# Patient Record
Sex: FEMALE | Race: WHITE | NOT HISPANIC OR LATINO
[De-identification: names, ages, dates, MRNs, and addresses within clinical notes are randomized per-mention and may not be internally consistent; named-entity substitution may affect disease eponyms.]

---

## 2017-01-12 ENCOUNTER — CHARTING TRANS (OUTPATIENT)
Dept: OTHER | Age: 57
End: 2017-01-12

## 2017-01-18 ENCOUNTER — CHARTING TRANS (OUTPATIENT)
Dept: OTHER | Age: 57
End: 2017-01-18

## 2017-01-20 ENCOUNTER — CHARTING TRANS (OUTPATIENT)
Dept: OTHER | Age: 57
End: 2017-01-20

## 2017-02-16 ENCOUNTER — CHARTING TRANS (OUTPATIENT)
Dept: OTHER | Age: 57
End: 2017-02-16

## 2017-02-17 ENCOUNTER — CHARTING TRANS (OUTPATIENT)
Dept: OTHER | Age: 57
End: 2017-02-17

## 2017-02-23 ENCOUNTER — MYAURORA ACCOUNT LINK (OUTPATIENT)
Dept: OTHER | Age: 57
End: 2017-02-23

## 2017-02-24 ENCOUNTER — PRIOR ORIGINAL RECORDS (OUTPATIENT)
Dept: OTHER | Age: 57
End: 2017-02-24

## 2017-03-13 ENCOUNTER — CHARTING TRANS (OUTPATIENT)
Dept: FAMILY MEDICINE | Age: 57
End: 2017-03-13

## 2017-03-13 ENCOUNTER — LAB SERVICES (OUTPATIENT)
Dept: OTHER | Age: 57
End: 2017-03-13

## 2017-03-13 ENCOUNTER — MYAURORA ACCOUNT LINK (OUTPATIENT)
Dept: OTHER | Age: 57
End: 2017-03-13

## 2017-03-13 ENCOUNTER — CHARTING TRANS (OUTPATIENT)
Dept: OTHER | Age: 57
End: 2017-03-13

## 2017-03-13 LAB
BILIRUBIN URINE: ABNORMAL
BLOOD URINE: NEGATIVE
CLARITY: ABNORMAL
COLOR: ABNORMAL
GLUCOSE QUALITATIVE U: NEGATIVE
KETONES, URINE: ABNORMAL
LEUKOCYTE ESTERASE URINE: NEGATIVE
NITRITE URINE: NEGATIVE
PH URINE: 6.5 (ref 5–7)
SPECIFIC GRAVITY URINE: 1.02 (ref 1–1.03)
URINE PROTEIN, QUAL (DIPSTICK): 100
UROBILINOGEN URINE: 1

## 2017-03-14 ENCOUNTER — CHARTING TRANS (OUTPATIENT)
Dept: OTHER | Age: 57
End: 2017-03-14

## 2017-03-14 ENCOUNTER — LAB SERVICES (OUTPATIENT)
Dept: OTHER | Age: 57
End: 2017-03-14

## 2017-03-14 ENCOUNTER — PRIOR ORIGINAL RECORDS (OUTPATIENT)
Dept: OTHER | Age: 57
End: 2017-03-14

## 2017-03-14 LAB
ALBUMIN SERPL BCG-MCNC: 4 G/DL (ref 3.6–5.1)
ALP SERPL-CCNC: 99 U/L (ref 45–105)
ALT SERPL W/O P-5'-P-CCNC: 24 U/L (ref 15–43)
AST SERPL-CCNC: 20 U/L (ref 14–43)
BILIRUB SERPL-MCNC: 0.7 MG/DL (ref 0–1.3)
BILIRUBIN URINE: NEGATIVE
BLOOD URINE: NEGATIVE
BUN SERPL-MCNC: 15 MG/DL (ref 7–20)
CALCIUM SERPL-MCNC: 9.5 MG/DL (ref 8.6–10.6)
CHLORIDE SERPL-SCNC: 108 MMOL/L (ref 96–107)
CLARITY: CLEAR
COLOR: YELLOW
CREATININE, SERUM: 0.8 MG/DL (ref 0.5–1.4)
GFR SERPL CREATININE-BSD FRML MDRD: >60 ML/MIN/{1.73M2}
GFR SERPL CREATININE-BSD FRML MDRD: >60 ML/MIN/{1.73M2}
GLUCOSE QUALITATIVE U: NEGATIVE
GLUCOSE SERPL-MCNC: 106 MG/DL (ref 70–200)
HCO3 SERPL-SCNC: 22 MMOL/L (ref 22–32)
KETONES, URINE: NEGATIVE
LEUKOCYTE ESTERASE URINE: NEGATIVE
NITRITE URINE: NEGATIVE
PH URINE: 7 (ref 5–7)
POTASSIUM SERPL-SCNC: 4.1 MMOL/L (ref 3.5–5.3)
PROT SERPL-MCNC: 7 G/DL (ref 6.4–8.5)
SODIUM SERPL-SCNC: 146 MMOL/L (ref 136–146)
SPECIFIC GRAVITY URINE: 1.02 (ref 1–1.03)
URINE PROTEIN, QUAL (DIPSTICK): NORMAL
UROBILINOGEN URINE: 0.2

## 2017-03-15 ENCOUNTER — CHARTING TRANS (OUTPATIENT)
Dept: OTHER | Age: 57
End: 2017-03-15

## 2017-03-15 LAB
ALBUMIN/CREAT UR: 21.7 MG/G (ref 0–30)
CREAT UR-MCNC: 79.7 MG/DL
MICROALBUMIN UR-MCNC: 17.3 MG/L

## 2017-03-16 LAB — FINAL REPORT: ABNORMAL

## 2017-03-28 ENCOUNTER — LAB SERVICES (OUTPATIENT)
Dept: OTHER | Age: 57
End: 2017-03-28

## 2017-03-28 ENCOUNTER — CHARTING TRANS (OUTPATIENT)
Dept: FAMILY MEDICINE | Age: 57
End: 2017-03-28

## 2017-03-28 ENCOUNTER — PRIOR ORIGINAL RECORDS (OUTPATIENT)
Dept: OTHER | Age: 57
End: 2017-03-28

## 2017-03-28 LAB
ALBUMIN SERPL BCG-MCNC: 3.5 G/DL (ref 3.6–5.1)
ALP SERPL-CCNC: 116 U/L (ref 45–105)
ALT SERPL W/O P-5'-P-CCNC: 31 U/L (ref 15–43)
AST SERPL-CCNC: 32 U/L (ref 14–43)
BILIRUB SERPL-MCNC: 0.9 MG/DL (ref 0–1.3)
BUN SERPL-MCNC: 13 MG/DL (ref 7–20)
CALCIUM SERPL-MCNC: 9.5 MG/DL (ref 8.6–10.6)
CHLORIDE SERPL-SCNC: 109 MMOL/L (ref 96–107)
CHOLEST SERPL-MCNC: 129 MG/DL (ref 140–200)
CREATININE, SERUM: 0.9 MG/DL (ref 0.5–1.4)
GFR SERPL CREATININE-BSD FRML MDRD: >60 ML/MIN/{1.73M2}
GFR SERPL CREATININE-BSD FRML MDRD: >60 ML/MIN/{1.73M2}
GLUCOSE SERPL-MCNC: 92 MG/DL (ref 70–200)
HCO3 SERPL-SCNC: 26 MMOL/L (ref 22–32)
HDLC SERPL-MCNC: 64 MG/DL
LDLC SERPL CALC-MCNC: 42 MG/DL (ref 30–100)
POTASSIUM SERPL-SCNC: 5 MMOL/L (ref 3.5–5.3)
PROT SERPL-MCNC: 6.6 G/DL (ref 6.4–8.5)
SODIUM SERPL-SCNC: 145 MMOL/L (ref 136–146)
TRIGL SERPL-MCNC: 115 MG/DL (ref 0–200)

## 2017-04-20 ENCOUNTER — HOSPITAL ENCOUNTER (OUTPATIENT)
Dept: LAB | Facility: HOSPITAL | Age: 57
Discharge: HOME | End: 2017-04-20
Attending: INTERNAL MEDICINE
Payer: COMMERCIAL

## 2017-04-20 ENCOUNTER — CHARTING TRANS (OUTPATIENT)
Dept: OTHER | Age: 57
End: 2017-04-20

## 2017-04-20 ENCOUNTER — PRIOR ORIGINAL RECORDS (OUTPATIENT)
Dept: OTHER | Age: 57
End: 2017-04-20

## 2017-04-20 PROCEDURE — 81001 URINALYSIS AUTO W/SCOPE: CPT | Performed by: INTERNAL MEDICINE

## 2017-04-20 PROCEDURE — 80048 BASIC METABOLIC PNL TOTAL CA: CPT | Performed by: INTERNAL MEDICINE

## 2017-04-20 PROCEDURE — 36415 COLL VENOUS BLD VENIPUNCTURE: CPT | Performed by: INTERNAL MEDICINE

## 2017-04-20 PROCEDURE — 85025 COMPLETE CBC W/AUTO DIFF WBC: CPT | Performed by: INTERNAL MEDICINE

## 2017-04-21 ENCOUNTER — PRIOR ORIGINAL RECORDS (OUTPATIENT)
Dept: OTHER | Age: 57
End: 2017-04-21

## 2017-04-21 LAB
ALBUMIN: 3.5 G/DL
ALKALINE PHOSPHATATE(ALK PHOS): 116 IU/L
BILIRUBIN TOTAL: 0.9 MG/DL
BUN: 13 MG/DL
CALCIUM: 9.5 MG/DL
CHLORIDE: 109 MEQ/L
CHOLESTEROL, TOTAL: 129 MG/DL
CREATININE, SERUM: 0.9 MG/DL
GLUCOSE: 92 MG/DL
HDL CHOLESTEROL: 64 MG/DL
LDL CHOLESTEROL: 42 MG/DL
POTASSIUM, SERUM: 4.5 MEQ/L
PROTEIN, TOTAL: 6.6 G/DL
SODIUM: 145 MEQ/L
TRIGLYCERIDES: 115 MG/DL

## 2017-04-27 LAB
BUN: 11 MG/DL
CALCIUM: 8.8 MG/DL
CHLORIDE: 103 MEQ/L
CREATININE, SERUM: 0.77 MG/DL
GLUCOSE: 88 MG/DL
HEMATOCRIT: 43.3 %
HEMOGLOBIN: 13.8 G/DL
PLATELETS: 192 K/UL
POTASSIUM, SERUM: 4.7 MEQ/L
RED BLOOD COUNT: 4.39 X 10-6/U
SODIUM: 136 MEQ/L
WHITE BLOOD COUNT: 9.4 X 10-3/U

## 2017-05-09 ENCOUNTER — APPOINTMENT (OUTPATIENT)
Dept: CT IMAGING | Facility: HOSPITAL | Age: 57
End: 2017-05-09
Attending: EMERGENCY MEDICINE
Payer: COMMERCIAL

## 2017-05-09 ENCOUNTER — PRIOR ORIGINAL RECORDS (OUTPATIENT)
Dept: OTHER | Age: 57
End: 2017-05-09

## 2017-05-09 ENCOUNTER — APPOINTMENT (OUTPATIENT)
Dept: GENERAL RADIOLOGY | Facility: HOSPITAL | Age: 57
End: 2017-05-09
Attending: EMERGENCY MEDICINE
Payer: COMMERCIAL

## 2017-05-09 ENCOUNTER — HOSPITAL ENCOUNTER (EMERGENCY)
Facility: HOSPITAL | Age: 57
Discharge: HOME | End: 2017-05-09
Attending: EMERGENCY MEDICINE
Payer: COMMERCIAL

## 2017-05-09 VITALS
HEART RATE: 77 BPM | OXYGEN SATURATION: 98 % | TEMPERATURE: 99 F | WEIGHT: 105 LBS | BODY MASS INDEX: 17.49 KG/M2 | RESPIRATION RATE: 14 BRPM | DIASTOLIC BLOOD PRESSURE: 77 MMHG | HEIGHT: 65 IN | SYSTOLIC BLOOD PRESSURE: 111 MMHG

## 2017-05-09 DIAGNOSIS — E86.0 DEHYDRATION: ICD-10-CM

## 2017-05-09 DIAGNOSIS — K52.9 GASTROENTERITIS: Primary | ICD-10-CM

## 2017-05-09 PROCEDURE — 96375 TX/PRO/DX INJ NEW DRUG ADDON: CPT

## 2017-05-09 PROCEDURE — 71020 XR CHEST PA + LAT CHEST (CPT=71020): CPT | Performed by: EMERGENCY MEDICINE

## 2017-05-09 PROCEDURE — 83690 ASSAY OF LIPASE: CPT | Performed by: EMERGENCY MEDICINE

## 2017-05-09 PROCEDURE — 99284 EMERGENCY DEPT VISIT MOD MDM: CPT

## 2017-05-09 PROCEDURE — 93010 ELECTROCARDIOGRAM REPORT: CPT

## 2017-05-09 PROCEDURE — 96374 THER/PROPH/DIAG INJ IV PUSH: CPT

## 2017-05-09 PROCEDURE — 96361 HYDRATE IV INFUSION ADD-ON: CPT

## 2017-05-09 PROCEDURE — 83880 ASSAY OF NATRIURETIC PEPTIDE: CPT | Performed by: EMERGENCY MEDICINE

## 2017-05-09 PROCEDURE — 80053 COMPREHEN METABOLIC PANEL: CPT | Performed by: EMERGENCY MEDICINE

## 2017-05-09 PROCEDURE — 93005 ELECTROCARDIOGRAM TRACING: CPT

## 2017-05-09 PROCEDURE — 85025 COMPLETE CBC W/AUTO DIFF WBC: CPT | Performed by: EMERGENCY MEDICINE

## 2017-05-09 PROCEDURE — 74177 CT ABD & PELVIS W/CONTRAST: CPT | Performed by: EMERGENCY MEDICINE

## 2017-05-09 PROCEDURE — 84484 ASSAY OF TROPONIN QUANT: CPT | Performed by: EMERGENCY MEDICINE

## 2017-05-09 RX ORDER — ONDANSETRON 2 MG/ML
4 INJECTION INTRAMUSCULAR; INTRAVENOUS ONCE
Status: COMPLETED | OUTPATIENT
Start: 2017-05-09 | End: 2017-05-09

## 2017-05-09 RX ORDER — MORPHINE SULFATE 4 MG/ML
4 INJECTION, SOLUTION INTRAMUSCULAR; INTRAVENOUS ONCE
Status: COMPLETED | OUTPATIENT
Start: 2017-05-09 | End: 2017-05-09

## 2017-05-09 NOTE — ED PROVIDER NOTES
Patient Seen in: [facility] Emergency Department    History   Patient presents with:  Nausea/Vomiting/Diarrhea (gastrointestinal): Diarrhea since last night with vomiting today.     Stated Complaint: vomiting and diarrhea    HPI    Patient is a pleas total) by mouth daily. Pravastatin Sodium 20 MG Oral Tab,  Take 1 tablet (20 mg total) by mouth nightly.    Zolpidem Tartrate ER 6.25 MG Oral Tab CR,  Take 6.25 mg by mouth nightly as needed for Sleep.   omeprazole 20 MG Oral Capsule Delayed Release,  [place] °C) (Temporal)  Resp 14  Ht 165.1 cm (5' 5\")  Wt 47.628 kg  BMI 17.47 kg/m2  SpO2 98%    Physical Exam    Vital signs are reviewed noted as documented in the nursing chart.    GENERAL: Patient is awake and alert, resting comfortably on the cart, in no appa UA HOLD   C. DIFFICILE(TOXIGENIC)PCR   STOOL CULTURE W/SHIGATOXIN    Narrative: The following orders were created for panel order STOOL CULTURE W/SHIGATOXIN.   Procedure                               Abnormality         Status                     ---- 12:34.  INDICATIONS:  vomiting and diarrhea  TECHNIQUE:  CT scanning was performed from the dome of the diaphragm to the pubic symphysis with non-ionic intravenous contrast material. Post contrast coronal MPR imaging was performed.   Dose reduction techniqu established, and blood was drawn and sent to the laboratory for further evaluation. The patient was attached to a cardiac monitor. An EKG was obtained and reviewed as noted above.  Patient was observed while the laboratory and radiology studies were obtaine

## 2017-05-09 NOTE — ED INITIAL ASSESSMENT (HPI)
Diarrhea since Thursday, vomiting starting yesterday. Patient states she feels \"depleted\". Denies fever at home.

## 2017-05-10 ENCOUNTER — CHARTING TRANS (OUTPATIENT)
Dept: OTHER | Age: 57
End: 2017-05-10

## 2017-06-05 ENCOUNTER — CHARTING TRANS (OUTPATIENT)
Dept: OTHER | Age: 57
End: 2017-06-05

## 2017-06-07 ENCOUNTER — CHARTING TRANS (OUTPATIENT)
Dept: OTHER | Age: 57
End: 2017-06-07

## 2017-06-21 ENCOUNTER — MYAURORA ACCOUNT LINK (OUTPATIENT)
Dept: OTHER | Age: 57
End: 2017-06-21

## 2017-06-21 ENCOUNTER — PRIOR ORIGINAL RECORDS (OUTPATIENT)
Dept: OTHER | Age: 57
End: 2017-06-21

## 2017-07-01 ENCOUNTER — APPOINTMENT (OUTPATIENT)
Dept: CT IMAGING | Facility: HOSPITAL | Age: 57
End: 2017-07-01
Attending: EMERGENCY MEDICINE
Payer: COMMERCIAL

## 2017-07-01 ENCOUNTER — PRIOR ORIGINAL RECORDS (OUTPATIENT)
Dept: OTHER | Age: 57
End: 2017-07-01

## 2017-07-01 ENCOUNTER — HOSPITAL ENCOUNTER (EMERGENCY)
Facility: HOSPITAL | Age: 57
Discharge: LEFT BEFORE DISCHARGE | End: 2017-07-01
Attending: EMERGENCY MEDICINE
Payer: COMMERCIAL

## 2017-07-01 VITALS
TEMPERATURE: 98 F | RESPIRATION RATE: 16 BRPM | BODY MASS INDEX: 19.16 KG/M2 | HEIGHT: 65 IN | SYSTOLIC BLOOD PRESSURE: 112 MMHG | DIASTOLIC BLOOD PRESSURE: 76 MMHG | HEART RATE: 71 BPM | WEIGHT: 115 LBS | OXYGEN SATURATION: 97 %
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
## 2017-07-01 DIAGNOSIS — R41.9 ALTERATION OF AWARENESS: Primary | ICD-10-CM

## 2017-07-01 DIAGNOSIS — T42.6X1A: ICD-10-CM

## 2017-07-01 LAB
ALBUMIN SERPL-MCNC: 3.3 G/DL (ref 3.5–4.8)
ALP LIVER SERPL-CCNC: 93 U/L (ref 41–108)
ALT SERPL-CCNC: 12 U/L (ref 14–54)
AMPHETAMINE URINE: NEGATIVE
APTT PPP: 27.5 SECONDS (ref 25–34)
AST SERPL-CCNC: 20 U/L (ref 15–41)
BARBITURATES URINE: NEGATIVE
BASOPHILS # BLD AUTO: 0.19 X10(3) UL (ref 0–0.1)
BASOPHILS NFR BLD AUTO: 2.1 %
BENZODIAZEPINES URINE: NEGATIVE
BILIRUB SERPL-MCNC: 1.9 MG/DL (ref 0.1–2)
BILIRUB UR QL STRIP.AUTO: NEGATIVE
BUN BLD-MCNC: 13 MG/DL (ref 8–20)
CALCIUM BLD-MCNC: 8.4 MG/DL (ref 8.3–10.3)
CHLORIDE: 110 MMOL/L (ref 101–111)
CLARITY UR REFRACT.AUTO: CLEAR
CO2: 23 MMOL/L (ref 22–32)
COCAINE URINE: NEGATIVE
COLOR UR AUTO: YELLOW
CREAT BLD-MCNC: 1.01 MG/DL (ref 0.55–1.02)
EOSINOPHIL # BLD AUTO: 0.28 X10(3) UL (ref 0–0.3)
EOSINOPHIL NFR BLD AUTO: 3.1 %
ERYTHROCYTE [DISTWIDTH] IN BLOOD BY AUTOMATED COUNT: 12.4 % (ref 11.5–16)
ETHYL ALCOHOL: 52 MG/DL (ref ?–3)
GLUCOSE BLD-MCNC: 84 MG/DL (ref 70–99)
GLUCOSE BLD-MCNC: 90 MG/DL (ref 65–99)
GLUCOSE UR STRIP.AUTO-MCNC: NEGATIVE MG/DL
HCT VFR BLD AUTO: 37.8 % (ref 34–50)
HGB BLD-MCNC: 12.8 G/DL (ref 12–16)
IMMATURE GRANULOCYTE COUNT: 0.1 X10(3) UL (ref 0–1)
IMMATURE GRANULOCYTE RATIO %: 1.1 %
INR BLD: 1 (ref 0.89–1.11)
KETONES UR STRIP.AUTO-MCNC: NEGATIVE MG/DL
LEUKOCYTE ESTERASE UR QL STRIP.AUTO: NEGATIVE
LYMPHOCYTES # BLD AUTO: 2.85 X10(3) UL (ref 0.9–4)
LYMPHOCYTES NFR BLD AUTO: 31.4 %
M PROTEIN MFR SERPL ELPH: 6.7 G/DL (ref 6.1–8.3)
MCH RBC QN AUTO: 30.8 PG (ref 27–33.2)
MCHC RBC AUTO-ENTMCNC: 33.9 G/DL (ref 31–37)
MCV RBC AUTO: 91.1 FL (ref 81–100)
MONOCYTES # BLD AUTO: 0.65 X10(3) UL (ref 0.1–0.6)
MONOCYTES NFR BLD AUTO: 7.2 %
NEUTROPHIL ABS PRELIM: 5.02 X10 (3) UL (ref 1.3–6.7)
NEUTROPHILS # BLD AUTO: 5.02 X10(3) UL (ref 1.3–6.7)
NEUTROPHILS NFR BLD AUTO: 55.1 %
NITRITE UR QL STRIP.AUTO: NEGATIVE
OPIATE URINE: NEGATIVE
PCP URINE: NEGATIVE
PH UR STRIP.AUTO: 6 [PH] (ref 4.5–8)
PLATELET # BLD AUTO: 176 10(3)UL (ref 150–450)
POTASSIUM SERPL-SCNC: 3.9 MMOL/L (ref 3.6–5.1)
PROT UR STRIP.AUTO-MCNC: NEGATIVE MG/DL
PSA SERPL DL<=0.01 NG/ML-MCNC: 13.2 SECONDS (ref 12–14.3)
RBC # BLD AUTO: 4.15 X10(6)UL (ref 3.8–5.1)
RBC UR QL AUTO: NEGATIVE
RED CELL DISTRIBUTION WIDTH-SD: 41.2 FL (ref 35.1–46.3)
SODIUM SERPL-SCNC: 141 MMOL/L (ref 136–144)
SP GR UR STRIP.AUTO: 1.01 (ref 1–1.03)
UROBILINOGEN UR STRIP.AUTO-MCNC: <2 MG/DL
WBC # BLD AUTO: 9.1 X10(3) UL (ref 4–13)

## 2017-07-01 PROCEDURE — 70450 CT HEAD/BRAIN W/O DYE: CPT | Performed by: EMERGENCY MEDICINE

## 2017-07-01 PROCEDURE — 82962 GLUCOSE BLOOD TEST: CPT

## 2017-07-01 PROCEDURE — 81003 URINALYSIS AUTO W/O SCOPE: CPT | Performed by: EMERGENCY MEDICINE

## 2017-07-01 PROCEDURE — 99285 EMERGENCY DEPT VISIT HI MDM: CPT

## 2017-07-01 PROCEDURE — 85610 PROTHROMBIN TIME: CPT | Performed by: EMERGENCY MEDICINE

## 2017-07-01 PROCEDURE — 96361 HYDRATE IV INFUSION ADD-ON: CPT

## 2017-07-01 PROCEDURE — 85025 COMPLETE CBC W/AUTO DIFF WBC: CPT | Performed by: EMERGENCY MEDICINE

## 2017-07-01 PROCEDURE — 96360 HYDRATION IV INFUSION INIT: CPT

## 2017-07-01 PROCEDURE — 93005 ELECTROCARDIOGRAM TRACING: CPT

## 2017-07-01 PROCEDURE — 85730 THROMBOPLASTIN TIME PARTIAL: CPT | Performed by: EMERGENCY MEDICINE

## 2017-07-01 PROCEDURE — 80307 DRUG TEST PRSMV CHEM ANLYZR: CPT | Performed by: EMERGENCY MEDICINE

## 2017-07-01 PROCEDURE — 80320 DRUG SCREEN QUANTALCOHOLS: CPT | Performed by: EMERGENCY MEDICINE

## 2017-07-01 PROCEDURE — 80053 COMPREHEN METABOLIC PANEL: CPT | Performed by: EMERGENCY MEDICINE

## 2017-07-01 PROCEDURE — 93010 ELECTROCARDIOGRAM REPORT: CPT

## 2017-07-01 RX ORDER — SODIUM CHLORIDE 9 MG/ML
INJECTION, SOLUTION INTRAVENOUS ONCE
Status: COMPLETED | OUTPATIENT
Start: 2017-07-01 | End: 2017-07-01

## 2017-07-01 RX ORDER — ACETAMINOPHEN 500 MG
1000 TABLET ORAL ONCE
Status: COMPLETED | OUTPATIENT
Start: 2017-07-01 | End: 2017-07-01

## 2017-07-01 NOTE — ED NOTES
While reviewing home medications with patients spouse, spouse stating that patient takes Ambien nightly, but approximately 15 ambien are missing from rx bottle. Patient unable to recall if she took more than prescribed dose.

## 2017-07-01 NOTE — ED PROVIDER NOTES
Patient Seen in: [facility] Emergency Department    History   Patient presents with:  Altered Mental Status (neurologic)  Dizziness (neurologic)    Stated Complaint: altered mental status,dizziness    HPI    Majority of history is obtained from [facility]  No date: OTHER SURGICAL HISTORY      Comment: heart net    Medications :   lisinopril 10 MG Oral Tab,  Take 1 tablet (10 mg total) by mouth daily. Patient taking differently: Take 20 mg by mouth daily.      Metoprolol Succinate ER 25 MG Oral otherwise stated in HPI.     Physical Exam   ED Triage Vitals [07/01/17 1720]  BP: 100/77  Pulse: 71  Resp: (!) 32  Temp: 97.7 °F (36.5 °C)  Temp src: Temporal  SpO2: 97 %  O2 Device: None (Room air)    Current:/81   Pulse 71   Temp 97.7 °F (36.5 °C) Positive (*)     All other components within normal limits    Narrative:     Results of the Urine Drug Screen should be used only for medical purposes.    ETHYL ALCOHOL - Abnormal; Notable for the following:     Ethyl Alcohol 52 (*)     All other components especially since patient is reporting significant headache. Metabolic or infectious history is also considered.       Patient hydrated with normal saline and closely monitored    CBC shows normal white count and hemoglobin with adequate platelets  metaboli

## 2017-07-01 NOTE — ED INITIAL ASSESSMENT (HPI)
Patient arrives from home with spouse, spouse states patient became confused last night. Spouse said that patient came downstairs and stated she felt like she was drunk but she was not drinking that day.  Patient also took her morning medications at night a

## 2017-07-02 LAB
ATRIAL RATE: 70 BPM
P AXIS: 39 DEGREES
P-R INTERVAL: 202 MS
Q-T INTERVAL: 414 MS
QRS DURATION: 82 MS
QTC CALCULATION (BEZET): 447 MS
R AXIS: -12 DEGREES
T AXIS: 146 DEGREES
VENTRICULAR RATE: 70 BPM

## 2017-07-02 NOTE — ED NOTES
RE-EVSANTINO ONTIVEROS MD AND TO BE DCD AMA--DETAILED DISCUSION WITH FINDINGS AND PRECAUTIONS FOR AMA. TO RETURN IF S/S RETURN OR BECOME GREATER.  IN AGREEMENT . QUESTIONS ANSWERED PER JENNIFER SALEH.  IV DCD INTACT.   AMB + GAIT WITH SPOUSE

## 2017-07-10 ENCOUNTER — CHARTING TRANS (OUTPATIENT)
Dept: OTHER | Age: 57
End: 2017-07-10

## 2017-07-10 ENCOUNTER — LAB SERVICES (OUTPATIENT)
Dept: OTHER | Age: 57
End: 2017-07-10

## 2017-07-10 ENCOUNTER — CHARTING TRANS (OUTPATIENT)
Dept: FAMILY MEDICINE | Age: 57
End: 2017-07-10

## 2017-07-13 ENCOUNTER — MYAURORA ACCOUNT LINK (OUTPATIENT)
Dept: OTHER | Age: 57
End: 2017-07-13

## 2017-07-13 ENCOUNTER — HOSPITAL ENCOUNTER (OUTPATIENT)
Dept: CV DIAGNOSTICS | Facility: HOSPITAL | Age: 57
Discharge: HOME | End: 2017-07-13
Attending: INTERNAL MEDICINE

## 2017-07-13 DIAGNOSIS — I50.1 LEFT HEART FAILURE (HCC): ICD-10-CM

## 2017-07-13 DIAGNOSIS — R06.00 DYSPNEA, UNSPECIFIED TYPE: ICD-10-CM

## 2017-07-21 ENCOUNTER — CHARTING TRANS (OUTPATIENT)
Dept: OTHER | Age: 57
End: 2017-07-21

## 2017-07-21 LAB — AP REPORT: NORMAL

## 2017-07-25 LAB — HPV I/H RISK 4 DNA CVX QL NAA+PROBE: NORMAL

## 2017-08-02 ENCOUNTER — CHARTING TRANS (OUTPATIENT)
Dept: OTHER | Age: 57
End: 2017-08-02

## 2017-08-02 ENCOUNTER — PRIOR ORIGINAL RECORDS (OUTPATIENT)
Dept: OTHER | Age: 57
End: 2017-08-02

## 2017-08-14 LAB
ALBUMIN: 3.3 G/DL
ALBUMIN: 3.5 G/DL
ALBUMIN: 4 G/DL
ALKALINE PHOSPHATATE(ALK PHOS): 116 IU/L
ALKALINE PHOSPHATATE(ALK PHOS): 93 IU/L
ALKALINE PHOSPHATATE(ALK PHOS): 99 IU/L
BILIRUBIN TOTAL: 0.7 MG/DL
BILIRUBIN TOTAL: 0.9 MG/DL
BILIRUBIN TOTAL: 1.9 MG/DL
BUN: 13 MG/DL
BUN: 13 MG/DL
BUN: 15 MG/DL
CALCIUM: 8.4 MG/DL
CALCIUM: 9.5 MG/DL
CALCIUM: 9.5 MG/DL
CHLORIDE: 108 MEQ/L
CHLORIDE: 109 MEQ/L
CHLORIDE: 110 MEQ/L
CHOLESTEROL, TOTAL: 129 MG/DL
CREATININE, SERUM: 0.8 MG/DL
CREATININE, SERUM: 0.9 MG/DL
CREATININE, SERUM: 1.01 MG/DL
GLUCOSE: 106 MG/DL
GLUCOSE: 84 MG/DL
GLUCOSE: 92 MG/DL
HDL CHOLESTEROL: 64 MG/DL
LDL CHOLESTEROL: 42 MG/DL
POTASSIUM, SERUM: 3.9 MEQ/L
POTASSIUM, SERUM: 4.1 MEQ/L
POTASSIUM, SERUM: 5 MEQ/L
PROTEIN, TOTAL: 6.6 G/DL
PROTEIN, TOTAL: 6.7 G/DL
PROTEIN, TOTAL: 7 G/DL
SGOT (AST): 20 IU/L
SGOT (AST): 20 IU/L
SGOT (AST): 32 IU/L
SGPT (ALT): 12 IU/L
SGPT (ALT): 24 IU/L
SGPT (ALT): 31 IU/L
SODIUM: 141 MEQ/L
SODIUM: 145 MEQ/L
SODIUM: 146 MEQ/L
TRIGLYCERIDES: 115 MG/DL

## 2017-08-29 ENCOUNTER — CHARTING TRANS (OUTPATIENT)
Dept: OTHER | Age: 57
End: 2017-08-29

## 2017-08-31 ENCOUNTER — CHARTING TRANS (OUTPATIENT)
Dept: OTHER | Age: 57
End: 2017-08-31

## 2017-09-06 ENCOUNTER — MYAURORA ACCOUNT LINK (OUTPATIENT)
Dept: OTHER | Age: 57
End: 2017-09-06

## 2017-09-27 ENCOUNTER — CHARTING TRANS (OUTPATIENT)
Dept: OTHER | Age: 57
End: 2017-09-27

## 2017-10-12 ENCOUNTER — IMAGING SERVICES (OUTPATIENT)
Dept: OTHER | Age: 57
End: 2017-10-12

## 2017-10-19 ENCOUNTER — CHARTING TRANS (OUTPATIENT)
Dept: OTHER | Age: 57
End: 2017-10-19

## 2017-10-25 ENCOUNTER — CHARTING TRANS (OUTPATIENT)
Dept: OTHER | Age: 57
End: 2017-10-25

## 2017-10-26 ENCOUNTER — CHARTING TRANS (OUTPATIENT)
Dept: OTHER | Age: 57
End: 2017-10-26

## 2017-10-27 ENCOUNTER — CHARTING TRANS (OUTPATIENT)
Dept: OTHER | Age: 57
End: 2017-10-27

## 2017-11-13 ENCOUNTER — CHARTING TRANS (OUTPATIENT)
Dept: OTHER | Age: 57
End: 2017-11-13

## 2017-11-13 ENCOUNTER — LAB SERVICES (OUTPATIENT)
Dept: OTHER | Age: 57
End: 2017-11-13

## 2017-11-13 LAB
BUN SERPL-MCNC: 16 MG/DL (ref 7–20)
CALCIUM SERPL-MCNC: 9.5 MG/DL (ref 8.6–10.6)
CHLORIDE SERPL-SCNC: 110 MMOL/L (ref 96–107)
CREATININE, SERUM: 1 MG/DL (ref 0.5–1.4)
GFR SERPL CREATININE-BSD FRML MDRD: 55 ML/MIN/{1.73M2}
GFR SERPL CREATININE-BSD FRML MDRD: >60 ML/MIN/{1.73M2}
GLUCOSE SERPL-MCNC: 89 MG/DL (ref 70–200)
HCO3 SERPL-SCNC: 22 MMOL/L (ref 22–32)
POTASSIUM SERPL-SCNC: 4 MMOL/L (ref 3.5–5.3)
SODIUM SERPL-SCNC: 141 MMOL/L (ref 136–146)

## 2017-11-20 ENCOUNTER — HOSPITAL ENCOUNTER (EMERGENCY)
Facility: HOSPITAL | Age: 57
Discharge: HOME | End: 2017-11-20
Attending: EMERGENCY MEDICINE
Payer: COMMERCIAL

## 2017-11-20 ENCOUNTER — APPOINTMENT (OUTPATIENT)
Dept: CT IMAGING | Facility: HOSPITAL | Age: 57
End: 2017-11-20
Attending: EMERGENCY MEDICINE
Payer: COMMERCIAL

## 2017-11-20 VITALS
RESPIRATION RATE: 18 BRPM | DIASTOLIC BLOOD PRESSURE: 74 MMHG | WEIGHT: 120 LBS | HEART RATE: 73 BPM | HEIGHT: 65 IN | TEMPERATURE: 98 F | BODY MASS INDEX: 19.99 KG/M2 | SYSTOLIC BLOOD PRESSURE: 108 MMHG | OXYGEN SATURATION: 99 %

## 2017-11-20 DIAGNOSIS — R51.9 NONINTRACTABLE HEADACHE, UNSPECIFIED CHRONICITY PATTERN, UNSPECIFIED HEADACHE TYPE: Primary | ICD-10-CM

## 2017-11-20 DIAGNOSIS — R53.83 FATIGUE, UNSPECIFIED TYPE: ICD-10-CM

## 2017-11-20 PROCEDURE — 96361 HYDRATE IV INFUSION ADD-ON: CPT

## 2017-11-20 PROCEDURE — 84484 ASSAY OF TROPONIN QUANT: CPT | Performed by: EMERGENCY MEDICINE

## 2017-11-20 PROCEDURE — 80053 COMPREHEN METABOLIC PANEL: CPT | Performed by: EMERGENCY MEDICINE

## 2017-11-20 PROCEDURE — 85610 PROTHROMBIN TIME: CPT | Performed by: EMERGENCY MEDICINE

## 2017-11-20 PROCEDURE — 96374 THER/PROPH/DIAG INJ IV PUSH: CPT

## 2017-11-20 PROCEDURE — 99285 EMERGENCY DEPT VISIT HI MDM: CPT

## 2017-11-20 PROCEDURE — 85025 COMPLETE CBC W/AUTO DIFF WBC: CPT | Performed by: EMERGENCY MEDICINE

## 2017-11-20 PROCEDURE — 93005 ELECTROCARDIOGRAM TRACING: CPT

## 2017-11-20 PROCEDURE — 85730 THROMBOPLASTIN TIME PARTIAL: CPT | Performed by: EMERGENCY MEDICINE

## 2017-11-20 PROCEDURE — 70450 CT HEAD/BRAIN W/O DYE: CPT | Performed by: EMERGENCY MEDICINE

## 2017-11-20 PROCEDURE — 93010 ELECTROCARDIOGRAM REPORT: CPT

## 2017-11-20 PROCEDURE — 96375 TX/PRO/DX INJ NEW DRUG ADDON: CPT

## 2017-11-20 RX ORDER — HYDROMORPHONE HYDROCHLORIDE 1 MG/ML
0.5 INJECTION, SOLUTION INTRAMUSCULAR; INTRAVENOUS; SUBCUTANEOUS EVERY 30 MIN PRN
Status: DISCONTINUED | OUTPATIENT
Start: 2017-11-20 | End: 2017-11-20

## 2017-11-20 RX ORDER — ONDANSETRON 2 MG/ML
4 INJECTION INTRAMUSCULAR; INTRAVENOUS ONCE
Status: COMPLETED | OUTPATIENT
Start: 2017-11-20 | End: 2017-11-20

## 2017-11-21 NOTE — ED PROVIDER NOTES
Patient Seen in: [facility] Emergency Department    History   Patient presents with:  Fatigue (constitutional, neurologic)    Stated Complaint: Pale, Headache, V/D, Dizzy     HPI    Rachael Hobson is a 52-year-old female presenting to the emergency depart except as noted above.     Physical Exam   ED Triage Vitals [11/20/17 6406]  BP: 105/76  Pulse: 86  Resp: 23  Temp: 98 °F (36.7 °C)  Temp src: Temporal  SpO2: 99 %  O2 Device: None (Room air)    Current:/76   Pulse 86   Temp 98 °F (36.7 °C) (Temporal) components within normal limits   TROPONIN I - Normal   PROTHROMBIN TIME (PT) - Normal   CBC WITH DIFFERENTIAL WITH PLATELET    Narrative: The following orders were created for panel order CBC WITH DIFFERENTIAL WITH PLATELET.   Procedure

## 2017-11-25 ENCOUNTER — CHARTING TRANS (OUTPATIENT)
Dept: OTHER | Age: 57
End: 2017-11-25

## 2017-11-28 ENCOUNTER — CHARTING TRANS (OUTPATIENT)
Dept: OTHER | Age: 57
End: 2017-11-28

## 2017-12-06 ENCOUNTER — HOSPITAL ENCOUNTER (OUTPATIENT)
Dept: LAB | Facility: HOSPITAL | Age: 57
Discharge: HOME | End: 2017-12-06
Attending: INTERNAL MEDICINE
Payer: COMMERCIAL

## 2017-12-06 ENCOUNTER — PRIOR ORIGINAL RECORDS (OUTPATIENT)
Dept: OTHER | Age: 57
End: 2017-12-06

## 2017-12-06 PROCEDURE — 80053 COMPREHEN METABOLIC PANEL: CPT | Performed by: INTERNAL MEDICINE

## 2017-12-06 PROCEDURE — 83880 ASSAY OF NATRIURETIC PEPTIDE: CPT | Performed by: INTERNAL MEDICINE

## 2017-12-06 PROCEDURE — 84443 ASSAY THYROID STIM HORMONE: CPT | Performed by: INTERNAL MEDICINE

## 2017-12-06 PROCEDURE — 83036 HEMOGLOBIN GLYCOSYLATED A1C: CPT | Performed by: INTERNAL MEDICINE

## 2017-12-06 PROCEDURE — 36415 COLL VENOUS BLD VENIPUNCTURE: CPT | Performed by: INTERNAL MEDICINE

## 2017-12-06 PROCEDURE — 85025 COMPLETE CBC W/AUTO DIFF WBC: CPT | Performed by: INTERNAL MEDICINE

## 2017-12-07 LAB
ALKALINE PHOSPHATATE(ALK PHOS): 124 IU/L
ALT (SGPT): 16 U/L
AST (SGOT): 17 U/L
BILIRUBIN TOTAL: 0.9 MG/DL
BUN: 11 MG/DL
CALCIUM: 8.3 MG/DL
CHLORIDE: 111 MEQ/L
CREATININE, SERUM: 0.83 MG/DL
GLUCOSE: 102 MG/DL
GLUCOSE: 102 MG/DL
HEMATOCRIT: 40 %
HEMOGLOBIN A1C: 5.1 %
HEMOGLOBIN: 12.6 G/DL
PLATELETS: 163 K/UL
POTASSIUM, SERUM: 4.6 MEQ/L
PROBNP: 1918 PG/ML
PROTEIN, TOTAL: 7.2 G/DL
SGOT (AST): 17 IU/L
SGPT (ALT): 16 IU/L
SODIUM: 140 MEQ/L
THYROID STIMULATING HORMONE: 2.7 MLU/L
WHITE BLOOD COUNT: 15.2 X 10-3/U

## 2017-12-08 ENCOUNTER — PRIOR ORIGINAL RECORDS (OUTPATIENT)
Dept: OTHER | Age: 57
End: 2017-12-08

## 2017-12-14 ENCOUNTER — PRIOR ORIGINAL RECORDS (OUTPATIENT)
Dept: OTHER | Age: 57
End: 2017-12-14

## 2017-12-18 ENCOUNTER — CHARTING TRANS (OUTPATIENT)
Dept: OTHER | Age: 57
End: 2017-12-18

## 2017-12-20 ENCOUNTER — PRIOR ORIGINAL RECORDS (OUTPATIENT)
Dept: OTHER | Age: 57
End: 2017-12-20

## 2017-12-21 NOTE — HISTORICAL OFFICE NOTE
Andrea Books  : 1960  ACCOUNT:  [id number]  [phone]  PCP: Dr. Cameron Delgado     TODAY'S DATE: 2017  DICTATED BY:  Tanya Weaver M.D.]    CHIEF COMPLAINT: [Followup of ICD, not pacemaker dependent.]    HPI:    [On 2017, Rodríguez Ellis cardiac catheterization June 2016, Medtronic generator change 6/13/2011, MI 2004, drug eluding stent 5/05,, permanent pacemaker and tilt table study    FAMILY HISTORY: Negative for premature CAD. Significant for AAA.   SOCIAL HISTORY: SMOKING: Former tobacc BY MOUTH DAILY                 08/18/16 *Potassium Chloride At42EVX     1 TABLET DAILY AS DIRECTED.              06/08/16 Torsemide             10MG      one tablet as needed                     12/18/15 Norco                 7.5-325M  one tablet every 8hr

## 2017-12-21 NOTE — HISTORICAL OFFICE NOTE
PRICE VASQUEZ  : 1960 18:29:40  ACCOUNT:  [id number]  HOME PHONE:  [phone]  WORK PHONE:  [phone]        Received orders from Dr Geni Begum may hold Plavix for 5 days prior to procedure  called and notified patient and to co

## 2017-12-28 ENCOUNTER — HOSPITAL ENCOUNTER (OUTPATIENT)
Dept: INTERVENTIONAL RADIOLOGY/VASCULAR | Facility: HOSPITAL | Age: 57
Discharge: HOME | End: 2017-12-28
Attending: INTERNAL MEDICINE | Admitting: INTERNAL MEDICINE
Payer: COMMERCIAL

## 2017-12-28 VITALS
RESPIRATION RATE: 18 BRPM | DIASTOLIC BLOOD PRESSURE: 48 MMHG | SYSTOLIC BLOOD PRESSURE: 79 MMHG | BODY MASS INDEX: 19.99 KG/M2 | TEMPERATURE: 97 F | HEIGHT: 65 IN | OXYGEN SATURATION: 99 % | WEIGHT: 120 LBS | HEART RATE: 69 BPM

## 2017-12-28 DIAGNOSIS — Z45.02 IMPLANTABLE CARDIOVERTER-DEFIBRILLATOR (ICD) AT END OF BATTERY LIFE: ICD-10-CM

## 2017-12-28 PROCEDURE — 99152 MOD SED SAME PHYS/QHP 5/>YRS: CPT

## 2017-12-28 PROCEDURE — 93010 ELECTROCARDIOGRAM REPORT: CPT | Performed by: INTERNAL MEDICINE

## 2017-12-28 PROCEDURE — 0JPT0PZ REMOVAL OF CARDIAC RHYTHM RELATED DEVICE FROM TRUNK SUBCUTANEOUS TISSUE AND FASCIA, OPEN APPROACH: ICD-10-PCS | Performed by: INTERNAL MEDICINE

## 2017-12-28 PROCEDURE — 0JH608Z INSERTION OF DEFIBRILLATOR GENERATOR INTO CHEST SUBCUTANEOUS TISSUE AND FASCIA, OPEN APPROACH: ICD-10-PCS | Performed by: INTERNAL MEDICINE

## 2017-12-28 PROCEDURE — 99153 MOD SED SAME PHYS/QHP EA: CPT

## 2017-12-28 PROCEDURE — 33263 RMVL & RPLCMT DFB GEN 2 LEAD: CPT

## 2017-12-28 PROCEDURE — 93005 ELECTROCARDIOGRAM TRACING: CPT

## 2017-12-28 RX ORDER — CEFAZOLIN SODIUM/WATER 2 G/20 ML
SYRINGE (ML) INTRAVENOUS
Status: COMPLETED
Start: 2017-12-28 | End: 2017-12-28

## 2017-12-28 RX ORDER — MIDAZOLAM HYDROCHLORIDE 1 MG/ML
INJECTION INTRAMUSCULAR; INTRAVENOUS
Status: COMPLETED
Start: 2017-12-28 | End: 2017-12-28

## 2017-12-28 RX ORDER — CEFAZOLIN SODIUM/WATER 2 G/20 ML
2 SYRINGE (ML) INTRAVENOUS
Status: DISCONTINUED | OUTPATIENT
Start: 2017-12-28 | End: 2017-12-28

## 2017-12-28 RX ORDER — CHLORHEXIDINE GLUCONATE 4 G/100ML
30 SOLUTION TOPICAL
Status: DISCONTINUED | OUTPATIENT
Start: 2017-12-29 | End: 2017-12-28

## 2017-12-28 RX ORDER — DIPHENHYDRAMINE HYDROCHLORIDE 50 MG/ML
INJECTION INTRAMUSCULAR; INTRAVENOUS
Status: COMPLETED
Start: 2017-12-28 | End: 2017-12-28

## 2017-12-28 RX ORDER — LIDOCAINE HYDROCHLORIDE 10 MG/ML
INJECTION, SOLUTION EPIDURAL; INFILTRATION; INTRACAUDAL; PERINEURAL
Status: COMPLETED
Start: 2017-12-28 | End: 2017-12-28

## 2017-12-28 RX ORDER — SODIUM CHLORIDE 9 MG/ML
INJECTION, SOLUTION INTRAVENOUS CONTINUOUS
Status: DISCONTINUED | OUTPATIENT
Start: 2017-12-28 | End: 2017-12-28

## 2017-12-28 RX ORDER — BACITRACIN 50000 [USP'U]/1
INJECTION, POWDER, LYOPHILIZED, FOR SOLUTION INTRAMUSCULAR
Status: COMPLETED
Start: 2017-12-28 | End: 2017-12-28

## 2017-12-28 RX ADMIN — SODIUM CHLORIDE: 9 INJECTION, SOLUTION INTRAVENOUS at 10:15:00

## 2017-12-28 NOTE — PROCEDURES
OPERATION(S) PERFORMED:   1. Dual Ch ICD implant. 2. ICD generator removal.       : Grzegorz Cheng MD  INDICATION: Device at CASSI. COMPLICATIONS: None     ESTIMATED BLOOD LOSS: Minimal.    SEDATION: IV was maintained by RN.  Patient was assessed by

## 2017-12-28 NOTE — H&P
[facility]  H&P    Rosita Sorensen Patient Status:  Outpatient in a Bed    1960 MRN [id number]   Location [address] [facility] Attending Cynthia Vega MD   Hosp Day # 0 SOFIE Cunningham Comment: Right & left hear angiogram  05/31/2016: ANGIOGRAM      Comment: Right & left heart angiogram  06/02/2016: ANGIOGRAM      Comment: Right & left heart angiogram  11/24/2004: ANGIOPLASTY (CORONARY)      Comment: stent to LAD  05/11/2005: ANGIOPLASTY 120 lb  11/20/17 : 120 lb  07/01/17 : 115 lb        General: Alert and oriented in no apparent distress. HEENT: No focal deficits. Neck: No JVD, carotids 2+ no bruits. Cardiac: Regular rate and rhythm, S1, S2 normal, no murmur, rub or gallop.   Lungs: Cl

## 2018-01-01 ENCOUNTER — EXTERNAL RECORD (OUTPATIENT)
Dept: HEALTH INFORMATION MANAGEMENT | Facility: OTHER | Age: 58
End: 2018-01-01

## 2018-01-03 ENCOUNTER — CHARTING TRANS (OUTPATIENT)
Dept: FAMILY MEDICINE | Age: 58
End: 2018-01-03

## 2018-01-11 ENCOUNTER — CHARTING TRANS (OUTPATIENT)
Dept: OTHER | Age: 58
End: 2018-01-11

## 2018-01-24 ENCOUNTER — CHARTING TRANS (OUTPATIENT)
Dept: OTHER | Age: 58
End: 2018-01-24

## 2018-01-27 ENCOUNTER — CHARTING TRANS (OUTPATIENT)
Dept: OTHER | Age: 58
End: 2018-01-27

## 2018-01-29 ENCOUNTER — PRIOR ORIGINAL RECORDS (OUTPATIENT)
Dept: OTHER | Age: 58
End: 2018-01-29

## 2018-02-05 ENCOUNTER — APPOINTMENT (OUTPATIENT)
Dept: GENERAL RADIOLOGY | Facility: HOSPITAL | Age: 58
DRG: 194 | End: 2018-02-05
Attending: EMERGENCY MEDICINE
Payer: COMMERCIAL

## 2018-02-05 ENCOUNTER — PRIOR ORIGINAL RECORDS (OUTPATIENT)
Dept: OTHER | Age: 58
End: 2018-02-05

## 2018-02-05 ENCOUNTER — HOSPITAL ENCOUNTER (INPATIENT)
Facility: HOSPITAL | Age: 58
LOS: 2 days | Discharge: HOME | DRG: 194 | End: 2018-02-07
Attending: EMERGENCY MEDICINE | Admitting: HOSPITALIST
Payer: COMMERCIAL

## 2018-02-05 DIAGNOSIS — I50.9 ACUTE ON CHRONIC CONGESTIVE HEART FAILURE, UNSPECIFIED CONGESTIVE HEART FAILURE TYPE: ICD-10-CM

## 2018-02-05 DIAGNOSIS — N17.9 AKI (ACUTE KIDNEY INJURY) (HCC): ICD-10-CM

## 2018-02-05 DIAGNOSIS — J18.9 COMMUNITY ACQUIRED PNEUMONIA, UNSPECIFIED LATERALITY: ICD-10-CM

## 2018-02-05 DIAGNOSIS — E86.0 DEHYDRATION: Primary | ICD-10-CM

## 2018-02-05 LAB
ADENOVIRUS PCR:: NEGATIVE
ALBUMIN SERPL-MCNC: 2.2 G/DL (ref 3.5–4.8)
ALP LIVER SERPL-CCNC: 457 U/L (ref 46–118)
ALT SERPL-CCNC: 21 U/L (ref 14–54)
AST SERPL-CCNC: 44 U/L (ref 15–41)
ATRIAL RATE: 70 BPM
B PERT DNA SPEC QL NAA+PROBE: NEGATIVE
BASOPHILS # BLD AUTO: 0.06 X10(3) UL (ref 0–0.1)
BASOPHILS NFR BLD AUTO: 0.6 %
BILIRUB SERPL-MCNC: 0.6 MG/DL (ref 0.1–2)
BILIRUB UR QL STRIP.AUTO: NEGATIVE
BUN BLD-MCNC: 42 MG/DL (ref 8–20)
C PNEUM DNA SPEC QL NAA+PROBE: NEGATIVE
CALCIUM BLD-MCNC: 9 MG/DL (ref 8.3–10.3)
CHLORIDE: 110 MMOL/L (ref 101–111)
CLARITY UR REFRACT.AUTO: CLEAR
CO2: 19 MMOL/L (ref 22–32)
COLOR UR AUTO: YELLOW
CORONAVIRUS 229E PCR:: NEGATIVE
CORONAVIRUS HKU1 PCR:: NEGATIVE
CORONAVIRUS NL63 PCR:: NEGATIVE
CORONAVIRUS OC43 PCR:: NEGATIVE
CREAT BLD-MCNC: 2.7 MG/DL (ref 0.55–1.02)
EOSINOPHIL # BLD AUTO: 0.01 X10(3) UL (ref 0–0.3)
EOSINOPHIL NFR BLD AUTO: 0.1 %
ERYTHROCYTE [DISTWIDTH] IN BLOOD BY AUTOMATED COUNT: 14.7 % (ref 11.5–16)
FLUAV H3 RNA SPEC QL NAA+PROBE: POSITIVE
FLUBV RNA SPEC QL NAA+PROBE: NEGATIVE
GLUCOSE BLD-MCNC: 200 MG/DL (ref 70–99)
GLUCOSE UR STRIP.AUTO-MCNC: NEGATIVE MG/DL
HCT VFR BLD AUTO: 42.9 % (ref 34–50)
HGB BLD-MCNC: 13.8 G/DL (ref 12–16)
IMMATURE GRANULOCYTE COUNT: 0.12 X10(3) UL (ref 0–1)
IMMATURE GRANULOCYTE RATIO %: 1.3 %
KETONES UR STRIP.AUTO-MCNC: NEGATIVE MG/DL
LACTIC ACID: 1.7 MMOL/L (ref 0.5–2)
LACTIC ACID: 2.1 MMOL/L (ref 0.5–2)
LEUKOCYTE ESTERASE UR QL STRIP.AUTO: NEGATIVE
LIPASE: 55 U/L (ref 73–393)
LYMPHOCYTES # BLD AUTO: 1.32 X10(3) UL (ref 0.9–4)
LYMPHOCYTES NFR BLD AUTO: 13.8 %
M PROTEIN MFR SERPL ELPH: 7.9 G/DL (ref 6.1–8.3)
MCH RBC QN AUTO: 29.4 PG (ref 27–33.2)
MCHC RBC AUTO-ENTMCNC: 32.2 G/DL (ref 31–37)
MCV RBC AUTO: 91.3 FL (ref 81–100)
METAPNEUMOVIRUS PCR:: NEGATIVE
MONOCYTES # BLD AUTO: 0.83 X10(3) UL (ref 0.1–0.6)
MONOCYTES NFR BLD AUTO: 8.7 %
MYCOPLASMA PNEUMONIA PCR:: NEGATIVE
NEUTROPHIL ABS PRELIM: 7.21 X10 (3) UL (ref 1.3–6.7)
NEUTROPHILS # BLD AUTO: 7.21 X10(3) UL (ref 1.3–6.7)
NEUTROPHILS NFR BLD AUTO: 75.5 %
NITRITE UR QL STRIP.AUTO: NEGATIVE
P AXIS: 11 DEGREES
P-R INTERVAL: 154 MS
PARAINFLUENZA 1 PCR:: NEGATIVE
PARAINFLUENZA 2 PCR:: NEGATIVE
PARAINFLUENZA 3 PCR:: NEGATIVE
PARAINFLUENZA 4 PCR:: NEGATIVE
PH UR STRIP.AUTO: 7 [PH] (ref 4.5–8)
PLATELET # BLD AUTO: 88 10(3)UL (ref 150–450)
POTASSIUM SERPL-SCNC: 3.9 MMOL/L (ref 3.6–5.1)
PRO-BETA NATRIURETIC PEPTIDE: 2753 PG/ML (ref ?–125)
PROCALCITONIN SERPL-MCNC: 0.83 NG/ML (ref ?–0.11)
PROT UR STRIP.AUTO-MCNC: 100 MG/DL
Q-T INTERVAL: 398 MS
QRS DURATION: 82 MS
QTC CALCULATION (BEZET): 429 MS
R AXIS: -19 DEGREES
RBC # BLD AUTO: 4.7 X10(6)UL (ref 3.8–5.1)
RBC UR QL AUTO: NEGATIVE
RED CELL DISTRIBUTION WIDTH-SD: 49.5 FL (ref 35.1–46.3)
RHINOVIRUS/ENTERO PCR:: NEGATIVE
RSV RNA SPEC QL NAA+PROBE: NEGATIVE
SODIUM SERPL-SCNC: 139 MMOL/L (ref 136–144)
SP GR UR STRIP.AUTO: 1.02 (ref 1–1.03)
T AXIS: 262 DEGREES
TROPONIN: <0.046 NG/ML (ref ?–0.05)
UROBILINOGEN UR STRIP.AUTO-MCNC: <2 MG/DL
VENTRICULAR RATE: 70 BPM
WBC # BLD AUTO: 9.6 X10(3) UL (ref 4–13)

## 2018-02-05 PROCEDURE — 71045 X-RAY EXAM CHEST 1 VIEW: CPT | Performed by: EMERGENCY MEDICINE

## 2018-02-05 PROCEDURE — 99223 1ST HOSP IP/OBS HIGH 75: CPT | Performed by: HOSPITALIST

## 2018-02-05 RX ORDER — METOPROLOL SUCCINATE 100 MG/1
100 TABLET, EXTENDED RELEASE ORAL NIGHTLY
Status: ON HOLD | COMMUNITY
End: 2018-10-18

## 2018-02-05 RX ORDER — HEPARIN SODIUM 5000 [USP'U]/ML
5000 INJECTION, SOLUTION INTRAVENOUS; SUBCUTANEOUS EVERY 8 HOURS SCHEDULED
Status: DISCONTINUED | OUTPATIENT
Start: 2018-02-05 | End: 2018-02-07

## 2018-02-05 RX ORDER — CLOPIDOGREL BISULFATE 75 MG/1
75 TABLET ORAL DAILY
Status: DISCONTINUED | OUTPATIENT
Start: 2018-02-05 | End: 2018-02-07

## 2018-02-05 RX ORDER — SIMVASTATIN 40 MG
40 TABLET ORAL NIGHTLY
Status: ON HOLD | COMMUNITY
End: 2018-11-16

## 2018-02-05 RX ORDER — SODIUM CHLORIDE 9 MG/ML
125 INJECTION, SOLUTION INTRAVENOUS CONTINUOUS
Status: DISCONTINUED | OUTPATIENT
Start: 2018-02-05 | End: 2018-02-05

## 2018-02-05 RX ORDER — OXYCODONE HCL 10 MG/1
10 TABLET, FILM COATED, EXTENDED RELEASE ORAL EVERY 12 HOURS
Status: ON HOLD | COMMUNITY
End: 2018-09-06

## 2018-02-05 RX ORDER — OSELTAMIVIR PHOSPHATE 75 MG/1
75 CAPSULE ORAL EVERY 24 HOURS
Status: DISCONTINUED | OUTPATIENT
Start: 2018-02-05 | End: 2018-02-06

## 2018-02-05 RX ORDER — LISINOPRIL 20 MG/1
20 TABLET ORAL NIGHTLY
Status: ON HOLD | COMMUNITY
End: 2018-10-18

## 2018-02-05 RX ORDER — OXYCODONE HCL 10 MG/1
10 TABLET, FILM COATED, EXTENDED RELEASE ORAL EVERY 12 HOURS
Status: DISCONTINUED | OUTPATIENT
Start: 2018-02-05 | End: 2018-02-07

## 2018-02-05 RX ORDER — ASPIRIN 81 MG/1
81 TABLET ORAL DAILY
Status: DISCONTINUED | OUTPATIENT
Start: 2018-02-05 | End: 2018-02-07

## 2018-02-05 RX ORDER — SODIUM CHLORIDE 9 MG/ML
INJECTION, SOLUTION INTRAVENOUS CONTINUOUS
Status: DISCONTINUED | OUTPATIENT
Start: 2018-02-05 | End: 2018-02-07

## 2018-02-05 RX ORDER — HYDROCODONE BITARTRATE AND ACETAMINOPHEN 10; 325 MG/1; MG/1
1 TABLET ORAL EVERY 8 HOURS PRN
Status: DISCONTINUED | OUTPATIENT
Start: 2018-02-05 | End: 2018-02-07

## 2018-02-05 RX ORDER — ATORVASTATIN CALCIUM 20 MG/1
20 TABLET, FILM COATED ORAL NIGHTLY
Status: DISCONTINUED | OUTPATIENT
Start: 2018-02-05 | End: 2018-02-07

## 2018-02-05 RX ORDER — MORPHINE SULFATE 2 MG/ML
2 INJECTION, SOLUTION INTRAMUSCULAR; INTRAVENOUS EVERY 2 HOUR PRN
Status: DISCONTINUED | OUTPATIENT
Start: 2018-02-05 | End: 2018-02-07

## 2018-02-05 RX ORDER — ZOLPIDEM TARTRATE 5 MG/1
5 TABLET ORAL NIGHTLY PRN
Status: DISCONTINUED | OUTPATIENT
Start: 2018-02-05 | End: 2018-02-07

## 2018-02-05 RX ORDER — PANTOPRAZOLE SODIUM 20 MG/1
20 TABLET, DELAYED RELEASE ORAL
Status: DISCONTINUED | OUTPATIENT
Start: 2018-02-06 | End: 2018-02-07

## 2018-02-05 RX ORDER — MORPHINE SULFATE 2 MG/ML
1 INJECTION, SOLUTION INTRAMUSCULAR; INTRAVENOUS EVERY 2 HOUR PRN
Status: DISCONTINUED | OUTPATIENT
Start: 2018-02-05 | End: 2018-02-07

## 2018-02-05 RX ORDER — MELATONIN
400 DAILY
Status: DISCONTINUED | OUTPATIENT
Start: 2018-02-05 | End: 2018-02-07

## 2018-02-05 RX ORDER — ONDANSETRON 4 MG/1
8 TABLET, ORALLY DISINTEGRATING ORAL EVERY 8 HOURS PRN
COMMUNITY
End: 2019-11-27

## 2018-02-05 NOTE — ED PROVIDER NOTES
Patient Seen in: [facility] Emergency Department    History   Patient presents with:  Fatigue (constitutional, neurologic)    Stated Complaint: flu like symptoms x 2 weeks, getting more fatigued/lethargic/confused    HPI    15-year-old female garima 0.00      Years: 0.00         Quit date: 11/1/2004  Smokeless tobacco: Never Used                      Alcohol use:  Yes              Comment: OCCASSIONALLY,1X/WEEK      Review of Systems    Positive for stated complaint: flu like symptoms x 2 weeks, gettin All other components within normal limits   LACTIC ACID, PLASMA - Abnormal; Notable for the following:     Lactic Acid 2.1 (*)     All other components within normal limits   LIPASE - Abnormal; Notable for the following:     Lipase 55 (*)     All other com December 20, 2017           ED Course as of Feb 05 2042  ------------------------------------------------------------       MDM       BUN and creatinine are 42 and 2.7. Patient has no known baseline renal insufficiency. This indicates dehydration.   Kareem

## 2018-02-05 NOTE — H&P
[facility]  History and Physical     [facility] Patient Status:  Emergency    1960 MRN [id number]   Location [address] [facility] Attending Maura Jackson, *   Hosp Day # 0 PCP Santiago Mckenna     Chief Com 13 years ago. She has never used smokeless tobacco. She reports that she drinks alcohol. She reports that she does not use drugs.     Family History:   Family History   Problem Relation Age of Onset   • Heart Disorder Father    • Cancer Mother    • Cancer S of Systems:   A comprehensive 14 point review of systems was completed. Pertinent positives and negatives noted in the HPI.     Physical Exam:    BP 94/60   Pulse 76   Temp 98.8 °F (37.1 °C) (Temporal)   Resp 16   Ht 165.1 cm (5' 5\")   Wt 120 lb (54.4 k discussed with patient and her     Miki Martinez MD  2/5/2018

## 2018-02-06 PROBLEM — I50.9 ACUTE ON CHRONIC CONGESTIVE HEART FAILURE (HCC): Status: ACTIVE | Noted: 2018-02-05

## 2018-02-06 PROBLEM — J18.9 COMMUNITY ACQUIRED PNEUMONIA: Status: ACTIVE | Noted: 2018-02-05

## 2018-02-06 LAB
BUN BLD-MCNC: 27 MG/DL (ref 8–20)
CALCIUM BLD-MCNC: 8.8 MG/DL (ref 8.3–10.3)
CHLORIDE: 116 MMOL/L (ref 101–111)
CO2: 16 MMOL/L (ref 22–32)
CREAT BLD-MCNC: 1.68 MG/DL (ref 0.55–1.02)
GLUCOSE BLD-MCNC: 87 MG/DL (ref 70–99)
LACTIC ACID: 1 MMOL/L (ref 0.5–2)
PLATELET # BLD AUTO: 97 10(3)UL (ref 150–450)
POTASSIUM SERPL-SCNC: 3.5 MMOL/L (ref 3.6–5.1)
POTASSIUM SERPL-SCNC: 4.2 MMOL/L (ref 3.6–5.1)
SODIUM SERPL-SCNC: 143 MMOL/L (ref 136–144)

## 2018-02-06 PROCEDURE — 99232 SBSQ HOSP IP/OBS MODERATE 35: CPT | Performed by: HOSPITALIST

## 2018-02-06 RX ORDER — ONDANSETRON 2 MG/ML
4 INJECTION INTRAMUSCULAR; INTRAVENOUS EVERY 6 HOURS PRN
Status: DISCONTINUED | OUTPATIENT
Start: 2018-02-06 | End: 2018-02-07

## 2018-02-06 RX ORDER — OSELTAMIVIR PHOSPHATE 75 MG/1
75 CAPSULE ORAL 2 TIMES DAILY
Status: DISCONTINUED | OUTPATIENT
Start: 2018-02-06 | End: 2018-02-07

## 2018-02-06 RX ORDER — POTASSIUM CHLORIDE 20 MEQ/1
40 TABLET, EXTENDED RELEASE ORAL EVERY 4 HOURS
Status: COMPLETED | OUTPATIENT
Start: 2018-02-06 | End: 2018-02-06

## 2018-02-06 NOTE — PROGRESS NOTES
[facility] Pharmacy Note:  Renal Adjustment for oseltamivir (TAMIFLU)    Angeles Callejas is a 65year old female who has been prescribed oseltamivir (TAMIFLU) 75 mg every 24 hrs.   CrCl is estimated creatinine clearance is 32.1 mL/min (based on SCr of 1.68

## 2018-02-06 NOTE — PLAN OF CARE
METABOLIC/FLUID AND ELECTROLYTES - ADULT    • Hemodynamic stability and optimal renal function maintained Progressing        RESPIRATORY - ADULT    • Achieves optimal ventilation and oxygenation Progressing          Awake, alert, oriented  Still with throa

## 2018-02-06 NOTE — PLAN OF CARE
METABOLIC/FLUID AND ELECTROLYTES - ADULT    • Hemodynamic stability and optimal renal function maintained Progressing        Patient/Family Goals    • Patient/Family Long Term Goal Progressing    • Patient/Family Short Term Goal Progressing        RESPIRAT

## 2018-02-06 NOTE — PROGRESS NOTES
[facility] Pharmacy Note:  Renal Adjustment for oseltamivir (TAMIFLU)    David Bledsoe is a 65year old female who has been prescribed oseltamivir (TAMIFLU) 75 mg every 12 hrs.   CrCl is estimated creatinine clearance is 20 mL/min (based on SCr of 2.7 mg/

## 2018-02-06 NOTE — PROGRESS NOTES
[facility]  Progress Note     [place] Patient Status:  Inpatient    1960 MRN [id number]   [facility] 8NE-A Attending Maico Perez MD   Hosp Day # 1 PCP Margret Sloan     Chief Complaint: weakness, fatigue sore thr Oral 2 times per day   • atorvastatin  20 mg Oral Nightly   • cefTRIAXone  1 g Intravenous Q24H   • azithromycin  500 mg Intravenous Q24H   • Heparin Sodium (Porcine)  5,000 Units Subcutaneous Q8H [address]   • Oseltamivir Phosphate  75 mg Oral Q24H       ASSESS

## 2018-02-06 NOTE — PAYOR COMM NOTE
--------------  ADMISSION REVIEW     Payor: [employer]  Subscriber #:  [id number]  Authorization Number: [id number]    Admit date: 2/5/18  Admit time: [facility]       Admitting Physician: Mary Allen MD  Attending Physician:  Mary Allen MD  Primary Care Phys 05/11/2005: ANGIOPLASTY (CORONARY)      Comment: stent to LAD  2004: CARDIAC DEFIBRILLATOR PLACEMENT      Comment: Medtronic ICD  06/13/2011: CARDIAC DEFIBRILLATOR PLACEMENT      Comment: dual chamber medtronic AICD generator change  No date: CATH DRUG ELU CO2 19.0 (*)     All other components within normal limits   URINALYSIS WITH CULTURE REFLEX - Abnormal; Notable for the following:     Protein Urine 100  (*)     RBC URINE 3-5 (*)     Bacteria Urine Rare (*)     Mucous Urine 1+ (*)     All other component Please view results for these tests on the individual orders.    SCAN SLIDE   LACTIC ACID, PLASMA   LACTIC ACID, PLASMA   LACTIC ACID, PLASMA   RAINBOW DRAW BLUE   RAINBOW DRAW LAVENDER   RAINBOW DRAW LIGHT GREEN   RAINBOW DRAW GOLD   BLOOD CULTURE   BLOOD Community acquired pneumonia, unspecified laterality J18.9 2/5/2018[MM. 2]               Signed by Hui Ortiz MD on 2/5/2018  8:42 PM                        H&P - H&P Note      H&P signed by Bobby Carreon MD at 2/5/2018 10:41 PM     Author:  Mary Grace Gallagher 05/11/2005: ANGIOPLASTY (CORONARY)      Comment: stent to LAD  2004: CARDIAC DEFIBRILLATOR PLACEMENT      Comment: Medtronic ICD  06/13/2011: CARDIAC DEFIBRILLATOR PLACEMENT      Comment: dual chamber medtronic AICD generator change  7/13/2015: COLONOSCOPY Clopidogrel Bisulfate (PLAVIX) 75 MG Oral Tab Take 75 mg by mouth daily. Disp:  Rfl:[OO.2]        Review of Systems:   A comprehensive 14 point review of systems was completed. Pertinent positives and negatives noted in the HPI.     Physical Exam:[OO.1] 5. ERIKA- continue gentle hydration and reeval in am        Plan of care discussed with[OO.1] patient and her     Miki Martinez MD  2/5/2018             MEDICATIONS ADMINISTERED IN LAST 1 DAY:  aspirin EC tab 81 mg     Date Action Dose Route User    2/6 2/5/2018 1623 Given 10 mL Mouth/Throat Dimple Mccord, RN      nystatin (MYCOSTATIN) suspension 500,000 Units     Date Action Dose Route User    2/6/2018 0903 Given 106423 Units Oral Jennifer Olmos    2/5/2018 2048 Given 730103 Units Oral Selam PATIENT STATED HISTORY: (As transcribed by Technologist)  The patient shares that she has had symptoms of the flu for two weeks and she is very fatigued today. FINDINGS:  The heart size is within normal limits.   There is venous congestion with inte

## 2018-02-06 NOTE — PLAN OF CARE
METABOLIC/FLUID AND ELECTROLYTES - ADULT    • Hemodynamic stability and optimal renal function maintained Progressing        RESPIRATORY - ADULT    • Achieves optimal ventilation and oxygenation Progressing          Patient received first dose of Tamiflu t

## 2018-02-06 NOTE — PAYOR COMM NOTE
--------------  ADMISSION REVIEW     Payor: LAUREN [facility]  Subscriber #:  [id number]  Authorization Number: [id number]    Admit date: 2/5/18  Admit time: [phone]       Admitting Physician: Brad Laboy MD  Attending Physician:  Brad Laboy MD  Primary Care Phys Procedure: COLONOSCOPY;  Surgeon: Miguelina Denson MD;  Location: [facility] ENDOSCOPY  No date: COLONOSCOPY  04/13/2006: EP ELECTROPHYSIOLOGY STUDY  06/30/2009: OTHER SURGICAL HISTORY      Comment: [employer] heart net      Review of Systems    Positive fo - Abnormal; Notable for the following:     Pro-Beta Natriuretic Peptide 2,753 (*)     All other components within normal limits   LACTIC ACID, PLASMA - Abnormal; Notable for the following:     Lactic Acid 2.1 (*)     All other components within normal limi (Principal)Dehydration E86.0 8/10/2015 Unknown    Acute on chronic congestive heart failure, unspecified congestive heart failure type ([facility] I50.9 2/5/2018     ERIKA (acute kidney injury) ([facility] N17.9 2/5/2018     Community acquired pneumonia, unspecified lat total) by mouth nightly. Disp: 30 tablet Rfl: 5   Zolpidem Tartrate ER 6.25 MG Oral Tab CR Take 6.25 mg by mouth nightly as needed for Sleep. Disp:  Rfl:    omeprazole 20 MG Oral Capsule Delayed Release Take 20 mg by mouth every morning.    Disp:  Rfl:    M cyanosis. Integument: No rashes or lesions. Psychiatric: Appropriate mood and affect.       Diagnostic Data:      Labs:[OO.1]  Recent Labs   Lab  02/05/18   1308   WBC  9.6   HGB  13.8   MCV  91.3   PLT  88.0*       Recent Labs   Lab  02/05/18   1308   G g Intravenous Ria Bello RN      Clopidogrel Bisulfate (PLAVIX) tab 75 mg     Date Action Dose Route User    2/6/2018 0902 Given 75 mg Oral Anatoly Nova, [facility]    2/5/2018 1619 Given 75 mg Oral Dimple Mccord, RN      folic acid (FOLVITE) tab 400 mc New Bag 125 mL/hr Intravenous Yonathan Rai RN      0.9%  NaCl infusion     Date Action Dose Route User    2/6/2018 0741 Rate/Dose Change (none) Intravenous Evelyn Manjarrez RN    2/6/2018 7855 New Bag (none) Intravenous Slade Scott RN    2/5/201

## 2018-02-06 NOTE — HOME CARE LIAISON
Referral received for home health. I met with patient and her spouse and she is not sure if she wants home health and would like to discuss with her . Brochure and my card left with patient.   Thank you for the referral.

## 2018-02-07 ENCOUNTER — PRIOR ORIGINAL RECORDS (OUTPATIENT)
Dept: OTHER | Age: 58
End: 2018-02-07

## 2018-02-07 VITALS
HEART RATE: 70 BPM | DIASTOLIC BLOOD PRESSURE: 63 MMHG | TEMPERATURE: 98 F | HEIGHT: 65 IN | BODY MASS INDEX: 20.49 KG/M2 | RESPIRATION RATE: 18 BRPM | SYSTOLIC BLOOD PRESSURE: 102 MMHG | OXYGEN SATURATION: 98 % | WEIGHT: 123 LBS

## 2018-02-07 LAB
BUN BLD-MCNC: 12 MG/DL (ref 8–20)
CALCIUM BLD-MCNC: 8.3 MG/DL (ref 8.3–10.3)
CHLORIDE: 119 MMOL/L (ref 101–111)
CO2: 18 MMOL/L (ref 22–32)
CREAT BLD-MCNC: 1.04 MG/DL (ref 0.55–1.02)
GLUCOSE BLD-MCNC: 75 MG/DL (ref 70–99)
PLATELET # BLD AUTO: 121 10(3)UL (ref 150–450)
POTASSIUM SERPL-SCNC: 4.3 MMOL/L (ref 3.6–5.1)
SODIUM SERPL-SCNC: 144 MMOL/L (ref 136–144)

## 2018-02-07 PROCEDURE — 99239 HOSP IP/OBS DSCHRG MGMT >30: CPT | Performed by: HOSPITALIST

## 2018-02-07 RX ORDER — OSELTAMIVIR PHOSPHATE 75 MG/1
75 CAPSULE ORAL 2 TIMES DAILY
Qty: 7 CAPSULE | Refills: 0 | Status: SHIPPED | OUTPATIENT
Start: 2018-02-07 | End: 2018-02-10

## 2018-02-07 RX ORDER — AMOXICILLIN AND CLAVULANATE POTASSIUM 875; 125 MG/1; MG/1
1 TABLET, FILM COATED ORAL 2 TIMES DAILY
Qty: 14 TABLET | Refills: 0 | Status: SHIPPED | OUTPATIENT
Start: 2018-02-07 | End: 2018-02-14

## 2018-02-07 NOTE — PROGRESS NOTES
Spo2% on room air at rest 99%    SpO2% ambulation on room air 98%    Pt denies any short of breath , tolerated ambulation in the halls fine.   Dr Davey Mcnulty updated

## 2018-02-07 NOTE — PROGRESS NOTES
IM quick note:    Pt seen and examined. Feels better but still weak. Desires to go home today.     /63 (BP Location: Left arm)   Pulse 70   Temp 98.1 °F (36.7 °C) (Oral)   Resp 18   Ht 5' 5\" (1.651 m)   Wt 123 lb (55.8 kg)   SpO2 98%   BMI 20.47 kg

## 2018-02-07 NOTE — PLAN OF CARE
RESPIRATORY - ADULT    • Achieves optimal ventilation and oxygenation Progressing          Per pt sore throat better  Still with non productive cough  No sob, on room air  Droplet Isolation for Flu A  On Tamiflu ,IV antibiotics  Pt express desire to go manuel

## 2018-02-08 NOTE — PAYOR COMM NOTE
--------------  DISCHARGE REVIEW    Payor: LAUREN [facility]  Subscriber #:  [id number]  Authorization Number: [id number]    Admit date: 2/5/18  Admit time:  1537  Discharge Date: 2/7/2018  2:30 PM     Admitting Physician: Lita Francis MD  Attending Physician:  Roberto Melissa Venessa Robert is a 65year old female with history of ischemic cardiomyopathy and dual-chamber ICD implanted in 2004. She f/u Dr Merlin Lugo. She p/w 2 weeks of fever chills dry cough nausea, poor po intake and sore throat. She states she has chronic thrush on da hydrocodone-acetaminophen 7.5-325 MG Tabs  Commonly known as:  NORCO      Take 1 tablet by mouth every 8 (eight) hours as needed for Pain. Refills:  0     lisinopril 20 MG Tabs  Commonly known as:  PRINIVIL,ZESTRIL      Take 20 mg by mouth daily.    Ref gallops. Abdomen: Soft, nontender, nondistended. Positive bowel sounds. No rebound or guarding. Neurologic: No focal neurological deficits. Musculoskeletal: Moves all extremities. Extremities: No edema.   ---------------------------------------------

## 2018-02-08 NOTE — DISCHARGE SUMMARY
[facility] [place] [facility]OSPITAL[facility]  DISCHARGE SUMMARY     Blaire Dias Patient Status:  Inpatient    1960 MRN [id number]   [facility] 8NE-A Attending No att. providers found   Hosp Day # 2 PCP Dariana Mathias     Date of Admission: 2018  Win Her symptoms improved. She was discharged home on Tamiflu and empiric antibiotics. All questions/concerns were addressed.     Procedures during hospitalization:   • none    Incidental or significant findings and recommendations (brief descriptions):  • As known as:  ZOFRAN-ODT      Take 8 mg by mouth every 8 (eight) hours as needed for Nausea. Refills:  0     ONE-DAILY MULTI VITAMINS Tabs      Take 1 tablet by mouth daily.    Refills:  0     OxyCODONE HCl ER 10 MG T12a  Commonly known as:  OXYCONTIN      T

## 2018-02-20 ENCOUNTER — LAB ENCOUNTER (OUTPATIENT)
Dept: LAB | Facility: HOSPITAL | Age: 58
End: 2018-02-20
Attending: INTERNAL MEDICINE
Payer: COMMERCIAL

## 2018-02-20 ENCOUNTER — PRIOR ORIGINAL RECORDS (OUTPATIENT)
Dept: OTHER | Age: 58
End: 2018-02-20

## 2018-02-20 ENCOUNTER — MYAURORA ACCOUNT LINK (OUTPATIENT)
Dept: OTHER | Age: 58
End: 2018-02-20

## 2018-02-20 ENCOUNTER — HOSPITAL ENCOUNTER (OUTPATIENT)
Dept: GENERAL RADIOLOGY | Facility: HOSPITAL | Age: 58
Discharge: HOME | End: 2018-02-20
Attending: INTERNAL MEDICINE
Payer: COMMERCIAL

## 2018-02-20 DIAGNOSIS — R06.00 DYSPNEA, PAROXYSMAL NOCTURNAL: Primary | ICD-10-CM

## 2018-02-20 DIAGNOSIS — I50.1 LEFT HEART FAILURE (HCC): ICD-10-CM

## 2018-02-20 DIAGNOSIS — R06.00 DYSPNEA: ICD-10-CM

## 2018-02-20 LAB
ALBUMIN SERPL-MCNC: 3 G/DL (ref 3.5–4.8)
ALP LIVER SERPL-CCNC: 215 U/L (ref 46–118)
ALT SERPL-CCNC: 31 U/L (ref 14–54)
AST SERPL-CCNC: 37 U/L (ref 15–41)
BASOPHILS # BLD AUTO: 0.4 X10(3) UL (ref 0–0.1)
BASOPHILS NFR BLD AUTO: 2.8 %
BETA NATRIURETIC PEPTIDE: 67 PG/ML (ref 2–99)
BILIRUB SERPL-MCNC: 0.3 MG/DL (ref 0.1–2)
BUN BLD-MCNC: 13 MG/DL (ref 8–20)
CALCIUM BLD-MCNC: 9 MG/DL (ref 8.3–10.3)
CHLORIDE: 116 MMOL/L (ref 101–111)
CO2: 18 MMOL/L (ref 22–32)
CREAT BLD-MCNC: 0.84 MG/DL (ref 0.55–1.02)
EOSINOPHIL # BLD AUTO: 0.43 X10(3) UL (ref 0–0.3)
EOSINOPHIL NFR BLD AUTO: 3 %
ERYTHROCYTE [DISTWIDTH] IN BLOOD BY AUTOMATED COUNT: 14.3 % (ref 11.5–16)
GLUCOSE BLD-MCNC: 88 MG/DL (ref 70–99)
HCT VFR BLD AUTO: 41.4 % (ref 34–50)
HGB BLD-MCNC: 13 G/DL (ref 12–16)
IMMATURE GRANULOCYTE COUNT: 0.21 X10(3) UL (ref 0–1)
IMMATURE GRANULOCYTE RATIO %: 1.5 %
LYMPHOCYTES # BLD AUTO: 5.21 X10(3) UL (ref 0.9–4)
LYMPHOCYTES NFR BLD AUTO: 36.7 %
M PROTEIN MFR SERPL ELPH: 8.2 G/DL (ref 6.1–8.3)
MCH RBC QN AUTO: 29.1 PG (ref 27–33.2)
MCHC RBC AUTO-ENTMCNC: 31.4 G/DL (ref 31–37)
MCV RBC AUTO: 92.8 FL (ref 81–100)
MONOCYTES # BLD AUTO: 1.17 X10(3) UL (ref 0.1–1)
MONOCYTES NFR BLD AUTO: 8.3 %
NEUTROPHIL ABS PRELIM: 6.76 X10 (3) UL (ref 1.3–6.7)
NEUTROPHILS # BLD AUTO: 6.76 X10(3) UL (ref 1.3–6.7)
NEUTROPHILS NFR BLD AUTO: 47.7 %
PLATELET # BLD AUTO: 407 10(3)UL (ref 150–450)
POTASSIUM SERPL-SCNC: 4.8 MMOL/L (ref 3.6–5.1)
RBC # BLD AUTO: 4.46 X10(6)UL (ref 3.8–5.1)
RED CELL DISTRIBUTION WIDTH-SD: 48.8 FL (ref 35.1–46.3)
SODIUM SERPL-SCNC: 141 MMOL/L (ref 136–144)
WBC # BLD AUTO: 14.2 X10(3) UL (ref 4–13)

## 2018-02-20 PROCEDURE — 36415 COLL VENOUS BLD VENIPUNCTURE: CPT

## 2018-02-20 PROCEDURE — 71046 X-RAY EXAM CHEST 2 VIEWS: CPT | Performed by: INTERNAL MEDICINE

## 2018-02-20 PROCEDURE — 85025 COMPLETE CBC W/AUTO DIFF WBC: CPT

## 2018-02-20 PROCEDURE — 83880 ASSAY OF NATRIURETIC PEPTIDE: CPT

## 2018-02-20 PROCEDURE — 80053 COMPREHEN METABOLIC PANEL: CPT

## 2018-02-21 ENCOUNTER — MYAURORA ACCOUNT LINK (OUTPATIENT)
Dept: OTHER | Age: 58
End: 2018-02-21

## 2018-02-21 ENCOUNTER — HOSPITAL ENCOUNTER (OUTPATIENT)
Dept: CV DIAGNOSTICS | Facility: HOSPITAL | Age: 58
Discharge: HOME | End: 2018-02-21
Attending: INTERNAL MEDICINE

## 2018-02-21 ENCOUNTER — PRIOR ORIGINAL RECORDS (OUTPATIENT)
Dept: OTHER | Age: 58
End: 2018-02-21

## 2018-02-21 DIAGNOSIS — R06.00 DYSPNEA, UNSPECIFIED TYPE: ICD-10-CM

## 2018-02-21 DIAGNOSIS — I50.1 LEFT HEART FAILURE (HCC): ICD-10-CM

## 2018-02-22 ENCOUNTER — CHARTING TRANS (OUTPATIENT)
Dept: OTHER | Age: 58
End: 2018-02-22

## 2018-02-22 LAB
ALBUMIN: 2.2 G/DL
ALKALINE PHOSPHATATE(ALK PHOS): 457 IU/L
BILIRUBIN TOTAL: 0.6 MG/DL
BUN: 12 MG/DL
BUN: 27 MG/DL
BUN: 42 MG/DL
CALCIUM: 8.3 MG/DL
CALCIUM: 8.8 MG/DL
CALCIUM: 9 MG/DL
CHLORIDE: 110 MEQ/L
CHLORIDE: 116 MEQ/L
CHLORIDE: 119 MEQ/L
CREATININE, SERUM: 1.04 MG/DL
CREATININE, SERUM: 1.68 MG/DL
CREATININE, SERUM: 2.7 MG/DL
GLUCOSE: 200 MG/DL
GLUCOSE: 75 MG/DL
GLUCOSE: 87 MG/DL
HEMATOCRIT: 42.9 %
HEMOGLOBIN: 13.8 G/DL
PLATELETS: 121 K/UL
PLATELETS: 88 K/UL
PLATELETS: 97 K/UL
POTASSIUM, SERUM: 3.5 MEQ/L
POTASSIUM, SERUM: 3.9 MEQ/L
POTASSIUM, SERUM: 4.2 MEQ/L
POTASSIUM, SERUM: 4.3 MEQ/L
PROBNP: 2753 PG/ML
PROTEIN, TOTAL: 7.9 G/DL
RED BLOOD COUNT: 4.7 X 10-6/U
SGOT (AST): 44 IU/L
SGPT (ALT): 21 IU/L
SODIUM: 139 MEQ/L
SODIUM: 143 MEQ/L
SODIUM: 144 MEQ/L
WHITE BLOOD COUNT: 9.6 X 10-3/U

## 2018-02-23 ENCOUNTER — CHARTING TRANS (OUTPATIENT)
Dept: OTHER | Age: 58
End: 2018-02-23

## 2018-02-23 ENCOUNTER — PRIOR ORIGINAL RECORDS (OUTPATIENT)
Dept: OTHER | Age: 58
End: 2018-02-23

## 2018-02-23 LAB
ALBUMIN: 3 G/DL
ALKALINE PHOSPHATATE(ALK PHOS): 215 IU/L
BILIRUBIN TOTAL: 0.3 MG/DL
BNP: 67 PMOL/L
BUN: 13 MG/DL
CALCIUM: 9 MG/DL
CHLORIDE: 116 MEQ/L
CREATININE, SERUM: 0.84 MG/DL
GLUCOSE: 88 MG/DL
HEMATOCRIT: 41.4 %
HEMOGLOBIN: 13 G/DL
PLATELETS: 407 K/UL
POTASSIUM, SERUM: 4.8 MEQ/L
PROTEIN, TOTAL: 8.2 G/DL
RED BLOOD COUNT: 4.46 X 10-6/U
SGOT (AST): 37 IU/L
SGPT (ALT): 31 IU/L
SODIUM: 141 MEQ/L
WHITE BLOOD COUNT: 14.2 X 10-3/U

## 2018-02-28 ENCOUNTER — CHARTING TRANS (OUTPATIENT)
Dept: OTHER | Age: 58
End: 2018-02-28

## 2018-03-07 ENCOUNTER — PRIOR ORIGINAL RECORDS (OUTPATIENT)
Dept: OTHER | Age: 58
End: 2018-03-07

## 2018-03-22 ENCOUNTER — CHARTING TRANS (OUTPATIENT)
Dept: OTHER | Age: 58
End: 2018-03-22

## 2018-03-30 ENCOUNTER — MYAURORA ACCOUNT LINK (OUTPATIENT)
Dept: OTHER | Age: 58
End: 2018-03-30

## 2018-04-18 ENCOUNTER — PRIOR ORIGINAL RECORDS (OUTPATIENT)
Dept: OTHER | Age: 58
End: 2018-04-18

## 2018-04-18 ENCOUNTER — CHARTING TRANS (OUTPATIENT)
Dept: OTHER | Age: 58
End: 2018-04-18

## 2018-04-19 ENCOUNTER — CHARTING TRANS (OUTPATIENT)
Dept: OTHER | Age: 58
End: 2018-04-19

## 2018-04-19 ENCOUNTER — LAB SERVICES (OUTPATIENT)
Dept: OTHER | Age: 58
End: 2018-04-19

## 2018-04-19 ENCOUNTER — MYAURORA ACCOUNT LINK (OUTPATIENT)
Dept: OTHER | Age: 58
End: 2018-04-19

## 2018-04-23 LAB — PATH REPORT: NORMAL

## 2018-06-18 ENCOUNTER — CHARTING TRANS (OUTPATIENT)
Dept: OTHER | Age: 58
End: 2018-06-18

## 2018-07-06 ENCOUNTER — MYAURORA ACCOUNT LINK (OUTPATIENT)
Dept: OTHER | Age: 58
End: 2018-07-06

## 2018-07-17 ENCOUNTER — CHARTING TRANS (OUTPATIENT)
Dept: OTHER | Age: 58
End: 2018-07-17

## 2018-07-30 ENCOUNTER — CHARTING TRANS (OUTPATIENT)
Dept: OTHER | Age: 58
End: 2018-07-30

## 2018-08-07 ENCOUNTER — CHARTING TRANS (OUTPATIENT)
Dept: OTHER | Age: 58
End: 2018-08-07

## 2018-08-09 ENCOUNTER — CHARTING TRANS (OUTPATIENT)
Dept: OTHER | Age: 58
End: 2018-08-09

## 2018-08-16 ENCOUNTER — CHARTING TRANS (OUTPATIENT)
Dept: OTHER | Age: 58
End: 2018-08-16

## 2018-08-24 ENCOUNTER — ANCILLARY ORDERS (OUTPATIENT)
Dept: OTHER | Age: 58
End: 2018-08-24

## 2018-08-24 ENCOUNTER — CHARTING TRANS (OUTPATIENT)
Dept: OTHER | Age: 58
End: 2018-08-24

## 2018-08-24 DIAGNOSIS — Z12.31 ENCOUNTER FOR SCREENING MAMMOGRAM FOR MALIGNANT NEOPLASM OF BREAST: ICD-10-CM

## 2018-09-02 ENCOUNTER — HOSPITAL ENCOUNTER (INPATIENT)
Facility: HOSPITAL | Age: 58
LOS: 2 days | Discharge: HOME | DRG: 074 | End: 2018-09-06
Attending: EMERGENCY MEDICINE | Admitting: HOSPITALIST
Payer: COMMERCIAL

## 2018-09-02 ENCOUNTER — APPOINTMENT (OUTPATIENT)
Dept: GENERAL RADIOLOGY | Facility: HOSPITAL | Age: 58
DRG: 074 | End: 2018-09-02
Attending: HOSPITALIST
Payer: COMMERCIAL

## 2018-09-02 ENCOUNTER — APPOINTMENT (OUTPATIENT)
Dept: CT IMAGING | Facility: HOSPITAL | Age: 58
DRG: 074 | End: 2018-09-02
Attending: EMERGENCY MEDICINE
Payer: COMMERCIAL

## 2018-09-02 DIAGNOSIS — R20.2 PARESTHESIAS: Primary | ICD-10-CM

## 2018-09-02 DIAGNOSIS — R53.1 WEAKNESS: ICD-10-CM

## 2018-09-02 PROBLEM — G62.9 NEUROPATHY: Status: ACTIVE | Noted: 2018-09-02

## 2018-09-02 LAB
ALBUMIN SERPL-MCNC: 2.7 G/DL (ref 3.5–4.8)
ALBUMIN/GLOB SERPL: 0.6 {RATIO} (ref 1–2)
ALP LIVER SERPL-CCNC: 164 U/L (ref 46–118)
ALT SERPL-CCNC: 39 U/L (ref 14–54)
ANION GAP SERPL CALC-SCNC: 12 MMOL/L (ref 0–18)
APTT PPP: 28.6 SECONDS (ref 26.1–34.6)
AST SERPL-CCNC: 65 U/L (ref 15–41)
BASOPHILS # BLD AUTO: 0.16 X10(3) UL (ref 0–0.1)
BASOPHILS NFR BLD AUTO: 1.6 %
BILIRUB SERPL-MCNC: 1.2 MG/DL (ref 0.1–2)
BUN BLD-MCNC: 14 MG/DL (ref 8–20)
BUN/CREAT SERPL: 16.5 (ref 10–20)
CALCIUM BLD-MCNC: 8.8 MG/DL (ref 8.3–10.3)
CHLORIDE SERPL-SCNC: 101 MMOL/L (ref 101–111)
CK SERPL-CCNC: 29 IU/L (ref 26–192)
CO2 SERPL-SCNC: 21 MMOL/L (ref 22–32)
CREAT BLD-MCNC: 0.85 MG/DL (ref 0.55–1.02)
EOSINOPHIL # BLD AUTO: 0.11 X10(3) UL (ref 0–0.3)
EOSINOPHIL NFR BLD AUTO: 1.1 %
ERYTHROCYTE [DISTWIDTH] IN BLOOD BY AUTOMATED COUNT: 13.2 % (ref 11.5–16)
GLOBULIN PLAS-MCNC: 4.7 G/DL (ref 2.5–4)
GLUCOSE BLD-MCNC: 99 MG/DL (ref 70–99)
HAV AB SERPL IA-ACNC: 896 PG/ML (ref 193–986)
HAV IGM SER QL: 1.6 MG/DL (ref 1.8–2.5)
HCT VFR BLD AUTO: 41.7 % (ref 34–50)
HGB BLD-MCNC: 13.9 G/DL (ref 12–16)
IMMATURE GRANULOCYTE COUNT: 0.12 X10(3) UL (ref 0–1)
IMMATURE GRANULOCYTE RATIO %: 1.2 %
INR BLD: 0.98 (ref 0.9–1.1)
LYMPHOCYTES # BLD AUTO: 2.32 X10(3) UL (ref 0.9–4)
LYMPHOCYTES NFR BLD AUTO: 22.5 %
M PROTEIN MFR SERPL ELPH: 7.4 G/DL (ref 6.1–8.3)
MCH RBC QN AUTO: 30.5 PG (ref 27–33.2)
MCHC RBC AUTO-ENTMCNC: 33.3 G/DL (ref 31–37)
MCV RBC AUTO: 91.6 FL (ref 81–100)
MONOCYTES # BLD AUTO: 0.73 X10(3) UL (ref 0.1–1)
MONOCYTES NFR BLD AUTO: 7.1 %
NEUTROPHIL ABS PRELIM: 6.85 X10 (3) UL (ref 1.3–6.7)
NEUTROPHILS # BLD AUTO: 6.85 X10(3) UL (ref 1.3–6.7)
NEUTROPHILS NFR BLD AUTO: 66.5 %
OSMOLALITY SERPL CALC.SUM OF ELEC: 279 MOSM/KG (ref 275–295)
PLATELET # BLD AUTO: 234 10(3)UL (ref 150–450)
POTASSIUM SERPL-SCNC: 4.2 MMOL/L (ref 3.6–5.1)
PSA SERPL DL<=0.01 NG/ML-MCNC: 13.4 SECONDS (ref 12.4–14.7)
RBC # BLD AUTO: 4.55 X10(6)UL (ref 3.8–5.1)
RED CELL DISTRIBUTION WIDTH-SD: 43.9 FL (ref 35.1–46.3)
SED RATE-ML: 22 MM/HR (ref 0–25)
SODIUM SERPL-SCNC: 134 MMOL/L (ref 136–144)
TROPONIN I SERPL-MCNC: <0.046 NG/ML (ref ?–0.05)
TSI SER-ACNC: 1.59 MIU/ML (ref 0.35–5.5)
WBC # BLD AUTO: 10.3 X10(3) UL (ref 4–13)

## 2018-09-02 PROCEDURE — 70450 CT HEAD/BRAIN W/O DYE: CPT | Performed by: EMERGENCY MEDICINE

## 2018-09-02 PROCEDURE — 99220 INITIAL OBSERVATION CARE,LEVL III: CPT | Performed by: HOSPITALIST

## 2018-09-02 PROCEDURE — 74018 RADEX ABDOMEN 1 VIEW: CPT | Performed by: HOSPITALIST

## 2018-09-02 RX ORDER — LISINOPRIL 20 MG/1
20 TABLET ORAL DAILY
Status: DISCONTINUED | OUTPATIENT
Start: 2018-09-02 | End: 2018-09-06

## 2018-09-02 RX ORDER — METOCLOPRAMIDE HYDROCHLORIDE 5 MG/ML
10 INJECTION INTRAMUSCULAR; INTRAVENOUS EVERY 8 HOURS PRN
Status: DISCONTINUED | OUTPATIENT
Start: 2018-09-02 | End: 2018-09-06

## 2018-09-02 RX ORDER — ATORVASTATIN CALCIUM 20 MG/1
20 TABLET, FILM COATED ORAL NIGHTLY
Status: DISCONTINUED | OUTPATIENT
Start: 2018-09-02 | End: 2018-09-06

## 2018-09-02 RX ORDER — METOPROLOL SUCCINATE 100 MG/1
100 TABLET, EXTENDED RELEASE ORAL
Status: DISCONTINUED | OUTPATIENT
Start: 2018-09-02 | End: 2018-09-06

## 2018-09-02 RX ORDER — METOPROLOL SUCCINATE 100 MG/1
100 TABLET, EXTENDED RELEASE ORAL
Status: DISCONTINUED | OUTPATIENT
Start: 2018-09-03 | End: 2018-09-02

## 2018-09-02 RX ORDER — OXYMETAZOLINE HCL 0.05 %
1 AEROSOL, SPRAY WITH PUMP (ML) NASAL DAILY
Status: DISCONTINUED | OUTPATIENT
Start: 2018-09-02 | End: 2018-09-02 | Stop reason: RX

## 2018-09-02 RX ORDER — ONDANSETRON 2 MG/ML
4 INJECTION INTRAMUSCULAR; INTRAVENOUS EVERY 6 HOURS PRN
Status: DISCONTINUED | OUTPATIENT
Start: 2018-09-02 | End: 2018-09-06

## 2018-09-02 RX ORDER — ACETAMINOPHEN 325 MG/1
650 TABLET ORAL EVERY 6 HOURS PRN
Status: DISCONTINUED | OUTPATIENT
Start: 2018-09-02 | End: 2018-09-06

## 2018-09-02 RX ORDER — PANTOPRAZOLE SODIUM 40 MG/1
40 TABLET, DELAYED RELEASE ORAL
Status: DISCONTINUED | OUTPATIENT
Start: 2018-09-03 | End: 2018-09-06

## 2018-09-02 RX ORDER — MORPHINE SULFATE 4 MG/ML
2 INJECTION, SOLUTION INTRAMUSCULAR; INTRAVENOUS EVERY 4 HOURS PRN
Status: DISCONTINUED | OUTPATIENT
Start: 2018-09-02 | End: 2018-09-03

## 2018-09-02 RX ORDER — MELATONIN
400 DAILY
Status: DISCONTINUED | OUTPATIENT
Start: 2018-09-02 | End: 2018-09-06

## 2018-09-02 RX ORDER — DIPHENHYDRAMINE HCL 50 MG
50 CAPSULE ORAL ONCE
Status: COMPLETED | OUTPATIENT
Start: 2018-09-02 | End: 2018-09-02

## 2018-09-02 RX ORDER — DOXEPIN HYDROCHLORIDE 50 MG/1
1 CAPSULE ORAL DAILY
Status: DISCONTINUED | OUTPATIENT
Start: 2018-09-02 | End: 2018-09-06

## 2018-09-02 RX ORDER — ASPIRIN 81 MG/1
81 TABLET ORAL DAILY
Status: DISCONTINUED | OUTPATIENT
Start: 2018-09-03 | End: 2018-09-06

## 2018-09-02 RX ORDER — ENOXAPARIN SODIUM 100 MG/ML
40 INJECTION SUBCUTANEOUS NIGHTLY
Status: DISCONTINUED | OUTPATIENT
Start: 2018-09-02 | End: 2018-09-06

## 2018-09-02 RX ORDER — ZOLPIDEM TARTRATE 5 MG/1
5 TABLET ORAL NIGHTLY PRN
Status: DISCONTINUED | OUTPATIENT
Start: 2018-09-02 | End: 2018-09-04

## 2018-09-02 RX ORDER — OXYCODONE HCL 10 MG/1
10 TABLET, FILM COATED, EXTENDED RELEASE ORAL EVERY 12 HOURS
Status: DISCONTINUED | OUTPATIENT
Start: 2018-09-02 | End: 2018-09-06

## 2018-09-02 RX ORDER — MAGNESIUM OXIDE 400 MG (241.3 MG MAGNESIUM) TABLET
400 TABLET ONCE
Status: COMPLETED | OUTPATIENT
Start: 2018-09-02 | End: 2018-09-02

## 2018-09-02 RX ORDER — FLUCONAZOLE 100 MG/1
100 TABLET ORAL DAILY
Status: ON HOLD | COMMUNITY
End: 2018-09-06

## 2018-09-02 RX ORDER — TRAZODONE HYDROCHLORIDE 50 MG/1
50 TABLET ORAL NIGHTLY PRN
Status: DISCONTINUED | OUTPATIENT
Start: 2018-09-02 | End: 2018-09-04

## 2018-09-02 RX ORDER — MORPHINE SULFATE 4 MG/ML
4 INJECTION, SOLUTION INTRAMUSCULAR; INTRAVENOUS ONCE
Status: COMPLETED | OUTPATIENT
Start: 2018-09-02 | End: 2018-09-02

## 2018-09-02 RX ORDER — ONDANSETRON 4 MG/1
8 TABLET, ORALLY DISINTEGRATING ORAL EVERY 8 HOURS PRN
Status: DISCONTINUED | OUTPATIENT
Start: 2018-09-02 | End: 2018-09-06

## 2018-09-02 NOTE — ED PROVIDER NOTES
Patient Seen in: [facility] Emergency Department    History   Patient presents with:  Numbness Weakness (neurologic)    Stated Complaint: bilateral legs and chest feel numb x2 days, n/v    HPI    Patient is a 60-year-old female with extensive medica (CORONARY)      Comment: stent to LAD  05/11/2005: ANGIOPLASTY (CORONARY)      Comment: stent to LAD  2004: [address] Medtronic ICD  No date: CATH DRUG ELUTING STENT  7/13/2015: COLONOSCOPY N/A      Comment: Procedure: C Musculoskeletal: Normal range of motion. Neurological: She is alert and oriented to person, place, and time. Strength 5 out of 5 bilateral upper and lower extremities. No drift. Skin: Skin is warm and dry.    Psychiatric: She has a normal mood and bilateral legs and chest feel numb x2 days, n/v  TECHNIQUE:  Noncontrast CT scanning is performed through the brain. Dose reduction techniques were used.  Dose information is transmitted to the ACR ([facility] [employer]) Becca Salazar [facility] Da syndrome, stroke, tumor/mass. Distribution makes cord pathology less likely. Labs, CT ordered and will discuss with neurology.   Admission disposition: 9/2/2018  4:41 PM           Update at 4:40 PM.  Patient had a little bit of pain relief with the morphi

## 2018-09-02 NOTE — ED INITIAL ASSESSMENT (HPI)
Patient presents with complaints of numbness x 2 days. She started noticing numbness in her thighs on Friday and it has spread up all the way to her shoulder now. She also reports vomiting x 1 month, which she has seen her PMD for.  She has been on three an

## 2018-09-02 NOTE — H&P
[facility]  History and Physical     Denise Navarro Patient Status:  Emergency    1960 MRN [id number]   Location [address] [facility] Attending Chinmay Hess MD   Hosp Day # 0 PCP Magali Alegria     Chief Complaint smoking about 13 years ago. She has never used smokeless tobacco. She reports that she drinks alcohol. She reports that she does not use drugs.     Family History:   Family History   Problem Relation Age of Onset   • Heart Disorder Father    • Cancer Mother Clopidogrel Bisulfate (PLAVIX) 75 MG Oral Tab Take 75 mg by mouth daily. Disp:  Rfl:        Review of Systems:   A comprehensive 14 point review of systems was completed. Pertinent positives and negatives noted in the HPI.     Physical Exam:    / recs  2. Chronic nausea/vomiting-doubt this is \"chronic thrush\" as patient says; GI consult to evaluate; resume nystatin in meantime. Check KUB in meantime. 3. Chronic systolic chf-compensated  4. Pacemaker/aicd-says she can't have mri  5.  Cad with hx p

## 2018-09-03 ENCOUNTER — APPOINTMENT (OUTPATIENT)
Dept: CT IMAGING | Facility: HOSPITAL | Age: 58
DRG: 074 | End: 2018-09-03
Attending: Other
Payer: COMMERCIAL

## 2018-09-03 LAB
ALBUMIN SERPL-MCNC: 2.3 G/DL (ref 3.5–4.8)
ALP LIVER SERPL-CCNC: 135 U/L (ref 46–118)
ALT SERPL-CCNC: 38 U/L (ref 14–54)
ANION GAP SERPL CALC-SCNC: 9 MMOL/L (ref 0–18)
AST SERPL-CCNC: 66 U/L (ref 15–41)
BILIRUB DIRECT SERPL-MCNC: 0.2 MG/DL (ref 0.1–0.5)
BILIRUB SERPL-MCNC: 0.5 MG/DL (ref 0.1–2)
BUN BLD-MCNC: 9 MG/DL (ref 8–20)
BUN/CREAT SERPL: 15 (ref 10–20)
CALCIUM BLD-MCNC: 8.3 MG/DL (ref 8.3–10.3)
CHLORIDE SERPL-SCNC: 102 MMOL/L (ref 101–111)
CO2 SERPL-SCNC: 24 MMOL/L (ref 22–32)
CREAT BLD-MCNC: 0.6 MG/DL (ref 0.55–1.02)
CRP SERPL-MCNC: <0.29 MG/DL (ref ?–1)
EST. AVERAGE GLUCOSE BLD GHB EST-MCNC: 103 MG/DL (ref 68–126)
FOLATE SERPL-MCNC: 10 NG/ML (ref 5.9–?)
GLUCOSE BLD-MCNC: 86 MG/DL (ref 70–99)
HAV IGM SER QL: 1.6 MG/DL (ref 1.8–2.5)
HBA1C MFR BLD HPLC: 5.2 % (ref ?–5.7)
M PROTEIN MFR SERPL ELPH: 6.4 G/DL (ref 6.1–8.3)
OSMOLALITY SERPL CALC.SUM OF ELEC: 278 MOSM/KG (ref 275–295)
PHOSPHATE SERPL-MCNC: 3.1 MG/DL (ref 2.5–4.9)
POTASSIUM SERPL-SCNC: 3.7 MMOL/L (ref 3.6–5.1)
RHEUMATOID FACT SERPL-ACNC: <10 IU/ML (ref ?–15)
SED RATE-ML: 24 MM/HR (ref 0–25)
SODIUM SERPL-SCNC: 135 MMOL/L (ref 136–144)

## 2018-09-03 PROCEDURE — 72133 CT LUMBAR SPINE W/O & W/DYE: CPT | Performed by: OTHER

## 2018-09-03 PROCEDURE — 99225 SUBSEQUENT OBSERVATION CARE: CPT | Performed by: HOSPITALIST

## 2018-09-03 PROCEDURE — 72130 CT CHEST SPINE W/O & W/DYE: CPT | Performed by: OTHER

## 2018-09-03 PROCEDURE — 72127 CT NECK SPINE W/O & W/DYE: CPT | Performed by: OTHER

## 2018-09-03 PROCEDURE — 99254 IP/OBS CNSLTJ NEW/EST MOD 60: CPT | Performed by: OTHER

## 2018-09-03 RX ORDER — DIPHENHYDRAMINE HCL 25 MG
25 CAPSULE ORAL EVERY 6 HOURS PRN
Status: DISCONTINUED | OUTPATIENT
Start: 2018-09-03 | End: 2018-09-06

## 2018-09-03 RX ORDER — MORPHINE SULFATE 4 MG/ML
2 INJECTION, SOLUTION INTRAMUSCULAR; INTRAVENOUS EVERY 4 HOURS PRN
Status: DISCONTINUED | OUTPATIENT
Start: 2018-09-03 | End: 2018-09-05

## 2018-09-03 RX ORDER — POTASSIUM CHLORIDE 20 MEQ/1
40 TABLET, EXTENDED RELEASE ORAL ONCE
Status: COMPLETED | OUTPATIENT
Start: 2018-09-03 | End: 2018-09-03

## 2018-09-03 RX ORDER — MAGNESIUM OXIDE 400 MG (241.3 MG MAGNESIUM) TABLET
400 TABLET ONCE
Status: COMPLETED | OUTPATIENT
Start: 2018-09-03 | End: 2018-09-03

## 2018-09-03 RX ORDER — LORAZEPAM 0.5 MG/1
0.5 TABLET ORAL NIGHTLY
Status: DISCONTINUED | OUTPATIENT
Start: 2018-09-04 | End: 2018-09-04

## 2018-09-03 RX ORDER — GABAPENTIN 100 MG/1
100 CAPSULE ORAL 3 TIMES DAILY
Status: DISCONTINUED | OUTPATIENT
Start: 2018-09-03 | End: 2018-09-04

## 2018-09-03 NOTE — PROGRESS NOTES
09/02/18 1925 09/02/18 1926 09/02/18 1928   Vital Signs   /70 114/70 100/75   BP Location Left arm Left arm Left arm   BP Method Automatic Automatic Automatic   Patient Position Lying Sitting Standing     Orthostatic negative

## 2018-09-03 NOTE — CONSULTS
[facility]                       [facility]    Rosemary Pam Da Silva Patient Status:  Observation    1960 MRN [id number]   [address] [facility] Attending Shakir Navarro MD • Pneumonia due to organism    • Renal disorder     hx of ERIKA d/t dehydration   • Systolic heart failure ([facility]    • Thrush of mouth and esophagus (HCC)    • Visual impairment     glasses     PSHx: Past Surgical History:  11/30/2006: Ant Samuels (DESYREL) tab 50 mg 50 mg Oral Nightly PRN   Enoxaparin Sodium (LOVENOX) 40 MG/0.4ML injection 40 mg 40 mg Subcutaneous Nightly   acetaminophen (TYLENOL) tab 650 mg 650 mg Oral Q6H PRN   ondansetron HCl (ZOFRAN) injection 4 mg 4 mg Intravenous Q6H PRN   Me malignancy           ENT: The patient reports no hoarseness of voice, hearing loss, sinus congestion, tinnitus           Neurologic: +h/o numbness, tingling, pain, weakness of both LE  PE: BP (!) 86/57 (BP Location: Right arm)   Pulse 71   Temp 98.7 °F (37 Recent Labs   Lab  09/02/18   1527   ALT  39   AST  65*       Imaging: =====KUB_  CONCLUSION:  No bowel obstruction. Moderate amount of stool seen throughout the colon to the level the rectum. No distended loops of bowel. No definite mass.   Brittaney Naqvi

## 2018-09-03 NOTE — PLAN OF CARE
NURSING ADMISSION NOTE      Patient admitted via Cart  Oriented to room. Safety precautions initiated. Bed in low position. Call light in reach.       A&ox4, very worried/anxious tonight about symptoms  RA, SB w/ A-pacing  Voids, up with contact SBA

## 2018-09-03 NOTE — PLAN OF CARE
26 Dr Bill Rodgers on call for Dr Rudy Wolf   Need to know if they will adjust her medication so that her bp is higher so that we can given iv pain medication  Also need to know if her AICD is MRI compatable she also has a heart mesh     1622 Dr Saul De Santiago pa

## 2018-09-03 NOTE — PHYSICAL THERAPY NOTE
PT order received for an evaluation, however pt currently off the floor for STAT CT of cx, tx and lx spine. PT will hold until imaging is complete and appropriate activity orders are in place. RN Itasker aware.

## 2018-09-03 NOTE — PROGRESS NOTES
[facility]  Progress Note     Karen Cortez Patient Status:  Observation    1960 MRN [id number]   [facility] 2NE-A Attending Rehan Klein MD   Hosp Day # 0 PCP Brionna Kate     Chief Complaint: parasthesias    S: Luna Jobs Oral Daily   • OxyCODONE HCl ER  10 mg Oral 2 times per day   • atorvastatin  20 mg Oral Nightly   • nystatin  5 mL Oral QID   • aspirin  81 mg Oral Daily   • enoxaparin  40 mg Subcutaneous Nightly   • Metoprolol Succinate ER  100 mg Oral Daily Beta Blocke

## 2018-09-03 NOTE — CONSULTS
Noel 1827   Neurology; INITIAL Consultation VISIT  9/3/2018, 1:05 PM     Julianna Da Silva Patient Status:  Observation    1960 MRN [id number]   [facility] Attending Vonda Retana MD     PCP Suzie Ryder glasses       PAST SURGICAL HISTORY: Reviewed in H/P  Past Surgical History:  11/30/2006: ANGIOGRAM      Comment: Right & left hear angiogram  05/31/2016: ANGIOGRAM      Comment: Right & left heart angiogram  06/02/2016: ANGIOGRAM      Comment: Right & lef (eight) hours as needed for Nausea. simvastatin 40 MG Oral Tab Take 40 mg by mouth nightly.    Zolpidem Tartrate ER 6.25 MG Oral Tab CR Take 6.25 mg by mouth nightly as needed for Sleep.   omeprazole 20 MG Oral Capsule Delayed Release Take 40 mg by mouth Examination:  Patient is alert oriented with normal fluent speech. Ability to comprehend is intact. Cranial nerves: Pupils are equal and reactive, extraocular movements were full to horizontal and vertical gazes. There was no ptosis.   Facial grimacing s and or the presence of spinal cord lesion such as those seen in inflammatory myelitis. Given these limitations, see recommendations below.     PLAN:  Diagnostic:  CT scan whole spine  EMG NCV in the morning to rule out GBS  Spinal Tap may be necessary    R

## 2018-09-04 ENCOUNTER — APPOINTMENT (OUTPATIENT)
Dept: ULTRASOUND IMAGING | Facility: HOSPITAL | Age: 58
DRG: 074 | End: 2018-09-04
Attending: INTERNAL MEDICINE
Payer: COMMERCIAL

## 2018-09-04 ENCOUNTER — APPOINTMENT (OUTPATIENT)
Dept: GENERAL RADIOLOGY | Facility: HOSPITAL | Age: 58
DRG: 074 | End: 2018-09-04
Attending: Other
Payer: COMMERCIAL

## 2018-09-04 LAB
ANA SCREEN: NEGATIVE
ANA SCREEN: NEGATIVE
ATRIAL RATE: 92 BPM
CLARITY CSF: CLEAR
COLOR CSF: COLORLESS
COUNT PERFORMED ON TUBE: 3
GLUCOSE CSF-MCNC: 56 MG/DL (ref 40–70)
HAV IGM SER QL: 1.7 MG/DL (ref 1.8–2.5)
INR BLD: 0.99 (ref 0.9–1.1)
P AXIS: 48 DEGREES
P-R INTERVAL: 134 MS
POTASSIUM SERPL-SCNC: 3.9 MMOL/L (ref 3.6–5.1)
PROT PATTERN CSF ELPH-IMP: 30 MG/DL (ref 15–45)
PSA SERPL DL<=0.01 NG/ML-MCNC: 13.5 SECONDS (ref 12.4–14.7)
Q-T INTERVAL: 368 MS
QRS DURATION: 78 MS
QTC CALCULATION (BEZET): 455 MS
R AXIS: -11 DEGREES
RBC CSF: 1 /MM3
T AXIS: -65 DEGREES
TOTAL VOLUME CSF: 12 ML
VENTRICULAR RATE: 92 BPM
WBC # FLD MANUAL: 1 /MM3 (ref 0–5)

## 2018-09-04 PROCEDURE — 77003 FLUOROGUIDE FOR SPINE INJECT: CPT | Performed by: OTHER

## 2018-09-04 PROCEDURE — 99232 SBSQ HOSP IP/OBS MODERATE 35: CPT | Performed by: HOSPITALIST

## 2018-09-04 PROCEDURE — 95886 MUSC TEST DONE W/N TEST COMP: CPT | Performed by: OTHER

## 2018-09-04 PROCEDURE — 62270 DX LMBR SPI PNXR: CPT | Performed by: OTHER

## 2018-09-04 PROCEDURE — 95912 NRV CNDJ TEST 11-12 STUDIES: CPT | Performed by: OTHER

## 2018-09-04 PROCEDURE — 99233 SBSQ HOSP IP/OBS HIGH 50: CPT | Performed by: OTHER

## 2018-09-04 PROCEDURE — 00JU3ZZ INSPECTION OF SPINAL CANAL, PERCUTANEOUS APPROACH: ICD-10-PCS | Performed by: RADIOLOGY

## 2018-09-04 RX ORDER — GABAPENTIN 100 MG/1
100 CAPSULE ORAL 3 TIMES DAILY
Status: DISCONTINUED | OUTPATIENT
Start: 2018-09-04 | End: 2018-09-05

## 2018-09-04 RX ORDER — OXYCODONE HCL 10 MG/1
10 TABLET, FILM COATED, EXTENDED RELEASE ORAL ONCE
Status: COMPLETED | OUTPATIENT
Start: 2018-09-04 | End: 2018-09-04

## 2018-09-04 RX ORDER — HYDROCODONE BITARTRATE AND ACETAMINOPHEN 10; 325 MG/1; MG/1
1 TABLET ORAL EVERY 8 HOURS PRN
Status: DISCONTINUED | OUTPATIENT
Start: 2018-09-04 | End: 2018-09-06

## 2018-09-04 RX ORDER — LORAZEPAM 2 MG/ML
0.5 INJECTION INTRAMUSCULAR NIGHTLY
Status: DISCONTINUED | OUTPATIENT
Start: 2018-09-04 | End: 2018-09-06

## 2018-09-04 RX ORDER — LORAZEPAM 2 MG/ML
0.5 INJECTION INTRAMUSCULAR ONCE
Status: COMPLETED | OUTPATIENT
Start: 2018-09-04 | End: 2018-09-04

## 2018-09-04 NOTE — PLAN OF CARE
PAIN - ADULT    • Verbalizes/displays adequate comfort level or patient's stated pain goal Progressing            Pt alert able to make needs known  C/0 generalized pain pain medication given as ordered.   BP continues to remain low 77/44, Dr Jacqulynn Bamberger made

## 2018-09-04 NOTE — PROCEDURES
[facility]  Procedure Note    Tri Barboza Patient Status:  Inpatient    1960 MRN [id number]   [facility] 2NE-A Attending Kailyn Louis MD   Hosp Day # 0 SOFIE Preciado     Procedure: Fluoroscopic guided lumbar punctu

## 2018-09-04 NOTE — HISTORICAL OFFICE NOTE
Bina Butt  : 1960  ACCOUNT:  [id number]  [phone]  PCP: Dr. Timmy Jaramillo     TODAY'S DATE: 2018  DICTATED BY:  [Dr. Candance Cos      CHIEF COMPLAINT: [Followup of . CAD, of native vessels, Followup of .  CHF and left vent arrhythmia, congestive heart failure, drug eluting stent, electrophysiological study (EPS), Heartnet study participant, implantable cardioverter defibrillator, left right cardiac catheterization June 2016, Medtronic generator change 6/13/2011, MI 2004, herve 11/30/17 *Omeprazole           40MG      1 CAPSULE DAILY.                          09/05/17 *Torsemide            10MG      one tablet as needed                     08/02/17 *Simvastatin          40MG      1 BY MOUTH DAILY AT BEDTIME              12/16/16

## 2018-09-04 NOTE — PROCEDURES
[place]  Nerve Conduction & EMG Report                      Polly Seals, [address]   EMG department   [address]  [place], [address] [facility]  [phone]          Patient name: Margarita Cantrell  Date of

## 2018-09-04 NOTE — PROGRESS NOTES
[facility]  Progress Note     Hanny Walton Patient Status:  Observation    1960 MRN [id number]   [facility] 2NE-A Attending Sage Le MD   Hosp Day # 0 PCP Rommel Seymour     Chief Complaint: parasthesias    S: con Imaging data reviewed in Epic.     Medications:   • gabapentin  100 mg Oral TID   • LORazepam  0.5 mg Oral Nightly   • folic acid  039 mcg Oral Daily   • Pantoprazole Sodium  40 mg Oral QAM AC   • multivitamin  1 tablet Oral Daily   • lisinopril  20 mg Oral MD

## 2018-09-04 NOTE — PROGRESS NOTES
Gastroenterology Progress Note  Patient Name: Sharad Galeas  Chief Complaint: Nausea/vomiting  S: The patient reports that her nausea/vomiting is currently controlled. She reports no abdominal pain. She is awaiting abdominal ultrasound.    O: BP appears to have a fair amount of stool in the colon, but does not feel that she is constipated, and is unwilling to accept laxative therapy.     Plan:   1) Follow-up abdominal ultrasound  2) She is currently not having any nausea/vomiting - we will give a t

## 2018-09-04 NOTE — CONSULTS
[facility]  Advanced Heart Failure Consultation    Ana Lazar OmaraHariKarrieyojana Patient Status:  Inpatient    1960 MRN [id number]   [facility] 2NE-A Attending Triny Duvall MD   Hosp Day # 0 PCP Dori Smith     Reason for Consultation who is planning LP and EMG today. As he noted, the patient cannot undergo MRI due to ICD.  I sheared with Dr. Monica Quiroz my concern about the patient's opiate's seeking behavior and indicated that I stopped prescribing any pain medication for her and strongl • Cancer Sister 36     metastatic breast CA   • Diabetes Brother      T1   • Lung Disorder Brother      sarcoidosis   • Cancer Brother      testicular   • Cancer Brother      skin cancer   • Hypertension Neg    • Obesity Neg    • Psychiatric Neg    • Lip 100 mg, 100 mg, Oral, Daily Beta Blocker    Review of Systems:  All systems were reviewed and are negative except as described above in HPI. Physical Exam:  Blood pressure 93/71, pulse 71, temperature 98.2 °F (36.8 °C), temperature source Oral, resp.  rat more phalanges of foot     Abdominal pain, right lower quadrant     Systolic congestive heart failure (HCC)     Hypokalemia     Appendicolith     Chronic systolic congestive heart failure (HCC)     CAD (coronary artery disease)     Acute renal failure synd

## 2018-09-04 NOTE — PLAN OF CARE
PAIN - ADULT    • Verbalizes/displays adequate comfort level or patient's stated pain goal Progressing            Pt is A&Ox4. C/o of pain treated with morphine. Atrial paced on tele. On room air. Family member at bedside overnight.  All needs met, will mon

## 2018-09-04 NOTE — PHYSICAL THERAPY NOTE
Physical therapy consult received and chart reviewed. Attempted to evaluate pt at this time, but RN requests therapy be held at this time as pt is hypotensive (supine BP=79/54). Also pt has EMG and Lumbar puncture scheduled for this morning.   Will plan t

## 2018-09-04 NOTE — PAYOR COMM NOTE
--------------  ADMISSION REVIEW     Payor: [facility] PPO  Subscriber #:  [id number]  Authorization Number: N/A    Admit date: 9/2 to ED      Admitting Physician: Roger Sanchez MD  Attending Physician:  Triny Duvall MD  Primary Care Physician: [place] transplant/pt had a heart attack 2004   • Coronary artery disease    • Esophageal reflux    • Heart attack ([facility] [address]    • High blood pressure    • History of blood transfusion    • Hyperlipidemia    • Ischemic cardiomyopathy    • Pancreatitis    • Systolic hea cm (5' 6\")   Wt 54.4 kg   SpO2 100%   BMI 19.37 kg/m²          Physical Exam   Constitutional: She is oriented to person, place, and time. She appears well-developed and well-nourished. HENT:   Head: Normocephalic and atraumatic.    Mouth/Throat: Orophar ------                     CBC W/ DIFFERENTIAL[[id number]]          Abnormal            Final result                 Please view results for these tests on the individual orders.      Ct Brain Or Head ([id number])    Result Date: 9/2/2018  PROCEDURE:  CT B chest.  It is constant. She denies feeling weak or having difficulty ambulating although she states she did have a couple falls when she tried to step over her dog. Her neuro exam is normal here with good, symmetric strength.   Differential includes elect Hiram Kaur MD   Hosp Day # 0 PCP Margret Sloan     Chief Complaint: numbness    History of Present Illness: [place] is a 63year old female with numbness in thighs bilaterally and anteriolly for the last 2 days.   Then also developed bu Problem Relation Age of Onset   • Heart Disorder Father    • Cancer Mother      lung CA   • Cancer Sister 36     metastatic breast CA   • Diabetes Brother    • Hypertension Neg    • Obesity Neg    • Psychiatric Neg    • Lipids Neg        Allergies:   Cod Pertinent positives and negatives noted in the HPI. Physical Exam:    /83   Pulse 110   Temp 98.3 °F (36.8 °C) (Temporal)   Resp 18   Ht 5' 6\" (1.676 m)   Wt 120 lb (54.4 kg)   SpO2 100%   BMI 19.37 kg/m²    General: No acute distress.  Alert an chf-compensated  4. Pacemaker/aicd-says she can't have mri  5. Cad with hx pci-has not taken her asa/plavix in 5 days she says; resume ASA tomorrow but continue to hold plavix until after LP obtained if it is needed  6.  \"chronic thrush\" not sure this is 9/3/2018 1807 Given [id number] Units Oral Kailey De La Vega, HAYDE      OxyCODONE HCl ER (OXYCONTIN) 12 hr tab 10 mg     Date Action Dose Route User    9/3/2018 1809 Given 10 mg Oral Kailey De La Vega RN      TraZODone HCl (DESYREL) tab 50 mg     Date Action Celestine Weaver on the lower extremities.   She however does not have any Babinski sign.     Finger to nose tests were within normal limits, heel-to-shin tests were done normally.     Gait examination not performed.           Impression:               63year-old patient w 2/2 meds  1. Chronically low, preventing her from getting further pains meds. Constantly asking for pain meds. Drug seeking? 2. Consult dr Casimiro Natarajan to see if ok to decrease ace-I/bb. 4. Chronic systolic chf-compensated  5.  Pacemaker/aicd-confirm w/ card

## 2018-09-04 NOTE — PROGRESS NOTES
Pain Service  Discussed with patient, patient's  at bedside - consulted for PCA however patient has been hypotensive with SBPs in the 70s.   Current work up per Neurology -rule out GBS vs MS - LP and EMG done today  Patient reports generalized pain a

## 2018-09-04 NOTE — PROGRESS NOTES
No[facility] 1827  Neurology  Notes  Affiliated with [address]  2018, 9:14 AM     Lilaata Speaker Patient Status:  Observation    1960 MRN [id number]   [address] [facility] Attending Amira Stewart, Take 75 mg by mouth daily.         MEDICATIONS THIS ADMISSION  • gabapentin  100 mg Oral TID   • LORazepam  0.5 mg Oral Nightly   • folic acid  784 mcg Oral Daily   • Pantoprazole Sodium  40 mg Oral QAM AC   • multivitamin  1 tablet Oral Daily   • lisinopri having pains in hands:  Etiology uncertain? P:   A. Diagnostic:     LP and EMG today  B. Therapeutic:   Symptomatic treatment of pain  C:  Referrals: PTx    D.  Instructions/counseling:   General Teodora Camarena MD  Vascular & General Neurolog

## 2018-09-05 ENCOUNTER — APPOINTMENT (OUTPATIENT)
Dept: ULTRASOUND IMAGING | Facility: HOSPITAL | Age: 58
DRG: 074 | End: 2018-09-05
Attending: INTERNAL MEDICINE
Payer: COMMERCIAL

## 2018-09-05 LAB
ARSENIC, BLOOD: <10 UG/L
CADMIUM, BLOOD: 1.2 UG/L
LEAD, BLOOD (VENOUS): 2.4 UG/DL
MERCURY, BLOOD: <3 UG/L

## 2018-09-05 PROCEDURE — 99233 SBSQ HOSP IP/OBS HIGH 50: CPT | Performed by: OTHER

## 2018-09-05 PROCEDURE — 76700 US EXAM ABDOM COMPLETE: CPT | Performed by: INTERNAL MEDICINE

## 2018-09-05 PROCEDURE — 99232 SBSQ HOSP IP/OBS MODERATE 35: CPT | Performed by: HOSPITALIST

## 2018-09-05 RX ORDER — MORPHINE SULFATE 4 MG/ML
2 INJECTION, SOLUTION INTRAMUSCULAR; INTRAVENOUS EVERY 6 HOURS PRN
Status: DISCONTINUED | OUTPATIENT
Start: 2018-09-05 | End: 2018-09-06

## 2018-09-05 RX ORDER — DULOXETIN HYDROCHLORIDE 30 MG/1
30 CAPSULE, DELAYED RELEASE ORAL DAILY
Status: DISCONTINUED | OUTPATIENT
Start: 2018-09-05 | End: 2018-09-06

## 2018-09-05 NOTE — PLAN OF CARE
A&ox4, RA, NSR/Apaced on tele, vitals stable but low (15-12'G systolic BP)  Voids, up with contact assist d/t fall risk  lovenox - MD to decide when to restart plavix  Anxious and having difficulty sleeping - IV ativan at noc per MD  Nystatin - refusing, s

## 2018-09-05 NOTE — PROGRESS NOTES
[facility]  Progress Note     Deniz Dye Patient Status:  Observation    1960 MRN [id number]   [address] [facility] Attending Golden Ferrer MD   Hosp Day # 1 PCP Santiago Mckenna     Chief Complaint: parasthesias    S: con Imaging data reviewed in Epic.     Medications:   • gabapentin  100 mg Oral TID   • LORazepam  0.5 mg Intravenous Nightly   • folic acid  616 mcg Oral Daily   • Pantoprazole Sodium  40 mg Oral QAM AC   • multivitamin  1 tablet Oral Daily   • lisinopril  20

## 2018-09-05 NOTE — OCCUPATIONAL THERAPY NOTE
OCCUPATIONAL THERAPY EVALUATION - INPATIENT     Room Number: 6380/9069-U  Evaluation Date: 9/5/2018  Type of Evaluation: Initial  Presenting Problem: parasthesias    Physician Order: IP Consult to Occupational Therapy  Reason for Therapy: ADL/IADL Dysfunct Patient is a 69-year-old female with extensive medical history, most significantly ischemic cardiomyopathy with an AICD, she is on the heart transplant list.  Today she reports pain and paresthesias that started 3 days ago in both thighs.   The pain and num Neuropathy      Past Medical History  Past Medical History:   Diagnosis Date   • Arrhythmia    • CONGESTIVE HEART FAILURE     pt is on waiting list for heart transplant/pt had a heart attack 2004   • Coronary artery disease     LAD, angioplasty and stent Prior Level of Function:  I with all ADL's, drives, recently retired . Likes dancing, art, travel.       SUBJECTIVE   \"The pain started in my thighs, it is all over now\"  \"my finger joints are stiff, like arthritis\"    Patient self-stated go -   Toileting, which includes using toilet, bedpan or urinal? : A Little  -   Putting on and taking off regular upper body clothing?: A Little  -   Taking care of personal grooming such as brushing teeth?: A Little  -   Eating meals?: None    AM-PAC Score: In this OT evaluation patient presents with the following performance deficits: decreased ADL/IADL independence, decreased activity tolerance/endurance, decreased mobility/transfers, increased overall functional decline.  These deficits impact the patient’ Patient will be independent with bilateral AROM HEP (home exercise program).

## 2018-09-05 NOTE — PROGRESS NOTES
I have discussed the case with Dr. Daryn Black. Because of scheduling conflicts, the procedure will not be able to be performed today. We will perform at 07:00 tomorrow.

## 2018-09-05 NOTE — PLAN OF CARE
Assumed care at 2300. Pt A/O x4. RA. NSR/A Paced on tele. VSS. SBP remains low (baseline). Patient complaining of pain rated at 6/10. PRN and scheduled pain medications given with some  Relief. Call light within reach. Will continue to monitor.       PAIN

## 2018-09-05 NOTE — PAYOR COMM NOTE
--------------  CONTINUED STAY REVIEW    Payor: [facility]  Subscriber #:  [id number]  Authorization Number: [id number]    Admit date: 9/4/18  Admit time: 1196    Admitting Physician: Johnny Pan MD  Attending Physician:  Leticia Avila MD  Primary Car [address]                        Electronically signed by Corin Galeano DO at 9/4/2018  5:12 PM            MEDICATIONS ADMINISTERED IN LAST 1 DAY:  aspirin EC tab 81 mg     Date Action Dose Route User    9/5/2018 0810 Given 81 mg Oral (PROTONIX) EC tab 40 mg     Date Action Dose Route User    9/5/2018 0540 Given 40 mg Oral Monae Wheeler RN      multivitamin (ADULT) tab 1 tablet     Date Action Dose Route User    9/5/2018 0810 Given 1 tablet Oral Mushtaq Loomis RN        Chief C        FINDINGS:    LIVER:  Fatty infiltration of the liver without focal mass.    BILIARY: Katerina Graft is uncomplicated cholelithiasis. Katerina Graft is a negative Smyth sign.  There is no gallbladder wall thickening. .  Common bile duct diameter is 11 mm.   PANCREA    09/04/18 2208 -- 76 18  88/59 -- -- None (Room air) --    09/04/18 2119 -- 71 18  79/60 -- -- None (Room air) --    09/04/18 2014 -- 71 --  80/55 -- -- -- --    09/04/18 2014 97.9 °F (36.6 °C) -- 18 -- -- -- None (Room air) --    09/04/18 1812

## 2018-09-05 NOTE — PROGRESS NOTES
Gastroenterology Progress Note  Patient Name: Aggie Vincent  Chief Complaint: N/V  S: The patient reports that she has had no nausea/vomiting while in-house.   She reports that she has had ongoing pain issues, which have been somewhat challenging t thickening. .  Common bile duct diameter is 11 mm. PANCREAS:  Normal.  SPLEEN:  Normal.  KIDNEYS:  Normal.  Right kidney measures 11.6 cm.  Left kidney measures 9.8 cm.   AORTA/IVC:  Normal.      Impression:     CONCLUSION:    1.  There is cholelithiasis.

## 2018-09-05 NOTE — PLAN OF CARE
Assumed care of patient around 0730. Pt a/o x 4, RA, a-paced on tele monitor, lungs clear. C/o pain/numbness/tingling in BLE and bilateral hands. Hypotension this a.m., asymptomatic. MD aware. Prn pain meds given as requested by patient.  Pt and  upd

## 2018-09-05 NOTE — PROGRESS NOTES
[facility]  Cardiology Progress Note    Hector Arguello Patient Status:  Inpatient    1960 MRN [id number]   [facility] 2NE-A Attending Fausto Crandall MD   Hosp Day # 1 PCP Roscoe MORRISON     Subjective:  Continues to complain Assessment:  · Paraesthesia of bilateral legs - per neurology. Borderline abnormal EMG. Normal LP.     · Nausea and vomiting - US abdomen with CBD dilation, plans for EUS in am  · Chronic systolic heart failure, LVEF 35%  · CAD, SAUMYA  · ICD  · Hypote

## 2018-09-05 NOTE — PHYSICAL THERAPY NOTE
PHYSICAL THERAPY EVALUATION - INPATIENT     Room Number: 0439/4484-K  Evaluation Date: 9/5/2018  Type of Evaluation: Initial  Physician Order: PT Eval and Treat (fall and increased weakness and numbness in BLE)    Presenting Problem: Paresthesias  Reason duct measuring 11 mm without definite choledocholithiasis. No intrahepatic biliary tree dilation. ERCP or MR CP would be recommended for further evaluation if clinically indicated. Normal appearing pancreas.     Therapy significant co-morbidities:  CHF ( [facility] heart net  No date: TONSILLECTOMY    HOME SITUATION  Type of Home: House   Home Layout: Multi-level  Stairs to Enter : 6  Railing: Yes  Stairs to Bedroom: 25  Railing: Yes    Lives With: Spouse  Drives: Yes  Patient Owned Equipment: None  Patient R See OT calvin--per report symmetrical WFL   RLE ASSESSMENT   RLE AROM  R Hip Flexion: 3/4 - Full ROM   R Knee Flexion: 3/4 - Full ROM   R Knee Extension: 3/4 - Full ROM   R Ankle Dorsiflexion:  3/4 - Full ROM  R Ankle Plantar Flexion: 3/4 - Full ROM   RLE P assistance (Max A per FIM 2/2 distance, but required Min A)  Distance (ft): 50  Assistive Device: Rolling walker  Pattern:  (intermittent knee buckling)  Stoop/Curb Assistance: Not tested  Comment : Above grades based upon FIM definitions per department po assist patient in returning to prior to level of function. Based on this evaluation, patient's clinical presentation is evolving and overall the evaluation complexity is considered moderate.     DISCHARGE RECOMMENDATIONS  PT Discharge Recommendations: [place]

## 2018-09-05 NOTE — PROGRESS NOTES
[address]  Neurology Progress Note    Nora Rodgers Patient Status:  Inpatient    1960 MRN [id number]   [facility] 2NE-A Attending Nehemiah Todd MD   Hosp Day # 1 PCP Halima MORRISON         Subjective:  Charles Skinner KELSEA Dang [id number]    NEUROLOGY   Attending Addendum Gagan Notes:    I have seen and evaluated the patient with my housestaff or independently on the day of the APN/PA note and agree with the overall History/Assessement and Plan:   Specific comments/addit

## 2018-09-06 VITALS
DIASTOLIC BLOOD PRESSURE: 46 MMHG | SYSTOLIC BLOOD PRESSURE: 70 MMHG | HEART RATE: 71 BPM | OXYGEN SATURATION: 95 % | RESPIRATION RATE: 18 BRPM | WEIGHT: 118.19 LBS | HEIGHT: 66 IN | BODY MASS INDEX: 18.99 KG/M2 | TEMPERATURE: 98 F

## 2018-09-06 LAB
ALBUMIN INDEX: 3.2 RATIO
ALBUMIN SERPL-MCNC: 2.3 G/DL (ref 3.5–4.8)
ALBUMIN, CSF: 9 MG/DL
ALBUMIN, SERUM/PLASMA, NEPH: 2770 MG/DL
ALBUMIN/GLOB SERPL: 0.6 {RATIO} (ref 1–2)
ALP LIVER SERPL-CCNC: 122 U/L (ref 46–118)
ALT SERPL-CCNC: 40 U/L (ref 14–54)
ANGIOTENSIN CONVERTING E, CSF: <0.4 U/L
ANION GAP SERPL CALC-SCNC: 9 MMOL/L (ref 0–18)
AST SERPL-CCNC: 61 U/L (ref 15–41)
BASOPHILS # BLD AUTO: 0.22 X10(3) UL (ref 0–0.1)
BASOPHILS NFR BLD AUTO: 2.4 %
BILIRUB SERPL-MCNC: 0.4 MG/DL (ref 0.1–2)
BUN BLD-MCNC: 12 MG/DL (ref 8–20)
BUN/CREAT SERPL: 19.4 (ref 10–20)
CALCIUM BLD-MCNC: 8.3 MG/DL (ref 8.3–10.3)
CHLORIDE SERPL-SCNC: 106 MMOL/L (ref 101–111)
CO2 SERPL-SCNC: 21 MMOL/L (ref 22–32)
CREAT BLD-MCNC: 0.62 MG/DL (ref 0.55–1.02)
CSF IGG SYNTHESIS RATE: <0 MG/D
CSF IGG/ALBUMIN RATIO: 0.21 RATIO
EOSINOPHIL # BLD AUTO: 0.3 X10(3) UL (ref 0–0.3)
EOSINOPHIL NFR BLD AUTO: 3.3 %
ERYTHROCYTE [DISTWIDTH] IN BLOOD BY AUTOMATED COUNT: 13 % (ref 11.5–16)
GLOBULIN PLAS-MCNC: 3.9 G/DL (ref 2.5–4)
GLUCOSE BLD-MCNC: 91 MG/DL (ref 70–99)
HCT VFR BLD AUTO: 36.5 % (ref 34–50)
HGB BLD-MCNC: 11.6 G/DL (ref 12–16)
IGG INDEX: 0.42 RATIO
IMMATURE GRANULOCYTE COUNT: 0.11 X10(3) UL (ref 0–1)
IMMATURE GRANULOCYTE RATIO %: 1.2 %
IMMUNOGLOBULIN G CSF: 1.9 MG/DL
IMMUNOGLOBULIN G: 1380 MG/DL
LYMPHOCYTES # BLD AUTO: 3.45 X10(3) UL (ref 0.9–4)
LYMPHOCYTES NFR BLD AUTO: 38.4 %
M PROTEIN MFR SERPL ELPH: 6.2 G/DL (ref 6.1–8.3)
MCH RBC QN AUTO: 29.4 PG (ref 27–33.2)
MCHC RBC AUTO-ENTMCNC: 31.8 G/DL (ref 31–37)
MCV RBC AUTO: 92.4 FL (ref 81–100)
MONOCYTES # BLD AUTO: 0.79 X10(3) UL (ref 0.1–1)
MONOCYTES NFR BLD AUTO: 8.8 %
NEUTROPHIL ABS PRELIM: 4.12 X10 (3) UL (ref 1.3–6.7)
NEUTROPHILS # BLD AUTO: 4.12 X10(3) UL (ref 1.3–6.7)
NEUTROPHILS NFR BLD AUTO: 45.9 %
OSMOLALITY SERPL CALC.SUM OF ELEC: 281 MOSM/KG (ref 275–295)
PLATELET # BLD AUTO: 213 10(3)UL (ref 150–450)
POTASSIUM SERPL-SCNC: 4.1 MMOL/L (ref 3.6–5.1)
RBC # BLD AUTO: 3.95 X10(6)UL (ref 3.8–5.1)
RED CELL DISTRIBUTION WIDTH-SD: 44.2 FL (ref 35.1–46.3)
SODIUM SERPL-SCNC: 136 MMOL/L (ref 136–144)
WBC # BLD AUTO: 9 X10(3) UL (ref 4–13)

## 2018-09-06 PROCEDURE — 90792 PSYCH DIAG EVAL W/MED SRVCS: CPT | Performed by: OTHER

## 2018-09-06 PROCEDURE — 0DJ08ZZ INSPECTION OF UPPER INTESTINAL TRACT, VIA NATURAL OR ARTIFICIAL OPENING ENDOSCOPIC: ICD-10-PCS | Performed by: INTERNAL MEDICINE

## 2018-09-06 PROCEDURE — 99239 HOSP IP/OBS DSCHRG MGMT >30: CPT | Performed by: HOSPITALIST

## 2018-09-06 RX ORDER — OXYCODONE HCL 10 MG/1
10 TABLET, FILM COATED, EXTENDED RELEASE ORAL EVERY 12 HOURS
Qty: 6 TABLET | Refills: 0 | Status: ON HOLD | OUTPATIENT
Start: 2018-09-06 | End: 2018-12-14

## 2018-09-06 RX ORDER — CLOPIDOGREL BISULFATE 75 MG/1
75 TABLET ORAL DAILY
Status: DISCONTINUED | OUTPATIENT
Start: 2018-09-06 | End: 2018-09-06

## 2018-09-06 RX ORDER — NALOXONE HYDROCHLORIDE 4 MG/.1ML
4 SPRAY, METERED NASAL AS NEEDED
Qty: 1 EACH | Refills: 0 | Status: SHIPPED | OUTPATIENT
Start: 2018-09-06 | End: 2018-09-07

## 2018-09-06 RX ORDER — NALOXONE HYDROCHLORIDE 4 MG/.1ML
4 SPRAY, METERED NASAL AS NEEDED
Qty: 1 EACH | Refills: 0 | Status: SHIPPED | OUTPATIENT
Start: 2018-09-06 | End: 2018-09-06

## 2018-09-06 RX ORDER — NALOXONE HYDROCHLORIDE 0.4 MG/ML
80 INJECTION, SOLUTION INTRAMUSCULAR; INTRAVENOUS; SUBCUTANEOUS AS NEEDED
Status: ACTIVE | OUTPATIENT
Start: 2018-09-06 | End: 2018-09-06

## 2018-09-06 RX ORDER — HYDROCODONE BITARTRATE AND ACETAMINOPHEN 7.5; 325 MG/1; MG/1
1 TABLET ORAL EVERY 8 HOURS PRN
Qty: 10 TABLET | Refills: 0 | Status: SHIPPED | OUTPATIENT
Start: 2018-09-06 | End: 2018-09-09

## 2018-09-06 RX ORDER — MORPHINE SULFATE 4 MG/ML
2 INJECTION, SOLUTION INTRAMUSCULAR; INTRAVENOUS EVERY 8 HOURS PRN
Status: DISCONTINUED | OUTPATIENT
Start: 2018-09-06 | End: 2018-09-06

## 2018-09-06 RX ORDER — DULOXETIN HYDROCHLORIDE 30 MG/1
30 CAPSULE, DELAYED RELEASE ORAL DAILY
Qty: 30 CAPSULE | Refills: 0 | Status: SHIPPED | OUTPATIENT
Start: 2018-09-07 | End: 2018-10-14

## 2018-09-06 RX ORDER — SODIUM CHLORIDE, SODIUM LACTATE, POTASSIUM CHLORIDE, CALCIUM CHLORIDE 600; 310; 30; 20 MG/100ML; MG/100ML; MG/100ML; MG/100ML
INJECTION, SOLUTION INTRAVENOUS CONTINUOUS
Status: DISCONTINUED | OUTPATIENT
Start: 2018-09-06 | End: 2018-09-06

## 2018-09-06 NOTE — DISCHARGE SUMMARY
[facility]ITAL[facility]  DISCHARGE SUMMARY     Blaire Dias Patient Status:  Inpatient    1960 MRN [id number]   [facility] 2NE-A Attending No att. providers found   Hosp Day # 2 PCP Dariana Mathias     Date of Admission: 2018  Win cholelithiasis. As she has no clear cause of her sx's and the sx's seem to be stable she will be dc'ed w/ plans to f/u w/ neuro. Stated she threw up all her opioids and had none left.  I recommended she see her PCP and I would only give her 3 days of pain m ondansetron 8 MG Tbdp  Commonly known as:  ZOFRAN-ODT      Take 8 mg by mouth every 8 (eight) hours as needed for Nausea. Refills:  0     ONE-DAILY MULTI VITAMINS Tabs      Take 1 tablet by mouth daily.    Refills:  0     OxyCODONE HCl ER 10 MG T12a  Co Positive bowel sounds. No rebound or guarding. Neurologic: No focal neurological deficits. Musculoskeletal: Moves all extremities. MS 4-5/5 throughout  Extremities: No edema.   ----------------------------------------------------------------------------

## 2018-09-06 NOTE — PLAN OF CARE
Impaired Functional Mobility    • Achieve highest/safest level of mobility/gait Progressing        PAIN - ADULT    • Verbalizes/displays adequate comfort level or patient's stated pain goal Progressing          Pt.  Alertx4, calm, cooperative, slightly drow

## 2018-09-06 NOTE — PAYOR COMM NOTE
--------------  CONTINUED STAY REVIEW    Payor: [facility] PPO  Subscriber #:  [id number]  Authorization Number: [id number]    Admit date: 9/4/18  Admit time: 3277    Admitting Physician: Rossi Roblero MD  Attending Physician:  Colby Thacker MD  Primary Car .0 09/06/2018   CREATSERUM 0.62 09/06/2018   BUN 12 09/06/2018    09/06/2018   K 4.1 09/06/2018    09/06/2018   CO2 21.0 09/06/2018   GLU 91 09/06/2018   CA 8.3 09/06/2018   ALB 2.3 09/06/2018   ALKPHO 122 09/06/2018   BILT 0.4 09/06/2 weeks is needed. Continue Cymbalta.   Consider possible DC today.     Dr. Mcduffie Copcolleen to follow.     RAFFI Andrew  [facility] [id number]  Pager [phone]  9/6/2018, 8:45 AM       Electronically signed by RAFFI Stock morphINE sulfate (PF) 4 MG/ML injection 2 mg     Date Action Dose Route User    9/6/2018 0530 Given 2 mg Intravenous Maribel Robbins RN    9/5/2018 2000 Given 2 mg Intravenous Maribel Robbins RN      OxyCODONE HCl ER (OXYCONTIN) 12 hr tab 10 mg     Date exam began with a linear echoendoscope. The parenchyma of the pancreatic head, body, tail and uncinate process were normal in appearance. The pancreatic duct was normal caliber measuring 1 mm in the body and 2 mm in the head of the pancreas.  The common shadi

## 2018-09-06 NOTE — PHYSICAL THERAPY NOTE
PHYSICAL THERAPY TREATMENT NOTE - INPATIENT    Room Number: 5621/4018-N     Session: 1   Number of Visits to Meet Established Goals: 5    Presenting Problem: Paresthesias     History related to current admission: Pt is 63year old female admitted on 9/2/2 would be recommended for further evaluation if clinically indicated. Normal appearing pancreas.      Therapy significant co-morbidities:  CHF (on the wait list for heart transplant), Pacemaker/AICD, CAD, HTN, s/p extraction of all her teeth (dentures), chr home    OBJECTIVE  Precautions: None    WEIGHT BEARING RESTRICTION  Weight Bearing Restriction: None                PAIN ASSESSMENT   Rating: Unable to rate  Location: B feet  Management Techniques: Repositioning; Activity promotion    BALANCE declined further mobility due to pain. Pt returned to room, requesting to toilet, performed toileting with supervision. Pt returned to bed independently. Updated pt  And pt's daughter on role of PT, POC, DC planning/recs, positioning.,activity.  Pt denies 9/5/2018

## 2018-09-06 NOTE — CONSULTS
[facility]  Report of Psychiatric Consultation    Margarita Cantrell Patient Status:  Inpatient    1960 MRN [id number]   [facility] 2NE-A Attending Apolinar Montes MD   [facility] Day # 2 PCP Lia [place]     Date of Admission:  dose be increased but rather decreased. She is right at the 50 morphine equivalents daily that greatly increases her risk for addiction/use disorder. 3) She does not plan to establish with chronic pain clinic.  Since there is no clear diagnosis, she does opioids and the fact that she ran out early, stating that she had vomited some of it up, so had to take more to replace what she threw up. Psych was consulted to calvin for anxiety/depression and opioid use disorder.      Celine Estevez shares how she has \"chronic list for heart transplant/pt had a heart attack 2004   • Coronary artery disease     LAD, angioplasty and stent   • Esophageal reflux    • Heart attack [facility])    • History of blood transfusion    • Hyperlipidemia    • Ischemic cardiomyopathy    • Pancreatit drugs. Allergies:    Codeine Sulfate         NAUSEA ONLY  Nsaids                  UNKNOWN    Comment:Cardiologist states none to be taken.     Medications:    Current Facility-Administered Medications:   •  lactated ringers infusion, , Intravenous, Margurite Christian Negative for suicidal ideas. The patient is nervous/anxious.       Mental Status Exam:     Risk Assessment  Suicidal ideation: no suicidal ideation  Homicidal ideation: None noted    Objective       09/06/18  1220   BP: (!) 70/46   Pulse: 71   Resp: 18   Te moderate. Mild to moderate central canal stenosis C5-C6.       9/2/2018 CT Brain  No acute CT abnormality noted.

## 2018-09-06 NOTE — PROGRESS NOTES
[facility]  Progress Note     Rosita Sorensen Patient Status:  Observation    1960 MRN [id number]   [facility] 2NE-A Attending Shad Sargent MD   Hosp Day # 2 PCP Jeff Cunningham     Chief Complaint: parasthesias    S: see 09/02/18   1527  09/04/18   0904   PTP  13.4  13.5   INR  0.98  0.99       Recent Labs   Lab  09/02/18   1527   TROP  <0.046   CK  29            Imaging: Imaging data reviewed in Epic.     Medications:   • DULoxetine HCl  30 mg Oral Daily   • LORazepam  0.5 diagnosis  9. Hypomagnesemia-replace per protocol  10. Hyponatremia-monitor     Quality:  · DVT Prophylaxis: lovenox  · CODE status: full  · Kumar: no     Estimated date of discharge: possibly today? OK to DC from my standpoint.    Discharge is dependent on

## 2018-09-06 NOTE — PROGRESS NOTES
IV and tele discontinued. Pt. Received discharge instructions and follow-up information. Prescriptions faxed over to pharmacy of choice and paper copies given to the patient. Questions addressed and answered. Pt.  To be transported by wheelchair with tech t

## 2018-09-06 NOTE — PLAN OF CARE
Impaired Functional Mobility    • Achieve highest/safest level of mobility/gait Progressing        PAIN - ADULT    • Verbalizes/displays adequate comfort level or patient's stated pain goal Progressing          A&ox4, family ( and dtr) at bedside th

## 2018-09-06 NOTE — OPERATIVE REPORT
[facility] Patient Status:  Inpatient    1960 MRN [id number]   [facility] ENDOSCOPY Attending Rehan Klein MD   Hosp Day # 2 PCP Thania MORRISON     PREOPERATIVE DIAGNOSIS/INDICATION: Nausea, Vomiting, Abdominal jaqueline Dilated CBD without stone or stricture. 2. Gallbladder sludge. RECOMMENDATIONS:    1. Soft diet and advance as tolerated. 2. Consider biliary dyskinesia, symptomatic cholelithiasis work up electively.      [facility]  Gastroenterology/Advanced Endo

## 2018-09-06 NOTE — PROGRESS NOTES
[facility]  Advanced Heart Failure Progress Note    Gertrude Coelho Patient Status:  Inpatient    1960 MRN [id number]   [facility] 2NE-A Attending Belkis Vaelncia MD   Hosp Day # 2 PCP Faisal Vasquez     Subjective:  Feels ter 09/06/2018   RDW 13.0 09/06/2018   .0 09/06/2018       Lab Results  Component Value Date   PT 13.3 10/16/2012   INR 0.99 09/04/2018   INR 0.98 09/02/2018   INR 1.04 11/20/2017     No results for input(s): BNPML in the last 168 hours.     BUN (mg/dL) injection 4 mg 4 mg Intravenous Q6H PRN   Metoclopramide HCl (REGLAN) injection 10 mg 10 mg Intravenous Q8H PRN   Metoprolol Succinate ER (Toprol XL) 24 hr tab 100 mg 100 mg Oral Daily Beta Blocker       Assessment and Plan:  Patient Active Problem List:

## 2018-09-06 NOTE — BH PROGRESS NOTE
Behavioral Health Note  CHIEF COMPLAINT  Nausea,vomiting  REASON FOR ADMISSION  Nausea,vomiting  SUBJECTIVE  The patient is a 62 y/o female admitted with nausea and vomitting. This writer met with the patient for follow-up for outpatient therapists.  The pily

## 2018-09-06 NOTE — H&P (VIEW-ONLY)
[facility]                       Gastroenterology Consultation-[facility]    Misael Da Silva Patient Status:  Observation    1960 MRN [id number]   [address] [place] [address] Attending Rosina Nair MD • Pneumonia due to organism    • Renal disorder     hx of ERIKA d/t dehydration   • Systolic heart failure ([address]    • Thrush of mouth and esophagus (HCC)    • Visual impairment     glasses     PSHx: Past Surgical History:  11/30/2006: Siria Matute (DESYREL) tab 50 mg 50 mg Oral Nightly PRN   Enoxaparin Sodium (LOVENOX) 40 MG/0.4ML injection 40 mg 40 mg Subcutaneous Nightly   acetaminophen (TYLENOL) tab 650 mg 650 mg Oral Q6H PRN   ondansetron HCl (ZOFRAN) injection 4 mg 4 mg Intravenous Q6H PRN   Me malignancy           ENT: The patient reports no hoarseness of voice, hearing loss, sinus congestion, tinnitus           Neurologic: +h/o numbness, tingling, pain, weakness of both LE  PE: BP (!) 86/57 (BP Location: Right arm)   Pulse 71   Temp 98.7 °F (37 Recent Labs   Lab  09/02/18   1527   ALT  39   AST  65*       Imaging: =====KUB_  CONCLUSION:  No bowel obstruction. Moderate amount of stool seen throughout the colon to the level the rectum. No distended loops of bowel. No definite mass.   Malka Stern

## 2018-09-06 NOTE — PROGRESS NOTES
PSYCH CONSULT    Date of Admission: 9/2/18  Date of Consult: 9/6/18  Reason for Consultation: Eval for opioid use disorder, depression    Impression:  AXIS 1: Anxiety and depressive disorder unspecified.      Chronic opioid use (45-52.5 oral morphine equiva will follow up with GI regarding a possible biliary cause for her recurrent N/V.    5) She will follow up with neuro regarding her acute numbness and extremity pain.      6) Psych liaison will give her referrals for individual therapists to treat her anxiet

## 2018-09-06 NOTE — PROGRESS NOTES
[facility] [facility] Progress Note    Herberth Jacinto Patient Status:  Inpatient    1960 MRN [id number]   [facility] 2NE-A Attending Colby Thacker MD   [address] Day # 2 PCP Jason Huddleston     CC: Numbness and Pain     Mart Knock 09/06/2018    09/06/2018   K 4.1 09/06/2018    09/06/2018   CO2 21.0 09/06/2018   GLU 91 09/06/2018   CA 8.3 09/06/2018   ALB 2.3 09/06/2018   ALKPHO 122 09/06/2018   BILT 0.4 09/06/2018   TP 6.2 09/06/2018   AST 61 09/06/2018   ALT 40 09/06/20 today.    Dr. Swanson Rim to follow.     RAFFI Ann  [employer] [id number]  Pager [phone]  9/6/2018, 8:45 AM

## 2018-09-07 LAB
CSF BAND OLIGOCLONAL: NEGATIVE
CSF OLIGOCLONAL BANDS NUMBER: 0 BANDS
MYELOPEROX ANTIBODIES, IGG: 0 AU/ML
SERINE PROTEASE3, IGG: 1 AU/ML

## 2018-09-07 NOTE — PAYOR COMM NOTE
--------------  DISCHARGE REVIEW    Payor: LAUREN COHN  Subscriber #:  [id number]  Authorization Number: [id number]    Admit date: 9/4/18  Admit time:  0946  Discharge Date: 9/6/2018  5:30 PM     Admitting Physician: Temo Ceja MD  Attending Physician: complicated stay. Continued to have parasthesias/neuropathy in both hands and legs. C/o significant pain. Unable to obtain MRI due to pacemaker. Had LP which was unremarkable which ruled out GBS.  Had CT spine w/ mild to moderate central canal stenosis C5-C folic acid 586 MCG Tabs  Commonly known as:  FOLVITE      Take 400 mcg by mouth daily. Refills:  0     hydrocodone-acetaminophen 7.5-325 MG Tabs  Commonly known as:  NORCO      Take 1 tablet by mouth every 8 (eight) hours as needed for Pain.    Stop yas A  [address] [address]  [phone]      Follow up with Dr. Richard KUNZ, [facility], on September 20, 2018 at [address].  [place] [address] [phone]    On 9/10/2018  Ojuimaez-Ywfufg-Yo appointment (Primary Care) on [facility]

## 2018-09-08 LAB
MAG ANTIBODY, IGM ELISA: 62 TU
SGPG ANTIBODY, IGM: 0.07 IV

## 2018-09-10 ENCOUNTER — CHARTING TRANS (OUTPATIENT)
Dept: OTHER | Age: 58
End: 2018-09-10

## 2018-10-02 ENCOUNTER — APPOINTMENT (OUTPATIENT)
Dept: CT IMAGING | Facility: HOSPITAL | Age: 58
End: 2018-10-02
Attending: EMERGENCY MEDICINE
Payer: COMMERCIAL

## 2018-10-02 ENCOUNTER — PRIOR ORIGINAL RECORDS (OUTPATIENT)
Dept: OTHER | Age: 58
End: 2018-10-02

## 2018-10-02 ENCOUNTER — APPOINTMENT (OUTPATIENT)
Dept: GENERAL RADIOLOGY | Facility: HOSPITAL | Age: 58
End: 2018-10-02
Attending: EMERGENCY MEDICINE
Payer: COMMERCIAL

## 2018-10-02 ENCOUNTER — HOSPITAL ENCOUNTER (EMERGENCY)
Facility: HOSPITAL | Age: 58
Discharge: HOME | End: 2018-10-02
Attending: EMERGENCY MEDICINE
Payer: COMMERCIAL

## 2018-10-02 VITALS
TEMPERATURE: 99 F | HEART RATE: 97 BPM | OXYGEN SATURATION: 99 % | RESPIRATION RATE: 15 BRPM | DIASTOLIC BLOOD PRESSURE: 83 MMHG | SYSTOLIC BLOOD PRESSURE: 113 MMHG

## 2018-10-02 DIAGNOSIS — E87.1 HYPONATREMIA: ICD-10-CM

## 2018-10-02 DIAGNOSIS — R11.2 NAUSEA AND VOMITING IN ADULT: Primary | ICD-10-CM

## 2018-10-02 DIAGNOSIS — R10.9 ABDOMINAL PAIN OF UNKNOWN ETIOLOGY: ICD-10-CM

## 2018-10-02 DIAGNOSIS — E87.6 HYPOKALEMIA: ICD-10-CM

## 2018-10-02 PROCEDURE — 87086 URINE CULTURE/COLONY COUNT: CPT | Performed by: EMERGENCY MEDICINE

## 2018-10-02 PROCEDURE — 81001 URINALYSIS AUTO W/SCOPE: CPT | Performed by: EMERGENCY MEDICINE

## 2018-10-02 PROCEDURE — 84484 ASSAY OF TROPONIN QUANT: CPT | Performed by: EMERGENCY MEDICINE

## 2018-10-02 PROCEDURE — 96374 THER/PROPH/DIAG INJ IV PUSH: CPT

## 2018-10-02 PROCEDURE — 96361 HYDRATE IV INFUSION ADD-ON: CPT

## 2018-10-02 PROCEDURE — 85025 COMPLETE CBC W/AUTO DIFF WBC: CPT | Performed by: EMERGENCY MEDICINE

## 2018-10-02 PROCEDURE — 93010 ELECTROCARDIOGRAM REPORT: CPT

## 2018-10-02 PROCEDURE — 80053 COMPREHEN METABOLIC PANEL: CPT | Performed by: EMERGENCY MEDICINE

## 2018-10-02 PROCEDURE — 99285 EMERGENCY DEPT VISIT HI MDM: CPT

## 2018-10-02 PROCEDURE — 93005 ELECTROCARDIOGRAM TRACING: CPT

## 2018-10-02 PROCEDURE — 74177 CT ABD & PELVIS W/CONTRAST: CPT | Performed by: EMERGENCY MEDICINE

## 2018-10-02 PROCEDURE — 83690 ASSAY OF LIPASE: CPT | Performed by: EMERGENCY MEDICINE

## 2018-10-02 PROCEDURE — 82803 BLOOD GASES ANY COMBINATION: CPT | Performed by: EMERGENCY MEDICINE

## 2018-10-02 PROCEDURE — 71045 X-RAY EXAM CHEST 1 VIEW: CPT | Performed by: EMERGENCY MEDICINE

## 2018-10-02 PROCEDURE — 96360 HYDRATION IV INFUSION INIT: CPT

## 2018-10-02 RX ORDER — DICYCLOMINE HCL 20 MG
20 TABLET ORAL 4 TIMES DAILY PRN
Qty: 30 TABLET | Refills: 0 | Status: SHIPPED | OUTPATIENT
Start: 2018-10-02 | End: 2018-10-14

## 2018-10-02 RX ORDER — ONDANSETRON 2 MG/ML
4 INJECTION INTRAMUSCULAR; INTRAVENOUS ONCE
Status: COMPLETED | OUTPATIENT
Start: 2018-10-02 | End: 2018-10-02

## 2018-10-02 RX ORDER — ONDANSETRON 4 MG/1
4 TABLET, ORALLY DISINTEGRATING ORAL EVERY 4 HOURS PRN
Qty: 10 TABLET | Refills: 0 | Status: SHIPPED | OUTPATIENT
Start: 2018-10-02 | End: 2018-10-09

## 2018-10-02 RX ORDER — POTASSIUM CHLORIDE 20 MEQ/1
40 TABLET, EXTENDED RELEASE ORAL ONCE
Status: COMPLETED | OUTPATIENT
Start: 2018-10-02 | End: 2018-10-02

## 2018-10-02 NOTE — ED INITIAL ASSESSMENT (HPI)
Pt presented c/o abdominal pain and nausea for past few weeks. Pt stated the pain worsened to point where is nausea prompted visit to ER. Pt also stated she has chest pain and SOB with a history of CHF. EKG being done.   Pt states was also recently told t

## 2018-10-02 NOTE — ED PROVIDER NOTES
Patient Seen in: [facility] Emergency Department    History   Patient presents with:  Abdomen/Flank Pain (GI/)    Stated Complaint: abd pain    HPI    60-year-old female presents with chief complaint abdominal pain.   Patient states that she woke u Comment:  2  2004: CARDIAC DEFIBRILLATOR PLACEMENT      Comment:  [employer] ICD  No date: CARDIAC PACEMAKER PLACEMENT  No date: CATH DRUG ELUTING STENT  7/13/2015: COLONOSCOPY; N/A      Comment:  Procedure: COLONOSCOPY;  Surgeon: Karlie Farias MD; is clear, uvula midline. Scalp is atraumatic. NECK: Neck is supple, there is no nuchal rigidity. No carotid bruits. No masses. Trachea midline. No cervical lymphadenopathy. HEART: Regular rate and rhythm, no murmurs.   LUNGS: Clear to auscultation Narrative: The following orders were created for panel order CBC WITH DIFFERENTIAL WITH PLATELET.   Procedure                               Abnormality         Status                     ---------                               -----------         ------ tablet Refills: 0    !! ondansetron 4 MG Oral Tablet Dispersible  Take 1 tablet (4 mg total) by mouth every 4 (four) hours as needed for Nausea. Qty: 10 tablet Refills: 0    !! - Potential duplicate medications found. Please discuss with provider.

## 2018-10-04 ENCOUNTER — PRIOR ORIGINAL RECORDS (OUTPATIENT)
Dept: OTHER | Age: 58
End: 2018-10-04

## 2018-10-05 ENCOUNTER — OFFICE VISIT (OUTPATIENT)
Dept: SURGERY | Facility: CLINIC | Age: 58
End: 2018-10-05
Payer: COMMERCIAL

## 2018-10-05 VITALS
SYSTOLIC BLOOD PRESSURE: 120 MMHG | DIASTOLIC BLOOD PRESSURE: 91 MMHG | TEMPERATURE: 97 F | HEART RATE: 112 BPM | BODY MASS INDEX: 18 KG/M2 | WEIGHT: 110 LBS

## 2018-10-05 DIAGNOSIS — K81.9 CHOLECYSTITIS: Primary | ICD-10-CM

## 2018-10-05 PROCEDURE — 99243 OFF/OP CNSLTJ NEW/EST LOW 30: CPT | Performed by: SURGERY

## 2018-10-05 NOTE — H&P
New Patient Visit Note       Active Problems      1.  Cholecystitis        Chief Complaint   Patient presents with:  Gallbladder: itractable abd PN , EGD showed dilated common bile duct and sludge in       History of Present Illness     Patient presents at hematochezia. The patient denies change in  bladder habits. There is no complaint of hematuria or dysuria. Allergies  Matteo Burton is allergic to codeine sulfate; sulfa antibiotics; and nsaids.     Past Medical / Surgical / Social / Family History    The pas ESOPHAGOGASTRODUODENOSCOPY (EGD); N/A      Comment:  Performed by Andrade Sheehan MD at [facility] ENDOSCOPY  8/11/2015: ESOPHAGOGASTRODUODENOSCOPY (EGD); N/A      Comment:  Performed by Osei Castillo MD at [facility] ENDOSCOPY  No date: FOOT SURGERY      Comment:  f Disp: 30 tablet Rfl: 0   ondansetron 4 MG Oral Tablet Dispersible Take 1 tablet (4 mg total) by mouth every 4 (four) hours as needed for Nausea.  Disp: 10 tablet Rfl: 0   DULoxetine HCl 30 MG Oral Cap DR Particles Take 1 capsule (30 mg total) by mouth daily distention, anal bleeding, blood in stool and constipation. Genitourinary: Negative for difficulty urinating, dysuria, frequency and urgency. Musculoskeletal: Negative for arthralgias and myalgias. Skin: Negative for color change and rash.    Neurolog preauricular, no posterior auricular and no occipital adenopathy present. Right cervical: No superficial cervical, no deep cervical and no posterior cervical adenopathy present.        Left cervical: No superficial cervical, no deep cervical and no p questions were answered to the patient's satisfaction, the patient provided willing and informed consent to proceed. No orders of the defined types were placed in this encounter.       Imaging & Referrals   None    Follow Up  Return in about 2 weeks

## 2018-10-05 NOTE — H&P (VIEW-ONLY)
New Patient Visit Note       Active Problems      1.  Cholecystitis        Chief Complaint   Patient presents with:  Gallbladder: itractable abd PN , EGD showed dilated common bile duct and sludge in       History of Present Illness Patient presents at the request of her primary care physician and cardiologist for evaluation of recurrent epigastric and upper abdominal pain with radiation to her chest back and shoulder.   The patient has significant comorbid medical conditions enumerate Past Medical / Surgical / Social / Family History    The past medical and past surgical history have been reviewed by me today.     Past Medical History:   Diagnosis Date   • Arrhythmia    • CONGESTIVE HEART FAILURE     pt is on waiting list for heart trans Comment:  Performed by Mena Penaloza MD at [facility] ENDOSCOPY  No date: FOOT SURGERY      Comment:  fixed nerve  No date: OTHER      Comment:  heart net  06/30/2009: OTHER SURGICAL HISTORY      Comment:  [facility] heart net  No date: TONSILLECTOMY    The fam ondansetron 4 MG Oral Tablet Dispersible Take 1 tablet (4 mg total) by mouth every 4 (four) hours as needed for Nausea. Disp: 10 tablet Rfl: 0   DULoxetine HCl 30 MG Oral Cap DR Particles Take 1 capsule (30 mg total) by mouth daily.  Disp: 30 capsule Rfl: 0 Gastrointestinal: Positive for abdominal pain, diarrhea, nausea and vomiting. Negative for abdominal distention, anal bleeding, blood in stool and constipation. Genitourinary: Negative for difficulty urinating, dysuria, frequency and urgency.    [address] Head (right side): No submental, no submandibular, no preauricular, no posterior auricular and no occipital adenopathy present.         Head (left side): No submental, no submandibular, no preauricular, no posterior auricular and no occipital adenopath · The risks, benefits, and alternatives to the procedure were explained to the patient.   The risks explained include, but are not limited to, bleeding, infection, pain wound complications, recurrence, incorrect diagnosis, injury to adjacent organs and stru

## 2018-10-06 ENCOUNTER — ANESTHESIA EVENT (OUTPATIENT)
Dept: SURGERY | Facility: HOSPITAL | Age: 58
End: 2018-10-06
Payer: COMMERCIAL

## 2018-10-08 ENCOUNTER — PRIOR ORIGINAL RECORDS (OUTPATIENT)
Dept: OTHER | Age: 58
End: 2018-10-08

## 2018-10-08 ENCOUNTER — TELEPHONE (OUTPATIENT)
Dept: SURGERY | Facility: CLINIC | Age: 58
End: 2018-10-08

## 2018-10-08 NOTE — PAT NURSING NOTE
History of cardiomyopathy/abnormal Ekg/Cmp reviewed by /anesthesia. She requests note of medical clearance pre op. Faxed request to and left message for Leslee/(cardiologist) office. Also faxed request to surgeon as ALYSE.

## 2018-10-09 LAB
ALBUMIN: 2.6 G/DL
ALKALINE PHOSPHATATE(ALK PHOS): 153 IU/L
BILIRUBIN TOTAL: 0.8 MG/DL
BUN: 11 MG/DL
CALCIUM: 8.9 MG/DL
CHLORIDE: 101 MEQ/L
CREATININE, SERUM: 0.65 MG/DL
GLOBULIN: 4.9 G/DL
GLUCOSE: 145 MG/DL
HEMATOCRIT: 37.2 %
HEMOGLOBIN: 12.7 G/DL
PLATELETS: 282 K/UL
POTASSIUM, SERUM: 3.2 MEQ/L
PROTEIN, TOTAL: 7.5 G/DL
RED BLOOD COUNT: 4.26 X 10-6/U
SGOT (AST): 47 IU/L
SGPT (ALT): 34 IU/L
SODIUM: 130 MEQ/L
WHITE BLOOD COUNT: 10.2 X 10-3/U

## 2018-10-10 ENCOUNTER — HOSPITAL ENCOUNTER (OUTPATIENT)
Facility: HOSPITAL | Age: 58
Setting detail: HOSPITAL OUTPATIENT SURGERY
Discharge: HOME | End: 2018-10-10
Attending: SURGERY | Admitting: SURGERY
Payer: COMMERCIAL

## 2018-10-10 ENCOUNTER — ANESTHESIA (OUTPATIENT)
Dept: SURGERY | Facility: HOSPITAL | Age: 58
End: 2018-10-10
Payer: COMMERCIAL

## 2018-10-10 ENCOUNTER — APPOINTMENT (OUTPATIENT)
Dept: GENERAL RADIOLOGY | Facility: HOSPITAL | Age: 58
End: 2018-10-10
Attending: SURGERY
Payer: COMMERCIAL

## 2018-10-10 VITALS
TEMPERATURE: 98 F | OXYGEN SATURATION: 100 % | WEIGHT: 105 LBS | SYSTOLIC BLOOD PRESSURE: 109 MMHG | RESPIRATION RATE: 14 BRPM | DIASTOLIC BLOOD PRESSURE: 75 MMHG | BODY MASS INDEX: 17.49 KG/M2 | HEART RATE: 113 BPM | HEIGHT: 65 IN

## 2018-10-10 DIAGNOSIS — K81.9 CHOLECYSTITIS: ICD-10-CM

## 2018-10-10 PROCEDURE — 47563 LAPARO CHOLECYSTECTOMY/GRAPH: CPT | Performed by: SURGERY

## 2018-10-10 PROCEDURE — 74300 X-RAY BILE DUCTS/PANCREAS: CPT | Performed by: SURGERY

## 2018-10-10 PROCEDURE — 0FT44ZZ RESECTION OF GALLBLADDER, PERCUTANEOUS ENDOSCOPIC APPROACH: ICD-10-PCS | Performed by: SURGERY

## 2018-10-10 RX ORDER — HYDROCODONE BITARTRATE AND ACETAMINOPHEN 10; 325 MG/1; MG/1
1 TABLET ORAL AS NEEDED
Status: COMPLETED | OUTPATIENT
Start: 2018-10-10 | End: 2018-10-10

## 2018-10-10 RX ORDER — METOCLOPRAMIDE HYDROCHLORIDE 5 MG/ML
10 INJECTION INTRAMUSCULAR; INTRAVENOUS AS NEEDED
Status: DISCONTINUED | OUTPATIENT
Start: 2018-10-10 | End: 2018-10-10

## 2018-10-10 RX ORDER — HYDROCODONE BITARTRATE AND ACETAMINOPHEN 10; 325 MG/1; MG/1
2 TABLET ORAL AS NEEDED
Status: COMPLETED | OUTPATIENT
Start: 2018-10-10 | End: 2018-10-10

## 2018-10-10 RX ORDER — SODIUM CHLORIDE, SODIUM LACTATE, POTASSIUM CHLORIDE, CALCIUM CHLORIDE 600; 310; 30; 20 MG/100ML; MG/100ML; MG/100ML; MG/100ML
INJECTION, SOLUTION INTRAVENOUS CONTINUOUS
Status: DISCONTINUED | OUTPATIENT
Start: 2018-10-10 | End: 2018-10-10

## 2018-10-10 RX ORDER — NALOXONE HYDROCHLORIDE 0.4 MG/ML
80 INJECTION, SOLUTION INTRAMUSCULAR; INTRAVENOUS; SUBCUTANEOUS AS NEEDED
Status: DISCONTINUED | OUTPATIENT
Start: 2018-10-10 | End: 2018-10-10

## 2018-10-10 RX ORDER — OXYCODONE HYDROCHLORIDE 10 MG/1
10 TABLET ORAL EVERY 6 HOURS PRN
Qty: 20 TABLET | Refills: 0 | Status: ON HOLD | OUTPATIENT
Start: 2018-10-10 | End: 2018-10-17

## 2018-10-10 RX ORDER — ONDANSETRON 2 MG/ML
INJECTION INTRAMUSCULAR; INTRAVENOUS
Status: COMPLETED
Start: 2018-10-10 | End: 2018-10-10

## 2018-10-10 RX ORDER — ACETAMINOPHEN 500 MG
1000 TABLET ORAL ONCE
Status: DISCONTINUED | OUTPATIENT
Start: 2018-10-10 | End: 2018-10-10 | Stop reason: HOSPADM

## 2018-10-10 RX ORDER — MORPHINE SULFATE 4 MG/ML
2 INJECTION, SOLUTION INTRAMUSCULAR; INTRAVENOUS EVERY 5 MIN PRN
Status: DISCONTINUED | OUTPATIENT
Start: 2018-10-10 | End: 2018-10-10

## 2018-10-10 RX ORDER — BUPIVACAINE HYDROCHLORIDE AND EPINEPHRINE 5; 5 MG/ML; UG/ML
INJECTION, SOLUTION EPIDURAL; INTRACAUDAL; PERINEURAL AS NEEDED
Status: DISCONTINUED | OUTPATIENT
Start: 2018-10-10 | End: 2018-10-10 | Stop reason: HOSPADM

## 2018-10-10 RX ORDER — ONDANSETRON 2 MG/ML
4 INJECTION INTRAMUSCULAR; INTRAVENOUS AS NEEDED
Status: DISCONTINUED | OUTPATIENT
Start: 2018-10-10 | End: 2018-10-10

## 2018-10-10 RX ORDER — DOCUSATE SODIUM 100 MG/1
100 CAPSULE, LIQUID FILLED ORAL 3 TIMES DAILY
Qty: 90 CAPSULE | Refills: 0 | Status: ON HOLD | OUTPATIENT
Start: 2018-10-10 | End: 2018-11-15

## 2018-10-10 RX ORDER — HEPARIN SODIUM 5000 [USP'U]/ML
5000 INJECTION, SOLUTION INTRAVENOUS; SUBCUTANEOUS ONCE
Status: COMPLETED | OUTPATIENT
Start: 2018-10-10 | End: 2018-10-10

## 2018-10-10 NOTE — BRIEF OP NOTE
Pre-Operative Diagnosis: Cholecystitis [K81.9]     Post-Operative Diagnosis: Cholecystitis [K81.9]      Procedure Performed:   Procedure(s):  LAPAROSCOPIC CHOLECYSTECTOMY WITH CHOLANGIOGRAM    Surgeon(s) and Role:     * Sandra Arias MD - Primary    A

## 2018-10-10 NOTE — OPERATIVE REPORT
OPERATIVE REPORT   PREOPERATIVE DIAGNOSIS: Cholecystitis and cholelithiasis. POSTOPERATIVE DIAGNOSIS: Cholecystitis and cholelithiasis. PROCEDURE PERFORMED: Laparoscopic cholecystectomy with intraoperative cholangiogram.   ASSISTANT: Lenny Jeffery.    ELVI conduct of this case, particularly in the performance of the cholangiogram, as well as the careful dissection necessary in and around the hilum of the gallbladder.    DESCRIPTION OF PROCEDURE: The patient was brought to the operating room, and, after the in artery on the specimen side, three towards the patient side, and the cystic artery was divided with the Endoshears. Next, the gallbladder was elevated from the gallbladder fossa using electrocautery.  Once the gallbladder was elevated from the gallbladder f

## 2018-10-10 NOTE — ANESTHESIA POSTPROCEDURE EVALUATION
[address] Patient Status:  Hospital Outpatient Surgery   Age/Gender 63year old female MRN [id number]   Location [facility] Attending Thong Gustafson MD   Hosp Day # 0 PCP Jeff Cunningham

## 2018-10-10 NOTE — ANESTHESIA PREPROCEDURE EVALUATION
PRE-OP EVALUATION    Patient Name: Tri Barboza    Pre-op Diagnosis: Cholecystitis [K81.9]    Procedure(s):  LAPAROSCOPIC CHOLECYSTECTOMY WITH CHOLANGIOGRAM    Surgeon(s) and Role:     * Tiffany Esparza MD - Primary    Pre-op vitals reviewed. mouth daily. Disp:  Rfl:    Coenzyme Q10 (CO Q-10) 150 MG Oral Cap Take 1 tablet by mouth daily. Disp:  Rfl:    aspirin 81 MG Oral Tab EC Take 81 mg by mouth daily. Disp:  Rfl:    folic acid (FOLVITE) 825 MCG Oral Tab Take 400 mcg by mouth daily.  Disp:  Rf OTHER      heart net   • OTHER SURGICAL HISTORY  2009    [facility] heart net   • TONSILLECTOMY       Social History    Tobacco Use      Smoking status: Former Smoker        Years: 20.00        Quit date: 2004        Years since quittin.9

## 2018-10-10 NOTE — INTERVAL H&P NOTE
Pre-op Diagnosis: Cholecystitis [K81.9]    The above referenced H&P was reviewed by Rene Gabriel MD on 10/10/2018, the patient was examined and no significant changes have occurred in the patient's condition since the H&P was performed.   I discussed w

## 2018-10-12 ENCOUNTER — CHARTING TRANS (OUTPATIENT)
Dept: OTHER | Age: 58
End: 2018-10-12

## 2018-10-13 ENCOUNTER — HOSPITAL ENCOUNTER (INPATIENT)
Facility: HOSPITAL | Age: 58
LOS: 4 days | Discharge: HOME | DRG: 314 | End: 2018-10-18
Attending: EMERGENCY MEDICINE | Admitting: INTERNAL MEDICINE
Payer: COMMERCIAL

## 2018-10-13 ENCOUNTER — APPOINTMENT (OUTPATIENT)
Dept: CT IMAGING | Facility: HOSPITAL | Age: 58
DRG: 314 | End: 2018-10-13
Attending: EMERGENCY MEDICINE
Payer: COMMERCIAL

## 2018-10-13 DIAGNOSIS — K66.8 INTRA-ABDOMINAL FREE AIR OF UNKNOWN ETIOLOGY: ICD-10-CM

## 2018-10-13 DIAGNOSIS — I50.9 ACUTE ON CHRONIC CONGESTIVE HEART FAILURE, UNSPECIFIED HEART FAILURE TYPE (HCC): ICD-10-CM

## 2018-10-13 DIAGNOSIS — Z98.890 POST-OPERATIVE STATE: ICD-10-CM

## 2018-10-13 DIAGNOSIS — I95.9 HYPOTENSION, UNSPECIFIED HYPOTENSION TYPE: Primary | ICD-10-CM

## 2018-10-13 DIAGNOSIS — R29.898 SEVERE MUSCLE DECONDITIONING: ICD-10-CM

## 2018-10-13 PROCEDURE — 74176 CT ABD & PELVIS W/O CONTRAST: CPT | Performed by: EMERGENCY MEDICINE

## 2018-10-13 RX ORDER — SODIUM CHLORIDE 9 MG/ML
1000 INJECTION, SOLUTION INTRAVENOUS ONCE
Status: COMPLETED | OUTPATIENT
Start: 2018-10-13 | End: 2018-10-13

## 2018-10-14 ENCOUNTER — APPOINTMENT (OUTPATIENT)
Dept: CT IMAGING | Facility: HOSPITAL | Age: 58
DRG: 314 | End: 2018-10-14
Attending: EMERGENCY MEDICINE
Payer: COMMERCIAL

## 2018-10-14 ENCOUNTER — APPOINTMENT (OUTPATIENT)
Dept: CV DIAGNOSTICS | Facility: HOSPITAL | Age: 58
DRG: 314 | End: 2018-10-14
Attending: INTERNAL MEDICINE
Payer: COMMERCIAL

## 2018-10-14 ENCOUNTER — APPOINTMENT (OUTPATIENT)
Dept: GENERAL RADIOLOGY | Facility: HOSPITAL | Age: 58
DRG: 314 | End: 2018-10-14
Attending: INTERNAL MEDICINE
Payer: COMMERCIAL

## 2018-10-14 PROBLEM — I50.9 ACUTE ON CHRONIC CONGESTIVE HEART FAILURE, UNSPECIFIED HEART FAILURE TYPE (HCC): Status: ACTIVE | Noted: 2018-10-14

## 2018-10-14 PROBLEM — I50.9 ACUTE ON CHRONIC CONGESTIVE HEART FAILURE (HCC): Status: RESOLVED | Noted: 2018-02-05 | Resolved: 2018-10-14

## 2018-10-14 PROBLEM — K81.9 CHOLECYSTITIS: Status: RESOLVED | Noted: 2018-10-05 | Resolved: 2018-10-14

## 2018-10-14 PROBLEM — N17.9 AKI (ACUTE KIDNEY INJURY) (HCC): Status: RESOLVED | Noted: 2018-02-05 | Resolved: 2018-10-14

## 2018-10-14 PROBLEM — N28.9 RENAL INSUFFICIENCY: Status: ACTIVE | Noted: 2018-10-14

## 2018-10-14 PROBLEM — Z98.890 POST-OPERATIVE STATE: Status: ACTIVE | Noted: 2018-10-14

## 2018-10-14 PROBLEM — Z90.49 HISTORY OF LAPAROSCOPIC CHOLECYSTECTOMY: Status: ACTIVE | Noted: 2018-10-14

## 2018-10-14 PROBLEM — K66.8 INTRA-ABDOMINAL FREE AIR OF UNKNOWN ETIOLOGY: Status: ACTIVE | Noted: 2018-10-14

## 2018-10-14 PROBLEM — I95.9 HYPOTENSION, UNSPECIFIED HYPOTENSION TYPE: Status: ACTIVE | Noted: 2018-10-14

## 2018-10-14 PROBLEM — J18.9 COMMUNITY ACQUIRED PNEUMONIA: Status: RESOLVED | Noted: 2018-02-05 | Resolved: 2018-10-14

## 2018-10-14 PROBLEM — R29.898 SEVERE MUSCLE DECONDITIONING: Status: ACTIVE | Noted: 2018-10-14

## 2018-10-14 PROCEDURE — 74176 CT ABD & PELVIS W/O CONTRAST: CPT | Performed by: EMERGENCY MEDICINE

## 2018-10-14 PROCEDURE — 93306 TTE W/DOPPLER COMPLETE: CPT | Performed by: INTERNAL MEDICINE

## 2018-10-14 PROCEDURE — 99223 1ST HOSP IP/OBS HIGH 75: CPT | Performed by: INTERNAL MEDICINE

## 2018-10-14 PROCEDURE — 71045 X-RAY EXAM CHEST 1 VIEW: CPT | Performed by: INTERNAL MEDICINE

## 2018-10-14 RX ORDER — METOCLOPRAMIDE HYDROCHLORIDE 5 MG/ML
5 INJECTION INTRAMUSCULAR; INTRAVENOUS EVERY 8 HOURS PRN
Status: DISCONTINUED | OUTPATIENT
Start: 2018-10-14 | End: 2018-10-16

## 2018-10-14 RX ORDER — CLOPIDOGREL BISULFATE 75 MG/1
75 TABLET ORAL DAILY
Status: DISCONTINUED | OUTPATIENT
Start: 2018-10-14 | End: 2018-10-18

## 2018-10-14 RX ORDER — POTASSIUM CHLORIDE 20 MEQ/1
40 TABLET, EXTENDED RELEASE ORAL EVERY 4 HOURS
Status: DISPENSED | OUTPATIENT
Start: 2018-10-14 | End: 2018-10-14

## 2018-10-14 RX ORDER — ASPIRIN 81 MG/1
81 TABLET ORAL DAILY
Status: DISCONTINUED | OUTPATIENT
Start: 2018-10-14 | End: 2018-10-18

## 2018-10-14 RX ORDER — MELATONIN
400 DAILY
Status: DISCONTINUED | OUTPATIENT
Start: 2018-10-14 | End: 2018-10-18

## 2018-10-14 RX ORDER — SODIUM CHLORIDE 9 MG/ML
INJECTION, SOLUTION INTRAVENOUS CONTINUOUS
Status: ACTIVE | OUTPATIENT
Start: 2018-10-14 | End: 2018-10-14

## 2018-10-14 RX ORDER — SODIUM CHLORIDE 9 MG/ML
INJECTION, SOLUTION INTRAVENOUS CONTINUOUS
Status: DISCONTINUED | OUTPATIENT
Start: 2018-10-14 | End: 2018-10-16

## 2018-10-14 RX ORDER — ONDANSETRON 2 MG/ML
4 INJECTION INTRAMUSCULAR; INTRAVENOUS EVERY 6 HOURS PRN
Status: DISCONTINUED | OUTPATIENT
Start: 2018-10-14 | End: 2018-10-18

## 2018-10-14 RX ORDER — ZOLPIDEM TARTRATE 5 MG/1
5 TABLET ORAL NIGHTLY PRN
Status: DISCONTINUED | OUTPATIENT
Start: 2018-10-14 | End: 2018-10-18

## 2018-10-14 RX ORDER — ACETAMINOPHEN 325 MG/1
650 TABLET ORAL EVERY 6 HOURS PRN
Status: DISCONTINUED | OUTPATIENT
Start: 2018-10-14 | End: 2018-10-18

## 2018-10-14 RX ORDER — ENOXAPARIN SODIUM 100 MG/ML
40 INJECTION SUBCUTANEOUS NIGHTLY
Status: DISCONTINUED | OUTPATIENT
Start: 2018-10-14 | End: 2018-10-14

## 2018-10-14 RX ORDER — OXYCODONE HCL 10 MG/1
10 TABLET, FILM COATED, EXTENDED RELEASE ORAL EVERY 12 HOURS
Status: DISCONTINUED | OUTPATIENT
Start: 2018-10-14 | End: 2018-10-18

## 2018-10-14 RX ORDER — POTASSIUM CHLORIDE 29.8 MG/ML
40 INJECTION INTRAVENOUS ONCE
Status: COMPLETED | OUTPATIENT
Start: 2018-10-14 | End: 2018-10-14

## 2018-10-14 RX ORDER — POTASSIUM CHLORIDE 14.9 MG/ML
20 INJECTION INTRAVENOUS ONCE
Status: COMPLETED | OUTPATIENT
Start: 2018-10-14 | End: 2018-10-14

## 2018-10-14 RX ORDER — ENOXAPARIN SODIUM 100 MG/ML
30 INJECTION SUBCUTANEOUS NIGHTLY
Status: DISCONTINUED | OUTPATIENT
Start: 2018-10-14 | End: 2018-10-18

## 2018-10-14 RX ORDER — ATORVASTATIN CALCIUM 20 MG/1
20 TABLET, FILM COATED ORAL NIGHTLY
Status: DISCONTINUED | OUTPATIENT
Start: 2018-10-14 | End: 2018-10-18

## 2018-10-14 RX ORDER — OXYMETAZOLINE HCL 0.05 %
1 AEROSOL, SPRAY WITH PUMP (ML) NASAL DAILY
Status: DISCONTINUED | OUTPATIENT
Start: 2018-10-14 | End: 2018-10-14 | Stop reason: RX

## 2018-10-14 NOTE — CONSULTS
[facility]    PATIENT'S NAME: Sarita Wallace   ATTENDING PHYSICIAN: VICKI Lee: Julio C Arguello M.D.    PATIENT ACCOUNT#:   [de-identified]    LOCATION:  [address] [place] [address]  MEDICAL RECORD #:   [id number]       DATE OF surgery. There have been no symptoms of heart failure recently. She states that she had a defibrillator discharge within the last year. Chronic pain has been an ongoing issue, and opioid use has been a concern.     PAST MEDICAL HISTORY:  Coronary disease generalized discomfort. INTEGUMENT: No skin rash. ENVIRONMENTAL ALLERGIES: None. NEUROLOGIC: No history of TIA, CVA, or syncope prior to today's episode. No history of a seizure disorder.       PHYSICAL EXAMINATION:    GENERAL:  A pleasant, chronically i infiltration of the liver. IMPRESSION:    1. Syncope, likely related to hypovolemic hypotension. The possibility of an arrhythmia cannot be excluded. 2.   Hypotension secondary to hypovolemia.   3.   Status post laparoscopic cholecystectomy 10/10/2

## 2018-10-14 NOTE — ED NOTES
Pt was able to drink approximately half of the oral contrast dilution. Antinausea medication offered, declined. EDMD notified.

## 2018-10-14 NOTE — PROGRESS NOTES
[facility] Pharmacy Note:  Renal Dose Adjustment for Metoclopramide (REGLAN)    Nora Rodgers has been prescribed metoclopramide (REGLAN) 10 mg every 8 hours as needed for N/V.     Estimated Creatinine Clearance: 31.5 mL/min (A) (based on SCr of 1.48 mg/d

## 2018-10-14 NOTE — ED INITIAL ASSESSMENT (HPI)
Pt had syncope x3 this evening after standing and restarting her cardiac medications. Pt had her gallbladder removed this past Wednesday. She was found to be hypotensive at home.

## 2018-10-14 NOTE — ED NOTES
EDMD notified of blood pressure. Her MD has notified that she should not receive dopamine. EDMD notified.

## 2018-10-14 NOTE — PROGRESS NOTES
[facility]  Cardiology Progress Note    eMna Malone Patient Status:  Inpatient    1960 MRN [id number]   [facility] 6NE-A Attending Kiko Jaime MD   Hosp Day # 0 PCP Hailey Ferrer       Subjective: Kaycee Porter Labs   Lab  10/13/18   2047   TROP  <0.046       Allergies:     Codeine Sulfate         NAUSEA ONLY  Sulfa Antibiotics       NAUSEA AND VOMITING    Comment:pancreatitis  Nsaids                  UNKNOWN    Comment:Cardiologist states none to be taken.     Me morning    Brenda Santos MD  10/14/2018  9:42 AM

## 2018-10-14 NOTE — CONSULTS
Cardiology Consult Ofelia Skinner)    Dictated   October 13, 2018    Dr. Alisa Can:  Syncope  Hypotension 2nd hypovolemia  S/P recent lap choly 10/10/18  Abnormal CT abdomen--R/O perforated viscus  Ischemic cardiomyopathy   EF 35%

## 2018-10-14 NOTE — CONSULTS
[facility]  Report of  Surgical Consultation with History and Physical Exam    Sandy Nya Patient Status:  Inpatient    1960 MRN [id number]   [facility] 6NE-A Attending Noemi Clark MD   Hosp Day # 0 PCP Stefan Soni postsurgical changes in the gallbladder fossa. Clips are present. There is no fluid between the right kidney and the liver. There is no inflammatory process ongoing anywhere in the abdomen that would be indicative of a significant injury to the bowel. NRDR ([address]) which includes the Dose Index Registry. PATIENT STATED HISTORY: (As transcribed by Technologist)  Three days post laparoscopic cholecystectomy. Three syncopal episodes tonight. Hypotension.        FINDINGS: Pranav Danielle correlation is   recommended. 2.  Mild colonic wall thickening may be related to underdistention with nonspecific colitis not excluded. Clinical correlation recommended. 3. Status post cholecystectomy.   Dilated common bile duct measuring up to 1 PLACEMENT     • CATH DRUG ELUTING STENT     • COLONOSCOPY N/A 7/13/2015    Procedure: COLONOSCOPY;  Surgeon: Antolin Billings MD;  Location: [facility] ENDOSCOPY   • COLONOSCOPY     • COLONOSCOPY N/A 7/13/2015    Performed by Antolin Billings MD at [facility] ENDOSCOPY   • [address] (PLAVIX) tab 75 mg, 75 mg, Oral, Daily  •  folic acid (FOLVITE) tab 400 mcg, 400 mcg, Oral, Daily  •  OxyCODONE HCl ER (OXYCONTIN) 12 hr tab 10 mg, 10 mg, Oral, 2 times per day  •  atorvastatin (LIPITOR) tab 20 mg, 20 mg, Oral, Nightly  •  Zolpidem Tartrat 10/14/18   0436   RBC  4.19  3.86   HGB  12.7  11.9*   HCT  37.8  35.8   MCV  90.2  92.7   MCH  30.3  30.8   MCHC  33.6  33.2   RDW  16.0  16.3*   NEPRELIM  11.68*   --    WBC  14.9*  17.4*   PLT  243.0  225.0       Recent Labs   Lab  10/13/18   2047  10/1 completely benign abdomen. She shows no signs or symptoms consistent with peritonitis. She shows no evidence of leak of contrast on the CT scan. This patient's cardiac condition precludes any urgent laparoscopy for evaluation.   We would have to insuff

## 2018-10-14 NOTE — H&P
[facility]  History and Physical     [facility] Patient Status:  Emergency    1960 MRN [id number]   Location [facility] Attending Noemi Ingram MD   Hosp Day # 0 PCP Binu Ferrer     Chief Complaint: CARDIAC DEFIBRILLATOR PLACEMENT  2004    Medtronic ICD   • CARDIAC PACEMAKER PLACEMENT     • CATH DRUG ELUTING STENT     • COLONOSCOPY N/A 7/13/2015    Procedure: COLONOSCOPY;  Surgeon: Marty Mi MD;  Location: [facility] ENDOSCOPY   • COLONOSCOPY     • COLONOS No current facility-administered medications on file prior to encounter. Current Outpatient Medications on File Prior to Encounter:  docusate sodium (COLACE) 100 MG Oral Cap Take 1 capsule (100 mg total) by mouth 3 (three) times daily.  Disp: 90 capsule wheezes. No rhonchi. Cardiovascular: S1, S2. Regular rate and rhythm. No murmurs, rubs or gallops. Equal pulses. Chest and Back: No tenderness or deformity. Abdomen: Soft, nontender, nondistended. Positive bowel sounds.  No rebound, guarding or organom viscus-> general surgery consulted.      Connie Benavides MD

## 2018-10-14 NOTE — PROGRESS NOTES
Pharmacy Note: Dietary Supplement Discontinuation Per Policy    Coenzyme T-47 has been discontinued on [facility] per policy. This supplement may be restarted upon discharge using the medication reconciliation process.     Thank you,   Ghassan Edmonds,

## 2018-10-14 NOTE — PHYSICAL THERAPY NOTE
Attempted to see pt for PT eval today. HAYDE Galvan, asked that we hold PT until tomorrow. Will attempt again as schedule permits. Thank you.

## 2018-10-14 NOTE — PLAN OF CARE
Refusing scheduled PO potassium chloride at 1000, at later time able to give dose by letting tablet dissolve in water because pt unable to take dose. Requesting potassium level be drawn prior to third dose.  Level drawn 2.8, covering via IV now that central

## 2018-10-14 NOTE — ED PROVIDER NOTES
Patient Seen in: [facility] Emergency Department    History   Patient presents with:  Syncope (cardiovascular, neurologic)  Hypotension (cardiovascular)    Stated Complaint: syncope x3, hypotension; recent kenn    HPI    Hypotension yesterday and t is currently off of it because she has too many antibodies to find an appropriate match    Past Medical History:   Diagnosis Date   • Arrhythmia    • [address] 232     pt is on waiting list for heart transplant/pt had a heart attack 2004   • Cor SURGICAL HISTORY  2009    [facility] heart net   • REMOVAL GALLBLADDER  10/2018   • TONSILLECTOMY             Social History    Tobacco Use      Smoking status: Former Smoker        Years: 20.00        Quit date: 2004        Years since quittin (*)     Calculated Osmolality 271 (*)     GFR, Non- 39 (*)     GFR, -American 45 (*)     AST 60 (*)     Alkaline Phosphatase 229 (*)     Albumin 2.6 (*)     A/G Ratio 0.6 (*)     All other components within normal limits   PROTHROMBI at 71 bpm minute EKG has some ectopy noted. Q waves in the septal leads              She with an incredibly comp gated past medical history presents to the emergency department with low blood pressure and 3 distinct episodes of syncope today.   Despite IV Disposition and Plan     Clinical Impression:  Hypotension, unspecified hypotension type  (primary encounter diagnosis)  Intra-abdominal free air of unknown etiology  Post-operative state  Acute on chronic congestive heart failure, unspecified

## 2018-10-14 NOTE — PROGRESS NOTES
Justice VASQUEZ  : 1960  ACCOUNT:  [id number]  [phone]  PCP: Dr. Renee Pike     TODAY'S DATE: 10/04/2018  DICTATED BY:  [Dr. Gia Valero      CHIEF COMPLAINT: [Followup of . CAD, of native vessels, Followup of .  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 I believe her sinus tachycardia is most likely related to the lack of absorption of any of her cardiac medications. I was able to obtain an appointment with the surgeon for tomorrow.   I will have the patient return aft daily                42/12/55 Folic Acid            213DOH    one tablet by mouth daily

## 2018-10-14 NOTE — ICD/PM
Medtronic dual lead ICD remote check, reviewed findings with device representative Tanisha. Only 2 short stored episodes of NSVT since last check July/2018.  10/11 for 3 seconds, and 10/12 for 2 seconds. Lead status in expected range.

## 2018-10-14 NOTE — PROGRESS NOTES
Central Venous Catheter Placement Note    Indication: Vascular access    Location: Right IJ vein    Catheter type: Triple lumen catheter    Pre-procedure: Consented was obtained and documented from patient. Procedural pause performed.  Vital signs, labora

## 2018-10-14 NOTE — PLAN OF CARE
Received patient from the ER around 0200. Patient is AOx4, neurologically intact. Generalized weakness. Room air, saturating 100%, lungs clear, RR 12-20. Atrial paced on the monitor, HR 70. Titrating levophed gtt to maintain SBP > 90.  Palpable 2+ pedal pul

## 2018-10-14 NOTE — CONSULTS
Critical Care Consult     Assessment / Plan:  1. Hypotension - reportedly a chronic issue. Has known chronic systolic heart failure with EF of 35%.  Acute worsening due to dehydration from diarrhea and resuming her cardiac meds which she was previously not History:   Procedure Laterality Date   • ANGIOGRAM  11/30/2006    Right & left hear angiogram   • ANGIOGRAM  05/31/2016    Right & left heart angiogram   • ANGIOGRAM  06/02/2016    Right & left heart angiogram   • ANGIOPLASTY (CORONARY)  11/24/2004    sten Disp:  Rfl:  10/13/2018 at Unknown time   Metoprolol Succinate  MG Oral Tablet 24 Hr Take 100 mg by mouth nightly.    Disp:  Rfl:  10/13/2018 at Unknown time   ondansetron 8 MG Oral Tablet Dispersible Take 8 mg by mouth every 8 (eight) hours as needed Zolpidem Tartrate ER 6.25 MG Oral Tab CR Take 6.25 mg by mouth nightly as needed for Sleep. Disp:  Rfl:    omeprazole 20 MG Oral Capsule Delayed Release Take 40 mg by mouth every morning.    Disp:  Rfl:    Coenzyme Q10 (CO Q-10) 150 MG Oral Cap Take 1 ta testicular   • Cancer Brother         skin cancer   • Hypertension Neg    • Obesity Neg    • Psychiatric Neg    • Lipids Neg          Exam:   10/14/18  0900 10/14/18  0930 10/14/18  1000 10/14/18  1015   BP: 91/66 (!) 86/61 104/71 96/68   BP Location:

## 2018-10-15 ENCOUNTER — APPOINTMENT (OUTPATIENT)
Dept: CT IMAGING | Facility: HOSPITAL | Age: 58
DRG: 314 | End: 2018-10-15
Attending: HOSPITALIST
Payer: COMMERCIAL

## 2018-10-15 PROCEDURE — 74176 CT ABD & PELVIS W/O CONTRAST: CPT | Performed by: HOSPITALIST

## 2018-10-15 PROCEDURE — 99233 SBSQ HOSP IP/OBS HIGH 50: CPT | Performed by: HOSPITALIST

## 2018-10-15 RX ORDER — PANTOPRAZOLE SODIUM 20 MG/1
20 TABLET, DELAYED RELEASE ORAL
Status: DISCONTINUED | OUTPATIENT
Start: 2018-10-15 | End: 2018-10-15

## 2018-10-15 RX ORDER — HYDROMORPHONE HYDROCHLORIDE 1 MG/ML
0.5 INJECTION, SOLUTION INTRAMUSCULAR; INTRAVENOUS; SUBCUTANEOUS EVERY 2 HOUR PRN
Status: DISCONTINUED | OUTPATIENT
Start: 2018-10-15 | End: 2018-10-17

## 2018-10-15 RX ORDER — POTASSIUM CHLORIDE 14.9 MG/ML
20 INJECTION INTRAVENOUS ONCE
Status: COMPLETED | OUTPATIENT
Start: 2018-10-15 | End: 2018-10-15

## 2018-10-15 RX ORDER — HYDROMORPHONE HYDROCHLORIDE 1 MG/ML
0.5 INJECTION, SOLUTION INTRAMUSCULAR; INTRAVENOUS; SUBCUTANEOUS EVERY 2 HOUR PRN
Status: DISCONTINUED | OUTPATIENT
Start: 2018-10-15 | End: 2018-10-15

## 2018-10-15 NOTE — OCCUPATIONAL THERAPY NOTE
OCCUPATIONAL THERAPY EVALUATION - INPATIENT     Room Number: 5138/1535-P  Evaluation Date: 10/15/2018  Type of Evaluation: Initial  Presenting Problem: synocope and collapse    Physician Order: IP Consult to Occupational Therapy  Reason for Therapy: ADL/IA 2016    Right & left heart angiogram   • ANGIOGRAM  2016    Right & left heart angiogram   • ANGIOPLASTY (CORONARY)  2004    stent to LAD   • ANGIOPLASTY (CORONARY)  2005    stent to LAD   •       2   • CARDIAC DEFIBRILLATO Cardiac  Fall Risk: High fall risk    WEIGHT BEARING RESTRICTION  Weight Bearing Restriction: None                PAIN ASSESSMENT  Rating: Unable to rate          COGNITION  Overall Cognitive Status:  Impaired    VISION  Current Vision: no changes reported compensatory strategies. Pt performed supine to edge of bed transfer with Sampson and maintained sitting at edge of bed with supervision. Pt participated in ROM and strength testing of UB.   Pt performed transfer from edge of bed level to bedside chair with margie review of history including review of medical or therapy record   Specific performance deficits impacting engagement in ADL/IADL MODERATE  3 - 5 performance deficits   Client Assessment/Performance Deficits MODERATE - Comorbidities and min to mod modificat

## 2018-10-15 NOTE — PROGRESS NOTES
Bonnie Da Silva Patient Status:  Inpatient    1960 MRN [id number]   [facility] 6NE-A Attending Sanjay Garcia MD   Hosp Day # 1 PCP [facility] TAMIKO     Critical Care Progress Note      Assessment/Plan:  1.  Hypotension - reportedly S1S2, no  murmur. Abdomen: soft, non-tender, non-distended, positive BS. Extremity: No clubbing or cyanosis no edema. Skin: No rashes or lesions.     Recent Labs   Lab  10/13/18   2047  10/14/18   0436   RBC  4.19  3.86   HGB  12.7  11.9*   HCT  37.8

## 2018-10-15 NOTE — PROGRESS NOTES
[facility]  Progress Note     Rufus Rigo Patient Status:  Inpatient    1960 MRN [id number]   [facility] 6NE-A Attending Trang Patel MD   Hosp Day # 1 PCP Geovanni Patel     Chief Complaint:  Syncope  Recent choly emanuel Clearance: 52.5 mL/min (based on SCr of 0.94 mg/dL). Recent Labs   Lab  10/13/18   2047   PTP  12.3*   INR  0.88*       Recent Labs   Lab  10/13/18   2047   TROP  <0.046            Imaging: Imaging data reviewed in Epic.     Medications:   • aspirin  81 ventricle: The cavity size was normal. Pacer C/W ICD noted in the     right ventricle. Systolic function was normal.  5. Right atrium: The atrium was normal in size. Pacer C/W ICD noted in right     atrium.   6. No significant valvular regurgitation or sten

## 2018-10-15 NOTE — PAYOR COMM NOTE
--------------  ADMISSION REVIEW     Payor: LAUREN [facility]  Subscriber #:  [id number]  Authorization Number: [id number]    Admit date: 10/14/18  Admit time: [facility]       Admitting Physician: Pankaj Bowers MD  Attending Physician:  Samuel Jj MD  Primary Care while she had been on the heart transplant list in the past she is currently off of it because she has too many antibodies to find an appropriate match    Past Medical History:   Diagnosis Date   • Arrhythmia    • CONGESTIVE HEART FAILURE     pt is on wait of events she is a very decent historian.   She has mildly icteric sclera her neck is supple her lungs are clear to auscultation she has a systolic murmur in all of her heart feels well-healed median sternotomy incision abdomen is soft not particularly tend department with low blood pressure and 3 distinct episodes of syncope today.   Despite IV fluid hydration here in the emergency department we were unable to increase her blood pressures significantly and she is a very large risk for volume overload given he failure, unspecified heart failure type ([facility] [address]  Severe muscle deconditioning      [facility]  History and Physical     Chief Complaint: syncope    History of Present Illness: Adam Live is a 63year old female with HF, CAD, HL, opiod dep cholecystectomy on 10/10    Addendum:  CT A/P reviewed- possible perforated viscus-> general surgery consulted.      SURGERY:  This patient recently underwent laparoscopic cholecystectomy, free air is noted on the CT from the emergency department     Histor anywhere in the abdomen that would be indicative of a significant injury to the bowel. There is contrast on the initial CT scan that is within the entire cecum and ascending colon as well as the transverse and portions of the descending and sigmoid colon. available. It has been reviewed with the radiology department via the emergency medicine physician.   Both readings agree with my own personal interpretation of the films.     This patient has an excessive amount of free air after laparoscopic cholecystect CT abdomen--R/O perforated viscus  Ischemic cardiomyopathy   EF 35%  CAD  Renal Insufficiency  Dyslipidemia      REC:  Pressor support/Volume resuscitation  ECHO/Doppler-reevaluate LV  ICD interrogation in the am    10/15:    SURGERY:      Had nausea earli than sepsis   1. Cont IVF  2. Cont pressor support  Weaning levo   3. CT repeat w/ contrast P    4. Blood NGTD    5. Levo gtt being weaned   5. Diarrhea   1. Rule out C.diff   2. None overnoc   6. Hypokalemia due to to GI loss  1. BMP, Mg, Phos  2.  Replace 81 mg     Date Action Dose Route User    10/15/2018 1542 Given 81 mg Oral Vicky Chavez, RN      atorvastatin (LIPITOR) tab 20 mg     Date Action Dose Route User    10/14/2018 2137 Given 20 mg Oral Lavinia Puentes, RN      Clopidogrel Bisulfate (PLAVIX 10/14/2018 1810 Rate/Dose Change 7 mcg/min Intravenous Lysbeth Found, RN    10/14/2018 1715 Rate/Dose Verify 6 mcg/min Intravenous Lysbeth Found, RN      ondansetron HCl [facility]) injection 4 mg     Date Action Dose Route User    10/15/2018 1135 Give

## 2018-10-15 NOTE — PROGRESS NOTES
[facility]  Cardiology Progress Note    Divina Kan Patient Status:  Inpatient    1960 MRN [id number]   [facility] 6NE-A Attending Kg Anderson MD   Hosp Day # 1 PCP MARIYA MORRISON     Subjective:  C/o HA and nausea this mcg/min (10/15/18 1130)       Assessment:  · Hypotension - still on Levophed  · Syncope  · Chronic systolic heart failure, LVEF 35%  · CAD, SAUMYA  · ICD  · Status post laparoscopic cholecystectomy 10/10/2018  · Abnormal CT of the abdomen, persistent free air

## 2018-10-15 NOTE — PHYSICAL THERAPY NOTE
Attempted to see patient for PT evaluation this AM.  RN reports further testing on order and to check status and initiate PT as appropriate RN in agreement.

## 2018-10-15 NOTE — PROGRESS NOTES
10/15/18 1510   Clinical Encounter Type   Visited With Patient and family together   Referral To ( provided HPOA form to patient/patient wanted her  to review the form.    to remain available at pager [phone].)   4 [facility]

## 2018-10-15 NOTE — PHYSICAL THERAPY NOTE
PHYSICAL THERAPY EVALUATION - INPATIENT     Room Number: 0304/6710-N  Evaluation Date: 10/15/2018  Type of Evaluation: Initial  Physician Order: PT Eval and Treat    Presenting Problem: syncope and collapse  Reason for Therapy: Mobility Dysfunction a & left heart angiogram   • ANGIOGRAM  2016    Right & left heart angiogram   • ANGIOPLASTY (CORONARY)  2004    stent to LAD   • ANGIOPLASTY (CORONARY)  2005    stent to LAD   •       2   • CARDIAC DEFIBRILLATOR PLACEMENT  2004 RESTRICTION  Weight Bearing Restriction: None                PAIN ASSESSMENT  Rating: Unable to rate  Location: back  Management Techniques:  Activity promotion;Repositioning    COGNITION  · Overall Cognitive Status:  WFL - within functional limits  · Pepito Mason 42.13   CMS Modifier (G-Code): CK    FUNCTIONAL ABILITY STATUS  Gait Assessment   Gait Assistance: Dependent assistance(FIM due ot distance)  Distance (ft): 5  Assistive Device: Rolling walker  Pattern: Shuffle  Stoop/Curb Assistance: Not tested  Comment : rehabilitation(ELOS 14-16 days)    PLAN  PT Treatment Plan: Bed mobility; Endurance; Energy conservation;Patient education; Family education;Gait training;Stair training;Transfer training;Balance training  Rehab Potential : Fair  Frequency (Obs): 5x/week  Num

## 2018-10-15 NOTE — CM/SW NOTE
10/15/18 1200   CM/SW Screening   Referral [phone] staff; Chart review   Patient's Mental Status Alert;Oriented     Protocol order written for dc planning due to hx of CHF. Per CNICU RN, no dc needs anticipated.   Kaye,

## 2018-10-15 NOTE — PROGRESS NOTES
[facility]  Progress Note    Ruffus Cage Patient Status:  Inpatient    1960 MRN [id number]   [facility] 6NE-A Attending Elida Jackson MD   Hosp Day # 1 PCP Zechariah MORRISON     Subjective:  Pt resting comfortably in bed   Marita coronary artery of native heart with angina pectoris (HCC)     Gastroesophageal reflux disease     Paresthesias     Weakness     Neuropathy     Opioid dependence with opioid-induced disorder (HCC)     Anxiety disorder     Depressive disorder     Hyponatrem prophylaxis    Elpidio Tinsley, [id number]  10/15/2018  1:22 PM    Addendum:    I have seen and examined the patient; I obtained and performed the above history, physical examination, assessment and plan.   I have I have edited the above to reflect my evaluation, o

## 2018-10-15 NOTE — OCCUPATIONAL THERAPY NOTE
Attempted to see patient for OT evaluation this AM.  RN reports further testing on order and to check status and initiate OT as appropriate RN in agreement.    Alejandro Toth, MOT, OTR/L

## 2018-10-16 PROCEDURE — 99232 SBSQ HOSP IP/OBS MODERATE 35: CPT | Performed by: HOSPITALIST

## 2018-10-16 PROCEDURE — 90792 PSYCH DIAG EVAL W/MED SRVCS: CPT | Performed by: OTHER

## 2018-10-16 RX ORDER — DEXTROSE MONOHYDRATE 25 G/50ML
50 INJECTION, SOLUTION INTRAVENOUS
Status: DISCONTINUED | OUTPATIENT
Start: 2018-10-16 | End: 2018-10-18

## 2018-10-16 RX ORDER — MIRTAZAPINE 15 MG/1
15 TABLET, FILM COATED ORAL NIGHTLY
Status: DISCONTINUED | OUTPATIENT
Start: 2018-10-16 | End: 2018-10-18

## 2018-10-16 RX ORDER — MAGNESIUM SULFATE HEPTAHYDRATE 40 MG/ML
2 INJECTION, SOLUTION INTRAVENOUS ONCE
Status: DISCONTINUED | OUTPATIENT
Start: 2018-10-16 | End: 2018-10-16 | Stop reason: SDUPTHER

## 2018-10-16 RX ORDER — POTASSIUM CHLORIDE 29.8 MG/ML
40 INJECTION INTRAVENOUS ONCE
Status: COMPLETED | OUTPATIENT
Start: 2018-10-16 | End: 2018-10-16

## 2018-10-16 RX ORDER — DEXTROSE AND SODIUM CHLORIDE 5; .45 G/100ML; G/100ML
INJECTION, SOLUTION INTRAVENOUS CONTINUOUS
Status: DISCONTINUED | OUTPATIENT
Start: 2018-10-16 | End: 2018-10-17

## 2018-10-16 RX ORDER — METOCLOPRAMIDE HYDROCHLORIDE 5 MG/ML
5 INJECTION INTRAMUSCULAR; INTRAVENOUS EVERY 6 HOURS
Status: DISCONTINUED | OUTPATIENT
Start: 2018-10-16 | End: 2018-10-18

## 2018-10-16 NOTE — PROGRESS NOTES
[facility]  Progress Note     Hanny Walton Patient Status:  Inpatient    1960 MRN [id number]   [facility] 6NE-A Attending Kristy Mclain MD   Hosp Day # 2 PCP Rommel Seymour     Chief Complaint: abd pain nausea    S: Briana --    --    --     < > = values in this interval not displayed. Estimated Creatinine Clearance: 226.3 mL/min (A) (based on SCr of 0.22 mg/dL (L)).     Recent Labs   Lab  10/13/18   2047   PTP  12.3*   INR  0.88*       Recent Labs   Lab  10/13/18   204

## 2018-10-16 NOTE — CONSULTS
[facility]  Report of Psychiatric Consultation    [place] Patient Status:  Inpatient    1960 MRN [id number]   [facility] 6NE-A Attending Maico Perez MD   [address] Day # 2 PCP Margret Sloan     Date of Admission: 10/13/1 be the euphoric effect of the opioids she likes, not just the pain relief.      Janet Espino  10/16/2018  1:50 PM    History of Present Illness:  62 yo female with hx CAD and CHF (EF-35%) and chronic mouth pain of unknown etiology was admitted on 10/13/18 fo No hx of suicidal attempts or inpatient psych hospitalizations.      Substance Use History: Ex-cigarette smoker     Psych Family History: None     Social and Developmental History: . Has 5 children.  She was a HS  in the past.      Past Me SURGERY      fixed nerve   • LAPAROSCOPIC CHOLECYSTECTOMY N/A 10/10/2018    Performed by Roxanna Smallwood MD at [address]   • OTHER      heart net   • OTHER SURGICAL HISTORY  06/30/2009    [employer] heart net   • REMOVAL GALLBLADDER  10/2018   • Sonja Sow 20 mg, Intravenous, Q24H  •  aspirin EC tab 81 mg, 81 mg, Oral, Daily  •  Clopidogrel Bisulfate (PLAVIX) tab 75 mg, 75 mg, Oral, Daily  •  folic acid (FOLVITE) tab 400 mcg, 400 mcg, Oral, Daily  •  OxyCODONE HCl ER (OXYCONTIN) 12 hr tab 10 mg, 10 mg, Oral, K 3.5 10/16/2018    K 3.5 10/16/2018     10/16/2018    CO2 16.0 10/16/2018    GLU 58 10/16/2018    CA 6.8 10/16/2018    MG 1.5 10/16/2018    PGLU 94 10/16/2018

## 2018-10-16 NOTE — PROGRESS NOTES
[facility]  Progress Note    Divina Kan Patient Status:  Inpatient    1960 MRN [id number]   [facility] 6NE-A Attending Kg Anderson MD   Hosp Day # 2 PCP Will Rodríguez     Subjective:  Patient feels nausea.  No emesis to unspecified hypotension type     Intra-abdominal free air of unknown etiology     Post-operative state     Acute on chronic congestive heart failure, unspecified heart failure type (HCC)     Severe muscle deconditioning     History of laparoscopic cholecys

## 2018-10-16 NOTE — HISTORICAL OFFICE NOTE
Cristy Moreno CHRISTINA  : 1960  ACCOUNT:  [id number]  [phone]  PCP: Dr. Diana Cleveland     TODAY'S DATE: 10/04/2018  DICTATED BY:  [Dr. Ninoska Sorensen      CHIEF COMPLAINT: [Followup of . CAD, of native vessels, Followup of .  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 I believe her sinus tachycardia is most likely related to the lack of absorption of any of her cardiac medications. I was able to obtain an appointment with the surgeon for tomorrow.   I will have the patient return aft daily                41/19/25 Folic Acid            708BFB    one tablet by mouth daily                    Edgar Daigle M.D., F.ELVI.CARL.CARL., F.ELVI.H.ELVI., F.E.S.C.   Medical Director, Heart Failure Research, [address] [place] Director, THE [facility]

## 2018-10-16 NOTE — OCCUPATIONAL THERAPY NOTE
OCCUPATIONAL THERAPY TREATMENT NOTE - INPATIENT     Room Number: 6265/9972-U  Session: 1   Number of Visits to Meet Established Goals: 5    Presenting Problem: synocope and collapse    History related to current admission: Pt was admitted after syncope and ANGIOPLASTY (CORONARY)  2004    stent to LAD   • ANGIOPLASTY (CORONARY)  2005    stent to LAD   •       2   • CARDIAC DEFIBRILLATOR PLACEMENT      Medtronic ICD   • CARDIAC PACEMAKER PLACEMENT     • CATH DRUG ELUTING STENT     • CO Putting on and taking off regular upper body clothing?: A Little  -   Taking care of personal grooming such as brushing teeth?: A Little  -   Eating meals?: None    AM-PAC Score:  Score: 17  Approx Degree of Impairment: 50.11%  Standardized Score (AM-PAC S Good  Frequency (Obs): 5x/week      OT Goals:   ADL Goals -ongoing  Patient will perform grooming: with supervision  Patient will perform lower body dressing:  with supervision  Patient will perform toileting: with supervision     Functional Transfer Goals

## 2018-10-16 NOTE — PLAN OF CARE
Assumed patient care this am. Patient neurologically intact, complaining of headache and requiring frequent doses of IV dilaudid for pain relief.  Dr. Daniel Tanner, Dr. Bebeto Clark, and Surgery PA informed of headaches and IV dilaudid requirements as well as am lab r

## 2018-10-16 NOTE — HISTORICAL OFFICE NOTE
Gustabo Bloommelanie CHRISTINA  : 1960  ACCOUNT:  [id number]  [phone]  PCP: Dr. Maria T Kirkland     TODAY'S DATE: 10/04/2018  DICTATED BY:  [Dr. Nadege Peters      CHIEF COMPLAINT: [Followup of . CAD, of native vessels, Followup of .  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 I believe her sinus tachycardia is most likely related to the lack of absorption of any of her cardiac medications. I was able to obtain an appointment with the surgeon for tomorrow.   I will have the patient return aft daily                86/69/35 Folic Acid            [id number]    one tablet by mouth daily

## 2018-10-16 NOTE — PHYSICAL THERAPY NOTE
PHYSICAL THERAPY TREATMENT NOTE - INPATIENT    Room Number: 9516/1050-W     Session: 1  Number of Visits to Meet Established Goals: 5    Presenting Problem: syncope and collapse   History related to current admission: admitted after syncope and collapse x left heart angiogram   • ANGIOPLASTY (CORONARY)  2004    stent to LAD   • ANGIOPLASTY (CORONARY)  2005    stent to LAD   •       2   • CARDIAC DEFIBRILLATOR PLACEMENT      Medtronic ICD   • CARDIAC PACEMAKER PLACEMENT     • CATH DR in bed (including adjusting bedclothes, sheets and blankets)?: None   -   Sitting down on and standing up from a chair with arms (e.g., wheelchair, bedside commode, etc.): A Little   -   Moving from lying on back to sitting on the side of the bed?: None LE HEP. Pt continues to present with limited endurance, strength deficits, and decreased activity tolerance. These impairments and comorbidities manifest themselves as functional limitations in independent bed mobility, transfers, and gait and stairs.  The

## 2018-10-16 NOTE — PROGRESS NOTES
[facility]  Progress Note    Herberth Jacinto Patient Status:  Inpatient    1960 MRN [id number]   [facility] 6NE-A Attending Frankie Emmanuel MD   Hosp Day # 2 PCP Britney MORRISON       Subjective:  No chest pain or shortness of br 400 mcg Oral Daily   • OxyCODONE HCl ER  10 mg Oral 2 times per day   • atorvastatin  20 mg Oral Nightly   • enoxaparin  30 mg Subcutaneous Nightly     • Dextrose-NaCl 100 mL/hr at 10/16/18 1006   • norepinephrine Stopped (10/15/18 1222)       Assessment:

## 2018-10-17 PROCEDURE — 99232 SBSQ HOSP IP/OBS MODERATE 35: CPT | Performed by: OTHER

## 2018-10-17 PROCEDURE — 99233 SBSQ HOSP IP/OBS HIGH 50: CPT | Performed by: STUDENT IN AN ORGANIZED HEALTH CARE EDUCATION/TRAINING PROGRAM

## 2018-10-17 RX ORDER — SENNA AND DOCUSATE SODIUM 50; 8.6 MG/1; MG/1
2 TABLET, FILM COATED ORAL DAILY
Status: DISCONTINUED | OUTPATIENT
Start: 2018-10-17 | End: 2018-10-17

## 2018-10-17 RX ORDER — MIRTAZAPINE 15 MG/1
15 TABLET, FILM COATED ORAL NIGHTLY
Qty: 30 TABLET | Refills: 1 | Status: ON HOLD | OUTPATIENT
Start: 2018-10-17 | End: 2018-11-15

## 2018-10-17 RX ORDER — ACETAMINOPHEN, ASPIRIN AND CAFFEINE 250; 250; 65 MG/1; MG/1; MG/1
1 TABLET, FILM COATED ORAL EVERY 4 HOURS PRN
Status: DISCONTINUED | OUTPATIENT
Start: 2018-10-17 | End: 2018-10-18

## 2018-10-17 RX ORDER — POTASSIUM CHLORIDE 20 MEQ/1
40 TABLET, EXTENDED RELEASE ORAL EVERY 4 HOURS
Status: DISCONTINUED | OUTPATIENT
Start: 2018-10-17 | End: 2018-10-17

## 2018-10-17 NOTE — PROGRESS NOTES
(-) Ortho, pt asymptomatic.  Cardiology aware, still wants to keep for one more night for AM labs and restarted ACE inhibitor in AM.

## 2018-10-17 NOTE — PROGRESS NOTES
10/17/18 1503 10/17/18 1504 10/17/18 1505   Vital Signs   Pulse 102 101 112   Heart Rate Source Monitor Monitor Monitor   Cardiac Rhythm NSR;Atrial paced NSR; Atrial paced NSR; Atrial paced   BP 97/68 110/80 112/87   BP Location Right arm Right arm Right

## 2018-10-17 NOTE — BH PROGRESS NOTE
Went to see the pt per Dr. Kathe Sinlcair and gave her a referral to f/u with the psychiatrist, Dr. Leti Genao.

## 2018-10-17 NOTE — PROGRESS NOTES
[facility]  Progress Note    Margarita Cantrell Patient Status:  Inpatient    1960 MRN [id number]   [facility] 2NE-A Attending Elba Walton MD   Hosp Day # 3 PCP Lia Stilwell     Subjective:  Patient is Awake and alert and s artery of native heart with angina pectoris (HCC)     Gastroesophageal reflux disease     Paresthesias     Weakness     Neuropathy     Opioid dependence with opioid-induced disorder (HCC)     Anxiety disorder     Depressive disorder     Hyponatremia     Hy

## 2018-10-17 NOTE — PROGRESS NOTES
[facility]  Progress Note     Ruffus Cage Patient Status:  Inpatient    1960 MRN [id number]   [facility] 2NE-A Attending Britney Skelton MD   Hosp Day # 3 PCP Bj Guallpa     Chief Complaint: Fatigue pain     S: Briana --    --    --    --    AST  60*   --    --    --    --    --    ALT  41   --    --    --    --    --    BILT  2.0   --    --    --    --    --    TP  7.0   --    --    --    --    --     < > = values in this interval not displayed.        Estimated Creatin

## 2018-10-17 NOTE — PROGRESS NOTES
[facility]  Report of Psychiatric Progress Note    Hernando Tyler Patient Status:  Inpatient    1960 MRN [id number]   [facility] 6NE-A Attending Jasmina Dunlap MD   [facility] Day # 3 PCP Pedro Kinney     Date of Admission: 10/13/ (EF-35%) and chronic mouth pain of unknown etiology was admitted on 10/13/18 for evaluation of her syncopal episodes and hypotension. She had a lap choly on 10/10/18 and the intractable nausea resolved. In the ED, she had a CT that showed pneumoperitoneum. tried to figure it out, but aren't sure of the exact cause. She has severe pain and can't eat when the ulcers present, so uses the pain meds to allow her to eat/drink.  She has worked with her dentist who has changed the dentures and dental cream and isn't Medtronic ICD   • CARDIAC PACEMAKER PLACEMENT     • CATH DRUG ELUTING STENT     • COLONOSCOPY N/A 7/13/2015    Procedure: COLONOSCOPY;  Surgeon: Erika Kirkland MD;  Location: [facility] ENDOSCOPY   • COLONOSCOPY     • COLONOSCOPY N/A 7/13/2015    Performed by Yash Mills (PAIN RELIEVER PLUS) per tab 1 tablet, 1 tablet, Oral, Q4H PRN  •  potassium chloride 40 mEq in sodium chloride 0.9% 250 mL IVPB, 40 mEq, Intravenous, Once  •  glucose (DEX4) oral liquid 15 g, 15 g, Oral, Q15 Min PRN **OR** Glucose-Vitamin C (DEX-4) 4-0.00 Positive for malaise/fatigue. Psychiatric/Behavioral: Positive for depression.      Mental Status Exam:     Risk Assessment  Suicidal ideation: no suicidal ideation    Objective       10/17/18  1338   BP: 98/79   Pulse: 106   Resp: 20   Temp:      Appeara

## 2018-10-17 NOTE — PROGRESS NOTES
[facility]  Cardiology Progress Note    Subjective:  No chest pain or shortness of breath.     Objective:  /86   Pulse 81   Temp 97.9 °F (36.6 °C) (Oral)   Resp 18   Ht 5' 5\" (1.651 m)   Wt 112 lb 10.5 oz (51.1 kg)   SpO2 98%   BMI 18.75 kg/m² shocks. No dizziness or dyspnea. Will check labs in am.   ACE was held but consider restarting in am.   Her diuretic dose at home was torsemide 10 mg prn. D/C planning to continue. Thanks. Patient seen and independently examined .  Agree with abo

## 2018-10-18 ENCOUNTER — PRIOR ORIGINAL RECORDS (OUTPATIENT)
Dept: OTHER | Age: 58
End: 2018-10-18

## 2018-10-18 VITALS
OXYGEN SATURATION: 99 % | RESPIRATION RATE: 18 BRPM | TEMPERATURE: 98 F | SYSTOLIC BLOOD PRESSURE: 104 MMHG | HEIGHT: 65 IN | BODY MASS INDEX: 17.96 KG/M2 | HEART RATE: 101 BPM | WEIGHT: 107.81 LBS | DIASTOLIC BLOOD PRESSURE: 72 MMHG

## 2018-10-18 PROCEDURE — 99239 HOSP IP/OBS DSCHRG MGMT >30: CPT | Performed by: STUDENT IN AN ORGANIZED HEALTH CARE EDUCATION/TRAINING PROGRAM

## 2018-10-18 RX ORDER — LISINOPRIL 2.5 MG/1
2.5 TABLET ORAL NIGHTLY
Qty: 30 TABLET | Refills: 2 | Status: ON HOLD | OUTPATIENT
Start: 2018-10-18 | End: 2020-12-08

## 2018-10-18 RX ORDER — ONDANSETRON 4 MG/1
4 TABLET, ORALLY DISINTEGRATING ORAL EVERY 8 HOURS PRN
Qty: 8 TABLET | Refills: 0 | Status: ON HOLD | OUTPATIENT
Start: 2018-10-18 | End: 2018-11-15

## 2018-10-18 RX ORDER — METOPROLOL SUCCINATE 25 MG/1
12.5 TABLET, EXTENDED RELEASE ORAL NIGHTLY
Qty: 30 TABLET | Refills: 2 | Status: ON HOLD | OUTPATIENT
Start: 2018-10-18 | End: 2020-12-08

## 2018-10-18 RX ORDER — METOPROLOL SUCCINATE 25 MG/1
12.5 TABLET, EXTENDED RELEASE ORAL NIGHTLY
Qty: 30 TABLET | Refills: 2 | Status: SHIPPED | OUTPATIENT
Start: 2018-10-18 | End: 2018-10-18

## 2018-10-18 RX ORDER — LISINOPRIL 2.5 MG/1
2.5 TABLET ORAL NIGHTLY
Status: DISCONTINUED | OUTPATIENT
Start: 2018-10-18 | End: 2018-10-18

## 2018-10-18 RX ORDER — PANTOPRAZOLE SODIUM 40 MG/1
40 TABLET, DELAYED RELEASE ORAL
Status: DISCONTINUED | OUTPATIENT
Start: 2018-10-18 | End: 2018-10-18

## 2018-10-18 NOTE — PROGRESS NOTES
[facility]  Progress Note    Sandy Fay Patient Status:  Inpatient    1960 MRN [id number]   [facility] 2NE-A Attending Catie Fuentes MD   Hosp Day # 4 PCP MARIYA MORRISON     Subjective:  No new complaints.  Awake and denny heart failure (HCC)     Hypokalemia     Appendicolith     CAD (coronary artery disease)     Metabolic acidosis     Arterial hypotension     Coronary artery disease involving native coronary artery of native heart with angina pectoris ([facility] [address]     Demetrice Garcia

## 2018-10-18 NOTE — PROGRESS NOTES
[facility]  Cardiology Progress Note    Subjective:  No chest pain or shortness of breath.     Objective:  BP 95/68 (BP Location: Right arm)   Pulse 112   Temp 98.4 °F (36.9 °C) (Oral)   Resp 18   Ht 165.1 cm (5' 5\")   Wt 107 lb 12.9 oz (48.9 kg)   Sp standpoint to go home.    · No diuretic for now on DC with close fup in HF clinic       Aaron Velasquez MD, [facility]  Heart Failure Transplant / Pulmonary HTN

## 2018-10-18 NOTE — PHYSICAL THERAPY NOTE
Attempted to see Pt this AM - RN Lissy aware of attempt. Per RN - Pt actively discharging at this time. Pt was evaluated already, and d/c recommendation for HHPT.   Will f/u later today if time permits, after all other patients are attempted per tentative

## 2018-10-18 NOTE — PLAN OF CARE
ALERT,ORIENTED X3  DENIES CHEST PAIN AND SOB  SEEN BY NBA GARCÍA AND JOSE RAUL ,WITH DC ORDERS  ANXIOUS TO Napoleon Corrales IN GOOD CONDITION

## 2018-10-19 NOTE — DISCHARGE SUMMARY
[facility] PSY[facility]IATR[facility] [place] HOSPITALIST  DISCHARGE SUMMARY     Mark Gabriel Patient Status:  Inpatient    1960 MRN [id number]   [facility] 2NE-A Attending No att. providers found   Hosp Day # 4 PCP Nesha Valencia     Date of Admission: 10/13/2018 Shock secondary to intravascular volume depletion- intermittently required pressors. CT of the abdomen did reveal free air and surgery was consulted- the CT scan was repeated with no worsening and the patient was managed conservatively.  The pt does have a how and when to take each. Take 1 tablet (4 mg total) by mouth every 8 (eight) hours as needed for Nausea.    Quantity:  8 tablet  Refills:  0     OxyCODONE HCl ER 10 MG T12a  Commonly known as:  OXYCONTIN  What changed:  Another medication with the sa appointment:   Attila Salazar [facility] [phone]    Schedule an appointment as soon as possible for a visit in 1 week      Britney Rosado MD  [address] [id number]

## 2018-10-22 ENCOUNTER — CHARTING TRANS (OUTPATIENT)
Dept: OTHER | Age: 58
End: 2018-10-22

## 2018-10-22 ENCOUNTER — LAB SERVICES (OUTPATIENT)
Dept: OTHER | Age: 58
End: 2018-10-22

## 2018-10-22 LAB
BILIRUBIN URINE: ABNORMAL
BLOOD URINE: NEGATIVE
CLARITY: ABNORMAL
COLOR: ABNORMAL
GLUCOSE QUALITATIVE U: NEGATIVE
KETONES, URINE: ABNORMAL
LEUKOCYTE ESTERASE URINE: ABNORMAL
NITRITE URINE: NEGATIVE
PH URINE: 5 (ref 5–7)
SPECIFIC GRAVITY URINE: 1.03 (ref 1–1.03)
URINE PROTEIN, QUAL (DIPSTICK): 100
UROBILINOGEN URINE: 4

## 2018-10-23 ENCOUNTER — CHARTING TRANS (OUTPATIENT)
Dept: OTHER | Age: 58
End: 2018-10-23

## 2018-10-30 ENCOUNTER — MYAURORA ACCOUNT LINK (OUTPATIENT)
Dept: OTHER | Age: 58
End: 2018-10-30

## 2018-10-30 ENCOUNTER — LAB SERVICES (OUTPATIENT)
Dept: OTHER | Age: 58
End: 2018-10-30

## 2018-10-30 LAB
ALBUMIN SERPL-MCNC: 3.1 G/DL (ref 3.6–5.1)
ALP SERPL-CCNC: 243 U/L (ref 45–130)
ALT SERPL W/O P-5'-P-CCNC: 21 U/L (ref 4–38)
AST SERPL-CCNC: 47 U/L (ref 14–43)
BILIRUB SERPL-MCNC: 0.6 MG/DL (ref 0–1.3)
BUN SERPL-MCNC: 6 MG/DL (ref 7–20)
CALCIUM SERPL-MCNC: 9.3 MG/DL (ref 8.6–10.6)
CHLORIDE SERPL-SCNC: 101 MMOL/L (ref 96–107)
CO2 SERPL-SCNC: 21 MMOL/L (ref 22–32)
CREAT SERPL-MCNC: 0.5 MG/DL (ref 0.5–1.4)
GFR SERPL CREATININE-BSD FRML MDRD: >60 ML/MIN/{1.73M2}
GFR SERPL CREATININE-BSD FRML MDRD: >60 ML/MIN/{1.73M2}
GLUCOSE SERPL-MCNC: 155 MG/DL (ref 70–200)
HIV1+2 AB SERPL QL IA: NEGATIVE
MAGNESIUM SERPL-MCNC: 1.4 MG/DL (ref 1.6–2.6)
POTASSIUM SERPL-SCNC: 3.7 MMOL/L (ref 3.5–5.3)
PROT SERPL-MCNC: 7 G/DL (ref 6.4–8.5)
SEDIMENTATION RATE, RBC: 40 MM/H (ref 0–20)
SODIUM SERPL-SCNC: 134 MMOL/L (ref 136–146)
TSH SERPL DL<=0.05 MIU/L-ACNC: 4.7 M[IU]/L (ref 0.3–4.82)

## 2018-11-01 ENCOUNTER — PRIOR ORIGINAL RECORDS (OUTPATIENT)
Dept: OTHER | Age: 58
End: 2018-11-01

## 2018-11-01 ENCOUNTER — CHARTING TRANS (OUTPATIENT)
Dept: OTHER | Age: 58
End: 2018-11-01

## 2018-11-01 ENCOUNTER — IMAGING SERVICES (OUTPATIENT)
Dept: OTHER | Age: 58
End: 2018-11-01

## 2018-11-01 LAB
THYROGLOB AB SERPL-ACNC: <0.9 IU/ML (ref 0–4)
THYROPEROXIDASE AB SERPL-ACNC: 28 UNITS/ML

## 2018-11-02 ENCOUNTER — PRIOR ORIGINAL RECORDS (OUTPATIENT)
Dept: OTHER | Age: 58
End: 2018-11-02

## 2018-11-06 ENCOUNTER — CHARTING TRANS (OUTPATIENT)
Dept: OTHER | Age: 58
End: 2018-11-06

## 2018-11-08 ENCOUNTER — APPOINTMENT (OUTPATIENT)
Dept: GENERAL RADIOLOGY | Facility: HOSPITAL | Age: 58
End: 2018-11-08
Attending: EMERGENCY MEDICINE
Payer: COMMERCIAL

## 2018-11-08 ENCOUNTER — HOSPITAL ENCOUNTER (EMERGENCY)
Facility: HOSPITAL | Age: 58
Discharge: HOME | End: 2018-11-08
Attending: EMERGENCY MEDICINE
Payer: COMMERCIAL

## 2018-11-08 VITALS
OXYGEN SATURATION: 97 % | BODY MASS INDEX: 16.66 KG/M2 | SYSTOLIC BLOOD PRESSURE: 94 MMHG | HEIGHT: 65 IN | RESPIRATION RATE: 14 BRPM | HEART RATE: 85 BPM | WEIGHT: 100 LBS | TEMPERATURE: 97 F | DIASTOLIC BLOOD PRESSURE: 67 MMHG

## 2018-11-08 DIAGNOSIS — R53.1 WEAKNESS: ICD-10-CM

## 2018-11-08 DIAGNOSIS — R19.7 DIARRHEA, UNSPECIFIED TYPE: Primary | ICD-10-CM

## 2018-11-08 DIAGNOSIS — E87.6 HYPOKALEMIA: ICD-10-CM

## 2018-11-08 DIAGNOSIS — E86.0 DEHYDRATION: ICD-10-CM

## 2018-11-08 PROCEDURE — 81001 URINALYSIS AUTO W/SCOPE: CPT | Performed by: EMERGENCY MEDICINE

## 2018-11-08 PROCEDURE — 87040 BLOOD CULTURE FOR BACTERIA: CPT | Performed by: EMERGENCY MEDICINE

## 2018-11-08 PROCEDURE — 96361 HYDRATE IV INFUSION ADD-ON: CPT | Performed by: EMERGENCY MEDICINE

## 2018-11-08 PROCEDURE — 87086 URINE CULTURE/COLONY COUNT: CPT | Performed by: EMERGENCY MEDICINE

## 2018-11-08 PROCEDURE — 87045 FECES CULTURE AEROBIC BACT: CPT | Performed by: EMERGENCY MEDICINE

## 2018-11-08 PROCEDURE — 80053 COMPREHEN METABOLIC PANEL: CPT | Performed by: EMERGENCY MEDICINE

## 2018-11-08 PROCEDURE — 96374 THER/PROPH/DIAG INJ IV PUSH: CPT | Performed by: EMERGENCY MEDICINE

## 2018-11-08 PROCEDURE — 87427 SHIGA-LIKE TOXIN AG IA: CPT | Performed by: EMERGENCY MEDICINE

## 2018-11-08 PROCEDURE — 99284 EMERGENCY DEPT VISIT MOD MDM: CPT | Performed by: EMERGENCY MEDICINE

## 2018-11-08 PROCEDURE — 36415 COLL VENOUS BLD VENIPUNCTURE: CPT | Performed by: EMERGENCY MEDICINE

## 2018-11-08 PROCEDURE — 83690 ASSAY OF LIPASE: CPT | Performed by: EMERGENCY MEDICINE

## 2018-11-08 PROCEDURE — 85025 COMPLETE CBC W/AUTO DIFF WBC: CPT | Performed by: EMERGENCY MEDICINE

## 2018-11-08 PROCEDURE — 87046 STOOL CULTR AEROBIC BACT EA: CPT | Performed by: EMERGENCY MEDICINE

## 2018-11-08 PROCEDURE — 87493 C DIFF AMPLIFIED PROBE: CPT | Performed by: EMERGENCY MEDICINE

## 2018-11-08 PROCEDURE — 83735 ASSAY OF MAGNESIUM: CPT | Performed by: EMERGENCY MEDICINE

## 2018-11-08 PROCEDURE — 74019 RADEX ABDOMEN 2 VIEWS: CPT | Performed by: EMERGENCY MEDICINE

## 2018-11-08 PROCEDURE — 82272 OCCULT BLD FECES 1-3 TESTS: CPT | Performed by: EMERGENCY MEDICINE

## 2018-11-08 RX ORDER — POTASSIUM CHLORIDE 20 MEQ/1
40 TABLET, EXTENDED RELEASE ORAL ONCE
Status: COMPLETED | OUTPATIENT
Start: 2018-11-08 | End: 2018-11-08

## 2018-11-08 RX ORDER — ONDANSETRON 2 MG/ML
4 INJECTION INTRAMUSCULAR; INTRAVENOUS ONCE
Status: COMPLETED | OUTPATIENT
Start: 2018-11-08 | End: 2018-11-08

## 2018-11-08 NOTE — ED INITIAL ASSESSMENT (HPI)
Patient here for nausea/vomiting and increasing fatigue/weakness over past 2 weeks. Reports gallbladder removed about 3 weeks ago with additional admission a couple weeks ago.  States 1 episode of vomiting today, has not eaten anything in 3 days, tolerating

## 2018-11-08 NOTE — ED NOTES
Patient assisted up to bathroom with . Provided with specimen containers for stool and urine specimens.

## 2018-11-09 NOTE — ED NOTES
Report to [facility] RN at this time. Patient has tolerated PO potassium at this time. Remains updated with plan of care.

## 2018-11-09 NOTE — ED PROVIDER NOTES
Patient Seen in: [facility] Emergency Department    History   Patient presents with:  Nausea/Vomiting/Diarrhea (gastrointestinal)  Fatigue (constitutional, neurologic)  Dehydration (metabolic/constitutional)    Stated Complaint: vomiting, decreased COLONOSCOPY;  Surgeon: Jose De Jesus Aguilar MD;  Location: [address] [place] [address] ENDOSCOPY   • COLONOSCOPY     • COLONOSCOPY N/A 7/13/2015    Performed by Jose De Jesus Aguilar MD at [address] [place] [address] ENDOSCOPY   • ENDOSCOPIC ULTRASOUND (EUS) N/A 9/6/2018    Performed by Lara Diaz DO at [address] [place] [address] ENDOSCOP clear to auscultation bilaterally without rales/rhonchi. Equal breath sounds bilaterally  Cardiac: No tachycardia. No murmurs. Regular rate and rhythm. Abdomen: Soft and nontender with deep palpation throughout.   No guarding or rebound extremities: No Narrative: The following orders were created for panel order STOOL CULTURE W/LAI.   Procedure                               Abnormality         Status                     ---------                               -----------         ------

## 2018-11-09 NOTE — ED NOTES
Patient has returned from xray department at this time.  at bedside. IV fluids infusing. Nausea medication administered. Patient has had 1 episode of vomiting here in ED. Will reassess nausea prior to administrating oral potassium dose.  Patient requ

## 2018-11-15 ENCOUNTER — PRIOR ORIGINAL RECORDS (OUTPATIENT)
Dept: OTHER | Age: 58
End: 2018-11-15

## 2018-11-15 ENCOUNTER — HOSPITAL ENCOUNTER (INPATIENT)
Facility: HOSPITAL | Age: 58
LOS: 4 days | Discharge: HOME HEALTH | DRG: 380 | End: 2018-11-19
Attending: EMERGENCY MEDICINE | Admitting: HOSPITALIST
Payer: COMMERCIAL

## 2018-11-15 ENCOUNTER — APPOINTMENT (OUTPATIENT)
Dept: CT IMAGING | Facility: HOSPITAL | Age: 58
DRG: 380 | End: 2018-11-15
Attending: EMERGENCY MEDICINE
Payer: COMMERCIAL

## 2018-11-15 DIAGNOSIS — E86.0 DEHYDRATION: ICD-10-CM

## 2018-11-15 DIAGNOSIS — R11.2 INTRACTABLE VOMITING WITH NAUSEA, UNSPECIFIED VOMITING TYPE: Primary | ICD-10-CM

## 2018-11-15 PROCEDURE — 74176 CT ABD & PELVIS W/O CONTRAST: CPT | Performed by: EMERGENCY MEDICINE

## 2018-11-15 PROCEDURE — 99223 1ST HOSP IP/OBS HIGH 75: CPT | Performed by: HOSPITALIST

## 2018-11-15 RX ORDER — ONDANSETRON 4 MG/1
8 TABLET, ORALLY DISINTEGRATING ORAL EVERY 8 HOURS PRN
Status: DISCONTINUED | OUTPATIENT
Start: 2018-11-15 | End: 2018-11-19

## 2018-11-15 RX ORDER — PANTOPRAZOLE SODIUM 40 MG/1
40 TABLET, DELAYED RELEASE ORAL
Status: DISCONTINUED | OUTPATIENT
Start: 2018-11-16 | End: 2018-11-16

## 2018-11-15 RX ORDER — MIRTAZAPINE 15 MG/1
15 TABLET, FILM COATED ORAL NIGHTLY
Status: DISCONTINUED | OUTPATIENT
Start: 2018-11-15 | End: 2018-11-15

## 2018-11-15 RX ORDER — LISINOPRIL 2.5 MG/1
2.5 TABLET ORAL NIGHTLY
Status: DISCONTINUED | OUTPATIENT
Start: 2018-11-15 | End: 2018-11-19

## 2018-11-15 RX ORDER — ACETAMINOPHEN 325 MG/1
650 TABLET ORAL EVERY 6 HOURS PRN
Status: DISCONTINUED | OUTPATIENT
Start: 2018-11-15 | End: 2018-11-19

## 2018-11-15 RX ORDER — METOCLOPRAMIDE HYDROCHLORIDE 5 MG/ML
10 INJECTION INTRAMUSCULAR; INTRAVENOUS EVERY 8 HOURS PRN
Status: DISCONTINUED | OUTPATIENT
Start: 2018-11-15 | End: 2018-11-16

## 2018-11-15 RX ORDER — ATORVASTATIN CALCIUM 20 MG/1
20 TABLET, FILM COATED ORAL NIGHTLY
Status: DISCONTINUED | OUTPATIENT
Start: 2018-11-15 | End: 2018-11-19

## 2018-11-15 RX ORDER — METOCLOPRAMIDE HYDROCHLORIDE 5 MG/ML
5 INJECTION INTRAMUSCULAR; INTRAVENOUS ONCE
Status: COMPLETED | OUTPATIENT
Start: 2018-11-15 | End: 2018-11-15

## 2018-11-15 RX ORDER — ASPIRIN 81 MG/1
81 TABLET ORAL DAILY
Status: DISCONTINUED | OUTPATIENT
Start: 2018-11-16 | End: 2018-11-19

## 2018-11-15 RX ORDER — SODIUM CHLORIDE 9 MG/ML
INJECTION, SOLUTION INTRAVENOUS CONTINUOUS
Status: DISCONTINUED | OUTPATIENT
Start: 2018-11-15 | End: 2018-11-17

## 2018-11-15 RX ORDER — ONDANSETRON 2 MG/ML
4 INJECTION INTRAMUSCULAR; INTRAVENOUS EVERY 6 HOURS PRN
Status: DISCONTINUED | OUTPATIENT
Start: 2018-11-15 | End: 2018-11-19

## 2018-11-15 RX ORDER — ENOXAPARIN SODIUM 100 MG/ML
40 INJECTION SUBCUTANEOUS NIGHTLY
Status: DISCONTINUED | OUTPATIENT
Start: 2018-11-15 | End: 2018-11-19

## 2018-11-15 RX ORDER — MELATONIN
400 DAILY
Status: DISCONTINUED | OUTPATIENT
Start: 2018-11-16 | End: 2018-11-19

## 2018-11-15 RX ORDER — DOCUSATE SODIUM 100 MG/1
100 CAPSULE, LIQUID FILLED ORAL 3 TIMES DAILY
Status: DISCONTINUED | OUTPATIENT
Start: 2018-11-15 | End: 2018-11-19

## 2018-11-15 RX ORDER — ONDANSETRON 2 MG/ML
4 INJECTION INTRAMUSCULAR; INTRAVENOUS ONCE
Status: COMPLETED | OUTPATIENT
Start: 2018-11-15 | End: 2018-11-15

## 2018-11-15 RX ORDER — CLOPIDOGREL BISULFATE 75 MG/1
75 TABLET ORAL DAILY
Status: DISCONTINUED | OUTPATIENT
Start: 2018-11-16 | End: 2018-11-19

## 2018-11-15 RX ORDER — ONDANSETRON 4 MG/1
4 TABLET, ORALLY DISINTEGRATING ORAL EVERY 8 HOURS PRN
Status: DISCONTINUED | OUTPATIENT
Start: 2018-11-15 | End: 2018-11-15

## 2018-11-15 RX ORDER — SODIUM CHLORIDE 9 MG/ML
125 INJECTION, SOLUTION INTRAVENOUS CONTINUOUS
Status: DISCONTINUED | OUTPATIENT
Start: 2018-11-15 | End: 2018-11-16

## 2018-11-15 RX ORDER — OXYCODONE HCL 10 MG/1
10 TABLET, FILM COATED, EXTENDED RELEASE ORAL EVERY 12 HOURS
Status: DISCONTINUED | OUTPATIENT
Start: 2018-11-15 | End: 2018-11-19

## 2018-11-15 RX ORDER — ZOLPIDEM TARTRATE 5 MG/1
5 TABLET ORAL NIGHTLY PRN
Status: DISCONTINUED | OUTPATIENT
Start: 2018-11-15 | End: 2018-11-19

## 2018-11-15 NOTE — ED PROVIDER NOTES
Patient Seen in: [facility] Emergency Department    History   Patient presents with:  Dehydration (metabolic/constitutional)    Stated Complaint: weakness x couple of months w/ decreased appetite and vomiting     HPI    Patient is a pleasant 59-year by Rekha Clark MD at [address] [place] [address] ENDOSCOPY   • ENDOSCOPIC ULTRASOUND (EUS) N/A 9/6/2018    Performed by Jordan Allen DO at [address] [place] [address] ENDOSCOPY   • ENDOSCOPIC ULTRASOUND (EUS) N/A 12/1/2015    Performed by Hannah Hou MD at [address] [place] [address] ENDOSCOPY   • EP ELECTROPHYSIOLOG motion. Neck supple. Cardiovascular: Regular rhythm and normal heart sounds. Tachycardic. Pulmonary/Chest: Effort normal and breath sounds normal.   Abdominal: Soft. Nontender/nondistended. Musculoskeletal: Normal range of motion.    Neurological: Abdomen+pelvis(cpt=74176)    Result Date: 11/15/2018  CONCLUSION:  1. No evidence of a bowel obstruction. 2. Fatty infiltration of the liver. 3. Trace amount of hyperdense debris or contrast within the posterior right aspect of the bladder.   A bladder muco

## 2018-11-15 NOTE — ED INITIAL ASSESSMENT (HPI)
62 y/o female to ED with c/o of fatigue for the last few days. Patient reports she has had a decrease in appetite due to feeling nauseous all the time. Cardiologist instructed patient to come to ED. Dyspnea with exertion.
Meal completed. Pt is sound asleep in the chair.  
53.5

## 2018-11-15 NOTE — H&P
[facility]  History and Physical     [facility]:  [facility]    1960 MRN [id number]   Location [address] [facility] Attending Lenard Severe, MD   Hosp Day # 0 PCP Aubree Scale     Chief Complaint COLONOSCOPY N/A 7/13/2015    Performed by Atul Mckinley MD at [facility] ENDOSCOPY   • ENDOSCOPIC ULTRASOUND (EUS) N/A 9/6/2018    Performed by Jasiel Vegas DO at [facility] ENDOSCOPY   • ENDOSCOPIC ULTRASOUND (EUS) N/A 12/1/2015    Performed by Precious Cedeño MD needed for Nausea. Disp: 8 tablet Rfl: 0   lisinopril 2.5 MG Oral Tab Take 1 tablet (2.5 mg total) by mouth nightly. Disp: 30 tablet Rfl: 2   Metoprolol Succinate ER 25 MG Oral Tablet 24 Hr Take 0.5 tablets (12.5 mg total) by mouth nightly.  Disp: 30 tablet S1, S2. Regular rate and rhythm. No murmurs, rubs or gallops. Equal pulses. Chest and Back: No tenderness or deformity. Abdomen: Soft, nontender, nondistended. Positive bowel sounds. No rebound, guarding or organomegaly.   Neurologic: No focal neurologi

## 2018-11-16 ENCOUNTER — CHARTING TRANS (OUTPATIENT)
Dept: OTHER | Age: 58
End: 2018-11-16

## 2018-11-16 PROCEDURE — 0DB68ZX EXCISION OF STOMACH, VIA NATURAL OR ARTIFICIAL OPENING ENDOSCOPIC, DIAGNOSTIC: ICD-10-PCS | Performed by: STUDENT IN AN ORGANIZED HEALTH CARE EDUCATION/TRAINING PROGRAM

## 2018-11-16 PROCEDURE — 0DB78ZX EXCISION OF STOMACH, PYLORUS, VIA NATURAL OR ARTIFICIAL OPENING ENDOSCOPIC, DIAGNOSTIC: ICD-10-PCS | Performed by: STUDENT IN AN ORGANIZED HEALTH CARE EDUCATION/TRAINING PROGRAM

## 2018-11-16 PROCEDURE — 0DB38ZX EXCISION OF LOWER ESOPHAGUS, VIA NATURAL OR ARTIFICIAL OPENING ENDOSCOPIC, DIAGNOSTIC: ICD-10-PCS | Performed by: STUDENT IN AN ORGANIZED HEALTH CARE EDUCATION/TRAINING PROGRAM

## 2018-11-16 PROCEDURE — 0DB18ZX EXCISION OF UPPER ESOPHAGUS, VIA NATURAL OR ARTIFICIAL OPENING ENDOSCOPIC, DIAGNOSTIC: ICD-10-PCS | Performed by: STUDENT IN AN ORGANIZED HEALTH CARE EDUCATION/TRAINING PROGRAM

## 2018-11-16 PROCEDURE — 0DB98ZX EXCISION OF DUODENUM, VIA NATURAL OR ARTIFICIAL OPENING ENDOSCOPIC, DIAGNOSTIC: ICD-10-PCS | Performed by: STUDENT IN AN ORGANIZED HEALTH CARE EDUCATION/TRAINING PROGRAM

## 2018-11-16 PROCEDURE — 99232 SBSQ HOSP IP/OBS MODERATE 35: CPT | Performed by: HOSPITALIST

## 2018-11-16 RX ORDER — NALOXONE HYDROCHLORIDE 0.4 MG/ML
80 INJECTION, SOLUTION INTRAMUSCULAR; INTRAVENOUS; SUBCUTANEOUS AS NEEDED
Status: DISCONTINUED | OUTPATIENT
Start: 2018-11-16 | End: 2018-11-16 | Stop reason: HOSPADM

## 2018-11-16 RX ORDER — ATORVASTATIN CALCIUM 40 MG/1
40 TABLET, FILM COATED ORAL NIGHTLY
Refills: 0 | Status: ON HOLD | COMMUNITY
Start: 2018-10-22 | End: 2019-01-16

## 2018-11-16 RX ORDER — OMEPRAZOLE 40 MG/1
40 CAPSULE, DELAYED RELEASE ORAL DAILY
Refills: 3 | Status: ON HOLD | COMMUNITY
Start: 2018-10-14 | End: 2018-11-17

## 2018-11-16 RX ORDER — HYDROCODONE BITARTRATE AND ACETAMINOPHEN 7.5; 325 MG/1; MG/1
1 TABLET ORAL 3 TIMES DAILY PRN
Refills: 0 | Status: ON HOLD | COMMUNITY
Start: 2018-10-18 | End: 2018-12-14

## 2018-11-16 RX ORDER — POTASSIUM CHLORIDE 20 MEQ/1
40 TABLET, EXTENDED RELEASE ORAL EVERY 4 HOURS
Status: COMPLETED | OUTPATIENT
Start: 2018-11-16 | End: 2018-11-16

## 2018-11-16 RX ORDER — SODIUM CHLORIDE, SODIUM LACTATE, POTASSIUM CHLORIDE, CALCIUM CHLORIDE 600; 310; 30; 20 MG/100ML; MG/100ML; MG/100ML; MG/100ML
INJECTION, SOLUTION INTRAVENOUS CONTINUOUS
Status: DISCONTINUED | OUTPATIENT
Start: 2018-11-16 | End: 2018-11-16

## 2018-11-16 RX ORDER — METOCLOPRAMIDE HYDROCHLORIDE 5 MG/ML
10 INJECTION INTRAMUSCULAR; INTRAVENOUS EVERY 8 HOURS
Status: DISCONTINUED | OUTPATIENT
Start: 2018-11-16 | End: 2018-11-18

## 2018-11-16 NOTE — DIETARY MALNUTRITION NOTE
[facility]    NUTRITION INITIAL ASSESSMENT    Pt meets malnutrition criteria.     CRITERIA FOR MALNUTRITION DIAGNOSIS:  Criteria for severe malnutrition diagnosis: chronic illness related to wt loss greater than 10% in 6 months and energy intake less t MD    ANTHROPOMETRICS:  Ht: 167.6 cm (5' 6\")  Wt: 43.5 kg (96 lb). This is 74 % of IBW  BMI: Body mass index is 15.49 kg/m².   IBW: 59 kg  Usual Body Wt: 55 kg    WEIGHT HISTORY:   Patient Weight for the past 72 hrs:   Weight   11/15/18 1419 43.5 kg (96 lb

## 2018-11-16 NOTE — PLAN OF CARE
New admission at 1930  A/ox4, vss on room air. Medicated with zofran po and oxy po for pain. C/o poor appetite with 15 lb weight loss in 1 month, nutrition consult ordered.     CARDIOVASCULAR - ADULT    • Absence of cardiac arrhythmias or at baseline Progre

## 2018-11-16 NOTE — PLAN OF CARE
I talked to Dr. Maryland Scheuermann. Psych consulted cancelled. I saw her in 9/2018 and 10/2018 and don't have much to add. She was on Cymbalta but said it was ineffective. She was on Remeron briefly.  She would only acknowledge depressed mood when she was in pain or h

## 2018-11-16 NOTE — CM/SW NOTE
Order for home health care received. Home care liaison to assess patient for home healthcare.  sw to continue to follow    [address] [facility]

## 2018-11-16 NOTE — OCCUPATIONAL THERAPY NOTE
OCCUPATIONAL THERAPY EVALUATION - INPATIENT     Room Number: 8599/1065-V  Evaluation Date: 11/16/2018  Type of Evaluation: Initial  Presenting Problem: vomiting and nausea    Physician Order: IP Consult to Occupational Therapy  Reason for Therapy: ADL/IADL ULTRASOUND (EUS) N/A 9/6/2018    Performed by Yolanda Walls DO at [facility] ENDOSCOPY   • ENDOSCOPIC ULTRASOUND (EUS) N/A 12/1/2015    Performed by Severo Posey, MD at [facility]  04/13/2006   • ESOPHAGOGASTRODUODENOSCOPY able to communicate all basic needs and wants    Behavioral/Emotional/Social: Pt was participated in and was motivated for therapy today.     RANGE OF MOTION AND STRENGTH ASSESSMENT  Upper extremity ROM is within functional limits     Upper extremity streng address LOB in the bathroom as pt would at times topple off toilet when trying to complete claribel care. Patient End of Session: In bed;Needs met;Call light within reach;RN aware of session/findings; All patient questions and concerns addressed;SCDs in place; training;IADL training;Continued evaluation; Compensatory technique education;Equipment eval/education; Neuromuscluar reeducation;Patient/Family training;Patient/Family education; Endurance training;UE strengthening/ROM; Functional transfer training  Rehab Pot

## 2018-11-16 NOTE — PROGRESS NOTES
[facility]  Progress Note     Harish Cabrera Patient Status:  Inpatient    1960 MRN [id number]   [facility] 0SW-A Attending Salbador Mcbride, [address] [facility] Day # 1 PCP Tarah Pearce     Chief Complaint: N/V    S: Patient seen a Daily   • Clopidogrel Bisulfate  75 mg Oral Daily   • docusate sodium  100 mg Oral TID   • folic acid  107 mcg Oral Daily   • lisinopril  2.5 mg Oral Nightly   • Metoprolol Succinate ER  12.5 mg Oral Nightly   • Pantoprazole Sodium  40 mg Oral QAM AC   • O

## 2018-11-16 NOTE — ED NOTES
Pt states \"I had a few seconds of a rapid HB' My hr was about 200, than I got a h/a, rythmn strip pulled up, 170 hr noted for about 6 sec, Dr Jake Wang made aware, no further orders rcvd

## 2018-11-16 NOTE — CONSULTS
[facility]  Report of GI Consultation    Divina Kan Patient Status:  Inpatient    1960 MRN [id number]   [facility] 0SW-A Attending Tedd Lesch, MD   Hosp Day # 0 PCP Will Quince     Date of Admission:  11/15/2018  D Thrush of mouth and esophagus ([facility] [address]    • Visual impairment     glasses       Past Surgical History  Past Surgical History:   Procedure Laterality Date   • ANGIOGRAM  11/30/2006    Right & left hear angiogram   • ANGIOGRAM  05/31/2016    Right & left heart Facility-Administered Medications:  0.9%  NaCl infusion 125 mL/hr Intravenous Continuous   [START ON 11/16/2018] aspirin EC tab 81 mg 81 mg Oral Daily   [START ON 11/16/2018] Clopidogrel Bisulfate (PLAVIX) tab 75 mg 75 mg Oral Daily   docusate sodium (COLA lymphadenopathy  PULM: Lungs clear to auscultation anteriorly  CV: Regular, no murmurs, 2+ radial pulses  ABD: Scaphoid abdomen. Non-tender.   Normoactive bowel sounds; soft/nondistended  EXT: No edema, decreased muscle mass throughout  SKIN: No rash, no j use  5. Adjustment disorder with depressed mood (as per prior psych eval, not treated because she didn't feel Cymbalta helped)    At this point suspect she has function GI disorder leading to symptoms (cyclic nausea and vomiting).   Will send some additiona

## 2018-11-16 NOTE — OPERATIVE REPORT
EGD operative report  Patient Name: West Miles  Procedure: Esophagogastroduodenoscopy with biopsy  Indication: nausea and vomiting  Attending: Amadeo Shin M.D.   Consent:  The risks, benefits, and alternatives were discussed with the patient post-bulbar duodenum, and descending duodenum were normal.    Impression:   1. Severe erosive esophagitis  2. Hiatal hernia (3 cm)  3. Mild gastritis (chronic)  Recommendations:   1. PPI twice daily IV  2. Scheduled Reglan for 24-48 hours  3.  Clear liquid

## 2018-11-17 PROCEDURE — 99232 SBSQ HOSP IP/OBS MODERATE 35: CPT | Performed by: HOSPITALIST

## 2018-11-17 RX ORDER — PANTOPRAZOLE SODIUM 40 MG/1
40 TABLET, DELAYED RELEASE ORAL
Qty: 60 TABLET | Refills: 0 | Status: SHIPPED | OUTPATIENT
Start: 2018-11-17 | End: 2018-11-19

## 2018-11-17 RX ORDER — MIRTAZAPINE 15 MG/1
15 TABLET, FILM COATED ORAL NIGHTLY
Qty: 30 TABLET | Refills: 1 | Status: SHIPPED | OUTPATIENT
Start: 2018-11-17 | End: 2018-11-18

## 2018-11-17 RX ORDER — MIRTAZAPINE 15 MG/1
15 TABLET, FILM COATED ORAL NIGHTLY
Status: DISCONTINUED | OUTPATIENT
Start: 2018-11-17 | End: 2018-11-19

## 2018-11-17 RX ORDER — PANTOPRAZOLE SODIUM 40 MG/1
40 TABLET, DELAYED RELEASE ORAL
Status: DISCONTINUED | OUTPATIENT
Start: 2018-11-17 | End: 2018-11-19

## 2018-11-17 NOTE — HOME CARE LIAISON
Met with patient to offer home health care when d/c home. Patient is agreeable to [facility] for RN/PT/OT services.   Thanks for the referral.

## 2018-11-17 NOTE — PLAN OF CARE
CARDIOVASCULAR - ADULT    • Absence of cardiac arrhythmias or at baseline Progressing        GASTROINTESTINAL - ADULT    • Minimal or absence of nausea and vomiting Progressing    • Maintains adequate nutritional intake (undernourished) Progressing

## 2018-11-17 NOTE — HOME CARE LIAISON
I attempted to see patient for home health and she declined to discuss at this time. I left my brochure and card but Lorraine Cramer is covering this weekend.   If patient agrees to discuss home health this weekend Shagufta's number is [phone]    Thank you for the

## 2018-11-17 NOTE — PROGRESS NOTES
[facility]  Progress Note     West Miles Patient Status:  Inpatient    1960 MRN [id number]   [facility] 0SW-A Attending Kg Avila, [address] [facility] Day # 2 PCP Margret Sloan     Chief Complaint: N/V    S: Patient seen a IV push  40 mg Intravenous Q12H   • Metoclopramide HCl  10 mg Intravenous Q8H   • aspirin  81 mg Oral Daily   • Clopidogrel Bisulfate  75 mg Oral Daily   • docusate sodium  100 mg Oral TID   • folic acid  377 mcg Oral Daily   • lisinopril  2.5 mg Oral Nigh

## 2018-11-17 NOTE — PROGRESS NOTES
[address]  Report of GI Consultation    Joel Gallo Patient Status:  Inpatient    1960 MRN [id number]   [facility] 2NE-A Attending Brina Nguyễn, [address] [facility] Day # 2 PCP Kristian Patterson     Date of Admission:  11/15/2018  P injection 4 mg 4 mg Intravenous Q6H PRN   ondansetron (ZOFRAN-ODT) disintegrating tab 8 mg 8 mg Oral Q8H PRN       Allergies    Codeine Sulfate         NAUSEA ONLY  Sulfa Antibiotics       NAUSEA AND VOMITING    Comment:pancreatitis  Nsaids suggested to ensure resolution. Dictated by: Alda Dunne MD on 11/15/2018 at 17:49     Approved by: Alda Dunne MD                 Impression:   1. Cyclic nausea and vomiting  2. Grade D esophagitis with ulceration  3. Hiatal hernia  4.  Ashanti Balls

## 2018-11-18 PROCEDURE — 99232 SBSQ HOSP IP/OBS MODERATE 35: CPT | Performed by: HOSPITALIST

## 2018-11-18 RX ORDER — METOCLOPRAMIDE 10 MG/1
10 TABLET ORAL
Qty: 90 TABLET | Refills: 1 | Status: ON HOLD | OUTPATIENT
Start: 2018-11-19 | End: 2018-12-14

## 2018-11-18 RX ORDER — MIRTAZAPINE 15 MG/1
15 TABLET, FILM COATED ORAL NIGHTLY
Qty: 30 TABLET | Refills: 1 | Status: SHIPPED | OUTPATIENT
Start: 2018-11-18 | End: 2019-06-04

## 2018-11-18 RX ORDER — MAGNESIUM OXIDE 400 MG (241.3 MG MAGNESIUM) TABLET
800 TABLET ONCE
Status: COMPLETED | OUTPATIENT
Start: 2018-11-18 | End: 2018-11-18

## 2018-11-18 RX ORDER — METOCLOPRAMIDE 5 MG/1
10 TABLET ORAL
Status: DISCONTINUED | OUTPATIENT
Start: 2018-11-18 | End: 2018-11-19

## 2018-11-18 NOTE — PLAN OF CARE
Complains of burning sensation in fingertips of bilateral hands. States this sensation has occurred over the last few days, prevents her from using her hands. She believes the pain medication helps resolve pain. Requested cold packs for hands.  Pain medicat

## 2018-11-18 NOTE — PROGRESS NOTES
[facility]  Report of GI Consultation    Estella Rashidacarolyn Patient Status:  Inpatient    1960 MRN [id number]   [facility] 2NE-A Attending Yanet King MD   Hosp Day # 3 PCP Zaina Wood     Date of Admission:  11/15/2018 PRN       Allergies    Codeine Sulfate         NAUSEA ONLY  Sulfa Antibiotics       NAUSEA AND VOMITING    Comment:pancreatitis  Nsaids                  UNKNOWN    Comment:Cardiologist states none to be taken.       Physical Exam:   Blood pressure 104/67, p unrevealing then. Would hold off on feeding tubes until this more clearly delineated. Further, would implement recs as per Dr Yunior Hatch previous notes, if patient is agreeable.       Recommendations:  1. PPI q12 hours oral tab  2. Continue Remeron  3.  Conve

## 2018-11-18 NOTE — DIETARY NOTE
Dietitian Short Note - [address]    Calorie count order received. Spoke with RN, envelope hanging on pt's door and started to collect meal tickets today. Will follow up on 11/19 for Day 1 results.     Margueritte Heimlich RD, LDN, CSP, [address] [place]   Pager [phone]

## 2018-11-18 NOTE — PROGRESS NOTES
[facility]  Progress Note     Roscoe Guaman Patient Status:  Inpatient    1960 MRN [id number]   [facility] 0SW-A Attending Haydee Muniz, [address] [facility] Day # 3 PCP Trinidad Watkins     Chief Complaint: N/V    S: Patient has no Labs   Lab  11/15/18   1509   TROP  <0.046            Imaging: Imaging data reviewed in Epic.     Medications:   • Metoclopramide HCl  10 mg Oral TID AC   • mirtazapine  15 mg Oral Nightly   • Pantoprazole Sodium  40 mg Oral BID AC   • aspirin  81 mg Oral D

## 2018-11-18 NOTE — PLAN OF CARE
Problem: CARDIOVASCULAR - ADULT  Goal: Absence of cardiac arrhythmias or at baseline  INTERVENTIONS:  - Continuous cardiac monitoring, monitor vital signs, obtain 12 lead EKG if indicated  - Evaluate effectiveness of antiarrhythmic and heart rate control m Administer analgesics based on type and severity of pain and evaluate response  - Implement non-pharmacological measures as appropriate and evaluate response  - Consider cultural and social influences on pain and pain management  - Manage/alleviate anxiety

## 2018-11-19 VITALS
TEMPERATURE: 98 F | WEIGHT: 95.44 LBS | HEIGHT: 66 IN | DIASTOLIC BLOOD PRESSURE: 58 MMHG | BODY MASS INDEX: 15.34 KG/M2 | SYSTOLIC BLOOD PRESSURE: 89 MMHG | RESPIRATION RATE: 16 BRPM | OXYGEN SATURATION: 99 % | HEART RATE: 99 BPM

## 2018-11-19 PROCEDURE — 99239 HOSP IP/OBS DSCHRG MGMT >30: CPT | Performed by: HOSPITALIST

## 2018-11-19 RX ORDER — PANTOPRAZOLE SODIUM 40 MG/1
40 TABLET, DELAYED RELEASE ORAL
Qty: 60 TABLET | Refills: 1 | Status: SHIPPED | OUTPATIENT
Start: 2018-11-19 | End: 2018-11-19

## 2018-11-19 RX ORDER — MAGNESIUM OXIDE 400 MG (241.3 MG MAGNESIUM) TABLET
800 TABLET ONCE
Status: COMPLETED | OUTPATIENT
Start: 2018-11-19 | End: 2018-11-19

## 2018-11-19 RX ORDER — PANTOPRAZOLE SODIUM 40 MG/1
40 TABLET, DELAYED RELEASE ORAL
Qty: 60 TABLET | Refills: 0 | Status: SHIPPED | OUTPATIENT
Start: 2018-11-19 | End: 2018-12-19

## 2018-11-19 NOTE — CM/SW NOTE
11/19/18 1400   Discharge disposition   Expected discharge disposition Home-Health   Name of Facillity/Home Care/Hospice [facility]   Discharge transportation Private car   SW notified [facility] of d/c

## 2018-11-19 NOTE — PAYOR COMM NOTE
--------------  CONTINUED STAY REVIEW    [facility]  Subscriber #:  [id number]  Authorization Number: [id number]    Admit date: 11/15/18  Admit time: 2200    Admitting Physician: Shad Sargent MD  Attending Physician:  Erin Loredo MD  Primary C (OXYCONTIN) 12 hr tab 10 mg     Date Action Dose Route User    11/19/2018 0625 Given 10 mg Oral Eliana Gomes RN    11/18/2018 1726 Given 10 mg Oral Piyush Og RN      Pantoprazole Sodium (PROTONIX) EC tab 40 mg     Date Action Dose Route User

## 2018-11-19 NOTE — DISCHARGE SUMMARY
[facility] HOSPITAL[facility]  DISCHARGE SUMMARY     Tri Barboza Patient Status:  Inpatient    1960 MRN [id number]   [facility] 2NE-A Attending Kermit Rahman MD   Hosp Day # 4 PCP Mary Preciado     Date of Admission: 11/15/2018  Date recommendations (brief descriptions):  • none    Lab/Test results pending at Discharge:   · none    Consultants:  • GI    Discharge Medication List:     Discharge Medications      START taking these medications      Instructions Prescription details   Meto for Nausea. Refills:  0     OxyCODONE HCl ER 10 MG T12a  Commonly known as:  OXYCONTIN      Take 1 tablet (10 mg total) by mouth Q12H.    Quantity:  6 tablet  Refills:  0     Zolpidem Tartrate ER 6.25 MG Tbcr  Commonly known as:  AMBIEN CR      Take 6.25

## 2018-11-19 NOTE — PLAN OF CARE
CARDIOVASCULAR - ADULT    • Absence of cardiac arrhythmias or at baseline Adequate for Discharge        GASTROINTESTINAL - ADULT    • Minimal or absence of nausea and vomiting Adequate for Discharge    • Maintains adequate nutritional intake (undernourishe

## 2018-11-19 NOTE — PROGRESS NOTES
Patient seen and examined    S- no complaints, ready to go ome, appetite has improved, is eating more tan she has in a [facility]  Chest CTAB  [facility]    Plan  Ok to dc ome  Continue PPI BID    Reyna Shrestha M.D.   [facility]

## 2018-11-20 ENCOUNTER — HOSPITAL ENCOUNTER (OUTPATIENT)
Facility: HOSPITAL | Age: 58
Setting detail: OBSERVATION
Discharge: HOME | End: 2018-11-21
Admitting: HOSPITALIST
Payer: COMMERCIAL

## 2018-11-20 ENCOUNTER — APPOINTMENT (OUTPATIENT)
Dept: GENERAL RADIOLOGY | Facility: HOSPITAL | Age: 58
End: 2018-11-20
Payer: COMMERCIAL

## 2018-11-20 ENCOUNTER — PRIOR ORIGINAL RECORDS (OUTPATIENT)
Dept: OTHER | Age: 58
End: 2018-11-20

## 2018-11-20 DIAGNOSIS — R07.9 ACUTE CHEST PAIN: Primary | ICD-10-CM

## 2018-11-20 PROCEDURE — 71045 X-RAY EXAM CHEST 1 VIEW: CPT

## 2018-11-20 RX ORDER — MORPHINE SULFATE 4 MG/ML
2 INJECTION, SOLUTION INTRAMUSCULAR; INTRAVENOUS ONCE
Status: COMPLETED | OUTPATIENT
Start: 2018-11-20 | End: 2018-11-20

## 2018-11-20 RX ORDER — MAGNESIUM HYDROXIDE/ALUMINUM HYDROXICE/SIMETHICONE 120; 1200; 1200 MG/30ML; MG/30ML; MG/30ML
30 SUSPENSION ORAL ONCE
Status: COMPLETED | OUTPATIENT
Start: 2018-11-20 | End: 2018-11-21

## 2018-11-20 RX ORDER — FAMOTIDINE 10 MG/ML
20 INJECTION, SOLUTION INTRAVENOUS ONCE
Status: COMPLETED | OUTPATIENT
Start: 2018-11-20 | End: 2018-11-20

## 2018-11-21 ENCOUNTER — CHARTING TRANS (OUTPATIENT)
Dept: OTHER | Age: 58
End: 2018-11-21

## 2018-11-21 ENCOUNTER — APPOINTMENT (OUTPATIENT)
Dept: CT IMAGING | Facility: HOSPITAL | Age: 58
End: 2018-11-21
Payer: COMMERCIAL

## 2018-11-21 VITALS
WEIGHT: 93.19 LBS | SYSTOLIC BLOOD PRESSURE: 103 MMHG | TEMPERATURE: 98 F | RESPIRATION RATE: 18 BRPM | HEIGHT: 66 IN | BODY MASS INDEX: 14.98 KG/M2 | DIASTOLIC BLOOD PRESSURE: 68 MMHG | OXYGEN SATURATION: 100 % | HEART RATE: 101 BPM

## 2018-11-21 PROBLEM — R07.9 ACUTE CHEST PAIN: Status: ACTIVE | Noted: 2018-11-21

## 2018-11-21 PROCEDURE — 71275 CT ANGIOGRAPHY CHEST: CPT

## 2018-11-21 PROCEDURE — 99220 INITIAL OBSERVATION CARE,LEVL III: CPT | Performed by: HOSPITALIST

## 2018-11-21 RX ORDER — METOCLOPRAMIDE 10 MG/1
10 TABLET ORAL
Status: DISCONTINUED | OUTPATIENT
Start: 2018-11-21 | End: 2018-11-21

## 2018-11-21 RX ORDER — PANTOPRAZOLE SODIUM 40 MG/1
40 TABLET, DELAYED RELEASE ORAL
Status: DISCONTINUED | OUTPATIENT
Start: 2018-11-21 | End: 2018-11-21

## 2018-11-21 RX ORDER — ONDANSETRON 4 MG/1
8 TABLET, ORALLY DISINTEGRATING ORAL EVERY 8 HOURS PRN
Status: DISCONTINUED | OUTPATIENT
Start: 2018-11-21 | End: 2018-11-21

## 2018-11-21 RX ORDER — ZOLPIDEM TARTRATE 5 MG/1
5 TABLET ORAL NIGHTLY PRN
Status: DISCONTINUED | OUTPATIENT
Start: 2018-11-21 | End: 2018-11-21

## 2018-11-21 RX ORDER — MELATONIN
400 DAILY
Status: DISCONTINUED | OUTPATIENT
Start: 2018-11-21 | End: 2018-11-21

## 2018-11-21 RX ORDER — OXYCODONE HCL 10 MG/1
10 TABLET, FILM COATED, EXTENDED RELEASE ORAL EVERY 12 HOURS
Status: DISCONTINUED | OUTPATIENT
Start: 2018-11-21 | End: 2018-11-21

## 2018-11-21 RX ORDER — ASPIRIN 81 MG/1
81 TABLET ORAL DAILY
Status: DISCONTINUED | OUTPATIENT
Start: 2018-11-21 | End: 2018-11-21

## 2018-11-21 RX ORDER — CLOPIDOGREL BISULFATE 75 MG/1
75 TABLET ORAL DAILY
Status: DISCONTINUED | OUTPATIENT
Start: 2018-11-21 | End: 2018-11-21

## 2018-11-21 RX ORDER — ATORVASTATIN CALCIUM 40 MG/1
40 TABLET, FILM COATED ORAL NIGHTLY
Status: DISCONTINUED | OUTPATIENT
Start: 2018-11-21 | End: 2018-11-21

## 2018-11-21 RX ORDER — MIRTAZAPINE 15 MG/1
15 TABLET, FILM COATED ORAL NIGHTLY
Status: DISCONTINUED | OUTPATIENT
Start: 2018-11-21 | End: 2018-11-21

## 2018-11-21 RX ORDER — LISINOPRIL 2.5 MG/1
2.5 TABLET ORAL NIGHTLY
Status: DISCONTINUED | OUTPATIENT
Start: 2018-11-21 | End: 2018-11-21

## 2018-11-21 RX ORDER — POTASSIUM CHLORIDE 20 MEQ/1
40 TABLET, EXTENDED RELEASE ORAL ONCE
Status: COMPLETED | OUTPATIENT
Start: 2018-11-21 | End: 2018-11-21

## 2018-11-21 RX ORDER — HYDROCODONE BITARTRATE AND ACETAMINOPHEN 10; 325 MG/1; MG/1
1 TABLET ORAL 3 TIMES DAILY PRN
Status: DISCONTINUED | OUTPATIENT
Start: 2018-11-21 | End: 2018-11-21

## 2018-11-21 RX ORDER — OXYMETAZOLINE HCL 0.05 %
1 AEROSOL, SPRAY WITH PUMP (ML) NASAL DAILY
Status: DISCONTINUED | OUTPATIENT
Start: 2018-11-21 | End: 2018-11-21 | Stop reason: RX

## 2018-11-21 NOTE — CONSULTS
[address] [facility] CONSULT      Patient: Karen Cortez Date: 2018     : 1960 Attending: Alexandrea Romero MD   63year old female      Chief Complaint/Reason for consult: History of HF/ chest pain    History of Present Illne 2016    Right & left heart angiogram   • ANGIOGRAM  2016    Right & left heart angiogram   • ANGIOPLASTY (CORONARY)  2004    stent to LAD   • ANGIOPLASTY (CORONARY)  2005    stent to LAD   •       2   • CARDIAC DEFIBRILLATO Never Used    Substance and Sexual Activity      Alcohol use: Yes        Comment: \"occasional\"      Drug use: No        Comment: pt denies      Sexual activity: Not on file    Other Topics      Concerns:        Not on file    Social History Narrative 11/20/18 2313 95 lb (43.1 kg)   11/21/18 0444 93 lb 3.2 oz (42.3 kg)        Intake/Output:  No intake/output data recorded. No intake/output data recorded.   No intake or output data in the 24 hours ending 11/21/18 1117    Physical Assessment:  Gen: appe previous dated 02/21/2018: On  eyes-yo-wssb comparison, there is improvement in left ventricle systolic  function and certain regional wall motion contractility.     Assessment/Plan:   Chest pain  -Atypical  -Last LHC 2 years ago with patent LAD stent and n

## 2018-11-21 NOTE — ED INITIAL ASSESSMENT (HPI)
Pt c/o CP onset 1700, \"goes across the back and my L arm, combination of achy and pressure. \" Pt denies MADY. States just DC'd from [facility] yesterday for Malnutrition, \"still having nausea, no vomiting. \"

## 2018-11-21 NOTE — ED PROVIDER NOTES
Patient Seen in: [facility] Emergency Department    History   Patient presents with:  Chest Pain Angina (cardiovascular)    Stated Complaint: CP since 66    HPI    81-year-old female is in this emergency department tonight with her  at the weakness    • Pancreatitis    • Personal history of antineoplastic chemotherapy    • Pneumonia due to organism    • Renal disorder     hx of ERIKA d/t dehydration   • Systolic heart failure ([facility] [address]    • Thrush of mouth and esophagus (HCC)    • Visual impairmen denies      Review of Systems    Positive for stated complaint: CP since 1700  Other systems are as noted in HPI. Constitutional and vital signs reviewed. All other systems reviewed and negative except as noted above.     Physical Exam     ED Triage V 2.4 (*)     A/G Ratio 0.5 (*)     All other components within normal limits   PRO BETA NATRIURETIC PEPTIDE - Abnormal; Notable for the following components:    Pro-Beta Natriuretic Peptide 1,660 (*)     All other components within normal limits   CBC W/ DI INDICATIONS:  persistent vomiting, leukocytosis,  TECHNIQUE:  Unenhanced multislice CT scanning was performed from the dome of the diaphragm to the pubic symphysis. Dose reduction techniques were used.  Dose information is transmitted to the ACR (American radiograph was obtained. COMPARISON:  [facility] , XR CHEST AP PORTABLE  (CPT=71045), 10/14/2018, 15:16. [facility] , XR CHEST AP PORTABLE  (CPT=71045), 10/02/2018, 11:42.   INDICATIONS:  CP since 1700  PATIENT STATED HISTORY: (As transcribed by Technologist)  Pt Didier Waters MD          I spoke with the radiologist with regard to the CT scan which looks unremarkable for PE or pneumonia, possible bronchial wall thickening, clinically questioning whether this patient may have earlier smoking history or bronchitis      C describing this pain is similar to when she had her stent years ago, I will speak with Dr. Rodolfo Gallo , for observation admission, the cardiologist plans on chemical stress test today.       Disposition and Plan     Clinical Impression:  Acute chest pain  (prim

## 2018-11-21 NOTE — DIETARY MALNUTRITION NOTE
[facility]     NUTRITION INITIAL ASSESSMENT     Pt meets severe malnutrition criteria.     CRITERIA FOR MALNUTRITION DIAGNOSIS:  Criteria for severe malnutrition diagnosis: chronic illness related to wt loss greater than 10% in 6 months and energy int medically appropriate. Discussed pt's wishes regarding this. Pt is not opposed to this idea as she is aware of her severe malnutrition, but wants to try everything possible via PO prior to considering this. Per pt would be agreeable if wt loss continues.

## 2018-11-21 NOTE — PLAN OF CARE
Pt admitted to 86 for chest pain as OBS. Troponin (-) x2. Pt received morphine, gi cocktail    PMH: CAD, HL, CHF, SAUMYA, implanted cardioverter defibrillator, recently d/c 11/19 was treated for n/v    Reports pain in upper back, chest, and neck/head.  Rates

## 2018-11-21 NOTE — PROGRESS NOTES
11/21/18 0441 11/21/18 0442 11/21/18 0444   Vital Signs   Pulse 118 116 (!) 128   Heart Rate Source Monitor Monitor Monitor   Resp 20 20 22   Respiratory Quality Normal Normal Normal   /79 104/73 (!) 71/51   BP Location Right arm Right arm Right a

## 2018-11-21 NOTE — H&P
[facility]  History and Physical     Mena Malone Patient Status:  Emergency    1960 MRN [id number]   Location 656 [facility] Attending Bharat Butler MD   Hosp Day # 0 PCP Hailey Ferrer     Chief Complaint • COLONOSCOPY     • COLONOSCOPY N/A 7/13/2015    Performed by Sarah Galvan MD at [address] [place] [address] (EUS) N/A 9/6/2018    Performed by Jonathan Caruso DO at [facility] ENDOSCOPY   • ENDOSCOPIC ULTRASOUND (EUS) N/A 12/1/2015    Performed by to Encounter:  Pantoprazole Sodium 40 MG Oral Tab EC Take 1 tablet (40 mg total) by mouth 2 (two) times daily before meals. Disp: 60 tablet Rfl: 0   mirtazapine 15 MG Oral Tab Take 1 tablet (15 mg total) by mouth nightly.  Disp: 30 tablet Rfl: 1   Metoclopr lymphadenopathy. No JVD. No carotid bruits. Respiratory: Clear to auscultation bilaterally. No wheezes. No rhonchi. Cardiovascular: S1, S2. Regular rate and rhythm. No murmurs, rubs or gallops. Equal pulses. Chest and Back: No tenderness or deformity. negative can proceed with stress test per cardiology recommendations. 2. Hyperlipidemia  3. Coronary artery disease  4. Malnutrition  5.  Opioid dependence    Quality:  · DVT Prophylaxis: Lovenox  · CODE status: Full Code  · Kumar: None    Plan of care dis

## 2018-11-21 NOTE — HISTORICAL OFFICE NOTE
Shaun VASQUEZ  : 1960  ACCOUNT:  [id number]  [phone]  PCP: Dr. Ramos Quiet     TODAY'S DATE: 10/04/2018  DICTATED BY:  [Dr. Jesse Pruett      CHIEF COMPLAINT: [Followup of . CAD, of native vessels, Followup of .  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 I believe her sinus tachycardia is most likely related to the lack of absorption of any of her cardiac medications. I was able to obtain an appointment with the surgeon for tomorrow.   I will have the patient return aft daily                27/35/20 Folic Acid            [id number]    one tablet by mouth daily

## 2018-11-21 NOTE — PLAN OF CARE
Assumed patient care 0730. Patient alert/orientated x4. No c/o SOB/chest pain. Up with assist. Poor appetite. Due medications given, needs addressed and attended to. Discharge orders received. Okay per specialities.  Discharge instructions and medications r

## 2018-11-23 ENCOUNTER — IMAGING SERVICES (OUTPATIENT)
Dept: OTHER | Age: 58
End: 2018-11-23

## 2018-11-23 ENCOUNTER — CHARTING TRANS (OUTPATIENT)
Dept: OTHER | Age: 58
End: 2018-11-23

## 2018-11-26 ENCOUNTER — PRIOR ORIGINAL RECORDS (OUTPATIENT)
Dept: OTHER | Age: 58
End: 2018-11-26

## 2018-11-28 ENCOUNTER — PRIOR ORIGINAL RECORDS (OUTPATIENT)
Dept: OTHER | Age: 58
End: 2018-11-28

## 2018-11-28 VITALS
SYSTOLIC BLOOD PRESSURE: 94 MMHG | HEIGHT: 67 IN | DIASTOLIC BLOOD PRESSURE: 68 MMHG | BODY MASS INDEX: 18.83 KG/M2 | WEIGHT: 120 LBS | TEMPERATURE: 97.8 F | RESPIRATION RATE: 16 BRPM | HEART RATE: 72 BPM

## 2018-11-28 VITALS
HEIGHT: 65 IN | BODY MASS INDEX: 19.66 KG/M2 | HEART RATE: 72 BPM | TEMPERATURE: 97.4 F | RESPIRATION RATE: 16 BRPM | DIASTOLIC BLOOD PRESSURE: 60 MMHG | SYSTOLIC BLOOD PRESSURE: 98 MMHG | WEIGHT: 118 LBS

## 2018-11-28 VITALS
TEMPERATURE: 97.9 F | WEIGHT: 131 LBS | RESPIRATION RATE: 16 BRPM | HEART RATE: 60 BPM | SYSTOLIC BLOOD PRESSURE: 102 MMHG | DIASTOLIC BLOOD PRESSURE: 70 MMHG

## 2018-11-28 VITALS
TEMPERATURE: 96.2 F | RESPIRATION RATE: 14 BRPM | DIASTOLIC BLOOD PRESSURE: 60 MMHG | HEART RATE: 74 BPM | SYSTOLIC BLOOD PRESSURE: 94 MMHG | WEIGHT: 114 LBS

## 2018-11-30 ENCOUNTER — OFFICE VISIT (OUTPATIENT)
Dept: NEUROLOGY | Facility: CLINIC | Age: 58
End: 2018-11-30
Payer: COMMERCIAL

## 2018-11-30 ENCOUNTER — APPOINTMENT (OUTPATIENT)
Dept: LAB | Age: 58
End: 2018-11-30
Attending: Other
Payer: COMMERCIAL

## 2018-11-30 VITALS
BODY MASS INDEX: 15.49 KG/M2 | WEIGHT: 93 LBS | SYSTOLIC BLOOD PRESSURE: 100 MMHG | RESPIRATION RATE: 16 BRPM | HEIGHT: 65 IN | HEART RATE: 66 BPM | DIASTOLIC BLOOD PRESSURE: 60 MMHG

## 2018-11-30 DIAGNOSIS — R29.2 HYPOREFLEXIA: Primary | ICD-10-CM

## 2018-11-30 DIAGNOSIS — R11.2 INTRACTABLE VOMITING WITH NAUSEA, UNSPECIFIED VOMITING TYPE: ICD-10-CM

## 2018-11-30 DIAGNOSIS — R53.1 WEAKNESS: ICD-10-CM

## 2018-11-30 DIAGNOSIS — R27.0 ATAXIA: ICD-10-CM

## 2018-11-30 PROCEDURE — 86334 IMMUNOFIX E-PHORESIS SERUM: CPT | Performed by: OTHER

## 2018-11-30 PROCEDURE — 83883 ASSAY NEPHELOMETRY NOT SPEC: CPT | Performed by: OTHER

## 2018-11-30 PROCEDURE — 99215 OFFICE O/P EST HI 40 MIN: CPT | Performed by: OTHER

## 2018-11-30 PROCEDURE — 84165 PROTEIN E-PHORESIS SERUM: CPT | Performed by: OTHER

## 2018-11-30 PROCEDURE — 36415 COLL VENOUS BLD VENIPUNCTURE: CPT | Performed by: OTHER

## 2018-11-30 PROCEDURE — 83516 IMMUNOASSAY NONANTIBODY: CPT | Performed by: OTHER

## 2018-11-30 NOTE — PROGRESS NOTES
[employer] Progress Note    HPI    Brigette Hammer is a 63year old F with complicated past medical history.   Patient was previously seen by my neurology associate, Dr. Froy Leach, while in hospital, but this is the patient's firs aside from when her ICD fires. She has constipation, and has lost ~30 lbs since 7/2018 - she was having intractable vomiting prior to having procedure done (ERCP) - denies any rashes or joint pains.  She denies any vision changes or pain when moving eyes f ENDOSCOPY   • ESOPHAGOGASTRODUODENOSCOPY (EGD) N/A 8/11/2015    Performed by Brenton Kessler MD at [facility] ENDOSCOPY   • FOOT SURGERY      fixed nerve   • LAPAROSCOPIC CHOLECYSTECTOMY N/A 10/10/2018    Performed by Nadege Walton MD at [facility] MAIN OR   • OTHER (15 mg total) by mouth nightly., Disp: 30 tablet, Rfl: 1  •  Metoclopramide HCl 10 MG Oral Tab, Take 1 tablet (10 mg total) by mouth 3 (three) times daily before meals. , Disp: 90 tablet, Rfl: 1  •  atorvastatin 40 MG Oral Tab, Take 40 mg by mouth nightly. , pulses intact    Neck: supple, full range of motion; no carotid bruits    Fundoscopic Exam: optic discs sharp bilaterally    Neuro:  Mental status:  Orientation: Alert and oriented to person, place, time  Speech Fluent and conversational    CN: PERRL, EOMI - 6.70 x10(3) uL   6.85 (H)   Lymphocytes Absolute      0.90 - 4.00 x10(3) uL   2.32   Monocytes Absolute      0.10 - 1.00 x10(3) uL   0.73   Eosinophils Absolute      0.00 - 0.30 x10(3) uL   0.11   Basophils Absolute      0.00 - 0.10 x10(3) uL   0.16 (H) MYELOPEROX ANTIBODIES, IGG      0 - 19 AU/mL  0    SERINE PROTEASE3, IGG      0 - 19 AU/mL  1    ANTI-NEUTROPHIL CYTO AB, IGG      <1:20  <1:20    CSF BAND OLIGOCLONAL       Negative     CSF OLIGOCLONAL BANDS NUMBER      0 - 1 Bands 0     CSF OLIG INTERP MASTOIDS:          No sign of acute inflammation. SKULL:             No evidence for fracture or osseous abnormality. OTHER:             None. Other procedures:   Reviewed     NCS/EMG (9/2018):          Summary:   1.   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  However, extensive serologic (ie vasculitis, MAG Ab, ESR, CRP, MELIZA, heavy metals),  and CSF testing was unremarkable; in addition, workup with CT cervical / thoracic/lumbar spine did not show evidence for As noted above      40 total minutes spent with patient >50% of visit was spent in counseling and coordination of care, including discussion of differential diagnosis, and review of workup to date, as well as additional diagnostic testing as noted above;

## 2018-11-30 NOTE — PATIENT INSTRUCTIONS
Please schedule EMG and have labs done  Follow up after EMG to be determined   Refill policies:    • Allow 2-3 business days for refills; controlled substances may take longer.   • Contact your pharmacy at least 5 days prior to running out of medication and done without insurance authorization, patient may be responsible for the entire amount billed. Precertification and Prior Authorizations: If your physician has recommended that you have a procedure or additional testing performed.   Golden An

## 2018-12-05 ENCOUNTER — OFF PREMISE (OUTPATIENT)
Dept: FAMILY MEDICINE | Age: 58
End: 2018-12-05

## 2018-12-05 DIAGNOSIS — E43 UNSPECIFIED SEVERE PROTEIN-CALORIE MALNUTRITION (CMD): ICD-10-CM

## 2018-12-05 DIAGNOSIS — Z79.82 ENCOUNTER FOR LONG-TERM (CURRENT) USE OF ASPIRIN: ICD-10-CM

## 2018-12-05 DIAGNOSIS — I50.22 CHRONIC SYSTOLIC HEART FAILURE (CMD): Primary | ICD-10-CM

## 2018-12-05 DIAGNOSIS — I25.5 ISCHEMIC CARDIOMYOPATHY: ICD-10-CM

## 2018-12-05 DIAGNOSIS — Z87.01 PERSONAL HISTORY OF PNEUMONIA (RECURRENT): ICD-10-CM

## 2018-12-05 DIAGNOSIS — Z95.5 PRESENCE OF CORONARY ANGIOPLASTY IMPLANT AND GRAFT: ICD-10-CM

## 2018-12-05 DIAGNOSIS — Z87.891 PERSONAL HISTORY OF NICOTINE DEPENDENCE: ICD-10-CM

## 2018-12-05 DIAGNOSIS — Z79.02 ENCOUNTER FOR LONG-TERM (CURRENT) USE OF ANTIPLATELETS/ANTITHROMBOTICS: ICD-10-CM

## 2018-12-05 DIAGNOSIS — I25.2 OLD MYOCARDIAL INFARCTION: ICD-10-CM

## 2018-12-05 DIAGNOSIS — I25.10 ATHEROSCLEROSIS OF NATIVE CORONARY ARTERY OF NATIVE HEART WITHOUT ANGINA PECTORIS: ICD-10-CM

## 2018-12-05 DIAGNOSIS — G62.9 POLYNEUROPATHY: ICD-10-CM

## 2018-12-05 DIAGNOSIS — E78.5 HYPERLIPIDEMIA, UNSPECIFIED HYPERLIPIDEMIA TYPE: ICD-10-CM

## 2018-12-05 DIAGNOSIS — F43.21 ADJUSTMENT DISORDER WITH DEPRESSED MOOD: ICD-10-CM

## 2018-12-05 DIAGNOSIS — Z91.81 HISTORY OF FALLING: ICD-10-CM

## 2018-12-07 ENCOUNTER — PRIOR ORIGINAL RECORDS (OUTPATIENT)
Dept: OTHER | Age: 58
End: 2018-12-07

## 2018-12-10 ENCOUNTER — HOSPITAL ENCOUNTER (INPATIENT)
Facility: HOSPITAL | Age: 58
LOS: 4 days | Discharge: HOME HEALTH | DRG: 641 | End: 2018-12-14
Attending: EMERGENCY MEDICINE | Admitting: HOSPITALIST
Payer: COMMERCIAL

## 2018-12-10 ENCOUNTER — APPOINTMENT (OUTPATIENT)
Dept: GENERAL RADIOLOGY | Facility: HOSPITAL | Age: 58
DRG: 641 | End: 2018-12-10
Attending: EMERGENCY MEDICINE
Payer: COMMERCIAL

## 2018-12-10 DIAGNOSIS — R11.11 INTRACTABLE VOMITING WITHOUT NAUSEA, UNSPECIFIED VOMITING TYPE: Primary | ICD-10-CM

## 2018-12-10 DIAGNOSIS — E87.2 METABOLIC ACIDOSIS: ICD-10-CM

## 2018-12-10 DIAGNOSIS — E87.1 HYPONATREMIA: ICD-10-CM

## 2018-12-10 DIAGNOSIS — R63.0 ANOREXIA: ICD-10-CM

## 2018-12-10 DIAGNOSIS — E87.6 HYPOKALEMIA: ICD-10-CM

## 2018-12-10 DIAGNOSIS — N28.9 RENAL INSUFFICIENCY: ICD-10-CM

## 2018-12-10 PROBLEM — R73.9 HYPERGLYCEMIA: Status: ACTIVE | Noted: 2018-12-10

## 2018-12-10 PROCEDURE — 99223 1ST HOSP IP/OBS HIGH 75: CPT | Performed by: HOSPITALIST

## 2018-12-10 PROCEDURE — 74018 RADEX ABDOMEN 1 VIEW: CPT | Performed by: EMERGENCY MEDICINE

## 2018-12-10 PROCEDURE — 71045 X-RAY EXAM CHEST 1 VIEW: CPT | Performed by: EMERGENCY MEDICINE

## 2018-12-10 RX ORDER — LISINOPRIL 2.5 MG/1
2.5 TABLET ORAL NIGHTLY
Status: DISCONTINUED | OUTPATIENT
Start: 2018-12-10 | End: 2018-12-12

## 2018-12-10 RX ORDER — MAGNESIUM OXIDE 400 MG (241.3 MG MAGNESIUM) TABLET
400 TABLET ONCE
Status: COMPLETED | OUTPATIENT
Start: 2018-12-10 | End: 2018-12-10

## 2018-12-10 RX ORDER — ATORVASTATIN CALCIUM 40 MG/1
40 TABLET, FILM COATED ORAL NIGHTLY
Status: DISCONTINUED | OUTPATIENT
Start: 2018-12-10 | End: 2018-12-14

## 2018-12-10 RX ORDER — SODIUM CHLORIDE 9 MG/ML
INJECTION, SOLUTION INTRAVENOUS CONTINUOUS
Status: DISCONTINUED | OUTPATIENT
Start: 2018-12-10 | End: 2018-12-14

## 2018-12-10 RX ORDER — ONDANSETRON 4 MG/1
8 TABLET, ORALLY DISINTEGRATING ORAL EVERY 8 HOURS PRN
Status: DISCONTINUED | OUTPATIENT
Start: 2018-12-10 | End: 2018-12-14

## 2018-12-10 RX ORDER — ZOLPIDEM TARTRATE 5 MG/1
5 TABLET ORAL NIGHTLY PRN
Status: DISCONTINUED | OUTPATIENT
Start: 2018-12-10 | End: 2018-12-14

## 2018-12-10 RX ORDER — ONDANSETRON 2 MG/ML
4 INJECTION INTRAMUSCULAR; INTRAVENOUS EVERY 4 HOURS PRN
Status: CANCELLED | OUTPATIENT
Start: 2018-12-10

## 2018-12-10 RX ORDER — SODIUM CHLORIDE 9 MG/ML
INJECTION, SOLUTION INTRAVENOUS CONTINUOUS
Status: DISCONTINUED | OUTPATIENT
Start: 2018-12-10 | End: 2018-12-10

## 2018-12-10 RX ORDER — SODIUM CHLORIDE 9 MG/ML
INJECTION, SOLUTION INTRAVENOUS CONTINUOUS
Status: CANCELLED | OUTPATIENT
Start: 2018-12-10 | End: 2018-12-10

## 2018-12-10 RX ORDER — CLOPIDOGREL BISULFATE 75 MG/1
75 TABLET ORAL DAILY
Status: DISCONTINUED | OUTPATIENT
Start: 2018-12-10 | End: 2018-12-10

## 2018-12-10 RX ORDER — OXYCODONE HCL 10 MG/1
10 TABLET, FILM COATED, EXTENDED RELEASE ORAL EVERY 12 HOURS
Status: DISCONTINUED | OUTPATIENT
Start: 2018-12-10 | End: 2018-12-12

## 2018-12-10 RX ORDER — SODIUM CHLORIDE 9 MG/ML
1000 INJECTION, SOLUTION INTRAVENOUS ONCE
Status: COMPLETED | OUTPATIENT
Start: 2018-12-10 | End: 2018-12-10

## 2018-12-10 RX ORDER — PANTOPRAZOLE SODIUM 40 MG/1
40 TABLET, DELAYED RELEASE ORAL
Status: DISCONTINUED | OUTPATIENT
Start: 2018-12-10 | End: 2018-12-14

## 2018-12-10 RX ORDER — ONDANSETRON 2 MG/ML
4 INJECTION INTRAMUSCULAR; INTRAVENOUS ONCE
Status: COMPLETED | OUTPATIENT
Start: 2018-12-10 | End: 2018-12-10

## 2018-12-10 RX ORDER — MIRTAZAPINE 15 MG/1
15 TABLET, FILM COATED ORAL NIGHTLY
Status: DISCONTINUED | OUTPATIENT
Start: 2018-12-10 | End: 2018-12-14

## 2018-12-10 RX ORDER — HYDROCODONE BITARTRATE AND ACETAMINOPHEN 10; 325 MG/1; MG/1
1 TABLET ORAL 3 TIMES DAILY PRN
Status: DISCONTINUED | OUTPATIENT
Start: 2018-12-10 | End: 2018-12-13

## 2018-12-10 RX ORDER — METOCLOPRAMIDE 10 MG/1
10 TABLET ORAL
Status: DISCONTINUED | OUTPATIENT
Start: 2018-12-10 | End: 2018-12-14

## 2018-12-10 NOTE — PROGRESS NOTES
[facility]  Cardiology Progress Note    Hernando Tyler Patient Status:  Inpatient    1960 MRN [id number]   [facility] 3NW-A Attending Jasmina Dunlap MD   Hosp Day # 0 PCP Pedro Kinney     Subjective:  Patient was readmission chronic.  No evidence of vascular congestion or   effusion.  No consolidation.      Impression:     CONCLUSION:  No acute process. Physical Exam:  General: Barely interactive, flat affect. Lethargic.   HEENT: cachectic  Neck: No JVD, carotids 2+ no b

## 2018-12-10 NOTE — HISTORICAL OFFICE NOTE
PRICE VASQUEZ  : 1960  ACCOUNT:  [id number]  [phone]  PCP: Dr. Javier Lopez     TODAY'S DATE: 2018  DICTATED BY:  [Dr. Altaf Cassidy      CHIEF COMPLAINT: [Followup of . CAD, of native vessels, Followup of .  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                          09/05/17 *Torsemide            10MG      one tablet as needed                     08/02/17 *Simvastatin          40MG      1 BY MOUTH DAILY AT BEDTIME              11/22/16 *Nystatin             [id number]

## 2018-12-10 NOTE — H&P
[facility]  History and Physical     Ruffus Cage Patient Status:  Emergency    1960 MRN [id number]   Location 656 [facility] Attending Tiffany Bravo MD   Hosp Day # 0 PCP Bj Guallpa     Chief Complain mouth and esophagus (HCC)    • Visual impairment     glasses        Past Surgical History:   Past Surgical History:   Procedure Laterality Date   • ANGIOGRAM  11/30/2006    Right & left hear angiogram   • ANGIOGRAM  05/31/2016    Right & left heart angiogr Diabetes Brother         T1   • Lung Disorder Brother         sarcoidosis   • Cancer Brother         testicular   • Cancer Brother         skin cancer   • Hypertension Neg    • Obesity Neg    • Psychiatric Neg    • Lipids Neg        Allergies:   Codeine Muse by mouth daily. Disp:  Rfl:    folic acid (FOLVITE) 415 MCG Oral Tab Take 400 mcg by mouth daily. Disp:  Rfl:    Clopidogrel Bisulfate (PLAVIX) 75 MG Oral Tab Take 75 mg by mouth daily.  Disp:  Rfl:        Review of Systems:   A comprehensive 14 point revie that she has not been taking for 3 days now  2. History of ischemic cardiomyopathy status post ICD  3. Depression with poor appetite and weight loss  4. Opioid dependence with opiate bowel dysfunction  5.  Gastritis    Quality:  · DVT Prophylaxis: heparin

## 2018-12-10 NOTE — PROGRESS NOTES
NURSING ADMISSION NOTE      Patient admitted via Cart  Oriented to room. Safety precautions initiated. Bed in low position. Call light in reach. PATIENT ARRIVED TO FLOOR VIA CART FROM ER IN STABLE CONDITION AT 1522.  PRESENT AT BEDSIDE.

## 2018-12-10 NOTE — CONSULTS
[facility]                       [facility]    [facility] Patient Status:  Emergency    1960 MRN [id number]   Location 656 [facility] Attending Poonam Koehler MD pt is on waiting list for heart transplant/pt had a heart attack 2004   • Coronary artery disease     LAD, angioplasty and stent   • Esophageal reflux    • Heart attack [facility])    • History of blood transfusion    • Hyperlipidemia    • Ischemic cardiomyopath TONSILLECTOMY       Medications:   [COMPLETED] 0.9%  NaCl infusion 1,000 mL Intravenous Once   [COMPLETED] ondansetron HCl (ZOFRAN) injection 4 mg 4 mg Intravenous Once     Allergies:   Codeine Sulfate         NAUSEA ONLY    Comment:Pt states \"it messes w Resp 16   Ht 5' 6\" (1.676 m)   Wt 90 lb (40.8 kg)   SpO2 99%   BMI 14.53 kg/m²   Gen: AAO x 3, able to speak in complete sentences; temporal wasting   HENT: EOMI, PERRL, oropharynx is clear with dry mucosal membranes  Eyes: Sclerae are anicteric  Neck:  S Stephen Solis MD       ____________________________________________________    EGD:11/16/2015: Dr Stefan Martinez  Indication: nausea and vomiting  Findings: severe Grade C-D reflux esophagitis in distal esophagus (28 cm-34 cm), 3 cm hiatal hernia, and mild gastritis   Imp about 250 calories daily, and if she eats more than that, she tends to vomit. She has persistent anorexia, and ongoing, but intermittent nausea. She has been taking Reglan, without much improvement.     1) Check am cortisol, prolactin, Ionized calcium

## 2018-12-11 ENCOUNTER — ANESTHESIA EVENT (OUTPATIENT)
Dept: ENDOSCOPY | Facility: HOSPITAL | Age: 58
End: 2018-12-11

## 2018-12-11 ENCOUNTER — APPOINTMENT (OUTPATIENT)
Dept: GENERAL RADIOLOGY | Facility: HOSPITAL | Age: 58
DRG: 641 | End: 2018-12-11
Attending: NURSE PRACTITIONER
Payer: COMMERCIAL

## 2018-12-11 ENCOUNTER — ANESTHESIA (OUTPATIENT)
Dept: ENDOSCOPY | Facility: HOSPITAL | Age: 58
End: 2018-12-11

## 2018-12-11 PROCEDURE — 99232 SBSQ HOSP IP/OBS MODERATE 35: CPT | Performed by: HOSPITALIST

## 2018-12-11 PROCEDURE — 99253 IP/OBS CNSLTJ NEW/EST LOW 45: CPT | Performed by: SURGERY

## 2018-12-11 PROCEDURE — 0DH63UZ INSERTION OF FEEDING DEVICE INTO STOMACH, PERCUTANEOUS APPROACH: ICD-10-PCS | Performed by: INTERNAL MEDICINE

## 2018-12-11 PROCEDURE — 90792 PSYCH DIAG EVAL W/MED SRVCS: CPT | Performed by: OTHER

## 2018-12-11 PROCEDURE — 0DHA8UZ INSERTION OF FEEDING DEVICE INTO JEJUNUM, VIA NATURAL OR ARTIFICIAL OPENING ENDOSCOPIC: ICD-10-PCS | Performed by: INTERNAL MEDICINE

## 2018-12-11 PROCEDURE — 74018 RADEX ABDOMEN 1 VIEW: CPT | Performed by: NURSE PRACTITIONER

## 2018-12-11 DEVICE — SAFETY PEG KIT 20FR: Type: IMPLANTABLE DEVICE | Status: FUNCTIONAL

## 2018-12-11 DEVICE — 3-PORT THROUGH-THE-PEG J-TUBE KIT
Type: IMPLANTABLE DEVICE | Status: FUNCTIONAL
Brand: ENDOVIVE JEJUNAL FEEDING TUBE

## 2018-12-11 RX ORDER — ARIPIPRAZOLE 15 MG/1
40 TABLET ORAL EVERY 4 HOURS
Status: COMPLETED | OUTPATIENT
Start: 2018-12-11 | End: 2018-12-11

## 2018-12-11 RX ORDER — POTASSIUM CHLORIDE 14.9 MG/ML
20 INJECTION INTRAVENOUS ONCE
Status: COMPLETED | OUTPATIENT
Start: 2018-12-11 | End: 2018-12-11

## 2018-12-11 RX ORDER — MAGNESIUM OXIDE 400 MG (241.3 MG MAGNESIUM) TABLET
800 TABLET ONCE
Status: COMPLETED | OUTPATIENT
Start: 2018-12-11 | End: 2018-12-11

## 2018-12-11 RX ORDER — CEFAZOLIN SODIUM/WATER 2 G/20 ML
SYRINGE (ML) INTRAVENOUS
Status: DISCONTINUED | OUTPATIENT
Start: 2018-12-11 | End: 2018-12-11 | Stop reason: HOSPADM

## 2018-12-11 RX ORDER — SODIUM CHLORIDE, SODIUM LACTATE, POTASSIUM CHLORIDE, CALCIUM CHLORIDE 600; 310; 30; 20 MG/100ML; MG/100ML; MG/100ML; MG/100ML
INJECTION, SOLUTION INTRAVENOUS CONTINUOUS
Status: DISCONTINUED | OUTPATIENT
Start: 2018-12-11 | End: 2018-12-12

## 2018-12-11 RX ORDER — NALOXONE HYDROCHLORIDE 0.4 MG/ML
80 INJECTION, SOLUTION INTRAMUSCULAR; INTRAVENOUS; SUBCUTANEOUS AS NEEDED
Status: DISCONTINUED | OUTPATIENT
Start: 2018-12-11 | End: 2018-12-11 | Stop reason: HOSPADM

## 2018-12-11 NOTE — CONSULTS
[facility]  Report of Psychiatric Consultation    Deniz Dye Patient Status:  Inpatient    1960 MRN [id number]   [facility] 8NE-A Attending Brennon Alex MD   Hosp Day # 1 PCP Santiago Mckenna     Date of Admission: 12/10/2 disorder psychologist, to see if her failure to thrive/inability to take in food/calories is from anorexia.      Pratima Chen  12/11/2018  9:58 AM    History of Present Illness:  64 yo female with hx CAD and CHF (EF-40%) and chronic mouth pain and HA's of un Oct 2018. It helped partially with mood and sleep, but did not improve appetite or decrease nausea. She has had helpless and hopeless feelings, but NEVER had suicidal ideation. Her weight was 111lb on 10/15/18 and is now 92 lbs today.  GI states that sh left hear angiogram   • ANGIOGRAM  05/31/2016    Right & left heart angiogram   • ANGIOGRAM  06/02/2016    Right & left heart angiogram   • ANGIOPLASTY (CORONARY)  11/24/2004    stent to LAD   • ANGIOPLASTY (CORONARY)  05/11/2005    stent to LAD   • C-SECT use drugs.     Allergies:    Codeine Sulfate         NAUSEA ONLY    Comment:Pt states \"it messes with my stomach, but I can             take it if I need it\"  Sulfa Antibiotics       NAUSEA AND VOMITING    Comment:pancreatitis  Nsaids                  UNK situation  Attention and Concentration:   fair  Memory:  Intact remote  Language: Intact naming and repetition  Fund of Knowledge: Able to recite name of [place] president    Insight: limited  Judgment: limited    Laboratory Data:  Lab Results   Component Value

## 2018-12-11 NOTE — HOME CARE LIAISON
Patient is current with [facility] for RN/PT services. Please add resumption orders prior to discharge home.   Thanks

## 2018-12-11 NOTE — DIETARY MALNUTRITION NOTE
[facility]    NUTRITION INITIAL ASSESSMENT    Pt meets severe malnutrition criteria.     CRITERIA FOR MALNUTRITION DIAGNOSIS:  Criteria for severe malnutrition diagnosis: chronic illness related to wt loss greater than 7.5% in 3 months, energy intake l lb)  10/18/18 : 48.9 kg (107 lb 12.9 oz)  10/10/18 : 47.6 kg (105 lb)  10/05/18 : 49.9 kg (110 lb)  09/03/18 : 53.6 kg (118 lb 2.7 oz)  02/07/18 : 55.8 kg (123 lb)    NUTRITION:  Diet: NPO  Oral Supplements: no    FOOD/NUTRITION RELATED HISTORY:  Appetite:

## 2018-12-11 NOTE — PROGRESS NOTES
Gastroenterology Progress Note  Patient Name: Piotr Lee  Chief Complaint: Nausea/vomiting  S: The patient reports no vomiting overnight.   She has chronic pain in the hands and feet, related to a neuropathy, and manages this pain with chronic n contributing to her overall state of poor health. She has not been taking Plavix since 12/1, per her son.     Plan:   1) Because of the suspicion that she has altered gastric motility, and that G-J tubes tend to be less reliable over time, as they migrate

## 2018-12-11 NOTE — CONSULTS
[facility]  Report of Consultation    [facility] Patient Status:  Inpatient    1960 MRN [id number]   [facility] 8NE-A Attending Eileen Lewis MD   Hosp Day # 1 PCP Lucas Cheng     Reason for Consultation:  Evaluation f (CORONARY)  2005    stent to LAD   •       2   • CARDIAC DEFIBRILLATOR PLACEMENT      Medtronic ICD   • CARDIAC PACEMAKER PLACEMENT     • CATH DRUG ELUTING STENT     • COLONOSCOPY     • COLONOSCOPY N/A 2015    Performed by Lilliana Stiles Comment:pancreatitis  Nsaids                  UNKNOWN    Comment:Cardiologist states none to be taken.     Medications:    Current Facility-Administered Medications:   •  atorvastatin (LIPITOR) tab 40 mg, 40 mg, Oral, Nightly  •  HYDROcodone-acetaminophen ( 12/11/2018    AST 32 12/11/2018    ALT 15 12/11/2018    MG 1.5 12/11/2018    PHOS 1.8 12/11/2018         Impression and Plan:  Patient Active Problem List:     Systolic congestive heart failure ([facility] [address]     Hypokalemia     Appendicolith     CAD (coronary mahin the case with Dr. Gary Romo and we discussed starting with a G-J tube placement to allow for improved nutrition in case the patient does need an operative J-tube placement. · Thank you for the consult. I spent 45 minutes face to face with the patient.  Mor

## 2018-12-11 NOTE — PROGRESS NOTES
REPORT CALLED TO HAYDE GORDON AT 3653. PATIENT TRANSFERRED TO ROOM 8606 IN STABLE CONDITION AT 1809. PATIENTS ,BRIANNE, UPDATED ON ROOM AND THAT DR. COOK IS NOT ON CALL SO HE WILL HAVE TO TALK TO HIM TOMORROW.

## 2018-12-11 NOTE — PROGRESS NOTES
Orders for consults to Dr. Rajiv Nuñez for J tube placement eval and also a new consult for Dr Joseph Samuels for opioid addiction and failure to thrive. Calls placed to notify both MD's.

## 2018-12-11 NOTE — ANESTHESIA POSTPROCEDURE EVALUATION
[address] Patient Status:  Inpatient   Age/Gender 63year old female MRN [id number]   Location 118 [facility]. Attending Elida Jackson MD   Hosp Day # 1 PCP Zechariah MORRISON       Anesthesia Post-op Note    Procedure(s)

## 2018-12-11 NOTE — ANESTHESIA PREPROCEDURE EVALUATION
PRE-OP EVALUATION    Patient Name: Hanny Walton    Pre-op Diagnosis: inpt    Procedure(s):  ESOPHAGOGASTRODUODENOSCOPY WITH PERCUTANEOUS ENDOSCOPIC GASTROSTOMY TUBE PLACEMENT      Surgeon(s) and Role:     * Miller Roberts MD - Primary    Pre-o mouth nightly. Disp:  Rfl: 0   hydrocodone-acetaminophen 7.5-325 MG Oral Tab Take 1 tablet by mouth 3 (three) times daily as needed. Disp:  Rfl: 0   lisinopril 2.5 MG Oral Tab Take 1 tablet (2.5 mg total) by mouth nightly.  Disp: 30 tablet Rfl: 2   Metoprol right atrium. 4. Pulmonary arteries: Systolic pressure could not be accurately estimated    ECG reviewed.         Unable to assess MET.    (+) hypertension   (+) hyperlipidemia  (+) CAD  (+) past MI    (+) pacemaker/AICD          (+) CHF                End tobacco: Never Used    Alcohol use: Yes      Comment: \"occasional\"      Drug use: No   Comment: pt denies     Available pre-op labs reviewed.   Lab Results   Component Value Date    WBC 8.2 12/11/2018    RBC 3.35 (L) 12/11/2018    HGB 9.7 (L) 12/11/2018

## 2018-12-11 NOTE — OPERATIVE REPORT
Percutaneous Endoscopic Gastrostomy Operative Report  Patient Name: Rosita Edu  Procedure: Esophagogastroduodenoscopy with Percutaneous Endoscopic Gastrostomy , followed by small bowel endoscopy and placement of gastrojejunal feeding tube  Shamika the subcutaneous space for local anesthetic effect. A small incision was then made, and a wire and trochar were passed into the stomach, percutaneously. The wire was grasped, and pulled out of the patient’s mouth.   A [employer] 20Fr gastrostomy tu and clipped to the jejunal mucosa. The colonoscope was then withdrawn. There was some residual bowing of jejunal tube in the stomach, and this was gently advanced towards the antrum, so as to straighten the tube.     Impression: Findings as above  Recomme

## 2018-12-11 NOTE — PHYSICAL THERAPY NOTE
PHYSICAL THERAPY EVALUATION - INPATIENT     Room Number: 1404 [facility] ENDO POOL ROOMS/ ENDO Pool  Evaluation Date: 12/11/2018  Type of Evaluation: Initial  Physician Order: PT Eval and Treat    Presenting Problem: intractable vomiting without nausea; anorexi PACEMAKER PLACEMENT     • CATH DRUG ELUTING STENT     • COLONOSCOPY     • COLONOSCOPY N/A 7/13/2015    Performed by Chantelle Garcia MD at [facility] ENDOSCOPY   • ENDOSCOPIC ULTRASOUND (EUS) N/A 9/6/2018    Performed by Haydee Daniel DO at [facility] ENDOSCOPY   • [address] functional limits     Lower extremity strength is within functional limits Right Hip flexion  3/5  Left Hip flexion  3/5  Right Knee extension  3/5  Left Knee extension  3/5  Right Dorsiflexion  3-/5  Left Dorsiflexion  3/5    BALANCE  Static Sitting: Good training  Transfer training    Patient End of Session: In bed;Needs met;Call light within reach;RN aware of session/findings; Family present    ASSESSMENT   Patient is a 63year old female admitted on 12/10/2018 for intractable vomiting/anorexia.   Pertinent 300 feet with assist device: walker - rolling at assistance level: supervision     Goal #4 Negotiate 20 steps with one rail and supervision   Goal #5    Goal #6    Goal Comments: Goals established on 12/11/2018

## 2018-12-11 NOTE — PLAN OF CARE
Alert. Drowsy. Oriented. sr per tele. ivf infusing. On oxycontin er scheduled bid for chronic pain. norco as needed. Very poor appetite. Refused to eat dinner last night. No n/v noted. Bladder scan done at 0135 showed 217. Voided 200cc.  Up w/ 1 assist and

## 2018-12-11 NOTE — PROGRESS NOTES
[facility]  Progress Note     Blaire Else Patient Status:  Inpatient    1960 MRN [id number]   [facility] ENDOSCOPY Attending Bobby Carreon MD   Hosp Day # 1 PCP Dariana Mathias     Chief Complaint: nausea vomiting depres mg Oral Nightly   • OxyCODONE HCl ER  10 mg Oral 2 times per day   • Pantoprazole Sodium  40 mg Oral BID AC       ASSESSMENT / PLAN:     1. Wt loss dehydration nausea vomiting narcs addiction-psych eval  2. Ischemic cmp and nsvt s/p aicd  3.  Cad hx of mi s

## 2018-12-12 PROCEDURE — 99232 SBSQ HOSP IP/OBS MODERATE 35: CPT | Performed by: OTHER

## 2018-12-12 PROCEDURE — 99233 SBSQ HOSP IP/OBS HIGH 50: CPT | Performed by: HOSPITALIST

## 2018-12-12 RX ORDER — SODIUM BICARBONATE 325 MG/1
650 TABLET ORAL 4 TIMES DAILY
Status: DISCONTINUED | OUTPATIENT
Start: 2018-12-12 | End: 2018-12-13

## 2018-12-12 RX ORDER — CALCIUM CARBONATE 200(500)MG
500 TABLET,CHEWABLE ORAL 2 TIMES DAILY
Status: COMPLETED | OUTPATIENT
Start: 2018-12-12 | End: 2018-12-13

## 2018-12-12 NOTE — PROGRESS NOTES
12/11/18 8497   Clinical Encounter Type   Visited With Patient  (Patient was sleeping at that time of visit.  )    to remain available at the pager #[phone] for further care and support

## 2018-12-12 NOTE — PROGRESS NOTES
[facility]  Progress Note     Gabo James Patient Status:  Inpatient    1960 MRN [id number]   [facility] 8NE-A Attending Lee Grider MD   Hosp Day # 2 PCP Kevin Webber     Chief Complaint: depression   S:  Tiarra Obrien Nightly   • lisinopril  2.5 mg Oral Nightly   • Metoclopramide HCl  10 mg Oral TID AC   • Metoprolol Succinate ER  12.5 mg Oral Nightly   • mirtazapine  15 mg Oral Nightly   • OxyCODONE HCl ER  10 mg Oral 2 times per day   • Pantoprazole Sodium  40 mg Oral

## 2018-12-12 NOTE — DIETARY NOTE
Nutrition Short Note    Consult for initiation of EN. Recommend Jevity 1.2 at 15 ml/hour . Hold at 15ml/hr x 24 hrs. If pt to continue on IVF - 50ml h20 flush q8.   If no IVF -  90 ml water flush q 4 hours    If labs are stable, can advance 10 ml/hour q

## 2018-12-12 NOTE — PROGRESS NOTES
12/12/18 1034   Clinical Encounter Type   Visited With Patient and family together   Referral To (Patient's family member has a copy of the HPOA form/ provided the 3 additional pages of directions for completion. Patient sleeping at this time.

## 2018-12-12 NOTE — PROGRESS NOTES
[facility]  Cardiology Progress Note    Subjective:  No chest pain or shortness of breath.     Objective:  /68 (BP Location: Right arm)   Pulse 86   Temp 98.7 °F (37.1 °C) (Oral)   Resp 16   Ht 5' 6\" (1.676 m)   Wt 97 lb 0 oz (44 kg)   SpO2 98% persistent nausea and vomiting.      Plan:  · Patient appears withdrawn. Appreciate Dr Libby Gilford input  · Doing well post G tube placement.  Primary to evaluate nutritional status  · Continue the electrolyte management protocol given malnutrition and dehydrati

## 2018-12-12 NOTE — PHYSICAL THERAPY NOTE
PHYSICAL THERAPY TREATMENT NOTE - INPATIENT    Room Number: 7398/2204-G     Session: 1   Number of Visits to Meet Established Goals: 3    Presenting Problem: intractable vomiting without nausea; anorexia    Problem List  Principal Problem:    Intractable ESOPHAGOGASTRODUODENOSCOPY (EGD) N/A 11/16/2018    Performed by Bhavya Arreola MD at [facility] ENDOSCOPY   • ESOPHAGOGASTRODUODENOSCOPY (EGD) N/A 12/1/2015    Performed by Tila Martinez MD at [facility] ENDOSCOPY   • ESOPHAGOGASTRODUODENOSCOPY (EGD) N/A 8/11/20 Standardized Score (AM-PAC Scale): 43.63   CMS Modifier (G-Code): CK    FUNCTIONAL ABILITY STATUS  Gait Assessment   Gait Assistance: Maximum assistance(per FIM distance - otherwise CGA)  Distance (ft): 100  Assistive Device: Rolling walker  Pattern: Buster Couch functional independence/return to baseline.  Recommend HHPT upon [facility] d/c.       DISCHARGE RECOMMENDATIONS  PT Discharge Recommendations: Home with home health PT     PLAN  PT Treatment Plan: Bed mobility;Gait training;Strengthening;Stoop trainin

## 2018-12-12 NOTE — PROGRESS NOTES
[facility]  Report of Psychiatric Progress Note    Date of Admission: 12/10/2018  Date of Service: 12/12/18  Reason for Consultation: Opioid dependence, eval anxiety/depression     Impression:  Primary Psychiatric Diagnosis:  Opioid dependence due to Dr. Thanh Schroeder has been concerned about Cristel's request for opioids when hospitalized and the fact that she is on 50mg morphine equivalents daily at home to treat mouth pain of unknown etiology. She has been on opioids for 2 yrs.  The pain was initially fr any hopelessness, anhedonia, or suicidal ideation. Interval Hx:  12/12/18- She feels tired and \"fine\". When I ask what \"fine\" means, she says, \"I'm fine\". I think she is minimizing her anxiety and depressed mood.  She is very withdrawal and talkin STENT     • COLONOSCOPY     • COLONOSCOPY N/A 7/13/2015    Performed by Kandy Leggett MD at [address] [place] [address] (EUS) N/A 9/6/2018    Performed by Tanja Arreguin DO at [facility] ENDOSCOPY   • ENDOSCOPIC ULTRASOUND (EUS) N/A 12/1/2015    Perfo bicarbonate tab 650 mg, 650 mg, Oral, QID  •  Calcium Carbonate Antacid (TUMS) chewable tab 500 mg, 500 mg, Oral, BID  •  atorvastatin (LIPITOR) tab 40 mg, 40 mg, Oral, Nightly  •  HYDROcodone-acetaminophen (NORCO)  MG per tab 1 tablet, 1 tablet, Sid Nelson  12/12/2018    K 4.0 12/12/2018     12/12/2018    CO2 16.0 12/12/2018    GLU 72 12/12/2018    CA 7.1 12/12/2018    ALB 1.4 12/12/2018    ALKPHO 128 12/12/2018    BILT 0.8 12/12/2018    TP 4.9 12/12/2018    AST 29 12/12/2018    ALT 15 12/12/2

## 2018-12-12 NOTE — PROGRESS NOTES
12/11/18 1964   Clinical Encounter Type   Visited With Patient  (Patient was sleeping at that time of visit.  )   Continue Visiting Yes    to remain available at the pager #[phone] for further care and supprt

## 2018-12-13 ENCOUNTER — TELEPHONE (OUTPATIENT)
Dept: FAMILY MEDICINE | Age: 58
End: 2018-12-13

## 2018-12-13 PROCEDURE — 99232 SBSQ HOSP IP/OBS MODERATE 35: CPT | Performed by: HOSPITALIST

## 2018-12-13 PROCEDURE — 99232 SBSQ HOSP IP/OBS MODERATE 35: CPT | Performed by: OTHER

## 2018-12-13 RX ORDER — POTASSIUM CHLORIDE 20 MEQ/1
40 TABLET, EXTENDED RELEASE ORAL EVERY 4 HOURS
Status: COMPLETED | OUTPATIENT
Start: 2018-12-13 | End: 2018-12-13

## 2018-12-13 RX ORDER — BUPRENORPHINE 2 MG/1
4 TABLET SUBLINGUAL 3 TIMES DAILY
Status: DISCONTINUED | OUTPATIENT
Start: 2018-12-13 | End: 2018-12-14

## 2018-12-13 NOTE — PROGRESS NOTES
[facility]  Progress Note     Herberth Jacinto Patient Status:  Inpatient    1960 MRN [id number]   [facility] 8NE-A Attending Marlin Gambino MD   Hosp Day # 3 PCP Jason Huddleston     Chief Complaint: depression   S:  No p 15 mmol Intravenous Once   • sodium bicarbonate  650 mg Oral QID   • Calcium Carbonate Antacid  500 mg Oral BID   • atorvastatin  40 mg Oral Nightly   • Metoclopramide HCl  10 mg Oral TID AC   • Metoprolol Succinate ER  12.5 mg Oral Nightly   • mirtazapin

## 2018-12-13 NOTE — PROGRESS NOTES
Gastroenterology Progress Note  Angeles Callejas Patient Status:  Inpatient    1960 MRN [id number]   [facility] 8NE-A Attending Toribio Franco MD   Hosp Day # 3 PCP Sanna Khalil female with chronic n/v, poor PO intake admitted with worsening malnutrition s/p EGD with G-tube insertion and jejunal extension.  TF started yesterday at slow rate to decrease likelihood of refeeding syndrome; currently without abd pain, n/v, abd distentio

## 2018-12-13 NOTE — PROGRESS NOTES
Assumed care of patient @ 9821. Pt denies nausea, complaining of shadi hand pain and feeling cold, temp 97.8F, warm packs and blanket given. Subutex started @ [id number] for opoid withdrawal, Dr. Viviana Jaeger at bedside. Pt had 6 beats of vtach.  Cards aware, potassium, ph

## 2018-12-13 NOTE — PLAN OF CARE
Patient is a 61-Problem: GASTROINTESTINAL - ADULT  Goal: Minimal or absence of nausea and vomiting  INTERVENTIONS:  - Maintain adequate hydration with IV or PO as ordered and tolerated  - Nasogastric tube to low intermittent suction as ordered  - Evaluate effectiveness of obstructive pulmonary disease) ([facility] 75[facility] Deviated nasal septum; Difficult intubation; GERD (gastroesophageal reflux disease); History of blood transfusion; Pain; Psoriasis; Rosacea; Severe protein-calorie malnutrition ([facility] 75[facility] Sjogren's disease ([facility] 75[facility] Axis 35 degrees    R Axis 36 degrees    T Axis -[id number]

## 2018-12-13 NOTE — PROGRESS NOTES
[facility]  Report of Psychiatric Progress Note    Date of Admission: 12/10/2018  Date of Service: 12/13/18  Reason for Consultation: Opioid dependence, eval anxiety/depression     Impression:  Primary Psychiatric Diagnosis:  Opioid dependence due to her failure to thrive/nausea and vomiting and inability to take food orally/weight loss. She is know to the psych service because of her chronic opioid use (she is on oxycodone long acting 10mg twice a day and Norco 7.5mg/325mg 1 tab TID prn).      Her gastric accomodation. Disrupted bowel motility is thought to be playing a role for her nausea/vomiting when she eats. Currently, she feels depressed with fatigue and low motivation.  She has anxiety about her health issues and getting the G-J tube per G angiogram   • ANGIOGRAM  2016    Right & left heart angiogram   • ANGIOGRAM  2016    Right & left heart angiogram   • ANGIOPLASTY (CORONARY)  2004    stent to LAD   • ANGIOPLASTY (CORONARY)  2005    stent to LAD   •       2 drugs.     Allergies:    Codeine Sulfate         NAUSEA ONLY    Comment:Pt states \"it messes with my stomach, but I can             take it if I need it\"  Sulfa Antibiotics       NAUSEA AND VOMITING    Comment:pancreatitis  Nsaids                  UNKNOWN soft    Mood:  \"Fine\"  Affect: Restricted, depressed    Thought process: logical  Thought content: no suicidal ideation  Perceptions: no hallucinations  Associations: Intact    Orientation: Alert, oriented person, place and oriented situation  Attention a

## 2018-12-13 NOTE — PROGRESS NOTES
[facility]  Advanced Heart Failure Progress Note    Tri Barboza Patient Status:  Inpatient    1960 MRN [id number]   [facility] 8NE-A Attending Janay Lopez MD   Hosp Day # 3 PCP Mary Preciado     Subjective:   Triny Shabazz 12/13/2018    RBC 3.36 12/13/2018    HGB 10.0 12/13/2018    HCT 30.5 12/13/2018    MCV 90.8 12/13/2018    MCH 29.8 12/13/2018    MCHC 32.8 12/13/2018    RDW 17.3 12/13/2018    .0 12/13/2018     Lab Results   Component Value Date    PT 13.3 10/16/201 native coronary artery of native heart with angina pectoris (HCC)     Gastroesophageal reflux disease     Paresthesias     Weakness     Neuropathy     Opioid dependence with opioid-induced disorder (HCC)     Anxiety disorder     Depression     Hyponatremia

## 2018-12-14 ENCOUNTER — PRIOR ORIGINAL RECORDS (OUTPATIENT)
Dept: OTHER | Age: 58
End: 2018-12-14

## 2018-12-14 VITALS
BODY MASS INDEX: 16.23 KG/M2 | TEMPERATURE: 98 F | WEIGHT: 101 LBS | HEIGHT: 66 IN | HEART RATE: 95 BPM | RESPIRATION RATE: 18 BRPM | DIASTOLIC BLOOD PRESSURE: 52 MMHG | OXYGEN SATURATION: 100 % | SYSTOLIC BLOOD PRESSURE: 83 MMHG

## 2018-12-14 PROCEDURE — 99239 HOSP IP/OBS DSCHRG MGMT >30: CPT | Performed by: HOSPITALIST

## 2018-12-14 PROCEDURE — 99232 SBSQ HOSP IP/OBS MODERATE 35: CPT | Performed by: OTHER

## 2018-12-14 RX ORDER — BUPRENORPHINE HYDROCHLORIDE 8 MG/1
8 TABLET SUBLINGUAL 2 TIMES DAILY
Status: DISCONTINUED | OUTPATIENT
Start: 2018-12-14 | End: 2018-12-14

## 2018-12-14 RX ORDER — BUPRENORPHINE HYDROCHLORIDE AND NALOXONE HYDROCHLORIDE DIHYDRATE 8; 2 MG/1; MG/1
1 TABLET SUBLINGUAL 2 TIMES DAILY
Qty: 60 TABLET | Refills: 0 | Status: ON HOLD | OUTPATIENT
Start: 2018-12-14 | End: 2019-01-16

## 2018-12-14 RX ORDER — DULOXETIN HYDROCHLORIDE 30 MG/1
CAPSULE, DELAYED RELEASE ORAL
Qty: 53 CAPSULE | Refills: 0 | Status: SHIPPED | OUTPATIENT
Start: 2018-12-14 | End: 2019-01-14 | Stop reason: CLARIF

## 2018-12-14 NOTE — PHYSICAL THERAPY NOTE
Attempted to see Pt this PM - RN aware of attempt. Pt pending d/c later today pending setup of tube feedings machine. Will f/u later today if time permits, after all other patients are attempted per tentative schedule.

## 2018-12-14 NOTE — PLAN OF CARE
PLAN OF CARE - SHIFT NOTE      Patient is ANOx4, flat affect, on RA, tele NSR, cont B/B, up with SBA. Patient has G tube that was placed on 12/11 - infusing Jevity 1.2 @ 25 cc/hr with Q8 hr flushes 50 cc. NS infusing @ 83 cc/hr.  When NS is dc'd, water flus

## 2018-12-14 NOTE — PROGRESS NOTES
[facility]  Report of Psychiatric Progress Note    Date of Admission: 12/10/2018  Date of Service: 12/14/18  Reason for Consultation: Opioid dependence, eval anxiety/depression     Impression:  Primary Psychiatric Diagnosis:  Opioid dependence due to on oxycodone long acting 10mg twice a day and Norco 7.5mg/325mg 1 tab TID prn).      Her cardiologist of 20 yrs, Dr. Tri Camacho has been concerned about Cristel's request for opioids when hospitalized and the fact that she is on 50mg morphine equivalents joaquín low motivation. She has anxiety about her health issues and getting the G-J tube per GI later today. She denies any hopelessness, anhedonia, or suicidal ideation. Interval Hx:  12/12/18- She feels tired and \"fine\".  When I ask what \"fine\" means, she Surgical History:   Procedure Laterality Date   • ANGIOGRAM  11/30/2006    Right & left hear angiogram   • ANGIOGRAM  05/31/2016    Right & left heart angiogram   • ANGIOGRAM  06/02/2016    Right & left heart angiogram   • ANGIOPLASTY (CORONARY)  11/24/200 skin cancer   • Hypertension Neg    • Obesity Neg    • Psychiatric Neg    • Lipids Neg       reports that she quit smoking about 14 years ago. She quit after 20.00 years of use.  she has never used smokeless tobacco. She reports that she drinks alcoh logical  Thought content: no suicidal ideation  Perceptions: no hallucinations  Associations: Intact    Orientation: Alert, oriented person, place and oriented situation  Attention and Concentration:   fair  Memory:  Intact remote  Language: Intact naming

## 2018-12-14 NOTE — PROGRESS NOTES
[facility]  Advanced Heart Failure Progress Note    Karen Cortez Patient Status:  Inpatient    1960 MRN [id number]   [facility] 8NE-A Attending Stu La MD   Hosp Day # 4 PCP Thania MORRISON     Subjective:  Looks an 0.97 11/20/2018    INR 0.88 (L) 10/13/2018    INR 0.99 09/04/2018     No results for input(s): BNPML in the last 168 hours.   BUN (mg/dL)   Date Value   12/14/2018 3 (L)   12/13/2018 3 (L)   12/12/2018 3 (L)   07/17/2014 13   09/27/2013 20   07/31/2013 13 Adjustment disorder with depressed mood     Intractable vomiting with nausea     Adult failure to thrive     Severe protein-calorie malnutrition (HCC)     Opioid abuse (HCC)     Intractable vomiting with nausea, unspecified vomiting type     Dehydration

## 2018-12-14 NOTE — PROGRESS NOTES
[facility]  Progress Note     Nora Rodgers Patient Status:  Inpatient    1960 MRN [id number]   [facility] 8NE-A Attending Ulises Casey MD   Hosp Day # 4 PCP Vika You     Chief Complaint: depression   S:  No p Intravenous Once   • magnesium sulfate  4 g Intravenous Once   • buprenorphine HCl  4 mg Sublingual TID   • atorvastatin  40 mg Oral Nightly   • Metoclopramide HCl  10 mg Oral TID AC   • Metoprolol Succinate ER  12.5 mg Oral Nightly   • mirtazapine  15 mg

## 2018-12-15 NOTE — PLAN OF CARE
Problem: SAFETY ADULT - FALL  Goal: Free from fall injury  INTERVENTIONS:  - Assess pt frequently for physical needs  - Identify cognitive and physical deficits and behaviors that affect risk of falls.   - [place] fall precautions as indicated by assessme

## 2018-12-15 NOTE — DISCHARGE SUMMARY
[facility]  DISCHARGE SUMMARY     Amandaroseanna Cantrell Patient Status:  Inpatient    1960 MRN [id number]   [facility] 8NE-A Attending No att. providers found   Hosp Day # 4 PCP Lia Altenburg     Date of Admission: 12/10/2018 she is complaining of generalized weakness fatigue and numbness all over her body. Patient has recently seen neurology to be evaluated by possible neuropathy as she has been having numbness in her thighs abdomen chest wall.   She is also complaining of gen (two) times daily. For chronic pain management   Quantity:  60 tablet  Refills:  0     DULoxetine HCl 30 MG Cpep  Commonly known as:  CYMBALTA      Take 1 tab every morning x 1 week, then 1 tab twice a day afterwards.    Quantity:  53 capsule  Refills:  0 OxyCODONE HCl ER 10 MG T12a  Commonly known as:  OXYCONTIN              Where to Get Your Medications      Please  your prescriptions at the location directed by your doctor or nurse    Bring a paper prescription for each of these medications  · B

## 2018-12-20 ENCOUNTER — PRIOR ORIGINAL RECORDS (OUTPATIENT)
Dept: OTHER | Age: 58
End: 2018-12-20

## 2018-12-21 ENCOUNTER — TELEPHONE (OUTPATIENT)
Dept: INTERNAL MEDICINE | Age: 58
End: 2018-12-21

## 2018-12-21 ENCOUNTER — PROCEDURE VISIT (OUTPATIENT)
Dept: NEUROLOGY | Facility: CLINIC | Age: 58
End: 2018-12-21
Payer: COMMERCIAL

## 2018-12-21 DIAGNOSIS — R29.2 HYPOREFLEXIA: Primary | ICD-10-CM

## 2018-12-21 DIAGNOSIS — R27.0 ATAXIA: ICD-10-CM

## 2018-12-21 LAB
ALBUMIN: 1.4 G/DL
ALKALINE PHOSPHATATE(ALK PHOS): 136 IU/L
BILIRUBIN TOTAL: 0.4 MG/DL
BUN: 3 MG/DL
CALCIUM: 7.4 MG/DL
CHLORIDE: 115 MEQ/L
CREATININE, SERUM: 0.33 MG/DL
GLOBULIN: 3.4 G/DL
GLUCOSE: 104 MG/DL
POTASSIUM, SERUM: 4.6 MEQ/L
PROTEIN, TOTAL: 4.8 G/DL
SGOT (AST): 32 IU/L
SGPT (ALT): 14 IU/L
SODIUM: 142 MEQ/L

## 2018-12-21 PROCEDURE — 95910 NRV CNDJ TEST 7-8 STUDIES: CPT | Performed by: OTHER

## 2018-12-21 PROCEDURE — 95886 MUSC TEST DONE W/N TEST COMP: CPT | Performed by: OTHER

## 2018-12-21 NOTE — PROCEDURES
[facility]  [address] [place], [address] [facility]  Ph: [phone]  FAX: [phone]        Full Name: Trish Canseco Gender: Female  Patient ID: [id number] YOB: 1960      Visit Date: 12/21/2018 09:42 Forearm Wrist 2.08 2.86 3.7 5.8 Forearm - Wrist 10 48   R Sural - Ankle (Calf)      Calf Ankle NR NR NR NR Calf - Ankle 14 NR       Motor NCS      Nerve / Sites Muscle Latency Amplitude Amp % Duration Area Segments Distance Lat Diff Velocity     ms mV % 1.  Right sural sensory response was absent. 2.  Right median sensory response was absent. 3.  Right ulnar sensory response was absent.   4.  Right radial sensory response was abnormal due to severely reduced amplitude, with normal peak latency, and mildl This is an abnormal study. There is electrophysiologic evidence to suggest an underlying large fiber, length dependent polyneuropathy, with absent sensory responses, indicating more severe sensory axonal involvement than motor involvement.   The presence o

## 2018-12-21 NOTE — CONSULTS
PRICE VASQUEZ  : 1960  ACCOUNT:  [id number]  [phone]  PCP: Dr. Bright Herring     TODAY'S DATE: 2018  DICTATED BY:  [Dr. Gale Florentino        CHIEF COMPLAINT: [Followup of . CAD, of native vessels, Followup of .  CHF, left ventr stent, electrophysiological study (EPS), Heartnet study participant, implantable cardioverter defibrillator, left right cardiac catheterization June 2016, Medtronic generator change 6/13/2011, MI 2004, drug eluding stent 5/05,, permanent pacemaker and tilt themselves can be a cause of her nausea and vomiting. I believe her sinus tachycardia is most likely related to the lack of absorption of any of her cardiac medications. I was able to obtain an appointment with the surgeon for tomorrow.   I will have the one tablet by mouth daily                86/70/79 Folic Acid            [id number]    one tablet by mouth daily                      Apurva Patel M.D., F.ELVI.C.C., F.ELVI.H.ELVI., F.E.S.C.   Medical Director, Heart Failure Research, [address]  Me

## 2019-01-01 ENCOUNTER — PRIOR ORIGINAL RECORDS (OUTPATIENT)
Dept: HEALTH INFORMATION MANAGEMENT | Facility: OTHER | Age: 59
End: 2019-01-01

## 2019-01-01 ENCOUNTER — EXTERNAL RECORD (OUTPATIENT)
Dept: CARDIOLOGY | Age: 59
End: 2019-01-01

## 2019-01-01 ENCOUNTER — EXTERNAL RECORD (OUTPATIENT)
Dept: HEALTH INFORMATION MANAGEMENT | Facility: OTHER | Age: 59
End: 2019-01-01

## 2019-01-08 ENCOUNTER — OFFICE VISIT (OUTPATIENT)
Dept: NEUROLOGY | Facility: CLINIC | Age: 59
End: 2019-01-08
Payer: COMMERCIAL

## 2019-01-08 VITALS
DIASTOLIC BLOOD PRESSURE: 66 MMHG | HEIGHT: 65 IN | HEART RATE: 114 BPM | BODY MASS INDEX: 16.5 KG/M2 | RESPIRATION RATE: 16 BRPM | WEIGHT: 99 LBS | SYSTOLIC BLOOD PRESSURE: 98 MMHG

## 2019-01-08 DIAGNOSIS — R27.0 ATAXIA: ICD-10-CM

## 2019-01-08 DIAGNOSIS — G62.9 NEUROPATHY: Primary | ICD-10-CM

## 2019-01-08 DIAGNOSIS — R29.2 HYPOREFLEXIA: ICD-10-CM

## 2019-01-08 DIAGNOSIS — R53.1 WEAKNESS: ICD-10-CM

## 2019-01-08 DIAGNOSIS — R11.2 INTRACTABLE VOMITING WITH NAUSEA, UNSPECIFIED VOMITING TYPE: ICD-10-CM

## 2019-01-08 PROCEDURE — 99214 OFFICE O/P EST MOD 30 MIN: CPT | Performed by: OTHER

## 2019-01-08 NOTE — PATIENT INSTRUCTIONS
Set up appointment with neurosurgery for sural nerve biopsy   Have lab work done  Follow up after testing done ~ 3 months  Refill policies:    • Allow 2-3 business days for refills; controlled substances may take longer.   • Contact your pharmacy at least 5 directly to determine coverage. If test is done without insurance authorization, patient may be responsible for the entire amount billed. Precertification and Prior Authorizations:   If your physician has recommended that you have a procedure or addit

## 2019-01-08 NOTE — PROGRESS NOTES
[facility] Progress Note    HPI  Patient presents with:  Neurologic Problem: Follow up after EMG      Estella Yates is a 63year old F with complicated past medical history.   Patient was previously seen by my neurology associat Currently, she feels she can walk but her knees buckle, denies urinary / bowel incontinence - denies headaches aside from when her ICD fires.   She has constipation, and has lost ~30 lbs since 7/2018 - she was having intractable vomiting prior to having • Shortness of breath    • Stented coronary artery    • Syncope    • Systolic heart failure (HCC)    • Thrush of mouth and esophagus (HCC)    • Uncomfortable fullness after meals    • Visual impairment     glasses   • Vomiting    • Wears glasses    • Weigh Problem Relation Age of Onset   • Other (Other) Father         Polio & multiple aortic aneursyms - burst   • Cancer Mother         lung CA   • Cancer Sister 36        metastatic breast CA   • Breast Cancer Sister    • Diabetes Brother         T1   • Lung D •  lisinopril 2.5 MG Oral Tab, Take 1 tablet (2.5 mg total) by mouth nightly., Disp: 30 tablet, Rfl: 2  •  Metoprolol Succinate ER 25 MG Oral Tablet 24 Hr, Take 0.5 tablets (12.5 mg total) by mouth nightly., Disp: 30 tablet, Rfl: 2  •  ondansetron 8 MG Ora DF and PF; otherwise, 5/5 strength throughout, tone normal;    DTR: absent bilateral biceps; present 1+ triceps, absent bilateral patellar and ankle jerks in lower extremities; toes downgoing bilaterally, no clonus  Sensory: intact to light touch throughou 35.1 - 46.3 fL   43.9   Prelim Neutrophil Abs      1.30 - 6.70 x10 (3) uL   6.85 (H)   Neutrophils Absolute      1.30 - 6.70 x10(3) uL   6.85 (H)   Lymphocytes Absolute      0.90 - 4.00 x10(3) uL   2.32   Monocytes Absolute      0.10 - 1.00 x10(3) uL 0.0 - 5.0 ug/L  1.2    LEAD, BLOOD (VENOUS)      0.0 - 4.9 ug/dL  2.4    MERCURY, BLOOD      0 - 10 ug/L  <3    MYELOPEROX ANTIBODIES, IGG      0 - 19 AU/mL  0    SERINE PROTEASE3, IGG      0 - 19 AU/mL  1    ANTI-NEUTROPHIL CYTO AB, IGG      <1:20  <1 INTRACRANIAL:  There are no abnormal extraaxial fluid collections. There is no midline shift. There are no acute intraparenchymal brain abnormalities. There is nothing specific for acute infarct. There is no hemorrhage or mass lesion.  The   craniocervi R Median - APB 30.2 5.3 24.9   R Ulnar - ADM 29.6 3.6 26.0       EMG                       EMG Summary Table       Spontaneous MUAP Recruitment   Muscle Nerve Roots IA Fib PSW Fasc H.F. Amp Dur.  PPP Pattern   R. Deltoid Axillary C5-C6 N None None None None Concentric needle EMG was performed on the selected muscles listed above in the table, with the following findings;  1.   Increased insertional activity, consisting of fibrillation potentials and positive sharp waves, was noted in the following muscles: Rig 4.  The left median motor response was normal, as was the left ulnar motor response.   5.  The right median and ulnar motor responses distally were normal.  6.  The bilateral tibial motor responses were normal, as were the bilateral peroneal motor responses NMO Ab was negative; monoclonal protein study likewise unremarkable. Repeat EMG demonstrates a predominantly sensory neuropathy with ongoing denervation or incomplete re-innervation in bilateral LE.   Findings do seem compatible with chronic inflam

## 2019-01-11 ENCOUNTER — TELEPHONE (OUTPATIENT)
Dept: CARE COORDINATION | Age: 59
End: 2019-01-11

## 2019-01-14 ENCOUNTER — HOSPITAL ENCOUNTER (OUTPATIENT)
Facility: HOSPITAL | Age: 59
Setting detail: OBSERVATION
Discharge: HOME HEALTH | End: 2019-01-16
Attending: EMERGENCY MEDICINE | Admitting: INTERNAL MEDICINE
Payer: COMMERCIAL

## 2019-01-14 ENCOUNTER — PRIOR ORIGINAL RECORDS (OUTPATIENT)
Dept: OTHER | Age: 59
End: 2019-01-14

## 2019-01-14 DIAGNOSIS — R63.30 FEEDING DIFFICULTIES: ICD-10-CM

## 2019-01-14 DIAGNOSIS — Z78.9 ENCOUNTER FOR GASTROJEJUNAL (GJ) TUBE PLACEMENT: Primary | ICD-10-CM

## 2019-01-14 LAB
ALBUMIN SERPL-MCNC: 1.5 G/DL (ref 3.1–4.5)
ALBUMIN/GLOB SERPL: 0.3 {RATIO} (ref 1–2)
ALP LIVER SERPL-CCNC: 204 U/L (ref 46–118)
ALT SERPL-CCNC: 45 U/L (ref 14–54)
ANION GAP SERPL CALC-SCNC: 8 MMOL/L (ref 0–18)
AST SERPL-CCNC: 115 U/L (ref 15–41)
BASOPHILS # BLD AUTO: 0.15 X10(3) UL (ref 0–0.1)
BASOPHILS NFR BLD AUTO: 1.3 %
BILIRUB SERPL-MCNC: 0.4 MG/DL (ref 0.1–2)
BUN BLD-MCNC: 5 MG/DL (ref 8–20)
BUN/CREAT SERPL: 10.9 (ref 10–20)
CALCIUM BLD-MCNC: 8.2 MG/DL (ref 8.3–10.3)
CHLORIDE SERPL-SCNC: 102 MMOL/L (ref 101–111)
CO2 SERPL-SCNC: 28 MMOL/L (ref 22–32)
CREAT BLD-MCNC: 0.46 MG/DL (ref 0.55–1.02)
EOSINOPHIL # BLD AUTO: 0.23 X10(3) UL (ref 0–0.3)
EOSINOPHIL NFR BLD AUTO: 2 %
ERYTHROCYTE [DISTWIDTH] IN BLOOD BY AUTOMATED COUNT: 19.1 % (ref 11.5–16)
GLOBULIN PLAS-MCNC: 4.4 G/DL (ref 2.8–4.4)
GLUCOSE BLD-MCNC: 124 MG/DL (ref 65–99)
GLUCOSE BLD-MCNC: 166 MG/DL (ref 65–99)
GLUCOSE BLD-MCNC: 235 MG/DL (ref 65–99)
GLUCOSE BLD-MCNC: 69 MG/DL (ref 70–99)
GLUCOSE BLD-MCNC: 75 MG/DL (ref 65–99)
HAV IGM SER QL: 1.4 MG/DL (ref 1.8–2.5)
HCT VFR BLD AUTO: 33.8 % (ref 34–50)
HGB BLD-MCNC: 11.2 G/DL (ref 12–16)
IMMATURE GRANULOCYTE COUNT: 0.05 X10(3) UL (ref 0–1)
IMMATURE GRANULOCYTE RATIO %: 0.4 %
LYMPHOCYTES # BLD AUTO: 3.43 X10(3) UL (ref 0.9–4)
LYMPHOCYTES NFR BLD AUTO: 29.4 %
M PROTEIN MFR SERPL ELPH: 5.9 G/DL (ref 6.4–8.2)
MCH RBC QN AUTO: 30.7 PG (ref 27–33.2)
MCHC RBC AUTO-ENTMCNC: 33.1 G/DL (ref 31–37)
MCV RBC AUTO: 92.6 FL (ref 81–100)
MONOCYTES # BLD AUTO: 0.98 X10(3) UL (ref 0.1–1)
MONOCYTES NFR BLD AUTO: 8.4 %
NEUTROPHIL ABS PRELIM: 6.82 X10 (3) UL (ref 1.3–6.7)
NEUTROPHILS # BLD AUTO: 6.82 X10(3) UL (ref 1.3–6.7)
NEUTROPHILS NFR BLD AUTO: 58.5 %
OSMOLALITY SERPL CALC.SUM OF ELEC: 282 MOSM/KG (ref 275–295)
PHOSPHATE SERPL-MCNC: 3.8 MG/DL (ref 2.5–4.9)
PLATELET # BLD AUTO: 231 10(3)UL (ref 150–450)
POTASSIUM SERPL-SCNC: 3.9 MMOL/L (ref 3.6–5.1)
RBC # BLD AUTO: 3.65 X10(6)UL (ref 3.8–5.1)
RED CELL DISTRIBUTION WIDTH-SD: 64.6 FL (ref 35.1–46.3)
SODIUM SERPL-SCNC: 138 MMOL/L (ref 136–144)
WBC # BLD AUTO: 11.7 X10(3) UL (ref 4–13)

## 2019-01-14 PROCEDURE — 99220 INITIAL OBSERVATION CARE,LEVL III: CPT | Performed by: INTERNAL MEDICINE

## 2019-01-14 RX ORDER — CLOPIDOGREL BISULFATE 75 MG/1
75 TABLET ORAL DAILY
Status: DISCONTINUED | OUTPATIENT
Start: 2019-01-15 | End: 2019-01-16

## 2019-01-14 RX ORDER — ONDANSETRON 2 MG/ML
4 INJECTION INTRAMUSCULAR; INTRAVENOUS EVERY 6 HOURS PRN
Status: DISCONTINUED | OUTPATIENT
Start: 2019-01-14 | End: 2019-01-16

## 2019-01-14 RX ORDER — LISINOPRIL 2.5 MG/1
2.5 TABLET ORAL NIGHTLY
Status: DISCONTINUED | OUTPATIENT
Start: 2019-01-14 | End: 2019-01-16

## 2019-01-14 RX ORDER — DEXTROSE AND SODIUM CHLORIDE 5; .9 G/100ML; G/100ML
INJECTION, SOLUTION INTRAVENOUS CONTINUOUS
Status: DISCONTINUED | OUTPATIENT
Start: 2019-01-14 | End: 2019-01-16

## 2019-01-14 RX ORDER — SODIUM CHLORIDE 9 MG/ML
125 INJECTION, SOLUTION INTRAVENOUS CONTINUOUS
Status: DISCONTINUED | OUTPATIENT
Start: 2019-01-14 | End: 2019-01-15

## 2019-01-14 RX ORDER — BUPRENORPHINE HYDROCHLORIDE 8 MG/1
8 TABLET SUBLINGUAL 2 TIMES DAILY PRN
Status: DISCONTINUED | OUTPATIENT
Start: 2019-01-14 | End: 2019-01-15

## 2019-01-14 RX ORDER — DEXTROSE MONOHYDRATE 25 G/50ML
25 INJECTION, SOLUTION INTRAVENOUS ONCE
Status: COMPLETED | OUTPATIENT
Start: 2019-01-14 | End: 2019-01-14

## 2019-01-14 RX ORDER — DULOXETIN HYDROCHLORIDE 30 MG/1
30 CAPSULE, DELAYED RELEASE ORAL 2 TIMES DAILY
COMMUNITY
End: 2019-02-13

## 2019-01-14 RX ORDER — ENOXAPARIN SODIUM 100 MG/ML
40 INJECTION SUBCUTANEOUS NIGHTLY
Status: DISCONTINUED | OUTPATIENT
Start: 2019-01-14 | End: 2019-01-16

## 2019-01-14 RX ORDER — DEXTROSE MONOHYDRATE 25 G/50ML
INJECTION, SOLUTION INTRAVENOUS
Status: COMPLETED
Start: 2019-01-14 | End: 2019-01-14

## 2019-01-14 RX ORDER — ASPIRIN 81 MG/1
81 TABLET ORAL DAILY
Status: DISCONTINUED | OUTPATIENT
Start: 2019-01-15 | End: 2019-01-16

## 2019-01-14 RX ORDER — DULOXETIN HYDROCHLORIDE 30 MG/1
30 CAPSULE, DELAYED RELEASE ORAL 2 TIMES DAILY
Status: DISCONTINUED | OUTPATIENT
Start: 2019-01-14 | End: 2019-01-16

## 2019-01-14 RX ORDER — ATORVASTATIN CALCIUM 40 MG/1
40 TABLET, FILM COATED ORAL NIGHTLY
Status: DISCONTINUED | OUTPATIENT
Start: 2019-01-14 | End: 2019-01-16

## 2019-01-14 RX ORDER — ZOLPIDEM TARTRATE 5 MG/1
5 TABLET ORAL NIGHTLY PRN
Status: DISCONTINUED | OUTPATIENT
Start: 2019-01-14 | End: 2019-01-16

## 2019-01-14 RX ORDER — MIRTAZAPINE 15 MG/1
15 TABLET, FILM COATED ORAL NIGHTLY
Status: DISCONTINUED | OUTPATIENT
Start: 2019-01-14 | End: 2019-01-16

## 2019-01-14 RX ORDER — MELATONIN
400 DAILY
Status: DISCONTINUED | OUTPATIENT
Start: 2019-01-15 | End: 2019-01-16

## 2019-01-14 NOTE — ED INITIAL ASSESSMENT (HPI)
Patient presents with clogged feeding tube. She states it has been clogged since this morning. Tube was placed about 5 weeks ago.

## 2019-01-14 NOTE — ED PROVIDER NOTES
Patient Seen in: [facility] Emergency Department    History   Patient presents with:  Cath Tube Problem (gastrointestinal, urinary, integumentary)    Stated Complaint: Feeding tube clotted    NOHEMI Nicholson is a 20-year-old female presenting to the [facility] 2016    Right & left heart angiogram   • ANGIOPLASTY (CORONARY)  2004    stent to LAD   • ANGIOPLASTY (CORONARY)  2005    stent to LAD   •       2   • CARDIAC DEFIBRILLATOR PLACEMENT      Medtronic ICD   • CARDIAC PACEMAKER P noted above.     Physical Exam     ED Triage Vitals [01/14/19 1509]   /78   Pulse 112   Resp 15   Temp 98.3 °F (36.8 °C)   Temp src Temporal   SpO2 96 %   O2 Device None (Room air)       Current:BP 95/69   Pulse 94   Temp 98.3 °F (36.8 °C) (Temporal)

## 2019-01-15 ENCOUNTER — ANESTHESIA EVENT (OUTPATIENT)
Dept: ENDOSCOPY | Facility: HOSPITAL | Age: 59
End: 2019-01-15
Payer: COMMERCIAL

## 2019-01-15 LAB
GLUCOSE BLD-MCNC: 101 MG/DL (ref 65–99)
GLUCOSE BLD-MCNC: 118 MG/DL (ref 65–99)

## 2019-01-15 PROCEDURE — 99225 SUBSEQUENT OBSERVATION CARE: CPT | Performed by: HOSPITALIST

## 2019-01-15 PROCEDURE — 90792 PSYCH DIAG EVAL W/MED SRVCS: CPT | Performed by: OTHER

## 2019-01-15 NOTE — CONSULTS
Gastroenterology Initial Consultation  I have personally seen and examined the patient.     Patient Name: Geovanni Graham  Referring physician: Dr. Chris Cortes  Reason for consultation: Clogged jejunal tube  CC: Clogged jejunal tube  HPI:  63 yr-old female antineoplastic chemotherapy    • Pneumonia due to organism    • Presence of other cardiac implants and grafts    • Rash    • Renal disorder     hx of ERIKA d/t dehydration   • Shortness of breath    • Stented coronary artery    • Syncope    • Systolic heart GASTROSTOMY PLACEMENT(PEG) N/A 12/11/2018    Performed by Miller Roberts MD at [facility] ENDOSCOPY   • REMOVAL GALLBLADDER  10/2018   • TONSILLECTOMY       Medications:   [COMPLETED] dextrose 50 % injection 25 mL 25 mL Intravenous Once   atorvastatin (LIPITOR) Respiratory: No shortness of breath, asthma, copd, recurrent pneumonia            Hematologic: The patient reports no easy bruising, frequent gum bleeding or nose bleeding;   The patient has no history of known chronic anemia            Dermatologic: The pa .0 01/14/2019    CREATSERUM 0.46 01/14/2019    BUN 5 01/14/2019     01/14/2019    K 3.9 01/14/2019     01/14/2019    CO2 28.0 01/14/2019    GLU 69 01/14/2019    CA 8.2 01/14/2019    ALB 1.5 01/14/2019    ALKPHO 204 01/14/2019    BILT 0.4

## 2019-01-15 NOTE — H&P
[facility]  History and Physical     Joel Gallo Patient Status:  Observation    1960 MRN [id number]   [facility] 3NW-A Attending Deidre Chun MD   Hosp Day # 0 PCP Kristian Patterson     Chief Complaint: Lanre Diaz • Wears glasses    • Weight loss         Past Surgical History:   Past Surgical History:   Procedure Laterality Date   • ANGIOGRAM  11/30/2006    Right & left hear angiogram   • ANGIOGRAM  05/31/2016    Right & left heart angiogram   • ANGIOGRAM  06/02/2 History:   Family History   Problem Relation Age of Onset   • Other (Other) Father         Polio & multiple aortic aneursyms - burst   • Cancer Mother         lung CA   • Cancer Sister 36        metastatic breast CA   • Breast Cancer Sister    • Diabetes B (PLAVIX) 75 MG Oral Tab Take 75 mg by mouth daily. Disp:  Rfl:    ondansetron 8 MG Oral Tablet Dispersible Take 8 mg by mouth every 8 (eight) hours as needed for Nausea.  Disp:  Rfl:        Review of Systems:   A comprehensive 14 point review of systems was with GJ tube placed  1. Check Mg, K, Phorphorus   3. Severe depression - resume home med  4. Chronic pain syndrome   5. CAD - resume ASA, plavix, BB in am   6.  Ischemic cardiomyopathy - compensated    Quality:  · DVT Prophylaxis: lovenox  · CODE status: fu

## 2019-01-15 NOTE — DIETARY NOTE
[facility]    NUTRITION INITIAL ASSESSMENT    Pt meets moderate malnutrition criteria.     CRITERIA FOR MALNUTRITION DIAGNOSIS:  Criteria for severe malnutrition diagnosis: chronic illness related to wt loss greater than 7.5% in 3 months and energy int (110 lb)  09/03/18 : 53.6 kg (118 lb 2.7 oz)  02/07/18 : 55.8 kg (123 lb)  12/22/17 : 54.4 kg (120 lb)  11/20/17 : 54.4 kg (120 lb)  07/01/17 : 52.2 kg (115 lb)  05/09/17 : 47.6 kg (105 lb)  08/25/16 : 49.9 kg (110 lb)  06/03/16 : 46.4 kg (102 lb 4.7 oz)

## 2019-01-15 NOTE — ANESTHESIA PREPROCEDURE EVALUATION
PRE-OP EVALUATION    Patient Name: Margarita Cantrell    Pre-op Diagnosis: Feeding difficulties [R63.3]    Procedure(s):  ESOPHAGOGASTRODUODENOSCOPY with replacement of gastric-jejunal tube     Surgeon(s) and Role:     * Landon Dumont MD - Primary MG Oral Tab Take 1 tablet (2.5 mg total) by mouth nightly. Disp: 30 tablet Rfl: 2   Metoprolol Succinate ER 25 MG Oral Tablet 24 Hr Take 0.5 tablets (12.5 mg total) by mouth nightly. Disp: 30 tablet Rfl: 2   Zolpidem Tartrate ER 6.25 MG Oral Tab CR Take 6. Pulmonary    Negative pulmonary ROS.                        Neuro/Psych      (+) depression  (+) anxiety                            Past Surgical History:   Procedure Laterality Date   • ANGIOGRAM  11/30/2006    Right & left hear angiogram   • ANGIOGRAM Never Used    Alcohol use: Yes      Comment: \"occasional\"      Drug use: No   Comment: pt denies     Available pre-op labs reviewed.   Lab Results   Component Value Date    WBC 11.7 01/14/2019    RBC 3.65 (L) 01/14/2019    HGB 11.2 (L) 01/14/2019    HCT 3

## 2019-01-15 NOTE — PROGRESS NOTES
[facility]  Progress Note     Estella Rashidacarolyn Patient Status:  Observation    1960 MRN [id number]   [facility] 3NW-A Attending Fortino Avalos MD   Hosp Day # 0 PCP Zaina Wood     Chief Complaint: G tube clogged  S: with GJ tube placed  3. Hypomagnesemia- replace   4. Severe depression - resume home med  5. Chronic pain syndrome- suboxone   6. CAD - resume ASA, plavix, BB in am   7.  Ischemic cardiomyopathy - compensated    Appreciate GI input   Replace with a jejunal

## 2019-01-16 ENCOUNTER — APPOINTMENT (OUTPATIENT)
Dept: GENERAL RADIOLOGY | Facility: HOSPITAL | Age: 59
End: 2019-01-16
Attending: INTERNAL MEDICINE
Payer: COMMERCIAL

## 2019-01-16 ENCOUNTER — ANESTHESIA (OUTPATIENT)
Dept: ENDOSCOPY | Facility: HOSPITAL | Age: 59
End: 2019-01-16
Payer: COMMERCIAL

## 2019-01-16 VITALS
RESPIRATION RATE: 18 BRPM | HEIGHT: 66 IN | BODY MASS INDEX: 16.07 KG/M2 | TEMPERATURE: 97 F | SYSTOLIC BLOOD PRESSURE: 87 MMHG | HEART RATE: 100 BPM | DIASTOLIC BLOOD PRESSURE: 60 MMHG | WEIGHT: 100 LBS | OXYGEN SATURATION: 91 %

## 2019-01-16 LAB — HAV IGM SER QL: 2.1 MG/DL (ref 1.8–2.5)

## 2019-01-16 PROCEDURE — 0DH63UZ INSERTION OF FEEDING DEVICE INTO STOMACH, PERCUTANEOUS APPROACH: ICD-10-PCS | Performed by: INTERNAL MEDICINE

## 2019-01-16 PROCEDURE — 99225 SUBSEQUENT OBSERVATION CARE: CPT | Performed by: OTHER

## 2019-01-16 PROCEDURE — 74018 RADEX ABDOMEN 1 VIEW: CPT | Performed by: INTERNAL MEDICINE

## 2019-01-16 PROCEDURE — 99217 OBSERVATION CARE DISCHARGE: CPT | Performed by: HOSPITALIST

## 2019-01-16 RX ORDER — BUPRENORPHINE AND NALOXONE 12; 3 MG/1; MG/1
1 FILM, SOLUBLE BUCCAL; SUBLINGUAL 2 TIMES DAILY
Qty: 60 EACH | Refills: 0 | Status: SHIPPED | OUTPATIENT
Start: 2019-01-16 | End: 2019-11-18

## 2019-01-16 RX ORDER — SIMVASTATIN 40 MG
40 TABLET ORAL NIGHTLY
Refills: 0 | Status: ON HOLD | COMMUNITY
Start: 2019-01-16 | End: 2020-12-08

## 2019-01-16 NOTE — CM/SW NOTE
Pt is ready for d/c today. Pt is current with [facility] and g-tube feedings with [place].  Resume HH and g-tube feedings with [place] order written. Erlin Moulton from [facility] aware of pt's d/c today.

## 2019-01-16 NOTE — PROGRESS NOTES
[facility]  Report of Psychiatric Progress Note    Elton Cosby Patient Status:  Observation    1960 MRN [id number]   [facility] 3NW-A Attending Arnoldo Quiles MD   Hosp Day # 1 PCP Reanna Nicholson     Date of Admission: 1 pain will perform the psychotherapy and a PA with pain and psych training will prescribe the suboxone. Will ask psych liaison to call her with an appointment.  (NOTE- I told her not to cancel the psych appointment at Dr. Paul Garcia office until she has an appoi decrease the side effects of opioids (ie delirium, cognitive slowing, constipation, decrease gastric motility, risk for resp failure in case of accidental OD). She agreed and was switched to Suboxone 8mg/2mg twice a day in Dec 2018.  She was referred to  of buprenorphine helps with the pain control more than the 8mg dosing. She has mild anxiety, but no depressed mood today. No hopelessness or suicidal ideation.      Psychiatry Review Systems: No voices or visions.  No elevated mood, racing thoughts, decreas • Wears glasses    • Weight loss      Past Surgical History:   Procedure Laterality Date   • ANGIOGRAM  11/30/2006    Right & left hear angiogram   • ANGIOGRAM  05/31/2016    Right & left heart angiogram   • ANGIOGRAM  06/02/2016    Right & left heart an • Lung Disorder Brother         sarcoidosis   • Cancer Brother         testicular   • Cancer Brother         skin cancer   • Hypertension Neg    • Obesity Neg    • Psychiatric Neg    • Lipids Neg       reports that she quit smoking about 14 years ago.  Sh thoughts    Mental Status Exam:     Risk Assessment  Suicidal ideation: no suicidal ideation    Objective       01/16/19  0742   BP: 94/65   Pulse: 83   Resp: 18   Temp: 97.4 °F (36.3 °C)     Appearance: Thin, in no acute distress  Behavior: normal psychom

## 2019-01-16 NOTE — PLAN OF CARE
GASTROINTESTINAL - ADULT    • Maintains or returns to baseline bowel function Progressing        MUSCULOSKELETAL - ADULT    • Return mobility to safest level of function Progressing        PAIN - ADULT    • Verbalizes/displays adequate comfort level or pat

## 2019-01-16 NOTE — DISCHARGE SUMMARY
[facility] HOSPITALIST  DISCHARGE SUMMARY     Tri Barboza Patient Status:  Observation    1960 MRN [id number]   [facility] 3NW-A Attending Yasemin Beyer MD   Hosp Day # 0 PCP Mary Preciado     Date of Admission: 2019  Date (two) times daily. Refills:  0     folic acid 924 MCG Tabs  Commonly known as:  FOLVITE      Take 400 mcg by mouth daily. Refills:  0     lisinopril 2.5 MG Tabs  Commonly known as:  PRINIVIL,ZESTRIL      Take 1 tablet (2.5 mg total) by mouth nightly. Vital signs:  Temp:  [97.4 °F (36.3 °C)-98.1 °F (36.7 °C)] 97.4 °F (36.3 °C)  Pulse:  [] 100  Resp:  [15-18] 18  BP: (87-99)/(58-65) 87/60    Physical Exam:    General: No acute distress. Respiratory: Clear to auscultation bilaterally.    Card

## 2019-01-16 NOTE — OPERATIVE REPORT
EGD operative report  Patient Name: John Nava  Procedure: Esophagogastroduodenoscopy with removal of G-J tube, and replacement with a 22 Fr [facility] G-J tube.   Date of procedure: 1/16/2019  Indication: Gastrostomy /jejunostomy malfunction incisors. Stomach: The gastric body, antrum, fundus, cardia, and angularis were normal, without masses, polyps, ulcers, erosions, diverticula, or varices.      Duodenum: The duodenal bulb, post-bulbar duodenum, and descending duodenum were normal, wi from a GI standpoint

## 2019-01-16 NOTE — PLAN OF CARE
Patient back from Endo. Ready to be discharge. States mild pain. G-tube in place, dry dressing. C/D/I.

## 2019-01-16 NOTE — ANESTHESIA POSTPROCEDURE EVALUATION
[address] Patient Status:  Observation   Age/Gender 63year old female MRN [id number]   Location 118 [facility]. Attending Mirian Royal MD   Hosp Day # 0 PCP Jessy MORRISON       Anesthesia Post-op Note    Procedu

## 2019-01-16 NOTE — PROGRESS NOTES
PSYCH CONSULT    Date of Admission: 1/14/19  Date of Consult: 1/15/19  Reason for Consultation: Chronic pain syndrome on buprenorphine     Impression:  Psychiatric Diagnoses:  Opioid dependence due to chronic pain syndrome (LE neuropathy, HA's, and mouth p

## 2019-01-16 NOTE — CONSULTS
[facility]  Report of Psychiatric Consultation    David Miss Patient Status:  Observation    1960 MRN [id number]   [facility] 3NW-A Attending Evan Motta MD   Hosp Day # 0 PCP Monserrat Bush     Date of Admission:  weight/nutrition. GI thinks recent 10lb weight gain is more fluid, than nutrition. The eventual goal is to have her eating everything orally and completely weaned off the tube feeds. She will likely need counseling/psychotherapy to do this.     Taras Montesinos decrease gastric motility, risk for resp failure in case of accidental OD). She agreed and was switched to Suboxone 8mg/2mg twice a day in Dec 2018. She was referred to Dr. Avni Villa (addictional psychiatrist) and has an appointment in the end of Jan 2019.  She unspecified. No hx of suicidal attempts or inpatient psych hospitalizations      2) Anorexia nervosa as a teenager.       Substance Use History: Ex-cigarette smoker     Psych Family History: None     Social and Developmental History: .  Has 5 childre stent to LAD   •       2   • CARDIAC DEFIBRILLATOR PLACEMENT      Medtronic ICD   • CARDIAC PACEMAKER PLACEMENT     • CATH DRUG ELUTING STENT     • COLONOSCOPY     • COLONOSCOPY N/A 2015    Performed by Leticia Venegas MD at [facility] ENDOSCOPY   • She reports that she does not use drugs.     Allergies:    Codeine Sulfate         NAUSEA ONLY    Comment:Pt states \"it messes with my stomach, but I can             take it if I need it\"  Sulfa Antibiotics       NAUSEA AND VOMITING    Comment:pancreatiti volume    Mood: \"fine\", but has some depressed and anxious mood  Affect: Calm, composed    Thought process: logical  Thought content: no delusions  Perceptions: no hallucinations  Associations: Intact    Orientation: Oriented person, place, time, situati

## 2019-01-16 NOTE — PLAN OF CARE
Patient unable to remove left and right rings, RN called Tiarra Andrew in Endo, will put tape on rings per Tiarra Andrew. Underwear and dentures removed.  put ring on bedside table.

## 2019-01-17 NOTE — CM/SW NOTE
01/17/19 0900   Discharge disposition   Expected discharge disposition Home-Health   Name of Facillity/Home Care/Hospice [facility]   Home services after discharge DME   HME provider ([facility] for tube feedings)   Discharge transportation Private car

## 2019-01-18 ENCOUNTER — TELEPHONE (OUTPATIENT)
Dept: SCHEDULING | Age: 59
End: 2019-01-18

## 2019-01-21 ENCOUNTER — TELEPHONE (OUTPATIENT)
Dept: FAMILY MEDICINE | Age: 59
End: 2019-01-21

## 2019-01-22 ENCOUNTER — TELEPHONE (OUTPATIENT)
Dept: FAMILY MEDICINE | Age: 59
End: 2019-01-22

## 2019-01-23 ENCOUNTER — HOSPITAL ENCOUNTER (OUTPATIENT)
Dept: LAB | Facility: HOSPITAL | Age: 59
Discharge: HOME | End: 2019-01-23
Attending: Other
Payer: COMMERCIAL

## 2019-01-23 ENCOUNTER — TELEPHONE (OUTPATIENT)
Dept: SURGERY | Facility: CLINIC | Age: 59
End: 2019-01-23

## 2019-01-23 ENCOUNTER — MYAURORA ACCOUNT LINK (OUTPATIENT)
Dept: OTHER | Age: 59
End: 2019-01-23

## 2019-01-23 ENCOUNTER — PRIOR ORIGINAL RECORDS (OUTPATIENT)
Dept: OTHER | Age: 59
End: 2019-01-23

## 2019-01-23 DIAGNOSIS — R27.0 ATAXIA: ICD-10-CM

## 2019-01-23 DIAGNOSIS — R11.2 INTRACTABLE VOMITING WITH NAUSEA, UNSPECIFIED VOMITING TYPE: ICD-10-CM

## 2019-01-23 DIAGNOSIS — R53.1 WEAKNESS: ICD-10-CM

## 2019-01-23 DIAGNOSIS — G62.9 NEUROPATHY: ICD-10-CM

## 2019-01-23 DIAGNOSIS — R29.2 HYPOREFLEXIA: ICD-10-CM

## 2019-01-23 LAB
ALBUMIN: 1.5 G/DL
ALKALINE PHOSPHATATE(ALK PHOS): 204 IU/L
ALT (SGPT): 45 U/L
AST (SGOT): 115 U/L
BILIRUBIN TOTAL: 0.4 MG/DL
BUN: 5 MG/DL
CALCIUM: 8.2 MG/DL
CHLORIDE: 102 MEQ/L
CREATININE, SERUM: 0.46 MG/DL
GLOBULIN: 4.4 G/DL
GLUCOSE: 69 MG/DL
HEMATOCRIT: 33.8 %
HEMOGLOBIN: 11.2 G/DL
MAGNESIUM: 1.4 MG/DL
POTASSIUM, SERUM: 3.9 MEQ/L
PROTEIN, TOTAL: 5.9 G/DL
RED BLOOD COUNT: 3.65 X 10-6/U
SGOT (AST): 115 IU/L
SGPT (ALT): 45 IU/L
SODIUM: 138 MEQ/L
WHITE BLOOD COUNT: 11.7 X 10-3/U

## 2019-01-23 PROCEDURE — 83520 IMMUNOASSAY QUANT NOS NONAB: CPT

## 2019-01-23 PROCEDURE — 36415 COLL VENOUS BLD VENIPUNCTURE: CPT

## 2019-01-24 ENCOUNTER — IMAGING SERVICES (OUTPATIENT)
Dept: OTHER | Age: 59
End: 2019-01-24

## 2019-01-24 ENCOUNTER — PRIOR ORIGINAL RECORDS (OUTPATIENT)
Dept: OTHER | Age: 59
End: 2019-01-24

## 2019-01-26 LAB
ASIALO-GM1 ANTIBODIES, IGG/IGM: 12 IV
GD1A ANTIBODIES, IGG/IGM: 13 IV
GD1B ANTIBODIES, IGG/IGM: 21 IV
GM1 ANTIBODIES, IGG/IGM: 16 IV
GM2 ANTIBODIES, IGG/IGM: 11 IV
GQ1B ANTIBODIES, IGG/IGM: 7 IV

## 2019-01-28 ENCOUNTER — TELEPHONE (OUTPATIENT)
Dept: NEUROLOGY | Facility: CLINIC | Age: 59
End: 2019-01-28

## 2019-01-28 NOTE — TELEPHONE ENCOUNTER
----- Message from Suezanne Fothergill, MD sent at 1/28/2019  3:14 PM CST -----  Results noted, all labs normal; let patient know

## 2019-02-04 ENCOUNTER — TELEPHONE (OUTPATIENT)
Dept: SURGERY | Facility: CLINIC | Age: 59
End: 2019-02-04

## 2019-02-04 ENCOUNTER — OFFICE VISIT (OUTPATIENT)
Dept: SURGERY | Facility: CLINIC | Age: 59
End: 2019-02-04
Payer: COMMERCIAL

## 2019-02-04 VITALS
SYSTOLIC BLOOD PRESSURE: 110 MMHG | HEIGHT: 66 IN | WEIGHT: 98 LBS | HEART RATE: 110 BPM | DIASTOLIC BLOOD PRESSURE: 60 MMHG | BODY MASS INDEX: 15.75 KG/M2

## 2019-02-04 DIAGNOSIS — R53.1 WEAKNESS: ICD-10-CM

## 2019-02-04 DIAGNOSIS — E43 SEVERE PROTEIN-CALORIE MALNUTRITION (HCC): ICD-10-CM

## 2019-02-04 DIAGNOSIS — G62.9 NEUROPATHY: Primary | ICD-10-CM

## 2019-02-04 DIAGNOSIS — Z01.818 PRE-OP TESTING: ICD-10-CM

## 2019-02-04 PROCEDURE — 99203 OFFICE O/P NEW LOW 30 MIN: CPT | Performed by: PHYSICIAN ASSISTANT

## 2019-02-04 RX ORDER — MIRTAZAPINE 15 MG/1
TABLET, FILM COATED ORAL
Refills: 1 | COMMUNITY
Start: 2019-01-17 | End: 2019-02-23

## 2019-02-04 RX ORDER — METOCLOPRAMIDE 10 MG/1
TABLET ORAL
Refills: 1 | COMMUNITY
Start: 2019-01-17 | End: 2019-02-23

## 2019-02-04 NOTE — PROGRESS NOTES
Pt states that she has bilateral upper and lower extremity weakness. Pt states that she has numbness and tingling in the upper and lower extremities. Pt states that she has issues with balance. Pt states that she has issues with gait.  Pt states that she ha

## 2019-02-04 NOTE — H&P
Neurosurgery Clinic Visit  2019    [place] PCP:  Margret GENAO 1960 MRN [id number]       CC:  Nerve Biopsy Consult    HPI:    Inder Nieves is a very pleasant 63year old female who presents for nerve biopsy discussion.   Last summer activity in the paraspinal muscles in the thoracic or lumbar levels.  There is no clear electrophysiologic evidence to suggest a primary demyelinating neuropathy such as CIDP.  However, given the findings on her initial presentation, with continued deficit date: 2004        Years since quittin.2      Smokeless tobacco: Never Used    Substance and Sexual Activity      Alcohol use: Yes        Comment: \"occasional\"      Drug use: No        Comment: pt denies    Other Topics      Concerns:        Caf She will need to see her dietician and be cleared from a nutrition standpoint. Will repeat CMP 1 week prior to sx. The risks, indication, options, and benefits of the operation were discussed with patient at length.   All questions were answered and the p

## 2019-02-04 NOTE — PATIENT INSTRUCTIONS
Refill policies:    • Allow 2-3 business days for refills; controlled substances may take longer.   • Contact your pharmacy at least 5 days prior to running out of medication and have them send an electronic request or submit request through the “request re been approved by your insurer. Depending on your insurance carrier, approval may take 3-10 days. It is highly recommended patients contact their insurance carrier directly to determine coverage.   If test is done without insurance authorization, patient ma Roseann Foreman.     · Stop all over the counter non-steroidal anti-inflammatories,  Vitamin E, fish/krill oil at least 7-10 days prior to surgery  · You will need to have pre-operative lab work , orders have been placed  · Nothing to eat or drink after 11:00pm the

## 2019-02-04 NOTE — TELEPHONE ENCOUNTER
You are scheduled for a Left sural nerve biopsy on 3/12/19 with Dr. Vivien Kaplan.     · Stop all over the counter non-steroidal anti-inflammatories,  Vitamin E, fish/krill oil at least 7-10 days prior to surgery  · You will need to have pre-operative lab work ,

## 2019-02-04 NOTE — TELEPHONE ENCOUNTER
Per Isidoro Singh ok for patient to complete all pre-op labs 1 week prior to surgery. LM informing patient. PA needed. Cards clearance needed. Letter faxed. Awaiting dietician name prior to sending letter.

## 2019-02-06 RX ORDER — OXYCODONE HCL 10 MG/1
TABLET, FILM COATED, EXTENDED RELEASE ORAL
COMMUNITY
End: 2019-02-11 | Stop reason: ALTCHOICE

## 2019-02-06 RX ORDER — HYDROCODONE BITARTRATE AND ACETAMINOPHEN 7.5; 325 MG/1; MG/1
TABLET ORAL
COMMUNITY
Start: 2018-11-20 | End: 2019-04-09 | Stop reason: ALTCHOICE

## 2019-02-06 RX ORDER — DICYCLOMINE HCL 20 MG
TABLET ORAL
COMMUNITY
Start: 2018-10-02 | End: 2020-05-26

## 2019-02-06 RX ORDER — LISINOPRIL 2.5 MG/1
TABLET ORAL
COMMUNITY
Start: 2018-10-19 | End: 2020-02-14 | Stop reason: SDUPTHER

## 2019-02-06 RX ORDER — MIRTAZAPINE 15 MG/1
TABLET, FILM COATED ORAL
COMMUNITY
Start: 2018-10-17

## 2019-02-06 RX ORDER — MAGNESIUM OXIDE 400 MG/1
TABLET ORAL
COMMUNITY
Start: 2018-10-31 | End: 2019-02-11 | Stop reason: ALTCHOICE

## 2019-02-06 RX ORDER — ATORVASTATIN CALCIUM 40 MG/1
TABLET, FILM COATED ORAL
COMMUNITY
Start: 2018-10-22 | End: 2019-02-11 | Stop reason: ALTCHOICE

## 2019-02-06 RX ORDER — DULOXETIN HYDROCHLORIDE 30 MG/1
CAPSULE, DELAYED RELEASE ORAL
COMMUNITY
End: 2019-02-11 | Stop reason: SDUPTHER

## 2019-02-06 RX ORDER — METOCLOPRAMIDE 5 MG/1
TABLET ORAL
COMMUNITY
Start: 2018-08-24 | End: 2019-02-11 | Stop reason: ALTCHOICE

## 2019-02-06 RX ORDER — METOPROLOL SUCCINATE 25 MG/1
TABLET, EXTENDED RELEASE ORAL
COMMUNITY
Start: 2018-10-19 | End: 2019-10-26 | Stop reason: SDUPTHER

## 2019-02-06 RX ORDER — ZOLPIDEM TARTRATE 6.25 MG/1
TABLET, FILM COATED, EXTENDED RELEASE ORAL
COMMUNITY
End: 2019-07-02 | Stop reason: SDUPTHER

## 2019-02-06 RX ORDER — SUCRALFATE ORAL 1 G/10ML
SUSPENSION ORAL
COMMUNITY
End: 2019-04-09 | Stop reason: ALTCHOICE

## 2019-02-06 RX ORDER — ONDANSETRON 8 MG/1
TABLET, ORALLY DISINTEGRATING ORAL
COMMUNITY
Start: 2018-07-30 | End: 2019-04-10 | Stop reason: SDUPTHER

## 2019-02-06 RX ORDER — TORSEMIDE 10 MG/1
TABLET ORAL
COMMUNITY
Start: 2017-09-05 | End: 2020-05-26

## 2019-02-11 ENCOUNTER — OFFICE VISIT (OUTPATIENT)
Dept: FAMILY MEDICINE | Age: 59
End: 2019-02-11

## 2019-02-11 VITALS
TEMPERATURE: 98.1 F | RESPIRATION RATE: 16 BRPM | HEART RATE: 60 BPM | WEIGHT: 101 LBS | SYSTOLIC BLOOD PRESSURE: 94 MMHG | BODY MASS INDEX: 15.82 KG/M2 | DIASTOLIC BLOOD PRESSURE: 62 MMHG

## 2019-02-11 DIAGNOSIS — R62.7 ADULT FAILURE TO THRIVE: Primary | ICD-10-CM

## 2019-02-11 DIAGNOSIS — E43 SEVERE PROTEIN-CALORIE MALNUTRITION (CMD): ICD-10-CM

## 2019-02-11 DIAGNOSIS — I10 ESSENTIAL HYPERTENSION, BENIGN: ICD-10-CM

## 2019-02-11 DIAGNOSIS — I50.22 CHRONIC SYSTOLIC CONGESTIVE HEART FAILURE (CMD): ICD-10-CM

## 2019-02-11 DIAGNOSIS — F11.29 OPIOID DEPENDENCE WITH OPIOID-INDUCED DISORDER (CMD): ICD-10-CM

## 2019-02-11 DIAGNOSIS — N18.30 STAGE 3 CHRONIC KIDNEY DISEASE DUE TO BENIGN HYPERTENSION (CMD): ICD-10-CM

## 2019-02-11 DIAGNOSIS — I12.9 STAGE 3 CHRONIC KIDNEY DISEASE DUE TO BENIGN HYPERTENSION (CMD): ICD-10-CM

## 2019-02-11 PROBLEM — I25.119 CORONARY ARTERY DISEASE INVOLVING NATIVE CORONARY ARTERY OF NATIVE HEART WITH ANGINA PECTORIS (CMD): Status: ACTIVE | Noted: 2019-02-11

## 2019-02-11 PROBLEM — E46 MALNUTRITION (CMD): Status: ACTIVE | Noted: 2019-02-11

## 2019-02-11 PROBLEM — M81.0 OSTEOPOROSIS OF LUMBAR SPINE: Status: ACTIVE | Noted: 2018-01-03

## 2019-02-11 PROBLEM — G89.29 CHRONIC IDIOPATHIC PAIN SYNDROME: Status: ACTIVE | Noted: 2019-02-11

## 2019-02-11 PROCEDURE — 99214 OFFICE O/P EST MOD 30 MIN: CPT | Performed by: FAMILY MEDICINE

## 2019-02-11 PROCEDURE — 3078F DIAST BP <80 MM HG: CPT | Performed by: FAMILY MEDICINE

## 2019-02-11 PROCEDURE — 3074F SYST BP LT 130 MM HG: CPT | Performed by: FAMILY MEDICINE

## 2019-02-11 RX ORDER — SIMVASTATIN 40 MG
40 TABLET ORAL DAILY
COMMUNITY
Start: 2019-01-16 | End: 2019-04-11 | Stop reason: SDUPTHER

## 2019-02-11 RX ORDER — BUPRENORPHINE AND NALOXONE 12; 3 MG/1; MG/1
1 FILM, SOLUBLE BUCCAL; SUBLINGUAL 2 TIMES DAILY
COMMUNITY
Start: 2019-01-16 | End: 2020-05-25 | Stop reason: DRUGHIGH

## 2019-02-11 RX ORDER — PANTOPRAZOLE SODIUM 40 MG/1
1 TABLET, DELAYED RELEASE ORAL 2 TIMES DAILY
Refills: 1 | COMMUNITY
Start: 2018-11-19 | End: 2019-02-11 | Stop reason: ALTCHOICE

## 2019-02-11 RX ORDER — CLOPIDOGREL BISULFATE 75 MG/1
75 TABLET ORAL DAILY
COMMUNITY
Start: 2010-03-15 | End: 2020-02-14 | Stop reason: SDUPTHER

## 2019-02-11 RX ORDER — DULOXETIN HYDROCHLORIDE 30 MG/1
30 CAPSULE, DELAYED RELEASE ORAL 2 TIMES DAILY
COMMUNITY
Start: 2018-09-07 | End: 2019-04-10 | Stop reason: SDUPTHER

## 2019-02-11 RX ORDER — LANOLIN ALCOHOL/MO/W.PET/CERES
400 CREAM (GRAM) TOPICAL DAILY
COMMUNITY

## 2019-02-11 ASSESSMENT — ENCOUNTER SYMPTOMS
ENDOCRINE NEGATIVE: 1
FEVER: 0
EYES NEGATIVE: 1
NUMBNESS: 1
DIAPHORESIS: 0
PSYCHIATRIC NEGATIVE: 1
ALLERGIC/IMMUNOLOGIC NEGATIVE: 1
RESPIRATORY NEGATIVE: 1
HEMATOLOGIC/LYMPHATIC NEGATIVE: 1
ACTIVITY CHANGE: 0
FATIGUE: 0
WEAKNESS: 1
CHILLS: 0
NAUSEA: 1

## 2019-02-12 ENCOUNTER — TELEPHONE (OUTPATIENT)
Dept: FAMILY MEDICINE | Age: 59
End: 2019-02-12

## 2019-02-13 DIAGNOSIS — G62.9 NEUROPATHY: Primary | ICD-10-CM

## 2019-02-13 NOTE — TELEPHONE ENCOUNTER
Medication: Duloxetine    Date of last refill:pt reported  [place]    Last office visit: 1/8/19  Due back to clinic per last office note:  3 months  Date next office visit scheduled:  not yet sceduled    Last OV note neuropathy raised and will plan for nerve biopsy as well as ganglioside Ab panel to further evaluate; pending results, may trial IVIG but will need to discuss with cardiology and other services

## 2019-02-14 RX ORDER — DULOXETIN HYDROCHLORIDE 30 MG/1
30 CAPSULE, DELAYED RELEASE ORAL 2 TIMES DAILY
Qty: 60 CAPSULE | Refills: 1 | Status: SHIPPED | OUTPATIENT
Start: 2019-02-14

## 2019-02-15 ENCOUNTER — TELEPHONE (OUTPATIENT)
Dept: FAMILY MEDICINE | Age: 59
End: 2019-02-15

## 2019-02-18 ENCOUNTER — TELEPHONE (OUTPATIENT)
Dept: FAMILY MEDICINE | Age: 59
End: 2019-02-18

## 2019-02-22 ENCOUNTER — TELEPHONE (OUTPATIENT)
Dept: FAMILY MEDICINE | Age: 59
End: 2019-02-22

## 2019-02-23 ENCOUNTER — ANESTHESIA (OUTPATIENT)
Dept: ENDOSCOPY | Facility: HOSPITAL | Age: 59
End: 2019-02-23

## 2019-02-23 ENCOUNTER — ANESTHESIA EVENT (OUTPATIENT)
Dept: ENDOSCOPY | Facility: HOSPITAL | Age: 59
End: 2019-02-23

## 2019-02-23 ENCOUNTER — APPOINTMENT (OUTPATIENT)
Dept: GENERAL RADIOLOGY | Facility: HOSPITAL | Age: 59
End: 2019-02-23
Payer: COMMERCIAL

## 2019-02-23 ENCOUNTER — HOSPITAL ENCOUNTER (EMERGENCY)
Facility: HOSPITAL | Age: 59
Discharge: HOME | End: 2019-02-23
Attending: EMERGENCY MEDICINE
Payer: COMMERCIAL

## 2019-02-23 VITALS
SYSTOLIC BLOOD PRESSURE: 108 MMHG | OXYGEN SATURATION: 99 % | TEMPERATURE: 99 F | DIASTOLIC BLOOD PRESSURE: 76 MMHG | RESPIRATION RATE: 18 BRPM | HEART RATE: 73 BPM

## 2019-02-23 DIAGNOSIS — K94.20 COMPLICATION OF GASTROSTOMY TUBE (HCC): Primary | ICD-10-CM

## 2019-02-23 PROCEDURE — 71046 X-RAY EXAM CHEST 2 VIEWS: CPT

## 2019-02-23 PROCEDURE — 74018 RADEX ABDOMEN 1 VIEW: CPT

## 2019-02-23 PROCEDURE — 99285 EMERGENCY DEPT VISIT HI MDM: CPT

## 2019-02-23 PROCEDURE — 0DC28ZZ EXTIRPATION OF MATTER FROM MIDDLE ESOPHAGUS, VIA NATURAL OR ARTIFICIAL OPENING ENDOSCOPIC: ICD-10-PCS | Performed by: STUDENT IN AN ORGANIZED HEALTH CARE EDUCATION/TRAINING PROGRAM

## 2019-02-23 RX ORDER — METOCLOPRAMIDE 10 MG/1
10 TABLET ORAL
COMMUNITY
End: 2019-11-18

## 2019-02-23 RX ORDER — MIRTAZAPINE 15 MG/1
15 TABLET, FILM COATED ORAL NIGHTLY
COMMUNITY
End: 2019-06-11

## 2019-02-23 RX ORDER — ONDANSETRON 2 MG/ML
4 INJECTION INTRAMUSCULAR; INTRAVENOUS EVERY 6 HOURS PRN
Status: DISCONTINUED | OUTPATIENT
Start: 2019-02-23 | End: 2019-02-23

## 2019-02-23 RX ORDER — ONDANSETRON 2 MG/ML
INJECTION INTRAMUSCULAR; INTRAVENOUS
Status: COMPLETED
Start: 2019-02-23 | End: 2019-02-23

## 2019-02-23 NOTE — ED PROVIDER NOTES
Patient Seen in: [facility] Emergency Department    History   Patient presents with:  Cath Tube Problem (gastrointestinal, urinary, integumentary)    Stated Complaint: concern for G/J tube displacement    HPI    15-year-old female comes to the hospi Rash    • Renal disorder     hx of ERIKA d/t dehydration   • Shortness of breath    • Stented coronary artery    • Syncope    • Systolic heart failure (HCC)    • Thrush of mouth and esophagus (HCC)    • Uncomfortable fullness after meals    • Visual impairme N/A 12/11/2018    Performed by Suri Ho MD at [facility] ENDOSCOPY   • REMOVAL GALLBLADDER  10/2018   • TONSILLECTOMY             Social History    Tobacco Use      Smoking status: Former Smoker        Years: 20.00        Quit date: 11/1/2004        Years the right mid abdomen is no longer visualized. CONCLUSION:  There is enteric catheter traversing the esophagus into the stomach. There is also a separate catheter overlying the mid abdomen. This catheter is somewhat difficult to delineate.   This may which is malpositioned. The catheter appears to extend into the upper esophagus. Adjacent to the upper portion of this catheter there is a more radiopaque density measuring 1.4 x 0.3 cm.   This may reflect the radiopaque end of the catheter which is kinke

## 2019-02-23 NOTE — ED INITIAL ASSESSMENT (HPI)
Pt here with c/o nausea, concern for g tube displacement after three days of n/d. Patient states she feels the tip of the tube in her throat.

## 2019-02-23 NOTE — ANESTHESIA PREPROCEDURE EVALUATION
PRE-OP EVALUATION    Patient Name: Matt Stubbs    Pre-op Diagnosis: J tube migration    Procedure(s):  ESOPHAGOGASTRODUODENOSCOPY    Surgeon(s) and Role:     * Morgan Munoz MD - Primary    Pre-op vitals reviewed.   Temp: 98.9 °F (37.2 °C) Complications  (-) history of anesthetic complications         GI/Hepatic/Renal  Comment: Clogged jejunal tube  Malnutrition  Adult failure to thrive    (+) GERD                           Cardiovascular  Comment: Conclusions:     1. Left ventricle:  The cav DO Deven at [facility] ENDOSCOPY   • ENDOSCOPIC ULTRASOUND (EUS) N/A 12/1/2015    Performed by Sarah Thakur MD at [facility] ENDOSCOPY   • ENTEROSCOPY N/A 12/11/2018    Performed by Ciera New MD at [facility] ENDOSCOPY   • EP ELECTROPHYSIOLOGY STUDY  04/13/2006 FB  TM distance: > 6 cm   Cardiovascular    Cardiovascular exam normal.         Dental             Pulmonary    Pulmonary exam normal.                 Other findings            ASA: 3   Plan: MAC  NPO status verified and           Plan/risks discussed with

## 2019-02-23 NOTE — ED NOTES
Pt resting comfortably in bed. Remains updated on plan of care. Denies any nausea or pain at this time, will continue to monitor.

## 2019-02-23 NOTE — H&P
[facility]  Report of GI Consultation    Karen Cortez Patient Status:  Emergency    1960 MRN [id number]   [facility] ENDOSCOPY Attending No att. providers found   Hosp Day # 0 PCP Brionna Kate     Date of Admission:   • Uncomfortable fullness after meals    • Visual impairment     glasses   • Vomiting    • Wears glasses    • Weight loss        Past Surgical History  Past Surgical History:   Procedure Laterality Date   • ANGIOGRAM  11/30/2006    Right & left hear angio Relation Age of Onset   • Other (Other) Father         Polio & multiple aortic aneursyms - burst   • Cancer Mother         lung CA   • Cancer Sister 36        metastatic breast CA   • Breast Cancer Sister    • Diabetes Brother         T1   • Lung Disorder CREATSERUM 0.46 (L) 01/14/2019    BUN 5 (L) 01/14/2019     01/14/2019    K 3.9 01/14/2019    CO2 28.0 01/14/2019    CA 8.2 (L) 01/14/2019    ALB 1.5 (L) 01/14/2019    ALKPHO 204 (H) 01/14/2019    TP 5.9 (L) 01/14/2019     (H) 01/14/2019    ALT into the esophagus  3. Extensive cardiac history including ischemic cardiomyopathy with AICD, CAD s/p PCI    Recommendations:  1. EGD with MAC to reposition J tube  2. Informed consent was obtained from the patient for the above procedure.  She was told ind

## 2019-02-23 NOTE — OPERATIVE REPORT
EGD operative report  Patient Name: Gabo James  Procedure: Esophagogastroduodenoscopy with advancement of pediatric colonoscope  Indication: migrated jejunostomy  Attending: Bird Scott M.D.   Consent:  The risks, benefits, and alternatives w The pediatric colonoscope was advanced as distal as possible. The scope was clipped to the wall and the scope was removed. There was no looping or coiling in the stomach at the end of the exam.  Impression: Findings as above.   Recommendations:   1. OK t

## 2019-02-24 NOTE — ANESTHESIA POSTPROCEDURE EVALUATION
[address] Patient Status:  Emergency   Age/Gender 63year old female MRN [id number]   Location 118 [facility]. Attending No att. providers found   Hosp Day # 0 PCP MARIYA MORRISON       Anesthesia Post-op Note    Proc

## 2019-02-25 ENCOUNTER — TELEPHONE (OUTPATIENT)
Dept: FAMILY MEDICINE | Age: 59
End: 2019-02-25

## 2019-02-27 ENCOUNTER — TELEPHONE (OUTPATIENT)
Dept: FAMILY MEDICINE | Age: 59
End: 2019-02-27

## 2019-02-27 NOTE — TELEPHONE ENCOUNTER
Spoke with patient who stated she has been seeing a dietician named Jordyn Liriano phone #: [phone]. Spoke with Jordyn Liriano who stated he is going to be seeing patient next week again and will call our office after she is re-evaluated.  He does not have the fax #

## 2019-02-28 ENCOUNTER — TELEPHONE (OUTPATIENT)
Dept: FAMILY MEDICINE | Age: 59
End: 2019-02-28

## 2019-02-28 VITALS
HEART RATE: 83 BPM | SYSTOLIC BLOOD PRESSURE: 100 MMHG | HEIGHT: 65 IN | BODY MASS INDEX: 20.33 KG/M2 | DIASTOLIC BLOOD PRESSURE: 61 MMHG | WEIGHT: 122 LBS

## 2019-02-28 VITALS
HEART RATE: 100 BPM | SYSTOLIC BLOOD PRESSURE: 72 MMHG | DIASTOLIC BLOOD PRESSURE: 48 MMHG | BODY MASS INDEX: 21.8 KG/M2 | HEIGHT: 65 IN

## 2019-02-28 VITALS
SYSTOLIC BLOOD PRESSURE: 88 MMHG | BODY MASS INDEX: 15.35 KG/M2 | HEART RATE: 113 BPM | WEIGHT: 98 LBS | DIASTOLIC BLOOD PRESSURE: 62 MMHG

## 2019-02-28 VITALS
WEIGHT: 124 LBS | SYSTOLIC BLOOD PRESSURE: 98 MMHG | BODY MASS INDEX: 20.66 KG/M2 | HEIGHT: 65 IN | DIASTOLIC BLOOD PRESSURE: 60 MMHG | HEART RATE: 60 BPM

## 2019-02-28 VITALS
DIASTOLIC BLOOD PRESSURE: 60 MMHG | HEIGHT: 65 IN | SYSTOLIC BLOOD PRESSURE: 86 MMHG | BODY MASS INDEX: 21.8 KG/M2 | HEART RATE: 85 BPM

## 2019-02-28 VITALS
SYSTOLIC BLOOD PRESSURE: 100 MMHG | WEIGHT: 118 LBS | HEIGHT: 65 IN | HEART RATE: 66 BPM | DIASTOLIC BLOOD PRESSURE: 60 MMHG | BODY MASS INDEX: 19.66 KG/M2

## 2019-02-28 VITALS
WEIGHT: 126 LBS | DIASTOLIC BLOOD PRESSURE: 52 MMHG | HEIGHT: 65 IN | BODY MASS INDEX: 20.99 KG/M2 | HEART RATE: 78 BPM | SYSTOLIC BLOOD PRESSURE: 68 MMHG

## 2019-02-28 VITALS
HEART RATE: 107 BPM | DIASTOLIC BLOOD PRESSURE: 80 MMHG | SYSTOLIC BLOOD PRESSURE: 110 MMHG | WEIGHT: 111 LBS | HEIGHT: 65 IN | BODY MASS INDEX: 18.49 KG/M2

## 2019-03-01 ENCOUNTER — TELEPHONE (OUTPATIENT)
Dept: FAMILY MEDICINE | Age: 59
End: 2019-03-01

## 2019-03-01 VITALS
WEIGHT: 117 LBS | BODY MASS INDEX: 19.49 KG/M2 | HEART RATE: 52 BPM | DIASTOLIC BLOOD PRESSURE: 52 MMHG | HEIGHT: 65 IN | SYSTOLIC BLOOD PRESSURE: 80 MMHG

## 2019-03-05 VITALS
SYSTOLIC BLOOD PRESSURE: 92 MMHG | BODY MASS INDEX: 20.3 KG/M2 | HEART RATE: 78 BPM | DIASTOLIC BLOOD PRESSURE: 70 MMHG | TEMPERATURE: 98.5 F | RESPIRATION RATE: 24 BRPM | WEIGHT: 122 LBS

## 2019-03-05 VITALS
TEMPERATURE: 96.8 F | BODY MASS INDEX: 20.47 KG/M2 | DIASTOLIC BLOOD PRESSURE: 66 MMHG | RESPIRATION RATE: 16 BRPM | SYSTOLIC BLOOD PRESSURE: 92 MMHG | HEART RATE: 72 BPM | WEIGHT: 123 LBS

## 2019-03-05 NOTE — TELEPHONE ENCOUNTER
Spoke with Juana Jolley, patients fidelia. He was able to see patient yesterday and is okay with clearing her for surgery. He will be faxing clearance note. Patient was initially seen 3 weeks ago and weighed 98 lbs, as of yesterday she weighed 105.      He w

## 2019-03-06 ENCOUNTER — LABORATORY ENCOUNTER (OUTPATIENT)
Dept: LAB | Facility: HOSPITAL | Age: 59
End: 2019-03-06
Attending: NEUROLOGICAL SURGERY
Payer: COMMERCIAL

## 2019-03-06 VITALS
HEART RATE: 88 BPM | WEIGHT: 126 LBS | RESPIRATION RATE: 16 BRPM | TEMPERATURE: 97.2 F | DIASTOLIC BLOOD PRESSURE: 50 MMHG | BODY MASS INDEX: 20.97 KG/M2 | SYSTOLIC BLOOD PRESSURE: 76 MMHG

## 2019-03-06 VITALS
HEART RATE: 88 BPM | SYSTOLIC BLOOD PRESSURE: 84 MMHG | TEMPERATURE: 97.3 F | BODY MASS INDEX: 19.77 KG/M2 | DIASTOLIC BLOOD PRESSURE: 64 MMHG | HEIGHT: 66 IN | RESPIRATION RATE: 16 BRPM | WEIGHT: 123 LBS

## 2019-03-06 VITALS
DIASTOLIC BLOOD PRESSURE: 76 MMHG | WEIGHT: 127 LBS | HEART RATE: 88 BPM | TEMPERATURE: 97.1 F | RESPIRATION RATE: 18 BRPM | BODY MASS INDEX: 21.13 KG/M2 | SYSTOLIC BLOOD PRESSURE: 98 MMHG

## 2019-03-06 DIAGNOSIS — G62.9 NEUROPATHY: ICD-10-CM

## 2019-03-06 LAB
ALBUMIN SERPL-MCNC: 2.4 G/DL (ref 3.4–5)
ALBUMIN/GLOB SERPL: 0.5 {RATIO} (ref 1–2)
ALP LIVER SERPL-CCNC: 182 U/L (ref 46–118)
ALT SERPL-CCNC: 24 U/L (ref 13–56)
ANION GAP SERPL CALC-SCNC: 7 MMOL/L (ref 0–18)
APTT PPP: 28.3 SECONDS (ref 26.1–34.6)
AST SERPL-CCNC: 41 U/L (ref 15–37)
ATRIAL RATE: 86 BPM
BASOPHILS # BLD AUTO: 0.13 X10(3) UL (ref 0–0.2)
BASOPHILS NFR BLD AUTO: 1.2 %
BILIRUB SERPL-MCNC: 0.3 MG/DL (ref 0.1–2)
BUN BLD-MCNC: 9 MG/DL (ref 7–18)
BUN/CREAT SERPL: 17 (ref 10–20)
CALCIUM BLD-MCNC: 8.6 MG/DL (ref 8.5–10.1)
CHLORIDE SERPL-SCNC: 105 MMOL/L (ref 98–107)
CO2 SERPL-SCNC: 28 MMOL/L (ref 21–32)
CREAT BLD-MCNC: 0.53 MG/DL (ref 0.55–1.02)
DEPRECATED RDW RBC AUTO: 46.5 FL (ref 35.1–46.3)
EOSINOPHIL # BLD AUTO: 0.32 X10(3) UL (ref 0–0.7)
EOSINOPHIL NFR BLD AUTO: 2.9 %
ERYTHROCYTE [DISTWIDTH] IN BLOOD BY AUTOMATED COUNT: 13.4 % (ref 11–15)
GLOBULIN PLAS-MCNC: 4.9 G/DL (ref 2.8–4.4)
GLUCOSE BLD-MCNC: 89 MG/DL (ref 70–99)
HCT VFR BLD AUTO: 39.9 % (ref 35–48)
HGB BLD-MCNC: 12.8 G/DL (ref 12–16)
IMM GRANULOCYTES # BLD AUTO: 0.05 X10(3) UL (ref 0–1)
IMM GRANULOCYTES NFR BLD: 0.4 %
INR BLD: 0.89 (ref 0.9–1.1)
LYMPHOCYTES # BLD AUTO: 2.72 X10(3) UL (ref 1–4)
LYMPHOCYTES NFR BLD AUTO: 24.4 %
M PROTEIN MFR SERPL ELPH: 7.3 G/DL (ref 6.4–8.2)
MCH RBC QN AUTO: 30.5 PG (ref 26–34)
MCHC RBC AUTO-ENTMCNC: 32.1 G/DL (ref 31–37)
MCV RBC AUTO: 95 FL (ref 80–100)
MONOCYTES # BLD AUTO: 0.88 X10(3) UL (ref 0.1–1)
MONOCYTES NFR BLD AUTO: 7.9 %
NEUTROPHILS # BLD AUTO: 7.06 X10 (3) UL (ref 1.5–7.7)
NEUTROPHILS # BLD AUTO: 7.06 X10(3) UL (ref 1.5–7.7)
NEUTROPHILS NFR BLD AUTO: 63.2 %
OSMOLALITY SERPL CALC.SUM OF ELEC: 288 MOSM/KG (ref 275–295)
P AXIS: 55 DEGREES
P-R INTERVAL: 138 MS
PLATELET # BLD AUTO: 258 10(3)UL (ref 150–450)
POTASSIUM SERPL-SCNC: 4.7 MMOL/L (ref 3.5–5.1)
PSA SERPL DL<=0.01 NG/ML-MCNC: 12.4 SECONDS (ref 12.5–14.7)
Q-T INTERVAL: 398 MS
QRS DURATION: 64 MS
QTC CALCULATION (BEZET): 476 MS
R AXIS: -13 DEGREES
RBC # BLD AUTO: 4.2 X10(6)UL (ref 3.8–5.3)
SODIUM SERPL-SCNC: 140 MMOL/L (ref 136–145)
T AXIS: -11 DEGREES
VENTRICULAR RATE: 86 BPM
WBC # BLD AUTO: 11.2 X10(3) UL (ref 4–11)

## 2019-03-06 PROCEDURE — 93010 ELECTROCARDIOGRAM REPORT: CPT | Performed by: INTERNAL MEDICINE

## 2019-03-06 PROCEDURE — 93005 ELECTROCARDIOGRAM TRACING: CPT

## 2019-03-06 PROCEDURE — 85025 COMPLETE CBC W/AUTO DIFF WBC: CPT

## 2019-03-06 PROCEDURE — 80053 COMPREHEN METABOLIC PANEL: CPT

## 2019-03-06 PROCEDURE — 85610 PROTHROMBIN TIME: CPT

## 2019-03-06 PROCEDURE — 36415 COLL VENOUS BLD VENIPUNCTURE: CPT

## 2019-03-06 PROCEDURE — 85730 THROMBOPLASTIN TIME PARTIAL: CPT

## 2019-03-07 ENCOUNTER — ANESTHESIA EVENT (OUTPATIENT)
Dept: SURGERY | Facility: HOSPITAL | Age: 59
End: 2019-03-07
Payer: COMMERCIAL

## 2019-03-07 NOTE — TELEPHONE ENCOUNTER
Below received and sent to EDW PAT. \"Notes recorded by Naif Grove MD on 3/6/2019 at 4:29 PM CST  Albumin has increased. Ok to proceed with surgery\"    Awaiting final cards clearance.

## 2019-03-08 ENCOUNTER — TELEPHONE (OUTPATIENT)
Dept: CARDIOLOGY | Age: 59
End: 2019-03-08

## 2019-03-08 NOTE — TELEPHONE ENCOUNTER
Call made to Cards office as we have not yet received cards clearance yet. Spoke with 's office who stated patient has not been seen recently, but they will send a message to clinical staff to see what can be done.      Awaiting cards clearanc

## 2019-03-11 ENCOUNTER — TELEPHONE (OUTPATIENT)
Dept: CARDIOLOGY | Age: 59
End: 2019-03-11

## 2019-03-11 NOTE — TELEPHONE ENCOUNTER
Call made to 's office and was transferred multiple times. Attempted to speak with Leslee-'s assistant. Was hung up on after being on the phone for 47:02 minutes.     Call made back to the office     Total call time: 5:25 minutes

## 2019-03-12 ENCOUNTER — ANESTHESIA (OUTPATIENT)
Dept: SURGERY | Facility: HOSPITAL | Age: 59
End: 2019-03-12
Payer: COMMERCIAL

## 2019-03-12 ENCOUNTER — HOSPITAL ENCOUNTER (OUTPATIENT)
Facility: HOSPITAL | Age: 59
Setting detail: HOSPITAL OUTPATIENT SURGERY
Discharge: HOME | End: 2019-03-12
Attending: NEUROLOGICAL SURGERY | Admitting: NEUROLOGICAL SURGERY
Payer: COMMERCIAL

## 2019-03-12 VITALS
HEIGHT: 65 IN | BODY MASS INDEX: 17.68 KG/M2 | TEMPERATURE: 98 F | RESPIRATION RATE: 18 BRPM | HEART RATE: 91 BPM | DIASTOLIC BLOOD PRESSURE: 65 MMHG | SYSTOLIC BLOOD PRESSURE: 90 MMHG | OXYGEN SATURATION: 92 % | WEIGHT: 106.13 LBS

## 2019-03-12 DIAGNOSIS — R53.1 WEAKNESS: ICD-10-CM

## 2019-03-12 DIAGNOSIS — G62.9 NEUROPATHY: Primary | ICD-10-CM

## 2019-03-12 PROCEDURE — 88313 SPECIAL STAINS GROUP 2: CPT | Performed by: NEUROLOGICAL SURGERY

## 2019-03-12 PROCEDURE — 01BG0ZX EXCISION OF TIBIAL NERVE, OPEN APPROACH, DIAGNOSTIC: ICD-10-PCS | Performed by: NEUROLOGICAL SURGERY

## 2019-03-12 PROCEDURE — 88305 TISSUE EXAM BY PATHOLOGIST: CPT | Performed by: NEUROLOGICAL SURGERY

## 2019-03-12 RX ORDER — MIDAZOLAM HYDROCHLORIDE 1 MG/ML
1 INJECTION INTRAMUSCULAR; INTRAVENOUS EVERY 5 MIN PRN
Status: DISCONTINUED | OUTPATIENT
Start: 2019-03-12 | End: 2019-03-12

## 2019-03-12 RX ORDER — HYDROCODONE BITARTRATE AND ACETAMINOPHEN 5; 325 MG/1; MG/1
2 TABLET ORAL AS NEEDED
Status: DISCONTINUED | OUTPATIENT
Start: 2019-03-12 | End: 2019-03-12

## 2019-03-12 RX ORDER — METOCLOPRAMIDE HYDROCHLORIDE 5 MG/ML
10 INJECTION INTRAMUSCULAR; INTRAVENOUS AS NEEDED
Status: DISCONTINUED | OUTPATIENT
Start: 2019-03-12 | End: 2019-03-12

## 2019-03-12 RX ORDER — SODIUM CHLORIDE, SODIUM LACTATE, POTASSIUM CHLORIDE, CALCIUM CHLORIDE 600; 310; 30; 20 MG/100ML; MG/100ML; MG/100ML; MG/100ML
INJECTION, SOLUTION INTRAVENOUS CONTINUOUS
Status: DISCONTINUED | OUTPATIENT
Start: 2019-03-12 | End: 2019-03-12

## 2019-03-12 RX ORDER — NALOXONE HYDROCHLORIDE 0.4 MG/ML
80 INJECTION, SOLUTION INTRAMUSCULAR; INTRAVENOUS; SUBCUTANEOUS AS NEEDED
Status: DISCONTINUED | OUTPATIENT
Start: 2019-03-12 | End: 2019-03-12

## 2019-03-12 RX ORDER — BACITRACIN 50000 [USP'U]/1
INJECTION, POWDER, LYOPHILIZED, FOR SOLUTION INTRAMUSCULAR AS NEEDED
Status: DISCONTINUED | OUTPATIENT
Start: 2019-03-12 | End: 2019-03-12 | Stop reason: HOSPADM

## 2019-03-12 RX ORDER — BUPIVACAINE HYDROCHLORIDE AND EPINEPHRINE 2.5; 5 MG/ML; UG/ML
INJECTION, SOLUTION EPIDURAL; INFILTRATION; INTRACAUDAL; PERINEURAL AS NEEDED
Status: DISCONTINUED | OUTPATIENT
Start: 2019-03-12 | End: 2019-03-12 | Stop reason: HOSPADM

## 2019-03-12 RX ORDER — CEFAZOLIN SODIUM/WATER 2 G/20 ML
SYRINGE (ML) INTRAVENOUS
Status: DISCONTINUED
Start: 2019-03-12 | End: 2019-03-12

## 2019-03-12 RX ORDER — LABETALOL HYDROCHLORIDE 5 MG/ML
5 INJECTION, SOLUTION INTRAVENOUS EVERY 5 MIN PRN
Status: DISCONTINUED | OUTPATIENT
Start: 2019-03-12 | End: 2019-03-12

## 2019-03-12 RX ORDER — HYDROMORPHONE HYDROCHLORIDE 1 MG/ML
0.4 INJECTION, SOLUTION INTRAMUSCULAR; INTRAVENOUS; SUBCUTANEOUS EVERY 5 MIN PRN
Status: DISCONTINUED | OUTPATIENT
Start: 2019-03-12 | End: 2019-03-12

## 2019-03-12 RX ORDER — ONDANSETRON 2 MG/ML
4 INJECTION INTRAMUSCULAR; INTRAVENOUS AS NEEDED
Status: DISCONTINUED | OUTPATIENT
Start: 2019-03-12 | End: 2019-03-12

## 2019-03-12 RX ORDER — DEXAMETHASONE SODIUM PHOSPHATE 4 MG/ML
4 VIAL (ML) INJECTION AS NEEDED
Status: DISCONTINUED | OUTPATIENT
Start: 2019-03-12 | End: 2019-03-12

## 2019-03-12 RX ORDER — HYDROCODONE BITARTRATE AND ACETAMINOPHEN 5; 325 MG/1; MG/1
1 TABLET ORAL EVERY 8 HOURS PRN
Qty: 10 TABLET | Refills: 0 | Status: SHIPPED | OUTPATIENT
Start: 2019-03-12 | End: 2019-04-05

## 2019-03-12 RX ORDER — ACETAMINOPHEN 500 MG
1000 TABLET ORAL ONCE AS NEEDED
Status: DISCONTINUED | OUTPATIENT
Start: 2019-03-12 | End: 2019-03-12

## 2019-03-12 RX ORDER — MEPERIDINE HYDROCHLORIDE 25 MG/ML
12.5 INJECTION INTRAMUSCULAR; INTRAVENOUS; SUBCUTANEOUS AS NEEDED
Status: DISCONTINUED | OUTPATIENT
Start: 2019-03-12 | End: 2019-03-12

## 2019-03-12 RX ORDER — ACETAMINOPHEN 500 MG
1000 TABLET ORAL ONCE
Status: DISCONTINUED | OUTPATIENT
Start: 2019-03-12 | End: 2019-03-12

## 2019-03-12 RX ORDER — CEFAZOLIN SODIUM/WATER 2 G/20 ML
2 SYRINGE (ML) INTRAVENOUS ONCE
Status: COMPLETED | OUTPATIENT
Start: 2019-03-12 | End: 2019-03-12

## 2019-03-12 RX ORDER — CEPHALEXIN 500 MG/1
500 CAPSULE ORAL 4 TIMES DAILY
Qty: 20 CAPSULE | Refills: 0 | Status: SHIPPED | OUTPATIENT
Start: 2019-03-12 | End: 2019-04-05 | Stop reason: ALTCHOICE

## 2019-03-12 RX ORDER — ACETAMINOPHEN 500 MG
TABLET ORAL
Status: DISCONTINUED
Start: 2019-03-12 | End: 2019-03-12

## 2019-03-12 RX ORDER — HYDROCODONE BITARTRATE AND ACETAMINOPHEN 5; 325 MG/1; MG/1
1 TABLET ORAL AS NEEDED
Status: DISCONTINUED | OUTPATIENT
Start: 2019-03-12 | End: 2019-03-12

## 2019-03-12 NOTE — H&P
Neurosurgery Clinic Visit  2019           [place] PCP:  Geovanni GENAO 1960 MRN [id number]         CC:  Nerve Biopsy Consult     HPI:     Fazal Ricardo is a very pleasant 63year old female who presents for nerve biopsy discussion. insertional activity in the paraspinal muscles in the thoracic or lumbar levels.  There is no clear electrophysiologic evidence to suggest a primary demyelinating neuropathy such as CIDP.  However, given the findings on her initial presentation, with veronika Smoking status: Former Smoker        Years: 20.00        Quit date: 2004        Years since quittin.2      Smokeless tobacco: Never Used    Substance and Sexual Activity      Alcohol use: Yes        Comment: \"occasional\"      Drug use:  No biopsy if cleared by cardiology to be off anti-platelets for 7 days and her protein is trending upwards. She will need to see her dietician and be cleared from a nutrition standpoint. Will repeat CMP 1 week prior to sx.   The risks, indication, options, a

## 2019-03-12 NOTE — ANESTHESIA PREPROCEDURE EVALUATION
PRE-OP EVALUATION    Patient Name: Adam Live    Pre-op Diagnosis: Weakness [R53.1]  Neuropathy [G62.9]    Procedure(s):  left sural nerve biopsy    Surgeon(s) and Role:     Josefa Contreras MD - Primary    Pre-op vitals reviewed.   Temp: 99.1 ° Evaluation    Patient summary reviewed.     Anesthetic Complications  (-) history of anesthetic complications         GI/Hepatic/Renal  Comment: Clogged jejunal tube  Malnutrition  Adult failure to thrive    (+) GERD                           Cardiovascular at [facility] ENDOSCOPY   • EGD     • ENDOSCOPIC ULTRASOUND (EUS) N/A 9/6/2018    Performed by Tanja Arreguin DO at [address] [place] [address] (EUS) N/A 12/1/2015    Performed by Donavan Negrete MD at [facility] ENDOSCOPY   • ENTEROSCOPY N/A 12/11/2018 Date     03/06/2019    K 4.7 03/06/2019     03/06/2019    CO2 28.0 03/06/2019    BUN 9 03/06/2019    CREATSERUM 0.53 (L) 03/06/2019    GLU 89 03/06/2019    CA 8.6 03/06/2019     Lab Results   Component Value Date    INR 0.89 (L) 03/06/2019

## 2019-03-12 NOTE — BRIEF OP NOTE
Pre-Operative Diagnosis: Weakness [R53.1]  Neuropathy [G62.9]     Post-Operative Diagnosis: Weakness [R53. 1]Neuropathy [G62.9]      Procedure Performed:   Procedure(s):  left sural nerve biopsy    Surgeon(s) and Role:     Sanket Levy MD - Primary    A

## 2019-03-12 NOTE — H&P
[facility]  Neurosurgery H&P    HISTORY OF PRESENT ILLNESS:  Denise Navarro is a(n) 63year old female with progressive, diffuse numbness and weakness with EMG showing diffuse large fiber polyneuropathy. Here for elective sural nerve biopsy. Right & left heart angiogram   • ANGIOGRAM  2016    Right & left heart angiogram   • ANGIOPLASTY (CORONARY)  2004    stent to LAD   • ANGIOPLASTY (CORONARY)  2005    stent to LAD   • APPENDECTOMY     •       2   • CARDIAC DEFI (age of onset: 36) in her sister; Diabetes in her brother; Lung Disorder in her brother; Other in her father. SOCIAL HISTORY:   reports that she quit smoking about 14 years ago. She quit after 20.00 years of use.  she has never used smokeless tobacco. Sh Clopidogrel Bisulfate (PLAVIX) 75 MG Oral Tab Take 75 mg by mouth daily. Disp:  Rfl:  Past Week at Unknown time   Metoclopramide HCl 10 MG Oral Tab Take 10 mg by mouth 3 (three) times daily before meals.  Disp:  Rfl:  More than a month at Unknown time   Z

## 2019-03-12 NOTE — ANESTHESIA POSTPROCEDURE EVALUATION
[address] Patient Status:  Hospital Outpatient Surgery   Age/Gender 63year old female MRN [id number]   Location [address] [place] [address] Attending Damon Devlin MD   Hosp Day # 0 [facility] 250 [facility]

## 2019-03-12 NOTE — OPERATIVE REPORT
[facility]    OPERATIVE REPORT    Patient:  Herberth Jacinto;  YOB: 1960     CSN:  [id number]; Medical Record Number:  [id number]    Admission Date:  3/12/2019 Operation Date:  3/12/2019    . ..........................     Operating P subcutaneous tissue was closed loosely with 2-0 vicryl sutures. The skin was closed with interrupted 3-0 nylon vertical mattress sutures. The incision was dressed with bacitracin, telfa, fluffs, a loose kerlix wrap and a loose ace bandage wrap.  Once adequ

## 2019-03-13 ENCOUNTER — TELEPHONE (OUTPATIENT)
Dept: FAMILY MEDICINE | Age: 59
End: 2019-03-13

## 2019-03-13 RX ORDER — OMEPRAZOLE 40 MG/1
40 CAPSULE, DELAYED RELEASE ORAL DAILY
Qty: 90 CAPSULE | Refills: 3 | Status: SHIPPED | OUTPATIENT
Start: 2019-03-13 | End: 2020-03-24

## 2019-03-13 RX ORDER — OMEPRAZOLE 40 MG/1
40 CAPSULE, DELAYED RELEASE ORAL DAILY
COMMUNITY
End: 2019-03-13 | Stop reason: SDUPTHER

## 2019-03-15 ENCOUNTER — TELEPHONE (OUTPATIENT)
Dept: FAMILY MEDICINE | Age: 59
End: 2019-03-15

## 2019-03-15 ENCOUNTER — TELEPHONE (OUTPATIENT)
Dept: SURGERY | Facility: CLINIC | Age: 59
End: 2019-03-15

## 2019-03-15 NOTE — TELEPHONE ENCOUNTER
Spoke with [facility] about the pt. They had some questions about dressing the incision site. I provided the instructions requested.      She also was asking that in the future let the pt's pain doctor handle any narcotic prescriptions as Dr. Shayne Lezama had ordered h

## 2019-03-20 RX ORDER — DIPHENOXYLATE HYDROCHLORIDE AND ATROPINE SULFATE 2.5; .025 MG/1; MG/1
TABLET ORAL
COMMUNITY

## 2019-03-21 ENCOUNTER — TELEPHONE (OUTPATIENT)
Dept: FAMILY MEDICINE | Age: 59
End: 2019-03-21

## 2019-03-25 ENCOUNTER — TELEPHONE (OUTPATIENT)
Dept: FAMILY MEDICINE | Age: 59
End: 2019-03-25

## 2019-03-29 ENCOUNTER — OFFICE VISIT (OUTPATIENT)
Dept: SURGERY | Facility: CLINIC | Age: 59
End: 2019-03-29
Payer: COMMERCIAL

## 2019-03-29 VITALS — HEART RATE: 110 BPM | SYSTOLIC BLOOD PRESSURE: 115 MMHG | DIASTOLIC BLOOD PRESSURE: 70 MMHG

## 2019-03-29 DIAGNOSIS — G62.9 NEUROPATHY: Primary | ICD-10-CM

## 2019-03-29 PROCEDURE — 99024 POSTOP FOLLOW-UP VISIT: CPT | Performed by: PHYSICIAN ASSISTANT

## 2019-03-29 NOTE — PROGRESS NOTES
Neurosurgery Clinic Visit  3/29/2019    Angeles Callejas PCP:  Sanna Khalil    1960 MRN [id number]       CC:  S/P L Sural Nerve biopsy 3/12/19 Dr. Vijay Leonard    HPI:    Wanda Staci is here for 2 week post op appt.   Path reveals extensive axonal los sensory axonal involvement than motor involvement.  The presence of increased insertional activity, with polyphasia in the distal lower extremities suggest ongoing denervation versus incomplete reinnervation, consistent with a subacute process.  There was • Vomiting     • Wears glasses     • Weight loss        Social History    Socioeconomic History      Marital status:       Spouse name: Not on file      Number of children: Not on file      Years of education: Not on file      Highest education le overgrown skin       A/P:     1. Neuropathy    2. Weakness    3. Severe protein-calorie malnutrition ([address]    4. S/P L Sural Nerve bx      Stitches prepped with alcohol and removed.   One stitch, the 2nd to the bottom has some retained material.  Recomm

## 2019-04-02 ENCOUNTER — TELEPHONE (OUTPATIENT)
Dept: FAMILY MEDICINE | Age: 59
End: 2019-04-02

## 2019-04-05 ENCOUNTER — OFFICE VISIT (OUTPATIENT)
Dept: NEUROLOGY | Facility: CLINIC | Age: 59
End: 2019-04-05
Payer: COMMERCIAL

## 2019-04-05 VITALS
WEIGHT: 98 LBS | HEIGHT: 66 IN | DIASTOLIC BLOOD PRESSURE: 65 MMHG | BODY MASS INDEX: 15.75 KG/M2 | RESPIRATION RATE: 16 BRPM | HEART RATE: 66 BPM | SYSTOLIC BLOOD PRESSURE: 98 MMHG

## 2019-04-05 DIAGNOSIS — R27.0 ATAXIA: ICD-10-CM

## 2019-04-05 DIAGNOSIS — R53.1 WEAKNESS: ICD-10-CM

## 2019-04-05 DIAGNOSIS — E43 SEVERE PROTEIN-CALORIE MALNUTRITION (HCC): ICD-10-CM

## 2019-04-05 DIAGNOSIS — G62.9 NEUROPATHY: Primary | ICD-10-CM

## 2019-04-05 PROCEDURE — 99214 OFFICE O/P EST MOD 30 MIN: CPT | Performed by: OTHER

## 2019-04-05 NOTE — PROGRESS NOTES
[facility] Progress Note    HPI  Patient presents with:  Neurologic Problem: Follow up      Herberth Jacinto is a 63year old F with complicated past medical history.   Patient was previously seen by my neurology associate, Dr. Luis E Verdin Currently, she feels she can walk but her knees buckle, denies urinary / bowel incontinence - denies headaches aside from when her ICD fires.   She has constipation, and has lost ~30 lbs since 7/2018 - she was having intractable vomiting prior to having 2006- due to being on transplant   • Pneumonia due to organism    • PONV (postoperative nausea and vomiting)    • Presence of other cardiac implants and grafts    • Rash    • Renal disorder     hx of ERIKA d/t dehydration   • Shortness of breath    • Stente • ESOPHAGOGASTRODUODENOSCOPY (EGD) N/A 8/11/2015    Performed by Katelin Scott MD at [facility] ENDOSCOPY   • FOOT SURGERY      fixed nerve   • LAPAROSCOPIC CHOLECYSTECTOMY N/A 10/10/2018    Performed by Humberto Perez MD at [facility] MAIN OR   • NERVE SURAL BIOPSY Sulfa Antibiotics       NAUSEA AND VOMITING    Comment:pancreatitis  Nsaids                  UNKNOWN    Comment:Cardiologist states none to be taken. Current Outpatient Medications:   •  aspirin 81 MG Oral Tab, Take 81 mg by mouth daily. , Disp: , Rfl: Gen: well developed, well nourished, no acute distress  HEENT: normocephalic  Heart; normal H2/C1, regular rate and rhythm  Lungs: clear to auscultation bilaterally  Extremities: no edema, peripheral pulses intact    Neck: supple, full range of motion; no The left sural nerve biopsy was sent to the [facility], for further processing and examination. The biopsy was reviewed by Lanny Pond MD; his diagnosis is below.   For further details, please see the scanned report, 12.0 - 16.0 g/dL   13.9   Hematocrit      34.0 - 50.0 %   41.7   Platelet Count      034.9 - 450.0 10(3)uL   234.0   MCV      81.0 - 100.0 fL   91.6   MCH      27.0 - 33.2 pg   30.5   MCHC      31.0 - 37.0 g/dL   33.3   RDW      11.5 - 16.0 %   13.2 3500 - 5200 mg/dL 2770 (L)     ALBUMIN INDEX      0.0 - 9.0 ratio 3.2     CSF IGG SYNTHESIS RATE      <=8.0 mg/d <0.0     CSF IGG/ALBUMIN RATIO      0.09 - 0.25 ratio 0.21     IGG INDEX      0.28 - 0.66 ratio 0.42     ARSENIC, BLOOD      0.0 - 13.0 ug/ VENTRICLES/SULCI:  Ventricles and sulci are normal in size. INTRACRANIAL:  There are no abnormal extraaxial fluid collections. There is no midline shift. There are no acute intraparenchymal brain abnormalities.   There is nothing specific for acu   ms ms ms   R Peroneal - EDB 58.1 6.0 52.1   R Tibial - AH 59.1 7.1 52.0   R Median - APB 30.2 5.3 24.9   R Ulnar - ADM 29.6 3.6 26.0       EMG                       EMG Summary Table       Spontaneous MUAP Recruitment   Muscle Nerve Roots IA Fib PSW Fasc 8.  Right ulnar motor response was normal; F wave response was normal     EMG (Concentric needle exam):  Concentric needle EMG was performed on the selected muscles listed above in the table, with the following findings;  1.   Increased insertional activity 3.  The left ulnar sensory response, as well as the left radial sensory response were normal.  4.  The left median motor response was normal, as was the left ulnar motor response.   5.  The right median and ulnar motor responses distally were normal.  6.  T Patient had extensive serologic workup (ie vasculitis, MAG Ab, ESR, CRP, MELIZA, heavy metals),  and CSF testing was unremarkable; in addition, workup with CT cervical / thoracic/lumbar spine did not show evidence for a compressive myelopathy.   NMO Ab was [facility] [phone]  4/5/2019

## 2019-04-09 ENCOUNTER — OFFICE VISIT (OUTPATIENT)
Dept: FAMILY MEDICINE | Age: 59
End: 2019-04-09

## 2019-04-09 VITALS
TEMPERATURE: 98.2 F | HEIGHT: 65 IN | HEART RATE: 74 BPM | BODY MASS INDEX: 19.16 KG/M2 | SYSTOLIC BLOOD PRESSURE: 92 MMHG | DIASTOLIC BLOOD PRESSURE: 68 MMHG | WEIGHT: 115 LBS | RESPIRATION RATE: 18 BRPM

## 2019-04-09 DIAGNOSIS — E78.2 MIXED HYPERLIPIDEMIA: ICD-10-CM

## 2019-04-09 DIAGNOSIS — I25.119 CORONARY ARTERY DISEASE INVOLVING NATIVE CORONARY ARTERY OF NATIVE HEART WITH ANGINA PECTORIS (CMD): ICD-10-CM

## 2019-04-09 DIAGNOSIS — I12.9 STAGE 3 CHRONIC KIDNEY DISEASE DUE TO BENIGN HYPERTENSION (CMD): ICD-10-CM

## 2019-04-09 DIAGNOSIS — R62.7 ADULT FAILURE TO THRIVE: ICD-10-CM

## 2019-04-09 DIAGNOSIS — N18.30 STAGE 3 CHRONIC KIDNEY DISEASE DUE TO BENIGN HYPERTENSION (CMD): ICD-10-CM

## 2019-04-09 DIAGNOSIS — G90.09 IDIOPATHIC PERIPHERAL AUTONOMIC NEUROPATHY: ICD-10-CM

## 2019-04-09 DIAGNOSIS — I10 ESSENTIAL HYPERTENSION, BENIGN: ICD-10-CM

## 2019-04-09 DIAGNOSIS — E43 SEVERE PROTEIN-CALORIE MALNUTRITION (CMD): Primary | ICD-10-CM

## 2019-04-09 DIAGNOSIS — Z95.810 AICD (AUTOMATIC CARDIOVERTER/DEFIBRILLATOR) PRESENT: ICD-10-CM

## 2019-04-09 DIAGNOSIS — I50.22 CHRONIC SYSTOLIC CONGESTIVE HEART FAILURE (CMD): ICD-10-CM

## 2019-04-09 PROCEDURE — 99214 OFFICE O/P EST MOD 30 MIN: CPT | Performed by: FAMILY MEDICINE

## 2019-04-09 PROCEDURE — 3078F DIAST BP <80 MM HG: CPT | Performed by: FAMILY MEDICINE

## 2019-04-09 PROCEDURE — 3074F SYST BP LT 130 MM HG: CPT | Performed by: FAMILY MEDICINE

## 2019-04-10 ENCOUNTER — OFFICE VISIT (OUTPATIENT)
Dept: CARDIOLOGY | Age: 59
End: 2019-04-10

## 2019-04-10 ENCOUNTER — ANCILLARY PROCEDURE (OUTPATIENT)
Dept: CARDIOLOGY | Age: 59
End: 2019-04-10
Attending: INTERNAL MEDICINE

## 2019-04-10 VITALS
BODY MASS INDEX: 19.49 KG/M2 | HEART RATE: 96 BPM | HEIGHT: 65 IN | DIASTOLIC BLOOD PRESSURE: 64 MMHG | WEIGHT: 117 LBS | SYSTOLIC BLOOD PRESSURE: 98 MMHG

## 2019-04-10 DIAGNOSIS — F11.29 OPIOID DEPENDENCE WITH OPIOID-INDUCED DISORDER (CMD): Primary | ICD-10-CM

## 2019-04-10 DIAGNOSIS — I12.9 STAGE 3 CHRONIC KIDNEY DISEASE DUE TO BENIGN HYPERTENSION (CMD): ICD-10-CM

## 2019-04-10 DIAGNOSIS — Z45.018 PACEMAKER REPROGRAMMING/CHECK: ICD-10-CM

## 2019-04-10 DIAGNOSIS — R62.7 ADULT FAILURE TO THRIVE: ICD-10-CM

## 2019-04-10 DIAGNOSIS — M81.0 OSTEOPOROSIS OF LUMBAR SPINE: ICD-10-CM

## 2019-04-10 DIAGNOSIS — E78.2 MIXED HYPERLIPIDEMIA: ICD-10-CM

## 2019-04-10 DIAGNOSIS — I10 ESSENTIAL HYPERTENSION, BENIGN: ICD-10-CM

## 2019-04-10 DIAGNOSIS — E43 SEVERE PROTEIN-CALORIE MALNUTRITION (CMD): ICD-10-CM

## 2019-04-10 DIAGNOSIS — G89.29 CHRONIC IDIOPATHIC PAIN SYNDROME: ICD-10-CM

## 2019-04-10 DIAGNOSIS — N18.30 STAGE 3 CHRONIC KIDNEY DISEASE DUE TO BENIGN HYPERTENSION (CMD): ICD-10-CM

## 2019-04-10 DIAGNOSIS — D12.6 TUBULAR ADENOMA OF COLON: ICD-10-CM

## 2019-04-10 DIAGNOSIS — I25.2 OLD MYOCARDIAL INFARCTION: ICD-10-CM

## 2019-04-10 DIAGNOSIS — Z95.810 AICD (AUTOMATIC CARDIOVERTER/DEFIBRILLATOR) PRESENT: ICD-10-CM

## 2019-04-10 DIAGNOSIS — I25.119 CORONARY ARTERY DISEASE INVOLVING NATIVE CORONARY ARTERY OF NATIVE HEART WITH ANGINA PECTORIS (CMD): ICD-10-CM

## 2019-04-10 DIAGNOSIS — I50.22 CHRONIC SYSTOLIC CONGESTIVE HEART FAILURE (CMD): ICD-10-CM

## 2019-04-10 PROCEDURE — 3078F DIAST BP <80 MM HG: CPT | Performed by: INTERNAL MEDICINE

## 2019-04-10 PROCEDURE — 93283 PRGRMG EVAL IMPLANTABLE DFB: CPT | Performed by: INTERNAL MEDICINE

## 2019-04-10 PROCEDURE — 93290 INTERROG DEV EVAL ICPMS IP: CPT | Performed by: INTERNAL MEDICINE

## 2019-04-10 PROCEDURE — 99215 OFFICE O/P EST HI 40 MIN: CPT | Performed by: INTERNAL MEDICINE

## 2019-04-10 PROCEDURE — 3074F SYST BP LT 130 MM HG: CPT | Performed by: INTERNAL MEDICINE

## 2019-04-10 RX ORDER — DULOXETIN HYDROCHLORIDE 30 MG/1
30 CAPSULE, DELAYED RELEASE ORAL 2 TIMES DAILY
Qty: 60 CAPSULE | Refills: 4 | Status: SHIPPED | OUTPATIENT
Start: 2019-04-10 | End: 2020-03-16 | Stop reason: SDUPTHER

## 2019-04-10 RX ORDER — ONDANSETRON 8 MG/1
8 TABLET, ORALLY DISINTEGRATING ORAL EVERY 8 HOURS PRN
Qty: 20 TABLET | Refills: 1 | Status: SHIPPED | OUTPATIENT
Start: 2019-04-10

## 2019-04-10 RX ORDER — METOCLOPRAMIDE 10 MG/1
10 TABLET ORAL DAILY
Qty: 30 TABLET | Refills: 1 | Status: SHIPPED | OUTPATIENT
Start: 2019-04-10 | End: 2020-05-26

## 2019-04-10 RX ORDER — METOCLOPRAMIDE 10 MG/1
10 TABLET ORAL DAILY
COMMUNITY
End: 2019-04-10 | Stop reason: SDUPTHER

## 2019-04-10 ASSESSMENT — ENCOUNTER SYMPTOMS: NUMBNESS: 1

## 2019-04-11 RX ORDER — SIMVASTATIN 40 MG
40 TABLET ORAL DAILY
Qty: 90 TABLET | Refills: 1 | Status: SHIPPED | OUTPATIENT
Start: 2019-04-11 | End: 2020-05-14

## 2019-04-12 ENCOUNTER — TELEPHONE (OUTPATIENT)
Dept: FAMILY MEDICINE | Age: 59
End: 2019-04-12

## 2019-04-26 ENCOUNTER — APPOINTMENT (OUTPATIENT)
Dept: CARDIOLOGY | Age: 59
End: 2019-04-26
Attending: INTERNAL MEDICINE

## 2019-04-30 ENCOUNTER — TELEPHONE (OUTPATIENT)
Dept: FAMILY MEDICINE | Age: 59
End: 2019-04-30

## 2019-05-17 ENCOUNTER — TELEPHONE (OUTPATIENT)
Dept: FAMILY MEDICINE | Age: 59
End: 2019-05-17

## 2019-05-28 ENCOUNTER — TELEPHONE (OUTPATIENT)
Dept: FAMILY MEDICINE | Age: 59
End: 2019-05-28

## 2019-06-24 ENCOUNTER — HOSPITAL ENCOUNTER (OUTPATIENT)
Dept: GENERAL RADIOLOGY | Facility: HOSPITAL | Age: 59
Discharge: HOME | End: 2019-06-24
Attending: STUDENT IN AN ORGANIZED HEALTH CARE EDUCATION/TRAINING PROGRAM
Payer: COMMERCIAL

## 2019-06-24 ENCOUNTER — LAB ENCOUNTER (OUTPATIENT)
Dept: LAB | Facility: HOSPITAL | Age: 59
End: 2019-06-24
Attending: STUDENT IN AN ORGANIZED HEALTH CARE EDUCATION/TRAINING PROGRAM
Payer: COMMERCIAL

## 2019-06-24 DIAGNOSIS — E43 SEVERE PROTEIN-CALORIE MALNUTRITION (HCC): ICD-10-CM

## 2019-06-24 DIAGNOSIS — R53.1 WEAKNESS: ICD-10-CM

## 2019-06-24 DIAGNOSIS — G62.9 NEUROPATHY: ICD-10-CM

## 2019-06-24 DIAGNOSIS — R62.7 ADULT FAILURE TO THRIVE SYNDROME: ICD-10-CM

## 2019-06-24 DIAGNOSIS — T85.528A DISLODGED JEJUNOSTOMY TUBE: ICD-10-CM

## 2019-06-24 DIAGNOSIS — Z01.818 PRE-OP TESTING: ICD-10-CM

## 2019-06-24 PROCEDURE — 74021 RADEX ABDOMEN 3+ VIEWS: CPT | Performed by: STUDENT IN AN ORGANIZED HEALTH CARE EDUCATION/TRAINING PROGRAM

## 2019-06-24 PROCEDURE — 71046 X-RAY EXAM CHEST 2 VIEWS: CPT | Performed by: STUDENT IN AN ORGANIZED HEALTH CARE EDUCATION/TRAINING PROGRAM

## 2019-06-24 PROCEDURE — 36415 COLL VENOUS BLD VENIPUNCTURE: CPT

## 2019-06-24 PROCEDURE — 80053 COMPREHEN METABOLIC PANEL: CPT

## 2019-06-24 PROCEDURE — 84134 ASSAY OF PREALBUMIN: CPT

## 2019-06-24 PROCEDURE — 85025 COMPLETE CBC W/AUTO DIFF WBC: CPT

## 2019-06-27 ENCOUNTER — TELEPHONE (OUTPATIENT)
Dept: CARDIOLOGY | Age: 59
End: 2019-06-27

## 2019-06-28 NOTE — PROGRESS NOTES
X ray shows Clip still holding the J tube in place distally. G tube removal should be done endoscopically so we can cut the suture the clip is affixed to, so as to avoid any trauma to the intestinal wall.     PM

## 2019-07-01 ENCOUNTER — TELEPHONE (OUTPATIENT)
Dept: CARDIOLOGY | Age: 59
End: 2019-07-01

## 2019-07-02 ENCOUNTER — TELEPHONE (OUTPATIENT)
Dept: CARDIOLOGY | Age: 59
End: 2019-07-02

## 2019-07-03 RX ORDER — ZOLPIDEM TARTRATE 6.25 MG/1
TABLET, FILM COATED, EXTENDED RELEASE ORAL
Qty: 90 TABLET | Refills: 0 | Status: SHIPPED | OUTPATIENT
Start: 2019-07-03 | End: 2020-01-24 | Stop reason: SDUPTHER

## 2019-07-06 ENCOUNTER — OFFICE VISIT (OUTPATIENT)
Dept: INTERNAL MEDICINE | Age: 59
End: 2019-07-06

## 2019-07-06 ENCOUNTER — LAB SERVICES (OUTPATIENT)
Dept: LAB | Age: 59
End: 2019-07-06

## 2019-07-06 DIAGNOSIS — R30.0 DYSURIA: Primary | ICD-10-CM

## 2019-07-06 DIAGNOSIS — R30.0 DYSURIA: ICD-10-CM

## 2019-07-06 DIAGNOSIS — N39.0 URINARY TRACT INFECTION WITHOUT HEMATURIA, SITE UNSPECIFIED: ICD-10-CM

## 2019-07-06 LAB
BILIRUBIN URINE: ABNORMAL
BLOOD URINE: ABNORMAL
CLARITY: ABNORMAL
COLOR: ABNORMAL
GLUCOSE QUALITATIVE U: NEGATIVE
KETONES, URINE: ABNORMAL
LEUKOCYTE ESTERASE URINE: ABNORMAL
NITRITE URINE: POSITIVE
PH URINE: 7 (ref 5–7)
RED BLOOD CELLS URINE: ABNORMAL (ref 0–3)
SPECIFIC GRAVITY URINE: 1.02 (ref 1–1.03)
SQUAMOUS EPITHELIAL CELLS: ABNORMAL
URINE PROTEIN, QUAL (DIPSTICK): 100
UROBILINOGEN URINE: ABNORMAL
WHITE BLOOD CELLS URINE: ABNORMAL (ref 0–5)

## 2019-07-06 PROCEDURE — 87088 URINE BACTERIA CULTURE: CPT | Performed by: INTERNAL MEDICINE

## 2019-07-06 PROCEDURE — 81001 URINALYSIS AUTO W/SCOPE: CPT | Performed by: INTERNAL MEDICINE

## 2019-07-06 PROCEDURE — 99214 OFFICE O/P EST MOD 30 MIN: CPT | Performed by: INTERNAL MEDICINE

## 2019-07-06 PROCEDURE — 87086 URINE CULTURE/COLONY COUNT: CPT | Performed by: INTERNAL MEDICINE

## 2019-07-06 PROCEDURE — 87186 SC STD MICRODIL/AGAR DIL: CPT | Performed by: INTERNAL MEDICINE

## 2019-07-06 PROCEDURE — 3078F DIAST BP <80 MM HG: CPT | Performed by: INTERNAL MEDICINE

## 2019-07-06 PROCEDURE — 3074F SYST BP LT 130 MM HG: CPT | Performed by: INTERNAL MEDICINE

## 2019-07-06 RX ORDER — CIPROFLOXACIN 250 MG/1
250 TABLET, FILM COATED ORAL 2 TIMES DAILY
Qty: 10 TABLET | Refills: 0 | Status: SHIPPED | OUTPATIENT
Start: 2019-07-06 | End: 2019-07-11

## 2019-07-06 ASSESSMENT — PATIENT HEALTH QUESTIONNAIRE - PHQ9
SUM OF ALL RESPONSES TO PHQ9 QUESTIONS 1 AND 2: 0
2. FEELING DOWN, DEPRESSED OR HOPELESS: NOT AT ALL
SUM OF ALL RESPONSES TO PHQ9 QUESTIONS 1 AND 2: 0
1. LITTLE INTEREST OR PLEASURE IN DOING THINGS: NOT AT ALL

## 2019-07-06 ASSESSMENT — PAIN SCALES - GENERAL: PAINLEVEL: 5-6

## 2019-07-08 LAB — FINAL REPORT: ABNORMAL

## 2019-07-18 RX ORDER — ONDANSETRON 8 MG/1
TABLET, ORALLY DISINTEGRATING ORAL
Qty: 20 TABLET | Refills: 0 | OUTPATIENT
Start: 2019-07-18

## 2019-07-23 ENCOUNTER — TELEPHONE (OUTPATIENT)
Dept: CARDIOLOGY | Age: 59
End: 2019-07-23

## 2019-07-30 ENCOUNTER — APPOINTMENT (OUTPATIENT)
Dept: FAMILY MEDICINE | Age: 59
End: 2019-07-30

## 2019-08-07 PROCEDURE — 93295 DEV INTERROG REMOTE 1/2/MLT: CPT | Performed by: INTERNAL MEDICINE

## 2019-08-07 PROCEDURE — 93296 REM INTERROG EVL PM/IDS: CPT | Performed by: INTERNAL MEDICINE

## 2019-08-16 ENCOUNTER — TELEPHONE (OUTPATIENT)
Dept: CARDIOLOGY | Age: 59
End: 2019-08-16

## 2019-08-19 ENCOUNTER — ANCILLARY PROCEDURE (OUTPATIENT)
Dept: CARDIOLOGY | Age: 59
End: 2019-08-19
Attending: INTERNAL MEDICINE

## 2019-08-19 DIAGNOSIS — Z95.810 ICD (IMPLANTABLE CARDIOVERTER-DEFIBRILLATOR) IN PLACE: ICD-10-CM

## 2019-08-29 ENCOUNTER — ANCILLARY PROCEDURE (OUTPATIENT)
Dept: CARDIOLOGY | Age: 59
End: 2019-08-29
Attending: INTERNAL MEDICINE

## 2019-08-29 ENCOUNTER — ANCILLARY ORDERS (OUTPATIENT)
Dept: CARDIOLOGY | Age: 59
End: 2019-08-29

## 2019-08-29 DIAGNOSIS — Z95.810 ICD (IMPLANTABLE CARDIOVERTER-DEFIBRILLATOR) IN PLACE: ICD-10-CM

## 2019-08-30 ENCOUNTER — HOSPITAL ENCOUNTER (OUTPATIENT)
Facility: HOSPITAL | Age: 59
Setting detail: HOSPITAL OUTPATIENT SURGERY
Discharge: HOME | End: 2019-08-30
Attending: STUDENT IN AN ORGANIZED HEALTH CARE EDUCATION/TRAINING PROGRAM | Admitting: STUDENT IN AN ORGANIZED HEALTH CARE EDUCATION/TRAINING PROGRAM
Payer: COMMERCIAL

## 2019-08-30 VITALS
OXYGEN SATURATION: 100 % | DIASTOLIC BLOOD PRESSURE: 61 MMHG | RESPIRATION RATE: 16 BRPM | SYSTOLIC BLOOD PRESSURE: 94 MMHG | HEART RATE: 76 BPM | TEMPERATURE: 98 F

## 2019-08-30 DIAGNOSIS — K94.20 COMPLICATION OF GASTROSTOMY TUBE (HCC): ICD-10-CM

## 2019-08-30 PROCEDURE — 0DP6XUZ REMOVAL OF FEEDING DEVICE FROM STOMACH, EXTERNAL APPROACH: ICD-10-PCS | Performed by: STUDENT IN AN ORGANIZED HEALTH CARE EDUCATION/TRAINING PROGRAM

## 2019-08-30 PROCEDURE — G0463 HOSPITAL OUTPT CLINIC VISIT: HCPCS | Performed by: STUDENT IN AN ORGANIZED HEALTH CARE EDUCATION/TRAINING PROGRAM

## 2019-08-30 NOTE — PROGRESS NOTES
G tube was half way out since this morning per pt, ALLEN Munoz pulled on the tubing gently and the full tube came out intact. MD spoke to pt and  at length re care of site and reinforced by staff, both verbalized understanding of instructions given.

## 2019-09-02 NOTE — H&P
Gastroenterology H/P  I have personally seen and examined the patient. Patient Name: Gertrude Coelho  CC: Problems with G-J tube  HPI: Patient is well known to my clinic. Had [address] tube placed for nausea and vomiting and weight loss.   Etiology was • Renal disorder     hx of ERIKA d/t dehydration   • Shortness of breath    • Stented coronary artery    • Syncope    • Systolic heart failure (HCC)    • Thrush of mouth and esophagus (HCC)    • Uncomfortable fullness after meals    • Visual impairment Chandni Sol MD at [facility] MAIN OR   • NERVE SURAL BIOPSY Left 3/12/2019    Performed by Destiney Garcia MD at [facility] MAIN OR   • OTHER      apparatus- around the heart- net(mesh metal sleeve)   • OTHER  03/12/2019    left sural nerve biopsy   • OTHER SURGICAL needed for Sleep.  Disp:  Rfl:      Allergies:   Codeine Sulfate         NAUSEA ONLY    Comment:Pt states \"it messes with my stomach, but I can             take it if I need it\"  Sulfa Antibiotics       NAUSEA AND VOMITING    Comment:pancreatitis  Nsaids

## 2019-09-02 NOTE — OPERATIVE REPORT
Patient: Haim Lackey   1960  Date of Service: 19  Procedure: Amezquita Coffee tube removal    Anesthesia: none    Exam/Procedure: Patient's G tube site was examined. Minor granulation tissue noted.   The tubing was pulled gently and there was no res

## 2019-09-05 ENCOUNTER — DOCUMENTATION (OUTPATIENT)
Dept: CARDIOLOGY | Age: 59
End: 2019-09-05

## 2019-09-05 ENCOUNTER — TELEPHONE (OUTPATIENT)
Dept: CARDIOLOGY | Age: 59
End: 2019-09-05

## 2019-09-10 ENCOUNTER — HOSPITAL ENCOUNTER (EMERGENCY)
Facility: HOSPITAL | Age: 59
Discharge: HOME | End: 2019-09-10
Attending: EMERGENCY MEDICINE
Payer: COMMERCIAL

## 2019-09-10 ENCOUNTER — ANESTHESIA (OUTPATIENT)
Dept: ENDOSCOPY | Facility: HOSPITAL | Age: 59
End: 2019-09-10
Payer: COMMERCIAL

## 2019-09-10 ENCOUNTER — ANESTHESIA EVENT (OUTPATIENT)
Dept: ENDOSCOPY | Facility: HOSPITAL | Age: 59
End: 2019-09-10
Payer: COMMERCIAL

## 2019-09-10 VITALS
BODY MASS INDEX: 18.66 KG/M2 | TEMPERATURE: 98 F | HEIGHT: 65 IN | RESPIRATION RATE: 16 BRPM | SYSTOLIC BLOOD PRESSURE: 108 MMHG | OXYGEN SATURATION: 99 % | DIASTOLIC BLOOD PRESSURE: 69 MMHG | HEART RATE: 85 BPM | WEIGHT: 112 LBS

## 2019-09-10 DIAGNOSIS — R10.9 ABDOMINAL PAIN OF UNKNOWN ETIOLOGY: Primary | ICD-10-CM

## 2019-09-10 LAB
ALBUMIN SERPL-MCNC: 2.2 G/DL (ref 3.4–5)
ALBUMIN/GLOB SERPL: 0.5 {RATIO} (ref 1–2)
ALP LIVER SERPL-CCNC: 232 U/L (ref 46–118)
ALT SERPL-CCNC: 56 U/L (ref 13–56)
ANION GAP SERPL CALC-SCNC: 9 MMOL/L (ref 0–18)
AST SERPL-CCNC: 104 U/L (ref 15–37)
BASOPHILS # BLD AUTO: 0.19 X10(3) UL (ref 0–0.2)
BASOPHILS NFR BLD AUTO: 1.5 %
BILIRUB SERPL-MCNC: 0.6 MG/DL (ref 0.1–2)
BUN BLD-MCNC: 5 MG/DL (ref 7–18)
BUN/CREAT SERPL: 7.1 (ref 10–20)
CALCIUM BLD-MCNC: 8.5 MG/DL (ref 8.5–10.1)
CHLORIDE SERPL-SCNC: 104 MMOL/L (ref 98–112)
CO2 SERPL-SCNC: 24 MMOL/L (ref 21–32)
CREAT BLD-MCNC: 0.7 MG/DL (ref 0.55–1.02)
DEPRECATED RDW RBC AUTO: 51.1 FL (ref 35.1–46.3)
EOSINOPHIL # BLD AUTO: 0.17 X10(3) UL (ref 0–0.7)
EOSINOPHIL NFR BLD AUTO: 1.3 %
ERYTHROCYTE [DISTWIDTH] IN BLOOD BY AUTOMATED COUNT: 14.6 % (ref 11–15)
GLOBULIN PLAS-MCNC: 4.4 G/DL (ref 2.8–4.4)
GLUCOSE BLD-MCNC: 107 MG/DL (ref 70–99)
GLUCOSE UR STRIP.AUTO-MCNC: NEGATIVE MG/DL
HCT VFR BLD AUTO: 40.7 % (ref 35–48)
HGB BLD-MCNC: 13.4 G/DL (ref 12–16)
HYALINE CASTS #/AREA URNS AUTO: PRESENT /LPF
IMM GRANULOCYTES # BLD AUTO: 0.09 X10(3) UL (ref 0–1)
IMM GRANULOCYTES NFR BLD: 0.7 %
KETONES UR STRIP.AUTO-MCNC: NEGATIVE MG/DL
LYMPHOCYTES # BLD AUTO: 1.85 X10(3) UL (ref 1–4)
LYMPHOCYTES NFR BLD AUTO: 14.3 %
M PROTEIN MFR SERPL ELPH: 6.6 G/DL (ref 6.4–8.2)
MCH RBC QN AUTO: 31.2 PG (ref 26–34)
MCHC RBC AUTO-ENTMCNC: 32.9 G/DL (ref 31–37)
MCV RBC AUTO: 94.9 FL (ref 80–100)
MONOCYTES # BLD AUTO: 0.99 X10(3) UL (ref 0.1–1)
MONOCYTES NFR BLD AUTO: 7.7 %
NEUTROPHILS # BLD AUTO: 9.61 X10 (3) UL (ref 1.5–7.7)
NEUTROPHILS # BLD AUTO: 9.61 X10(3) UL (ref 1.5–7.7)
NEUTROPHILS NFR BLD AUTO: 74.5 %
NITRITE UR QL STRIP.AUTO: NEGATIVE
OSMOLALITY SERPL CALC.SUM OF ELEC: 282 MOSM/KG (ref 275–295)
PH UR STRIP.AUTO: 6 [PH] (ref 4.5–8)
PLATELET # BLD AUTO: 196 10(3)UL (ref 150–450)
POTASSIUM SERPL-SCNC: 3.6 MMOL/L (ref 3.5–5.1)
PROT UR STRIP.AUTO-MCNC: 100 MG/DL
RBC # BLD AUTO: 4.29 X10(6)UL (ref 3.8–5.3)
RBC UR QL AUTO: NEGATIVE
SODIUM SERPL-SCNC: 137 MMOL/L (ref 136–145)
SP GR UR STRIP.AUTO: 1.03 (ref 1–1.03)
UROBILINOGEN UR STRIP.AUTO-MCNC: 4 MG/DL
WBC # BLD AUTO: 12.9 X10(3) UL (ref 4–11)

## 2019-09-10 PROCEDURE — 96361 HYDRATE IV INFUSION ADD-ON: CPT

## 2019-09-10 PROCEDURE — 85025 COMPLETE CBC W/AUTO DIFF WBC: CPT | Performed by: EMERGENCY MEDICINE

## 2019-09-10 PROCEDURE — 87086 URINE CULTURE/COLONY COUNT: CPT | Performed by: EMERGENCY MEDICINE

## 2019-09-10 PROCEDURE — 96374 THER/PROPH/DIAG INJ IV PUSH: CPT

## 2019-09-10 PROCEDURE — 81001 URINALYSIS AUTO W/SCOPE: CPT | Performed by: EMERGENCY MEDICINE

## 2019-09-10 PROCEDURE — 0D568ZZ DESTRUCTION OF STOMACH, VIA NATURAL OR ARTIFICIAL OPENING ENDOSCOPIC: ICD-10-PCS | Performed by: INTERNAL MEDICINE

## 2019-09-10 PROCEDURE — 80053 COMPREHEN METABOLIC PANEL: CPT | Performed by: EMERGENCY MEDICINE

## 2019-09-10 PROCEDURE — 3E0G3GC INTRODUCTION OF OTHER THERAPEUTIC SUBSTANCE INTO UPPER GI, PERCUTANEOUS APPROACH: ICD-10-PCS | Performed by: INTERNAL MEDICINE

## 2019-09-10 PROCEDURE — 99285 EMERGENCY DEPT VISIT HI MDM: CPT

## 2019-09-10 RX ORDER — ONDANSETRON 2 MG/ML
INJECTION INTRAMUSCULAR; INTRAVENOUS
Status: DISCONTINUED
Start: 2019-09-10 | End: 2019-09-10

## 2019-09-10 RX ORDER — NALOXONE HYDROCHLORIDE 0.4 MG/ML
80 INJECTION, SOLUTION INTRAMUSCULAR; INTRAVENOUS; SUBCUTANEOUS AS NEEDED
Status: DISCONTINUED | OUTPATIENT
Start: 2019-09-10 | End: 2019-09-10

## 2019-09-10 RX ORDER — SODIUM CHLORIDE, SODIUM LACTATE, POTASSIUM CHLORIDE, CALCIUM CHLORIDE 600; 310; 30; 20 MG/100ML; MG/100ML; MG/100ML; MG/100ML
INJECTION, SOLUTION INTRAVENOUS CONTINUOUS
Status: DISCONTINUED | OUTPATIENT
Start: 2019-09-10 | End: 2019-09-10

## 2019-09-10 RX ORDER — ONDANSETRON 2 MG/ML
4 INJECTION INTRAMUSCULAR; INTRAVENOUS ONCE
Status: COMPLETED | OUTPATIENT
Start: 2019-09-10 | End: 2019-09-10

## 2019-09-10 RX ORDER — LEVOFLOXACIN 5 MG/ML
INJECTION, SOLUTION INTRAVENOUS
Status: DISCONTINUED
Start: 2019-09-10 | End: 2019-09-10 | Stop reason: WASHOUT

## 2019-09-10 NOTE — CONSULTS
[facility]                       Gastroenterology Consultation-[facility] Gastroenterology    Deniz Hardik Patient Status:  Emergency    1960 MRN [id number]   Location 6[address] [facility] Attending Say Grider MD LAD, angioplasty and stent   • Diarrhea, unspecified    • Dizziness    • Esophageal reflux    • Fatigue    • Fever    • Frequent use of laxatives    • Frequent UTI    • Heart attack [facility])    • Heart palpitations    • Hemorrhoids    • History of blood tr ENDOSCOPY   • ENDOSCOPIC ULTRASOUND (EUS) N/A 12/1/2015    Performed by Anson Reed MD at [facility] ENDOSCOPY   • ENTEROSCOPY N/A 12/11/2018    Performed by Layne Heimlich, MD at [facility] ENDOSCOPY   • EP ELECTROPHYSIOLOGY STUDY  04/13/2006   • ERCP,DIAGNOSTIC taken. SocHx:  Quit smoking 2004; The patient drinks alcohol on social occasions; The patient has no history of IV drug use or other illicit substances. FamHx: The patient has no family history of colon cancer or other gastrointestinal malignancies;   No gastrostomy site with moderate palpation, non-distended with the presence of bowel sounds; No hepatosplenomegaly; no rebound or guarding;  No ascites is clinically apparent; no tympany to percussion; gastrostomy site without drainage at this time, elida needed     Tivis Lanes MD    Impression: 63 yr-old female with hx of MI, CAD s/p PCI (ASA/Plavix; last dose 9/4/19), ICM s/p ICD, anorexia (reports only as a teen), and recurrent nausea/vomiting c/b progressive wt loss s/p PEG tube with jejunal extension removed. This was removed recently, by Dr. Blessing Ariza, 1.5 weeks ago, as the gastric balloon had deflated. Within a few days of that, she began noticing significant drainage, which has persisted over the past week.   She reports that in the past 3-4 days, she

## 2019-09-10 NOTE — ANESTHESIA POSTPROCEDURE EVALUATION
[address] Patient Status:  Emergency   Age/Gender 63year old female MRN [id number]   Location 118 [facility]. Attending No att. providers found   Hosp Day # 0 PCP SHRUTI DESAI       Anesthesia Post-op Note    Pro

## 2019-09-10 NOTE — OPERATIVE REPORT
EGD operative report  Patient Name: Mark Gabriel  Procedure: Esophagogastroduodenoscopy with APC ablation and Padlock closure of gastric fistula  Date of procedure: 9/10/2019    Indication: Gastric fistula  Attending: Mateus Azul M.D.   Cons gastric body, antrum, fundus, cardia, and angularis were normal, without masses, polyps, ulcers, erosions, diverticula, or varices. A patent gastrostomy site was appreciated along the greater curvature of the distal body.   After wire brush excoriation of

## 2019-09-10 NOTE — ED PROVIDER NOTES
Patient Seen in: [facility] Emergency Department    History   Patient presents with:  Cath Tube Problem (gastrointestinal, urinary, integumentary)  Nausea/Vomiting/Diarrhea (gastrointestinal)    Stated Complaint: Had G-J tube removed 2 weeks ago, sina antineoplastic chemotherapy     2006- due to being on transplant   • Pneumonia due to organism    • PONV (postoperative nausea and vomiting)    • Presence of other cardiac implants and grafts    • Rash    • Renal disorder     hx of ERIKA d/t dehydration   • ENDOSCOPY   • ESOPHAGOGASTRODUODENOSCOPY (EGD) N/A 8/11/2015    Performed by Hien Valencia MD at [facility] ENDOSCOPY   • FOOT SURGERY      fixed nerve   • GASTROSTOMY TUBE REMOVAL/REPLACEMENT N/A 8/30/2019    Performed by Candice Singh MD at [facility] ENDOSCOPY atraumatic conjunctiva is clear. Sclerae anicteric. Neck is supple. Lungs are clear to auscultation bilaterally.   Heart is regular rate and rhythm without murmur gallop or rub    Abdomen is soft nondistended with tenderness to deep palpation around th -----------         ------                     CBC W/ DIFFERENTIAL[[id number]]          Abnormal            Final result                 Please view results for these tests on the individual orders.    RAINBOW DRAW BLUE   RAINBOW D

## 2019-09-10 NOTE — H&P (VIEW-ONLY)
[facility]                       [facility]    Hector Arguello Patient Status:  Emergency    1960 MRN [id number]   Location 6[address] [facility] Attending Tatiana Pedersen MD LAD, angioplasty and stent   • Diarrhea, unspecified    • Dizziness    • Esophageal reflux    • Fatigue    • Fever    • Frequent use of laxatives    • Frequent UTI    • Heart attack [facility])    • Heart palpitations    • Hemorrhoids    • History of blood tr ENDOSCOPY   • ENDOSCOPIC ULTRASOUND (EUS) N/A 12/1/2015    Performed by Andrade Sheehan MD at [facility] ENDOSCOPY   • ENTEROSCOPY N/A 12/11/2018    Performed by Miller Roberts MD at [facility] ENDOSCOPY   • EP ELECTROPHYSIOLOGY STUDY  04/13/2006   • ERCP,DIAGNOSTIC taken. SocHx:  Quit smoking 2004; The patient drinks alcohol on social occasions; The patient has no history of IV drug use or other illicit substances. FamHx: The patient has no family history of colon cancer or other gastrointestinal malignancies;   No gastrostomy site with moderate palpation, non-distended with the presence of bowel sounds; No hepatosplenomegaly; no rebound or guarding;  No ascites is clinically apparent; no tympany to percussion; gastrostomy site without drainage at this time, elida needed     Ct Isaacs MD    Impression: 63 yr-old female with hx of MI, CAD s/p PCI (ASA/Plavix; last dose 9/4/19), ICM s/p ICD, anorexia (reports only as a teen), and recurrent nausea/vomiting c/b progressive wt loss s/p PEG tube with jejunal extension removed. This was removed recently, by Dr. Regina Muller, 1.5 weeks ago, as the gastric balloon had deflated. Within a few days of that, she began noticing significant drainage, which has persisted over the past week.   She reports that in the past 3-4 days, she

## 2019-09-10 NOTE — ED INITIAL ASSESSMENT (HPI)
Pt states had a g-j tube removed 2 weeks ago, pt noted brown discharge from site with burning pain. Vomiting x 3 days.

## 2019-09-10 NOTE — H&P
Mely Osei MD   Physician   GASTROENTEROLOGY   Consults   Signed   Date of Service:  9/10/2019  1:25 PM               [facility]                       Gastroenterology [address] [place] [address] Gastroenterology           Maye Ortiz • Chest pain on exertion     • CONGESTIVE HEART FAILURE       pt is on waiting list for heart transplant/pt had a heart attack 2004   • Constipation     • Coronary artery disease       LAD, angioplasty and stent   • Diarrhea, unspecified     • Dizziness   • CARDIAC PACEMAKER PLACEMENT       • CATH DRUG ELUTING STENT       • COLONOSCOPY       • COLONOSCOPY N/A 7/13/2015     Performed by Dayana Holder MD at [facility] ENDOSCOPY   • EGD       • ENDOSCOPIC ULTRASOUND (EUS) N/A 9/6/2018     Performed by Manisha Meza, Allergies:   Codeine Sulfate         NAUSEA ONLY    Comment:Pt states \"it messes with my stomach, but I can             take it if I need it\"  Sulfa Antibiotics       NAUSEA AND VOMITING    Comment:pancreatitis  Nsaids                  UNKNOWN    Comment HENT: EOMI, PERRL, oropharynx is clear with moist mucosal membranes  Eyes: Sclerae are anicteric  Neck:  Supple without nuchal rigidity  CV: Regular rate and rhythm, with normal S1 and S2  Resp: Clear to auscultation bilaterally without wheezes; rubs, rhon Exam/Procedure: Patient's G tube site was examined.  Minor granulation tissue noted.  The tubing was pulled gently and there was no resistance.  The balloon easily passed through ostomy, and was noted to be already deflated (likely from balloon rupture or I have personally seen and examined the patient. I agree with the plan as outlined above by my NP, Juan Samayoa [facility]). This is a 62 yo woman who is well known to our service related to abnormal weight loss with recurrent nausea/vomiting.   Extensive

## 2019-09-10 NOTE — ANESTHESIA PREPROCEDURE EVALUATION
PRE-OP EVALUATION    Patient Name: David Miss    Pre-op Diagnosis: J tube migration    Procedure(s):  ESOPHAGOGASTRODUODENOSCOPY    Surgeon(s) and Role:     * Torres Munoz MD - Primary    Pre-op vitals reviewed.   Temp: 98 °F (36.7 °C)  P Endo/Other    Negative endo/other ROS. Pulmonary    Negative pulmonary ROS.                        Neuro/Psych      (+) depression  (+) anxiety                            Past Surgical History:   Procedure Lateralit Chandni Sol MD at [facility] MAIN OR   • NERVE SURAL BIOPSY Left 3/12/2019    Performed by Destiney Garcia MD at [facility] MAIN OR   • OTHER      apparatus- around the heart- net(mesh metal sleeve)   • OTHER  03/12/2019    left sural nerve biopsy   • OTHER SURGICAL

## 2019-09-17 ENCOUNTER — TELEPHONE (OUTPATIENT)
Dept: CARDIOLOGY | Age: 59
End: 2019-09-17

## 2019-09-20 ENCOUNTER — HOSPITAL ENCOUNTER (OUTPATIENT)
Dept: GENERAL RADIOLOGY | Facility: HOSPITAL | Age: 59
Discharge: HOME | End: 2019-09-20
Attending: STUDENT IN AN ORGANIZED HEALTH CARE EDUCATION/TRAINING PROGRAM
Payer: COMMERCIAL

## 2019-09-20 DIAGNOSIS — G43.A0 CYCLICAL VOMITING WITH NAUSEA, INTRACTABILITY OF VOMITING NOT SPECIFIED: ICD-10-CM

## 2019-09-20 PROCEDURE — 74245 XR UPPER GI TRACT + SMALL BOWEL (CPT=74245): CPT | Performed by: STUDENT IN AN ORGANIZED HEALTH CARE EDUCATION/TRAINING PROGRAM

## 2019-09-22 NOTE — PROGRESS NOTES
Generally normal appearing small bowel. The contrast moves slowly, but no sign of obstruction or blockage, and visual findings would not explain nausea. Continue with EGD as ordered.   PM

## 2019-09-23 ENCOUNTER — DOCUMENTATION (OUTPATIENT)
Dept: CARDIOLOGY | Age: 59
End: 2019-09-23

## 2019-09-24 ENCOUNTER — TELEPHONE (OUTPATIENT)
Dept: CARDIOLOGY | Age: 59
End: 2019-09-24

## 2019-09-26 ENCOUNTER — TELEPHONE (OUTPATIENT)
Dept: CARDIOLOGY | Age: 59
End: 2019-09-26

## 2019-09-26 ENCOUNTER — ANESTHESIA EVENT (OUTPATIENT)
Dept: ENDOSCOPY | Facility: HOSPITAL | Age: 59
End: 2019-09-26

## 2019-09-26 NOTE — PAT NURSING NOTE
Chart reviewed by anesthesiologist, Dr. Janet Sanchez for cardiomyopathy. Received an order for cardiac clearance. Faxed this request to the surgeon and Dr. Maury Victor. Received fax confirmation.  Telephoned Dr. Chasity Miranda office and spoke to [place] and [facility]

## 2019-09-27 ENCOUNTER — HOSPITAL ENCOUNTER (OUTPATIENT)
Facility: HOSPITAL | Age: 59
Setting detail: HOSPITAL OUTPATIENT SURGERY
Discharge: HOME | End: 2019-09-27
Attending: STUDENT IN AN ORGANIZED HEALTH CARE EDUCATION/TRAINING PROGRAM | Admitting: STUDENT IN AN ORGANIZED HEALTH CARE EDUCATION/TRAINING PROGRAM
Payer: COMMERCIAL

## 2019-09-27 ENCOUNTER — ANESTHESIA (OUTPATIENT)
Dept: ENDOSCOPY | Facility: HOSPITAL | Age: 59
End: 2019-09-27

## 2019-09-27 VITALS
OXYGEN SATURATION: 94 % | HEART RATE: 82 BPM | DIASTOLIC BLOOD PRESSURE: 55 MMHG | WEIGHT: 112 LBS | SYSTOLIC BLOOD PRESSURE: 92 MMHG | BODY MASS INDEX: 18.66 KG/M2 | TEMPERATURE: 99 F | RESPIRATION RATE: 18 BRPM | HEIGHT: 65 IN

## 2019-09-27 DIAGNOSIS — G43.A0 CYCLICAL VOMITING WITH NAUSEA, INTRACTABILITY OF VOMITING NOT SPECIFIED: ICD-10-CM

## 2019-09-27 PROCEDURE — 0DB98ZX EXCISION OF DUODENUM, VIA NATURAL OR ARTIFICIAL OPENING ENDOSCOPIC, DIAGNOSTIC: ICD-10-PCS | Performed by: STUDENT IN AN ORGANIZED HEALTH CARE EDUCATION/TRAINING PROGRAM

## 2019-09-27 PROCEDURE — 0DB78ZX EXCISION OF STOMACH, PYLORUS, VIA NATURAL OR ARTIFICIAL OPENING ENDOSCOPIC, DIAGNOSTIC: ICD-10-PCS | Performed by: STUDENT IN AN ORGANIZED HEALTH CARE EDUCATION/TRAINING PROGRAM

## 2019-09-27 PROCEDURE — 0DB68ZX EXCISION OF STOMACH, VIA NATURAL OR ARTIFICIAL OPENING ENDOSCOPIC, DIAGNOSTIC: ICD-10-PCS | Performed by: STUDENT IN AN ORGANIZED HEALTH CARE EDUCATION/TRAINING PROGRAM

## 2019-09-27 PROCEDURE — 88305 TISSUE EXAM BY PATHOLOGIST: CPT | Performed by: STUDENT IN AN ORGANIZED HEALTH CARE EDUCATION/TRAINING PROGRAM

## 2019-09-27 PROCEDURE — 93010 ELECTROCARDIOGRAM REPORT: CPT | Performed by: INTERNAL MEDICINE

## 2019-09-27 PROCEDURE — 93005 ELECTROCARDIOGRAM TRACING: CPT

## 2019-09-27 RX ORDER — SODIUM CHLORIDE, SODIUM LACTATE, POTASSIUM CHLORIDE, CALCIUM CHLORIDE 600; 310; 30; 20 MG/100ML; MG/100ML; MG/100ML; MG/100ML
INJECTION, SOLUTION INTRAVENOUS CONTINUOUS
Status: DISCONTINUED | OUTPATIENT
Start: 2019-09-27 | End: 2019-09-27

## 2019-09-27 NOTE — ANESTHESIA POSTPROCEDURE EVALUATION
[address] Patient Status:  Hospital Outpatient Surgery   Age/Gender 63year old female MRN [id number]   Location 118 [facility]. Attending Mettu, David Westfall MD   Hosp Day # 0 [facility]       Anesthesia Po

## 2019-09-27 NOTE — INTERVAL H&P NOTE
Pre-op Diagnosis: Cyclical vomiting with nausea, intractability of vomiting not specified [G43. A0]    The above referenced H&P was reviewed by Dave Romero MD on 9/27/2019, the patient was examined and no significant changes have occurred in the johnny

## 2019-09-27 NOTE — ANESTHESIA PREPROCEDURE EVALUATION
PRE-OP EVALUATION    Patient Name: Roscoe Guaman    Pre-op Diagnosis: Cyclical vomiting with nausea, intractability of vomiting not specified [G43. A0]    Procedure(s):  ESOPHAGOGASTRODUODENOSCOPY    Surgeon(s) and Role:     * David Munoz M 11/30/2006    Right & left hear angiogram   • ANGIOGRAM  05/31/2016    Right & left heart angiogram   • ANGIOGRAM  06/02/2016    Right & left heart angiogram   • ANGIOPLASTY (CORONARY)  11/24/2004    stent to LAD   • ANGIOPLASTY (CORONARY)  05/11/2005    s metal sleeve)   • OTHER  03/12/2019    left sural nerve biopsy   • OTHER SURGICAL HISTORY  06/30/2009    [employer] heart net   • PERCUTANEOUS ENDOSCOPIC GASTROSTOMY PLACEMENT/JEJUNOSTOMY TUBE PLACEMENT N/A 12/11/2018    Performed by Manuel Blas MD at E

## 2019-09-27 NOTE — OPERATIVE REPORT
EGD operative report  Patient Name: Rosita [facility]  Procedure: Esophagogastroduodenoscopy with biopsy   Indication: nausea and vomiting  Attending: Jasmeet Echevarria M.D.   Consent:  The risks, benefits, and alternatives were discussed with the patient sprue.  Impression: Findings as above. No explanation for symptoms. Recommendations:   1. Symptoms not explained by EGD findings or small bowel follow-through findings.   Symptoms present > 4 yrs, and prior imaging (gastric emptying scan, CT abdomen/pelvi

## 2019-10-29 RX ORDER — METOPROLOL SUCCINATE 25 MG/1
TABLET, EXTENDED RELEASE ORAL
Qty: 45 TABLET | Refills: 1 | Status: SHIPPED | OUTPATIENT
Start: 2019-10-29 | End: 2020-05-26

## 2019-11-13 PROCEDURE — 93295 DEV INTERROG REMOTE 1/2/MLT: CPT | Performed by: INTERNAL MEDICINE

## 2019-11-13 PROCEDURE — 93296 REM INTERROG EVL PM/IDS: CPT | Performed by: INTERNAL MEDICINE

## 2019-11-18 ENCOUNTER — TELEPHONE (OUTPATIENT)
Dept: CARDIOLOGY | Age: 59
End: 2019-11-18

## 2019-11-18 ENCOUNTER — APPOINTMENT (OUTPATIENT)
Dept: GENERAL RADIOLOGY | Facility: HOSPITAL | Age: 59
DRG: 309 | End: 2019-11-18
Attending: EMERGENCY MEDICINE
Payer: COMMERCIAL

## 2019-11-18 ENCOUNTER — HOSPITAL ENCOUNTER (INPATIENT)
Facility: HOSPITAL | Age: 59
LOS: 2 days | Discharge: HOME | DRG: 309 | End: 2019-11-20
Attending: EMERGENCY MEDICINE | Admitting: HOSPITALIST
Payer: COMMERCIAL

## 2019-11-18 ENCOUNTER — ANCILLARY PROCEDURE (OUTPATIENT)
Dept: CARDIOLOGY | Age: 59
End: 2019-11-18
Attending: INTERNAL MEDICINE

## 2019-11-18 DIAGNOSIS — R77.8 ELEVATED TROPONIN: ICD-10-CM

## 2019-11-18 DIAGNOSIS — Z95.810 ICD (IMPLANTABLE CARDIOVERTER-DEFIBRILLATOR) IN PLACE: ICD-10-CM

## 2019-11-18 DIAGNOSIS — R07.9 ACUTE CHEST PAIN: Primary | ICD-10-CM

## 2019-11-18 PROBLEM — R79.89 ELEVATED TROPONIN: Status: ACTIVE | Noted: 2019-11-18

## 2019-11-18 PROCEDURE — 99253 IP/OBS CNSLTJ NEW/EST LOW 45: CPT | Performed by: INTERNAL MEDICINE

## 2019-11-18 PROCEDURE — 4B02XTZ MEASUREMENT OF CARDIAC DEFIBRILLATOR, EXTERNAL APPROACH: ICD-10-PCS | Performed by: INTERNAL MEDICINE

## 2019-11-18 PROCEDURE — X1094 NO CHARGE VISIT: HCPCS | Performed by: INTERNAL MEDICINE

## 2019-11-18 PROCEDURE — 71045 X-RAY EXAM CHEST 1 VIEW: CPT | Performed by: EMERGENCY MEDICINE

## 2019-11-18 PROCEDURE — 99255 IP/OBS CONSLTJ NEW/EST HI 80: CPT | Performed by: INTERNAL MEDICINE

## 2019-11-18 PROCEDURE — 99223 1ST HOSP IP/OBS HIGH 75: CPT | Performed by: HOSPITALIST

## 2019-11-18 RX ORDER — CLOPIDOGREL BISULFATE 75 MG/1
75 TABLET ORAL DAILY
Status: DISCONTINUED | OUTPATIENT
Start: 2019-11-19 | End: 2019-11-18

## 2019-11-18 RX ORDER — BUPRENORPHINE HYDROCHLORIDE AND NALOXONE HYDROCHLORIDE DIHYDRATE 8; 2 MG/1; MG/1
1 TABLET SUBLINGUAL 3 TIMES DAILY
Refills: 0 | Status: ON HOLD | COMMUNITY
Start: 2019-11-05 | End: 2020-12-08

## 2019-11-18 RX ORDER — SOTALOL HYDROCHLORIDE 80 MG/1
80 TABLET ORAL EVERY 12 HOURS SCHEDULED
Status: DISCONTINUED | OUTPATIENT
Start: 2019-11-18 | End: 2019-11-20

## 2019-11-18 RX ORDER — DULOXETIN HYDROCHLORIDE 30 MG/1
30 CAPSULE, DELAYED RELEASE ORAL 2 TIMES DAILY
Status: DISCONTINUED | OUTPATIENT
Start: 2019-11-18 | End: 2019-11-20

## 2019-11-18 RX ORDER — ZOLPIDEM TARTRATE 5 MG/1
5 TABLET ORAL NIGHTLY PRN
Status: DISCONTINUED | OUTPATIENT
Start: 2019-11-18 | End: 2019-11-20

## 2019-11-18 RX ORDER — POTASSIUM CHLORIDE 20 MEQ/1
40 TABLET, EXTENDED RELEASE ORAL EVERY 4 HOURS
Status: COMPLETED | OUTPATIENT
Start: 2019-11-18 | End: 2019-11-18

## 2019-11-18 RX ORDER — ASPIRIN 81 MG/1
324 TABLET, CHEWABLE ORAL ONCE
Status: COMPLETED | OUTPATIENT
Start: 2019-11-18 | End: 2019-11-18

## 2019-11-18 RX ORDER — METOCLOPRAMIDE HYDROCHLORIDE 5 MG/ML
10 INJECTION INTRAMUSCULAR; INTRAVENOUS EVERY 6 HOURS PRN
Status: DISCONTINUED | OUTPATIENT
Start: 2019-11-18 | End: 2019-11-20

## 2019-11-18 RX ORDER — ONDANSETRON 2 MG/ML
4 INJECTION INTRAMUSCULAR; INTRAVENOUS EVERY 6 HOURS PRN
Status: DISCONTINUED | OUTPATIENT
Start: 2019-11-18 | End: 2019-11-19

## 2019-11-18 RX ORDER — ASPIRIN 81 MG/1
81 TABLET ORAL DAILY
Status: DISCONTINUED | OUTPATIENT
Start: 2019-11-19 | End: 2019-11-18

## 2019-11-18 RX ORDER — MIRTAZAPINE 15 MG/1
15 TABLET, FILM COATED ORAL NIGHTLY
Status: DISCONTINUED | OUTPATIENT
Start: 2019-11-18 | End: 2019-11-20

## 2019-11-18 RX ORDER — LISINOPRIL 2.5 MG/1
2.5 TABLET ORAL NIGHTLY
Status: DISCONTINUED | OUTPATIENT
Start: 2019-11-18 | End: 2019-11-20

## 2019-11-18 RX ORDER — NYSTATIN 100000 U/G
2 CREAM TOPICAL 2 TIMES DAILY
Status: DISCONTINUED | OUTPATIENT
Start: 2019-11-18 | End: 2019-11-20

## 2019-11-18 RX ORDER — MIRTAZAPINE 15 MG/1
15 TABLET, FILM COATED ORAL NIGHTLY
Status: ON HOLD | COMMUNITY
End: 2020-12-08

## 2019-11-18 RX ORDER — MELATONIN
400 DAILY
Status: DISCONTINUED | OUTPATIENT
Start: 2019-11-19 | End: 2019-11-20

## 2019-11-18 RX ORDER — NITROGLYCERIN 0.4 MG/1
0.4 TABLET SUBLINGUAL EVERY 5 MIN PRN
Status: DISCONTINUED | OUTPATIENT
Start: 2019-11-18 | End: 2019-11-20

## 2019-11-18 RX ORDER — HEPARIN SODIUM 5000 [USP'U]/ML
5000 INJECTION, SOLUTION INTRAVENOUS; SUBCUTANEOUS EVERY 8 HOURS SCHEDULED
Status: DISCONTINUED | OUTPATIENT
Start: 2019-11-18 | End: 2019-11-18

## 2019-11-18 RX ORDER — ACETAMINOPHEN 325 MG/1
650 TABLET ORAL EVERY 6 HOURS PRN
Status: DISCONTINUED | OUTPATIENT
Start: 2019-11-18 | End: 2019-11-20

## 2019-11-18 RX ORDER — ATORVASTATIN CALCIUM 20 MG/1
20 TABLET, FILM COATED ORAL NIGHTLY
Status: DISCONTINUED | OUTPATIENT
Start: 2019-11-18 | End: 2019-11-20

## 2019-11-18 RX ORDER — PANTOPRAZOLE SODIUM 40 MG/1
40 TABLET, DELAYED RELEASE ORAL
Status: DISCONTINUED | OUTPATIENT
Start: 2019-11-19 | End: 2019-11-20

## 2019-11-18 RX ORDER — AMITRIPTYLINE HYDROCHLORIDE 10 MG/1
10 TABLET, FILM COATED ORAL NIGHTLY
Status: DISCONTINUED | OUTPATIENT
Start: 2019-11-18 | End: 2019-11-20

## 2019-11-18 RX ORDER — BUPRENORPHINE HYDROCHLORIDE 8 MG/1
8 TABLET SUBLINGUAL 3 TIMES DAILY
Status: DISCONTINUED | OUTPATIENT
Start: 2019-11-18 | End: 2019-11-20

## 2019-11-18 NOTE — PROGRESS NOTES
[employer] device interrogated per request.  [employer] customer service notified and asked to have rep on call callback Dr. Larsen President w/report results.

## 2019-11-18 NOTE — H&P
[facility]  History and Physical     Elton Cosby Patient Status:  Inpatient    1960 MRN [id number]   [facility] 2NE-A Attending Yasir Florez MD   Hosp Day # 0 PCP Jenifer Augustine     Chief Complaint: AICD fired • Rash    • Renal disorder     hx of ERIKA d/t dehydration   • Shortness of breath    • Stented coronary artery    • Syncope    • Systolic heart failure (HCC)    • Thrush of mouth and esophagus (HCC)    • Uncomfortable fullness after meals    • Visual impa 12/1/2015    Performed by Randal Dudley MD at [facility] ENDOSCOPY   • ESOPHAGOGASTRODUODENOSCOPY (EGD) N/A 8/11/2015    Performed by Destiney Tolbert MD at [facility] ENDOSCOPY   • FOOT SURGERY      fixed nerve   • GASTROSTOMY TUBE REMOVAL/REPLACEMENT N/A 8/30/2019 to encounter. Buprenorphine HCl-Naloxone HCl 8-2 MG Sublingual SL Tab, Place 1 tablet under the tongue 3 (three) times daily. , Disp: , Rfl: 0  mirtazapine 15 MG Oral Tab, Take 15 mg by mouth nightly., Disp: , Rfl:   Amitriptyline HCl 10 MG Oral Tab, Take lymphadenopathy. No JVD. No carotid bruits. Respiratory: Clear to auscultation bilaterally. No wheezes. No rhonchi. Cardiovascular: S1, S2. Regular rate and rhythm. No murmurs, rubs or gallops. Equal pulses. Chest and Back: No tenderness or deformity.

## 2019-11-18 NOTE — CONSULTS
[facility]    PATIENT'S NAME: Jethro Mayes, [de-identified] M   ATTENDING PHYSICIAN: Macho Francisco D.O.   CONSULTING PHYSICIAN: Joshua Jenkins M.D.    PATIENT ACCOUNT#:   [de-identified]    LOCATION:  [facility]  MEDICAL RECORD #:   [id number]       DATE OF B distribution. History of problems with swallowing, nausea, vomiting, PEG tube.     MEDICATIONS:  Current home medications:  Amitriptyline 10 mg p.o. at bedtime; Plavix 75 mg p.o. daily; omeprazole 40 mg p.o. daily; mirtazapine 15 mg p.o. at bedtime; Mera Novel previous stent, no increased vascular markings. IMPRESSION:  A 81-year-old female with known ischemic cardiomyopathy, defibrillator, anteroapical infarct, dyskinetic apex, presenting with defibrillator going off 3 times per history.     RECOMMENDATIONS:

## 2019-11-18 NOTE — CONSULTS
Cardiology Consult Note     PRIMARY CARDIOLOGIST: RICHARD/WALTER/ LEÓN      CONSULT FOR: ICM WITH NEW DEFIB   X 3 FROM ICD. NEW ELEVATED TROP. HISTORY: 62 Y/O FEMALE WITH REMOTE ANTERIOR MI AND ICM. HAS ICD AND  LAST EF OF 40-45% NOTED ABOUT 1 YEAR AGO.

## 2019-11-18 NOTE — ED INITIAL ASSESSMENT (HPI)
Patient to ED for substernal chest pain this morning after AICD fired 3 times. Patient states was awake when this happened. Denies chest pain at this time.

## 2019-11-18 NOTE — CONSULTS
[facility]  Electrophysiology Initial Consult Note      Nora Rodgers Patient Status:  Inpatient    1960 MRN [id number]   [facility] 2NE-A Attending Nehemiah Todd MD   Hosp Day # 0  [facility] eats a little   • Osteoporosis    • Pacemaker    • Pancreatitis    • Personal history of antineoplastic chemotherapy     2006- due to being on transplant   • Pneumonia due to organism    • PONV (postoperative nausea and vomiting)    • Presence of other car • ESOPHAGOGASTRODUODENOSCOPY (EGD) N/A 1/16/2019    Performed by Jennifer Samayoa MD at [facility] ENDOSCOPY   • ESOPHAGOGASTRODUODENOSCOPY (EGD) N/A 11/16/2018    Performed by Adriane López MD at [facility] ENDOSCOPY   • ESOPHAGOGASTRODUODENOSCOPY (EGD) N/A 12/1 take it if I need it\"  Sulfa Antibiotics       NAUSEA AND VOMITING    Comment:pancreatitis  Nsaids                  UNKNOWN    Comment:Cardiologist states none to be taken.     Medications:    Current Facility-Administered Medications:   •  influenza vacci (36.8 °C), temperature source Oral, resp. rate 20, height 5' 5\" (1.651 m), weight 108 lb 0.4 oz (49 kg), SpO2 98 %.   Temp (24hrs), Av.4 °F (36.9 °C), Min:98.2 °F (36.8 °C), Max:98.5 °F (36.9 °C)    Wt Readings from Last 3 Encounters:  / : 108 l given inappropriate ICD shock for AF with RVR; will start sotalol 80 mg PO BID    3) CAD  - s/p MI; PCI 14 years ago  - continue atorvastatin, lisinopril, metoprolol  - will stop aspirin and plavix while starting anticoagulation    4) ischemic cardiomyopat Elevated troponin     AICD discharge          [facility]

## 2019-11-19 ENCOUNTER — APPOINTMENT (OUTPATIENT)
Dept: CV DIAGNOSTICS | Facility: HOSPITAL | Age: 59
DRG: 309 | End: 2019-11-19
Attending: INTERNAL MEDICINE
Payer: COMMERCIAL

## 2019-11-19 PROCEDURE — 99232 SBSQ HOSP IP/OBS MODERATE 35: CPT | Performed by: HOSPITALIST

## 2019-11-19 PROCEDURE — 93306 TTE W/DOPPLER COMPLETE: CPT | Performed by: INTERNAL MEDICINE

## 2019-11-19 PROCEDURE — 99232 SBSQ HOSP IP/OBS MODERATE 35: CPT | Performed by: INTERNAL MEDICINE

## 2019-11-19 RX ORDER — SODIUM CHLORIDE 9 MG/ML
INJECTION, SOLUTION INTRAVENOUS ONCE
Status: COMPLETED | OUTPATIENT
Start: 2019-11-19 | End: 2019-11-19

## 2019-11-19 RX ORDER — SODIUM CHLORIDE 9 MG/ML
INJECTION, SOLUTION INTRAVENOUS CONTINUOUS
Status: DISCONTINUED | OUTPATIENT
Start: 2019-11-19 | End: 2019-11-20

## 2019-11-19 RX ORDER — MAGNESIUM OXIDE 400 MG (241.3 MG MAGNESIUM) TABLET
400 TABLET ONCE
Status: COMPLETED | OUTPATIENT
Start: 2019-11-19 | End: 2019-11-19

## 2019-11-19 NOTE — PROGRESS NOTES
[facility]  Progress Note     Roscoe Guaman Patient Status:  Inpatient    1960 MRN [id number]   [facility] 2NE-A Attending Torsten Walker MD   Hosp Day # 1 PCP [facility]     Chief Complaint: AICD firing    S: Kareem acid  400 mcg Oral Daily   • lisinopril  2.5 mg Oral Nightly   • Metoprolol Succinate ER  12.5 mg Oral Nightly   • mirtazapine  15 mg Oral Nightly   • nystatin  2 g Topical BID   • Pantoprazole Sodium  40 mg Oral QAM AC   • atorvastatin  20 mg Oral Nightly

## 2019-11-19 NOTE — PROGRESS NOTES
[facility]    Electrophysiology Follow Up Note      Gabo James Patient Status:  Inpatient    1960 MRN [id number]   [facility] 2NE-A Attending Nimisha Greenwood MD   [facility] Day # 1 PCP Ruben Hou BID  •  Pantoprazole Sodium (PROTONIX) EC tab 40 mg, 40 mg, Oral, QAM AC  •  atorvastatin (LIPITOR) tab 20 mg, 20 mg, Oral, Nightly  •  Zolpidem Tartrate (AMBIEN) tab 5 mg, 5 mg, Oral, Nightly PRN  •  nitroGLYCERIN (NITROSTAT) SL tab 0.4 mg, 0.4 mg, [place] wall MI, ischemic cardiomyopathy with LVEF 45% and s/p MDT ICD who is admitted with ICD shock due to paroxysmal atrial fibrillation with RVR (morphology match low, defaulted to VT detection). Sotalol and anticoagulation was started.  ECG this am reports QTc Acute chest pain     Hyperglycemia     Intractable vomiting without nausea     Intractable vomiting without nausea, unspecified vomiting type     Anorexia     Malnutrition (HCC)     Hypocalcemia     Chronic pain syndrome     Hypomagnesemia     Encounter fo

## 2019-11-19 NOTE — PLAN OF CARE
Assumed care of patient around 0730. Ox4.  at bedside. Denies any chest pain at this time. Normal sinus with a-pace on tele. Dr. Braga Section at bedside. No cath today, ok to give eliquis, ok for regular diet.  Notified of increased QTC (511), ok to give lead EKG if indicated  - Evaluate effectiveness of antiarrhythmic and heart rate control medications as ordered  - Initiate emergency measures for life threatening arrhythmias  - Monitor electrolytes and administer replacement therapy as ordered  Outcome:

## 2019-11-19 NOTE — PLAN OF CARE
Patient here for defibrillator firing 3x. Patient started on sotalol this evening with EKG two hours after. QTC > 500. Patient currently A pacing. Eliquis on hold d/t possible cath tomorrow.    Plan for labs and 2D echo in am.      Problem: Patient/Fami

## 2019-11-19 NOTE — DIETARY MALNUTRITION NOTE
[facility]    NUTRITION INITIAL ASSESSMENT    Pt meets moderate malnutrition criteria.     CRITERIA FOR MALNUTRITION DIAGNOSIS:  Criteria for non-severe malnutrition diagnosis: chronic illness related to energy intake less than75% for greater than 1 mo Fair  Intake: 0%  Intake Meeting Needs: No, pt refused any ONS at this time  Food Allergies: No  Cultural/Ethnic/Hinduism Preferences Addresses: Yes    NUTRITION RELATED PHYSICAL FINDINGS:     1. Body Fat/Muscle Mass: moderate depletion body fat orbital r

## 2019-11-19 NOTE — PROGRESS NOTES
[facility]  Cardiology Progress Note    Subjective:  No chest pain or shortness of breath.     Objective:  BP (!) 82/49 (BP Location: Left arm)   Pulse 76   Temp 97.7 °F (36.5 °C) (Oral)   Resp 18   Ht 5' 5\" (1.651 m)   Wt 108 lb 0.4 oz (49 kg)   SpO2 morphology match was not met at these higher rates and detection criteria were met. Resulting in ICD shock x 1. This did convert the AF to sinus. Assessment:  · New PAF: [id number] score of 2, started on sotalol/apixaban 11/18.  QTc measuring 512ms this [phone]  E-mail: [email or web address]    11/19/2019  10:19 AM

## 2019-11-19 NOTE — PLAN OF CARE
New admission from ICD firing 3x at home and then subsequent chest pain. Patient denies any pain now that she is on the unit. Denies any SOB. Room air, . Normal sinus rhythm with 1x burst of 5 beats afib.  Interrogation suggested ICD fired due to afib an

## 2019-11-20 VITALS
HEART RATE: 76 BPM | RESPIRATION RATE: 18 BRPM | HEIGHT: 65 IN | SYSTOLIC BLOOD PRESSURE: 94 MMHG | TEMPERATURE: 98 F | WEIGHT: 108 LBS | DIASTOLIC BLOOD PRESSURE: 62 MMHG | OXYGEN SATURATION: 97 % | BODY MASS INDEX: 17.99 KG/M2

## 2019-11-20 PROCEDURE — 99239 HOSP IP/OBS DSCHRG MGMT >30: CPT | Performed by: HOSPITALIST

## 2019-11-20 PROCEDURE — 99232 SBSQ HOSP IP/OBS MODERATE 35: CPT | Performed by: INTERNAL MEDICINE

## 2019-11-20 RX ORDER — MAGNESIUM OXIDE 400 MG (241.3 MG MAGNESIUM) TABLET
800 TABLET ONCE
Status: COMPLETED | OUTPATIENT
Start: 2019-11-20 | End: 2019-11-20

## 2019-11-20 RX ORDER — SOTALOL HYDROCHLORIDE 80 MG/1
80 TABLET ORAL EVERY 12 HOURS SCHEDULED
Qty: 60 TABLET | Refills: 3 | Status: SHIPPED | OUTPATIENT
Start: 2019-11-20 | End: 2020-01-21

## 2019-11-20 NOTE — PLAN OF CARE
Assumed care of pt at [address]. Pt alert and oriented x4, saturating well on RA, SR occasionally Apaced on tele. No complaints of pain. All night meds given with out issue. Sotolol given. EKG to be done 2 hours after given.   Heath@[employer] ml/hr for low bp's, sbp's n

## 2019-11-20 NOTE — CM/SW NOTE
Pt being discharged to home now. Bedside RN called in the scripts, I provided the Eliquis $10/month coupon.     Sheryle Groom, RN, MSN, APN, CTL   Clinical Transitions Leader  [phone]

## 2019-11-20 NOTE — DISCHARGE SUMMARY
[facility]  DISCHARGE SUMMARY     Hanny Walton Patient Status:  Inpatient    1960 MRN [id number]   [facility] 2NE-A Attending No att. providers found   Hosp Day # 2 PCP Brennan Holloway     Date of Admission: 2019 returning to baseline. Cleared by cards for DC.      Procedures during hospitalization:   • none    Incidental or significant findings and recommendations (brief descriptions):  • none    Lab/Test results pending at Discharge:   · none    Consultants:  • ca 40 MG Cpdr      Take 40 mg by mouth daily. Refills:  3     ondansetron 4 MG Tbdp  Commonly known as:  ZOFRAN-ODT      Take 8 mg by mouth every 8 (eight) hours as needed for Nausea.    Refills:  0     simvastatin 40 MG Tabs  Commonly known as:  Bindu Rosas

## 2019-11-20 NOTE — PROGRESS NOTES
[facility]  Cardiology Progress Note    Subjective:  No chest pain or shortness of breath.     Objective:  BP 90/59 (BP Location: Left arm)   Pulse 71   Temp 98.3 °F (36.8 °C) (Oral)   Resp 18   Ht 5' 5\" (1.651 m)   Wt 108 lb 0.4 oz (49 kg)   SpO2 95% 11/18/19. · Hypotension: SBP improved this AM, 90s  · CAD: hx remote anterior MI and ischemic cardiomyopathy.   · Chronic systolic congestive heart failure: EF 30-35%, down from previous echo  · Medtronic dual chamber ICD, implanted 2004, last generator ch

## 2019-11-20 NOTE — PLAN OF CARE
Pt and VS remained stable throughout shift. Up in halls ambulating ramírez well. Denies CP/SOB sats maintained in 90's on RA  POC updated  Flu vaccine given per orders  APN cards notified will complete cardiac meds.   Pt to return to own home with support perso

## 2019-11-21 NOTE — PAYOR COMM NOTE
--------------  DISCHARGE REVIEW    Payor: LAUREN COHN  Subscriber #:  [id number]  Authorization Number: [id number]    Admit date: 11/18/19  Admit time:  5053  Discharge Date: 11/20/2019  3:11 PM     Admitting Physician: Bryan Sweeney MD  Attending [employer] in her normal state of health. Today was lying in bed when her AICD suddenly fired. Shocked her 3 times in 3 minutes. Pt denies any sx's prior or after. Denies f/c, n/v, cp, sob, abd pain, palpitations.  Does state that for the past month or so has felt occ acid 400 MCG Tabs  Commonly known as:  FOLVITE      Take 400 mcg by mouth daily. Refills:  0     lisinopril 2.5 MG Tabs      Take 1 tablet (2.5 mg total) by mouth nightly.    Quantity:  30 tablet  Refills:  2     Metoprolol Succinate ER 25 MG Tb24  Common Regular rate and rhythm. No murmurs, rubs or gallops. Abdomen: Soft, nontender, nondistended. Positive bowel sounds. No rebound or guarding. Neurologic: No focal neurological deficits. Musculoskeletal: Moves all extremities. Extremities: No edema.

## 2019-11-26 ENCOUNTER — TELEPHONE (OUTPATIENT)
Dept: CARDIOLOGY | Age: 59
End: 2019-11-26

## 2019-12-04 ENCOUNTER — HOSPITAL ENCOUNTER (INPATIENT)
Facility: HOSPITAL | Age: 59
LOS: 18 days | Discharge: NURSING, REHAB OR LONG-TERM CARE | DRG: 640 | End: 2019-12-22
Attending: EMERGENCY MEDICINE | Admitting: INTERNAL MEDICINE
Payer: COMMERCIAL

## 2019-12-04 ENCOUNTER — APPOINTMENT (OUTPATIENT)
Dept: CT IMAGING | Facility: HOSPITAL | Age: 59
DRG: 640 | End: 2019-12-04
Attending: EMERGENCY MEDICINE
Payer: COMMERCIAL

## 2019-12-04 ENCOUNTER — APPOINTMENT (OUTPATIENT)
Dept: GENERAL RADIOLOGY | Facility: HOSPITAL | Age: 59
DRG: 640 | End: 2019-12-04
Attending: EMERGENCY MEDICINE
Payer: COMMERCIAL

## 2019-12-04 DIAGNOSIS — R11.14 BILIOUS VOMITING WITH NAUSEA: ICD-10-CM

## 2019-12-04 DIAGNOSIS — R26.9 GAIT DISTURBANCE: ICD-10-CM

## 2019-12-04 DIAGNOSIS — R63.30 FEEDING DIFFICULTIES: ICD-10-CM

## 2019-12-04 DIAGNOSIS — R42 DIZZINESS: ICD-10-CM

## 2019-12-04 DIAGNOSIS — R53.1 ACUTE WEAKNESS: Primary | ICD-10-CM

## 2019-12-04 PROBLEM — D72.829 LEUKOCYTOSIS: Status: ACTIVE | Noted: 2019-12-04

## 2019-12-04 PROCEDURE — 70450 CT HEAD/BRAIN W/O DYE: CPT | Performed by: EMERGENCY MEDICINE

## 2019-12-04 PROCEDURE — 99223 1ST HOSP IP/OBS HIGH 75: CPT | Performed by: INTERNAL MEDICINE

## 2019-12-04 PROCEDURE — 71045 X-RAY EXAM CHEST 1 VIEW: CPT | Performed by: EMERGENCY MEDICINE

## 2019-12-04 RX ORDER — SODIUM CHLORIDE 9 MG/ML
INJECTION, SOLUTION INTRAVENOUS CONTINUOUS
Status: DISCONTINUED | OUTPATIENT
Start: 2019-12-04 | End: 2019-12-04

## 2019-12-04 RX ORDER — DULOXETIN HYDROCHLORIDE 30 MG/1
30 CAPSULE, DELAYED RELEASE ORAL 2 TIMES DAILY
Status: DISCONTINUED | OUTPATIENT
Start: 2019-12-04 | End: 2019-12-22

## 2019-12-04 RX ORDER — MECLIZINE HYDROCHLORIDE 25 MG/1
25 TABLET ORAL ONCE
Status: COMPLETED | OUTPATIENT
Start: 2019-12-04 | End: 2019-12-04

## 2019-12-04 RX ORDER — AMITRIPTYLINE HYDROCHLORIDE 10 MG/1
10 TABLET, FILM COATED ORAL NIGHTLY
Status: DISCONTINUED | OUTPATIENT
Start: 2019-12-04 | End: 2019-12-22

## 2019-12-04 RX ORDER — SODIUM CHLORIDE 9 MG/ML
INJECTION, SOLUTION INTRAVENOUS CONTINUOUS
Status: DISCONTINUED | OUTPATIENT
Start: 2019-12-04 | End: 2019-12-05

## 2019-12-04 RX ORDER — MIRTAZAPINE 15 MG/1
15 TABLET, FILM COATED ORAL NIGHTLY
Status: DISCONTINUED | OUTPATIENT
Start: 2019-12-04 | End: 2019-12-22

## 2019-12-04 RX ORDER — SOTALOL HYDROCHLORIDE 80 MG/1
80 TABLET ORAL EVERY 12 HOURS SCHEDULED
Status: DISCONTINUED | OUTPATIENT
Start: 2019-12-04 | End: 2019-12-22

## 2019-12-04 RX ORDER — ONDANSETRON 2 MG/ML
4 INJECTION INTRAMUSCULAR; INTRAVENOUS EVERY 6 HOURS PRN
Status: DISCONTINUED | OUTPATIENT
Start: 2019-12-04 | End: 2019-12-05

## 2019-12-04 RX ORDER — ACETAMINOPHEN 325 MG/1
650 TABLET ORAL EVERY 6 HOURS PRN
Status: DISCONTINUED | OUTPATIENT
Start: 2019-12-04 | End: 2019-12-22

## 2019-12-04 RX ORDER — METOCLOPRAMIDE HYDROCHLORIDE 5 MG/ML
10 INJECTION INTRAMUSCULAR; INTRAVENOUS EVERY 8 HOURS PRN
Status: DISCONTINUED | OUTPATIENT
Start: 2019-12-04 | End: 2019-12-08

## 2019-12-04 RX ORDER — MELATONIN
400 DAILY
Status: DISCONTINUED | OUTPATIENT
Start: 2019-12-04 | End: 2019-12-22

## 2019-12-04 RX ORDER — PANTOPRAZOLE SODIUM 20 MG/1
40 TABLET, DELAYED RELEASE ORAL
Status: DISCONTINUED | OUTPATIENT
Start: 2019-12-05 | End: 2019-12-07

## 2019-12-04 RX ORDER — ZOLPIDEM TARTRATE 5 MG/1
5 TABLET ORAL NIGHTLY PRN
Status: DISCONTINUED | OUTPATIENT
Start: 2019-12-04 | End: 2019-12-13

## 2019-12-04 RX ORDER — LISINOPRIL 2.5 MG/1
2.5 TABLET ORAL NIGHTLY
Status: DISCONTINUED | OUTPATIENT
Start: 2019-12-04 | End: 2019-12-22

## 2019-12-04 NOTE — ED PROVIDER NOTES
Patient Seen in: [facility] Emergency Department      History   Patient presents with:  Dehydration  Fatigue    Stated Complaint: dehyrdation and difficulty ambulating    HPI    50-year-old female presents with being potentially dehydrated and havin All other systems reviewed and negative except as noted above.     Physical Exam     ED Triage Vitals [12/04/19 1108]   BP 92/79   Pulse 105   Resp 16   Temp 97.9 °F (36.6 °C)   Temp src    SpO2 97 %   O2 Device None (Room air)       Current:BP 91/64 Labs Reviewed   COMP METABOLIC PANEL (14) - Abnormal; Notable for the following components:       Result Value    Glucose 115 (*)     Sodium 131 (*)     Calculated Osmolality 272 (*)      (*)     Alkaline Phosphatase 302 (*)     Albumin 2.2 (*) 12/4/2019  PROCEDURE:  CT BRAIN OR HEAD (42846)  COMPARISON:  [facility] CT, CT BRAIN OR HEAD (66542), 9/02/2018, 15:41. INDICATIONS:  dehyrdation and difficulty ambulating  TECHNIQUE:  Noncontrast CT scanning is performed through the brain.  Dose reduction lead cardiac pacemaker. Normal pulmonary vasculature. No acute process or significant interval change. CONCLUSION:  Lungs are clear without evidence of an acute process or significant interval change. Dictated by:  Anastasiia Groves DO on 12/04/2019 a

## 2019-12-04 NOTE — ED INITIAL ASSESSMENT (HPI)
Vomiting for about 3 weeks. Had G tube removed about 6 months ago.  states she has been keeping down only about 200 calories a day for about 3 weeks.

## 2019-12-05 PROCEDURE — 99232 SBSQ HOSP IP/OBS MODERATE 35: CPT | Performed by: HOSPITALIST

## 2019-12-05 NOTE — PLAN OF CARE
A&Ox4. Room air. NSR on tele. VSS. Complains of nausea, PRN zofran given. Generalized weakness. Per ER, pt is extremely unsteady. Bedrest over night, PT/OT eval. Psych consult. Pt had G tube that fell out of RLQ. Has lost weight since it was removed.  Belie Nasogastric tube to low intermittent suction as ordered  - Evaluate effectiveness of ordered antiemetic medications  - Provide nonpharmacologic comfort measures as appropriate  - Advance diet as tolerated, if ordered  - Obtain nutritional consult as needed mobility/gait  Description  Interventions:  - Assess patient's functional ability and stability  - Promote increasing activity/tolerance for mobility and gait  - Educate and engage patient/family in tolerated activity level and precautions  - Recommend pat

## 2019-12-05 NOTE — H&P
[facility]  History and Physical     Estella Rashidacarolyn Patient Status:  Inpatient    1960 MRN [id number]   [facility] 3NE-A Attending Andrey Prater MD   Hosp Day # 0 [facility]     Chief Complaint: dehydration diet     eats a little   • Osteoporosis    • Pacemaker    • Pancreatitis    • Personal history of antineoplastic chemotherapy     2006- due to being on transplant   • Pneumonia due to organism    • PONV (postoperative nausea and vomiting)    • Presence of Daysi Munoz MD at [facility] ENDOSCOPY   • ESOPHAGOGASTRODUODENOSCOPY (EGD) N/A 1/16/2019    Performed by Harika Presley MD at [facility] ENDOSCOPY   • ESOPHAGOGASTRODUODENOSCOPY (EGD) N/A 11/16/2018    Performed by Daysi Munoz MD at [place] [id number] messes with my stomach, but I can             take it if I need it\"  Sulfa Antibiotics       NAUSEA AND VOMITING    Comment:pancreatitis  Nsaids                  UNKNOWN    Comment:Cardiologist states none to be taken.     Medications:  No current facility Pertinent positives and negatives noted in the HPI. Physical Exam:    BP 94/66   Pulse 88   Temp 97.9 °F (36.6 °C)   Resp 21   Ht 157.5 cm (5' 2\")   Wt 100 lb (45.4 kg)   SpO2 99%   BMI 18.29 kg/m²   General: cachectic  Alert and oriented x 3.   HEENT full  · Kumar: none    Plan of care discussed with patient, ED physician     Hernandez Delgadillo MD  12/4/2019

## 2019-12-05 NOTE — PROGRESS NOTES
[facility]  Progress Note     Nora Rodgers Patient Status:  Inpatient    1960 MRN [id number]   [facility] 3NE-A Attending Tiana Moreno MD   Hosp Day # 1 [facility]     Chief Complaint: Weakness    S: Patient 10 mg Oral Nightly   • DULoxetine HCl  30 mg Oral BID   • folic acid  288 mcg Oral Daily   • lisinopril  2.5 mg Oral Nightly   • Metoprolol Succinate ER  12.5 mg Oral Nightly   • mirtazapine  15 mg Oral Nightly   • Pantoprazole Sodium  40 mg Oral QA AC

## 2019-12-05 NOTE — PHYSICAL THERAPY NOTE
PHYSICAL THERAPY EVALUATION - INPATIENT     Room Number: 7366/9231-K  Evaluation Date: 12/5/2019  Type of Evaluation: Initial  Physician Order: PT Eval and Treat    Presenting Problem: acute weakness, gait disturbance  Reason for Therapy: Mobility Dy Pacemaker    • Pancreatitis    • Personal history of antineoplastic chemotherapy     2006- due to being on transplant   • Pneumonia due to organism    • PONV (postoperative nausea and vomiting)    • Presence of other cardiac implants and grafts    • Rash ESOPHAGOGASTRODUODENOSCOPY (EGD) N/A 1/16/2019    Performed by Karla Vasques MD at [facility] ENDOSCOPY   • ESOPHAGOGASTRODUODENOSCOPY (EGD) N/A 11/16/2018    Performed by Loree Villalta MD at [facility] ENDOSCOPY   • ESOPHAGOGASTRODUODENOSCOPY (EGD) N/A 12/1/201  in high school, took leave from work in September of this year. SUBJECTIVE  \"i'm really tired today'    Patient self-stated goal is to improve strength and mobility, would eventually like to return to work as .     OBJECTIVE from lying on back to sitting on the side of the bed?: A Little   How much help from another person does the patient currently need. ..   -   Moving to and from a bed to a chair (including a wheelchair)?: Total   -   Need to walk in hospital room?: A Lot old female admitted on 12/4/2019 for acute weakness, gait disturbance. Pertinent comorbidities and personal factors impacting therapy include hx malnutrition, CHF, CAD, pacemaker, depression.   In this PT evaluation, the patient presents with the following mechanics; Coordination; Endurance; Energy conservation;Patient education;Gait training;Neuromuscular re-educate;Range of motion;Strengthening;Stoop training;Stair training;Transfer training;Balance training  Rehab Potential : Good  Frequency (Obs): Daily  Nu

## 2019-12-05 NOTE — BH PROGRESS NOTE
Went to see the pt per request from Dr. Charo Coronado. He wanted to make sure the pt is following up with a mental health provider. The pt said, she is seeing Sharyle Macleod NP from Dr. Agata Saavedra, psychiatrist office. She reports her last visit was about 1 1/2 weeks ago.   Sh

## 2019-12-05 NOTE — PLAN OF CARE
NURSING ADMISSION NOTE      Patient admitted via stretcher. Oriented to room. Safety precautions initiated. Bed in low position. Call light in reach. Admission navigator completed. Pt alert and oriented. Denies pain.    C/o numbness, tingling t

## 2019-12-05 NOTE — OCCUPATIONAL THERAPY NOTE
OCCUPATIONAL THERAPY EVALUATION - INPATIENT     Room Number: 6717/5081-G  Evaluation Date: 12/5/2019  Type of Evaluation: Initial  Presenting Problem: weakness    Physician Order: IP Consult to Occupational Therapy  Reason for Therapy: ADL/IADL Dysfunction • Pancreatitis    • Personal history of antineoplastic chemotherapy     2006- due to being on transplant   • Pneumonia due to organism    • PONV (postoperative nausea and vomiting)    • Presence of other cardiac implants and grafts    • Rash    • Renal d ESOPHAGOGASTRODUODENOSCOPY (EGD) N/A 1/16/2019    Performed by Landon Dumont MD at [facility] ENDOSCOPY   • ESOPHAGOGASTRODUODENOSCOPY (EGD) N/A 11/16/2018    Performed by Dot Gomez MD at [facility] ENDOSCOPY   • ESOPHAGOGASTRODUODENOSCOPY (EGD) N/A 12/1/201 stairs    Patient self-stated goal is to get stronger, to return to work next year     OBJECTIVE     Fall Risk: High fall risk    WEIGHT BEARING RESTRICTION  Weight Bearing Restriction: None                PAIN ASSESSMENT  Ratin  Location: none reporte which includes using toilet, bedpan or urinal? : A Lot  -   Putting on and taking off regular upper body clothing?: A Lot  -   Taking care of personal grooming such as brushing teeth?: A Lot  -   Eating meals?: A Little    AM-PAC Score:  Score: 13  Approx attention, motor planning, sequencing, use of adaptive techniques. These deficits impact the patient’s ability to participate in ADL, transfers, instrumental activities of daily living, rest and sleep, work, leisure and social participation.      The johnnyen program). Additional Goals:  Patient will be supervision with occulomotor HEP (home exercise program).   Pt will stand 2 min with min (A) for balance in prep for stance level ADL

## 2019-12-05 NOTE — PLAN OF CARE
Assumed care of patient at [address] [place] [address]. Patient A&Ox4. Denies pain. On RA. NSR on telemetry. Fluids d/c'd. 1800ml fluid restriction. Patient c/o blurred vision, weakness & poor coordination. Neurology consult.  PT/OT recommending acute rehab & use of sit-to-st frequently for physical needs  - Identify cognitive and physical deficits and behaviors that affect risk of falls.   - Benld fall precautions as indicated by assessment.  - Educate pt/family on patient safety including physical limitations  - Instruct p as appropriate  Outcome: Progressing     Problem: MUSCULOSKELETAL - ADULT  Goal: Return mobility to safest level of function  Description  INTERVENTIONS:  - Assess patient stability and activity tolerance for standing, transferring and ambulating w/ or w/o

## 2019-12-06 ENCOUNTER — ANESTHESIA EVENT (OUTPATIENT)
Dept: ENDOSCOPY | Facility: HOSPITAL | Age: 59
DRG: 640 | End: 2019-12-06
Payer: COMMERCIAL

## 2019-12-06 ENCOUNTER — TELEPHONE (OUTPATIENT)
Dept: CARDIOLOGY | Age: 59
End: 2019-12-06

## 2019-12-06 PROCEDURE — 99223 1ST HOSP IP/OBS HIGH 75: CPT | Performed by: OTHER

## 2019-12-06 PROCEDURE — 99232 SBSQ HOSP IP/OBS MODERATE 35: CPT | Performed by: HOSPITALIST

## 2019-12-06 PROCEDURE — 99233 SBSQ HOSP IP/OBS HIGH 50: CPT | Performed by: OTHER

## 2019-12-06 RX ORDER — BUPRENORPHINE HYDROCHLORIDE 8 MG/1
8 TABLET SUBLINGUAL 3 TIMES DAILY
Status: DISCONTINUED | OUTPATIENT
Start: 2019-12-06 | End: 2019-12-22

## 2019-12-06 RX ORDER — BUPRENORPHINE 2 MG/1
8 TABLET SUBLINGUAL EVERY 6 HOURS PRN
Status: DISCONTINUED | OUTPATIENT
Start: 2019-12-06 | End: 2019-12-06

## 2019-12-06 RX ORDER — CEFAZOLIN SODIUM/WATER 2 G/20 ML
2 SYRINGE (ML) INTRAVENOUS
Status: COMPLETED | OUTPATIENT
Start: 2019-12-07 | End: 2019-12-07

## 2019-12-06 NOTE — ANESTHESIA PREPROCEDURE EVALUATION
PRE-OP EVALUATION    Patient Name: Montez Soulier    Pre-op Diagnosis: Feeding difficulties [R63.3]  Bilious vomiting with nausea [R11.14]    Procedure(s):  PERCUTANEOUS ENDOSCOPIC GASTROSTOMY PLACEMENT/JEJUNOSTOMY TUBE PLACEMENT    Surgeon(s) and Sublingual SL Tab, Place 1 tablet under the tongue 3 (three) times daily. , Disp: , Rfl: 0  mirtazapine 15 MG Oral Tab, Take 15 mg by mouth nightly., Disp: , Rfl:   Amitriptyline HCl 10 MG Oral Tab, Take 1 tablet (10 mg total) by mouth nightly., Disp: 30 ta ANGIOGRAM  11/30/2006    Right & left hear angiogram   • ANGIOGRAM  05/31/2016    Right & left heart angiogram   • ANGIOGRAM  06/02/2016    Right & left heart angiogram   • ANGIOPLASTY (CORONARY)  11/24/2004    stent to LAD   • ANGIOPLASTY (CORONARY)  05/1 BIOPSY Left 3/12/2019    Performed by Brooks Jacobs MD at [facility] MAIN OR   • OTHER      apparatus- around the heart- net(mesh metal sleeve)   • OTHER  03/12/2019    left sural nerve biopsy   • OTHER SURGICAL HISTORY  06/30/2009    Paracor heart net   • PERCUT anesthesia, dental injury, pressure/nerve injuries from positioning, and other serious but rare complications including life-threatening cardiopulmonary events- for which she is at the increased risk given her CAD and ICM with the LVEF <40%.  All questions

## 2019-12-06 NOTE — DIETARY MALNUTRITION NOTE
[facility]    NUTRITION INITIAL ASSESSMENT    Pt meets severe malnutrition criteria.     CRITERIA FOR MALNUTRITION DIAGNOSIS:  Criteria for non-severe malnutrition diagnosis: chronic illness related to energy intake less than75% for greater than 1 kendal kg (98 lb)      NUTRITION:  Diet: Regular  Oral Supplements: Pt refused any ONS at this time.      FOOD/NUTRITION RELATED HISTORY:  Appetite: Fair  Intake: 0%  Intake Meeting Needs: No, pt refused any ONS at this time  Food Allergies: No  Cultural/Ethnic/Re

## 2019-12-06 NOTE — CONSULTS
[facility]                       Gastroenterology Consultation-[facility]    Meri Da Silva Patient Status:  Inpatient    1960 MRN [id number]   [facility] 3NE-A Attending Meri Solares MD   [facility] Day # 2 PCP palpitations    • Hemorrhoids    • History of blood transfusion     2009   • History of cardiac murmur    • Hyperlipidemia    • Indigestion    • Irregular bowel habits    • Ischemic cardiomyopathy    • Kidney failure    • Leg swelling    • Loss of appetite ENDOSCOPY   • EP ELECTROPHYSIOLOGY STUDY  04/13/2006   • ERCP,DIAGNOSTIC     • ESOPHAGOGASTRODUODENOSCOPY (EGD) N/A 9/27/2019    Performed by Justine Jones MD at [facility] ENDOSCOPY   • ESOPHAGOGASTRODUODENOSCOPY (EGD) N/A 9/10/2019    Performed by Magali Schmitt, (CYMBALTA) DR particles cap 30 mg, 30 mg, Oral, BID  folic acid (FOLVITE) tab 400 mcg, 400 mcg, Oral, Daily  lisinopril tab 2.5 mg, 2.5 mg, Oral, Nightly  metoprolol succinate (Toprol XL) partial tablet 12.5 mg, 12.5 mg, Oral, Nightly  mirtazapine (Barbara Left patient reports no history of prior solid tumor or hematologic malignancy           ENT: The patient reports no hoarseness of voice, hearing loss, sinus congestion, tinnitus           Neurologic: The patient reports no history of seizure, stroke, or freque Technologist)  Cyclical vomiting and nausea. Patient's g-tube fell out 2 weeks ago and had continued nausea and vomiting since with weight loss.        FLUOROSCOPY IMAGES OBTAINED:  27 images  FLUOROSCOPY TIME:  2 minutes 24 seconds  RADIATION DOSE (AIR KER lubricated upper endoscope was introduced into the mouth and advanced to the descending duodenum. The endoscope was then withdrawn into the gastric antrum and placed in a retroflexed position.   The endoscope was then righted, and air was suctioned from th pain, and per inpatient psych notes from 2019, patient's chronic pain was intricately linked with underlying anxiety and depression. Though she denies any having any issues now, I suspect the cyclic nausea and vomiting may in part be related to the same. lateral decubitus position, the lubricated upper endoscope was introduced into the mouth and advanced to the descending duodenum. The endoscope was then withdrawn into the gastric antrum and placed in a retroflexed position.   The endoscope was then righte diverticula, or varices.     _____________________________________________________________________  Patient: Kate Stacy GENAO 1960  Date of Service: 19  Procedure: GJ tube removal     Anesthesia: none     Exam/Procedure: Patient's G tube s practitioner's assessment and recommendations.      Briefly, this is a 64 yo F w/ho CAD s/p PCI, AF, anorexia presenting with recurrent nausea/vomiting c/b prior PEG, s/p removal. Pt states that she has felt worse since G tube removal. Pt has an OSH psych b

## 2019-12-06 NOTE — PLAN OF CARE
Cancel psych consult. Every time I've talked to her about the possibility of having a recurrence of her eating disorder (teenage years with anorexia), she is adamant that she does NOT have it. I have nothing to add regarding this.  She has a psych provider

## 2019-12-06 NOTE — CONSULTS
[facility]  Report of Consultation    Divina Kan Patient Status:  Inpatient    1960 MRN [id number]   [facility] 3NE-A Attending Connie Gonzalez MD   Hosp Day # 1 PCP Kalpana Miller     Reason for Consultation:  Lark Litten initially on anterior thighs, which then progressed as noted above.        Patient states she has numbness in feet and she also has intermittent numbness in hands for many years -         Patient states she was moving around at home after discharge    few days not been able to walk at all; in the past 2 days, she has been having dizziness, double vision and blurry vision - no slurred speech or swallowing issues; notes double vision worse on looking R; has baseline coordination issues in feet and legs an left heart angiogram   • ANGIOGRAM  2016    Right & left heart angiogram   • ANGIOPLASTY (CORONARY)  2004    stent to LAD   • ANGIOPLASTY (CORONARY)  2005    stent to LAD   • BIOPSY  2019    heel   •       2   • CARDIAC DEFIBRILL apparatus- around the heart- net(mesh metal sleeve)   • OTHER  03/12/2019    left sural nerve biopsy   • OTHER SURGICAL HISTORY  06/30/2009    Paracor heart net   • PERCUTANEOUS ENDOSCOPIC GASTROSTOMY PLACEMENT/JEJUNOSTOMY TUBE PLACEMENT N/A 12/11/2018 Tab, Take 15 mg by mouth nightly., Disp: , Rfl: , 12/3/2019 at 2100  Amitriptyline HCl 10 MG Oral Tab, Take 1 tablet (10 mg total) by mouth nightly., Disp: 30 tablet, Rfl: 2, 12/3/2019 at 2100  Omeprazole 40 MG Oral Capsule Delayed Release, Take 40 mg by m Oral, QAM AC  •  Sotalol HCl (BETAPACE) tab 80 mg, 80 mg, Oral, 2 times per day  •  Zolpidem Tartrate (AMBIEN) tab 5 mg, 5 mg, Oral, Nightly PRN    Review of Systems:  A 10-point system was reviewed. Pertinent positives and negatives are noted in HPI. Assessment:  1. Principal Problem:  2. Acute weakness  3. Active Problems:  4. Atrial fibrillation (HCC)  5.   Leukocytosis  6. Gait disturbance  7. Dizziness  8.       Rufus Sierra is a 63year old woman with complicated PMHx, inclu

## 2019-12-06 NOTE — PROGRESS NOTES
[address] [facility] Progress Note    Nora Rodgers Patient Status:  Inpatient    1960 MRN [id number]   [facility] 3NE-A Attending Tiana Moreno, [address] [facility] [address] Day # 2 PCP SHRUTI DESAI         Subjective:  Tiana Moreno Om concern then recommend MRI.       Labs:  B12 1073  UA neg for UTI  CBC with leukocytosis      Assessment:   Weakness, falls  Chronic neuropathy; prior extensive serologic workup (ie vasculitis, MAG Ab, ESR, CRP, MELIZA, heavy metals),  and CSF testing negative team.      Teodora Camarena MD  Vascular & General Neurology  [facility]  12/6/2019    Decision making:         (   ) other records reviewed -1  (   ) labs reviewed/ordered - 1  (   ) new diagnosis: - 2  (   ) Images independently reviewe

## 2019-12-06 NOTE — PROGRESS NOTES
[facility]  Progress Note     Mena Malone Patient Status:  Inpatient    1960 MRN [id number]   [facility] 3NE-A Attending Santosh Segovia MD   Hosp Day # 2 PCP Sayra Simms     Chief Complaint: Weakness    S: Patient 10 mg Oral Nightly   • DULoxetine HCl  30 mg Oral BID   • folic acid  201 mcg Oral Daily   • lisinopril  2.5 mg Oral Nightly   • Metoprolol Succinate ER  12.5 mg Oral Nightly   • mirtazapine  15 mg Oral Nightly   • Pantoprazole Sodium  40 mg Oral QA AC

## 2019-12-06 NOTE — CONSULTS
[facility]    Hanny Walton Patient Status:  Inpatient    1960 MRN [id number]   [facility] 3NE-A Attending Marilynn Blanton MD   [address] Day # 2 [facility]     Patient Identification  Hanny Walton is a 63 yea Functional Status: Patient was not independent with mobility/ambulation, transfers, ADL's, IADL's.   Support System and Family Circumstances (i.e., potential caregivers): spouse / significant other  Home Environment / Accessibility: 2-story house  Current F • ANGIOGRAM  05/31/2016    Right & left heart angiogram   • ANGIOGRAM  06/02/2016    Right & left heart angiogram   • ANGIOPLASTY (CORONARY)  11/24/2004    stent to LAD   • ANGIOPLASTY (CORONARY)  05/11/2005    stent to LAD   • BIOPSY  2019    heel   • C at [facility] MAIN OR   • OTHER      apparatus- around the heart- net(mesh metal sleeve)   • OTHER  03/12/2019    left sural nerve biopsy   • OTHER SURGICAL HISTORY  06/30/2009    Paracor heart net   • PERCUTANEOUS ENDOSCOPIC GASTROSTOMY PLACEMENT/JEJUNOSTOMY TUBE sarcoidosis   • Cancer Brother         testicular   • Cancer Brother         skin cancer   • Hypertension Neg    • Obesity Neg    • Psychiatric Neg    • Lipids Neg        Allergies:    Codeine Sulfate         NAUSEA ONLY    Comment:Pt states \"it messes Strength  is 4. ROM WNL. Left Lower Extremity: Strength  is 4. ROM WNL. Neuro: CNII-XII are grossly intact.  Sensation to dull touch intact in all extremities and reflexes are 2+, bilateral and symmetric for biceps, brachioradialis, triceps,

## 2019-12-06 NOTE — PLAN OF CARE
A/O x 4. States her  had brought her something to eat- menu noted to not be in room by PCT.  No dinner noted by RN or PCT  Room air  Tele NSR  Room air  Constipation   Bedpan to urinate   PT/OT rec Acute rehab- pt refusing to stand at this time bec assessment.  - Educate pt/family on patient safety including physical limitations  - Instruct pt to call for assistance with activity based on assessment  - Modify environment to reduce risk of injury  - Provide assistive devices as appropriate  - Consider Assess patient stability and activity tolerance for standing, transferring and ambulating w/ or w/o assistive devices  - Assist with transfers and ambulation using safe patient handling equipment as needed  - Ensure adequate protection for wounds/incisions

## 2019-12-07 ENCOUNTER — APPOINTMENT (OUTPATIENT)
Dept: GENERAL RADIOLOGY | Facility: HOSPITAL | Age: 59
DRG: 640 | End: 2019-12-07
Attending: INTERNAL MEDICINE
Payer: COMMERCIAL

## 2019-12-07 ENCOUNTER — ANESTHESIA (OUTPATIENT)
Dept: ENDOSCOPY | Facility: HOSPITAL | Age: 59
DRG: 640 | End: 2019-12-07
Payer: COMMERCIAL

## 2019-12-07 PROCEDURE — 74018 RADEX ABDOMEN 1 VIEW: CPT | Performed by: INTERNAL MEDICINE

## 2019-12-07 PROCEDURE — 99232 SBSQ HOSP IP/OBS MODERATE 35: CPT | Performed by: INTERNAL MEDICINE

## 2019-12-07 PROCEDURE — 0DHA3UZ INSERTION OF FEEDING DEVICE INTO JEJUNUM, PERCUTANEOUS APPROACH: ICD-10-PCS | Performed by: INTERNAL MEDICINE

## 2019-12-07 DEVICE — SAFETY PEG KIT 20FR: Type: IMPLANTABLE DEVICE | Site: ABDOMEN | Status: FUNCTIONAL

## 2019-12-07 RX ORDER — SODIUM CHLORIDE 9 MG/ML
INJECTION, SOLUTION INTRAVENOUS CONTINUOUS
Status: DISCONTINUED | OUTPATIENT
Start: 2019-12-07 | End: 2019-12-08

## 2019-12-07 RX ORDER — LORAZEPAM 2 MG/ML
0.5 INJECTION INTRAMUSCULAR EVERY 6 HOURS PRN
Status: DISCONTINUED | OUTPATIENT
Start: 2019-12-07 | End: 2019-12-10

## 2019-12-07 RX ORDER — LORAZEPAM 2 MG/ML
1 INJECTION INTRAMUSCULAR EVERY 6 HOURS PRN
Status: DISCONTINUED | OUTPATIENT
Start: 2019-12-07 | End: 2019-12-08

## 2019-12-07 RX ORDER — ESMOLOL HYDROCHLORIDE 10 MG/ML
INJECTION INTRAVENOUS AS NEEDED
Status: DISCONTINUED | OUTPATIENT
Start: 2019-12-07 | End: 2019-12-07 | Stop reason: SURG

## 2019-12-07 RX ORDER — ENOXAPARIN SODIUM 100 MG/ML
30 INJECTION SUBCUTANEOUS DAILY
Status: DISCONTINUED | OUTPATIENT
Start: 2019-12-07 | End: 2019-12-08

## 2019-12-07 RX ORDER — SODIUM CHLORIDE, SODIUM LACTATE, POTASSIUM CHLORIDE, CALCIUM CHLORIDE 600; 310; 30; 20 MG/100ML; MG/100ML; MG/100ML; MG/100ML
INJECTION, SOLUTION INTRAVENOUS CONTINUOUS
Status: DISCONTINUED | OUTPATIENT
Start: 2019-12-07 | End: 2019-12-13

## 2019-12-07 RX ORDER — PHENYLEPHRINE HCL 10 MG/ML
VIAL (ML) INJECTION AS NEEDED
Status: DISCONTINUED | OUTPATIENT
Start: 2019-12-07 | End: 2019-12-07 | Stop reason: SURG

## 2019-12-07 RX ORDER — SODIUM CHLORIDE 9 MG/ML
INJECTION, SOLUTION INTRAVENOUS CONTINUOUS PRN
Status: DISCONTINUED | OUTPATIENT
Start: 2019-12-07 | End: 2019-12-07 | Stop reason: SURG

## 2019-12-07 RX ORDER — LIDOCAINE HYDROCHLORIDE 10 MG/ML
INJECTION, SOLUTION EPIDURAL; INFILTRATION; INTRACAUDAL; PERINEURAL AS NEEDED
Status: DISCONTINUED | OUTPATIENT
Start: 2019-12-07 | End: 2019-12-07 | Stop reason: SURG

## 2019-12-07 RX ORDER — NALOXONE HYDROCHLORIDE 0.4 MG/ML
80 INJECTION, SOLUTION INTRAMUSCULAR; INTRAVENOUS; SUBCUTANEOUS AS NEEDED
Status: DISCONTINUED | OUTPATIENT
Start: 2019-12-07 | End: 2019-12-07 | Stop reason: HOSPADM

## 2019-12-07 RX ORDER — MIDAZOLAM HYDROCHLORIDE 1 MG/ML
INJECTION INTRAMUSCULAR; INTRAVENOUS AS NEEDED
Status: DISCONTINUED | OUTPATIENT
Start: 2019-12-07 | End: 2019-12-07 | Stop reason: SURG

## 2019-12-07 RX ADMIN — PHENYLEPHRINE HCL 100 MCG: 10 MG/ML VIAL (ML) INJECTION at 10:35:00

## 2019-12-07 RX ADMIN — ESMOLOL HYDROCHLORIDE 30 MG: 10 INJECTION INTRAVENOUS at 10:59:00

## 2019-12-07 RX ADMIN — LIDOCAINE HYDROCHLORIDE 40 MG: 10 INJECTION, SOLUTION EPIDURAL; INFILTRATION; INTRACAUDAL; PERINEURAL at 10:33:00

## 2019-12-07 RX ADMIN — SODIUM CHLORIDE: 9 INJECTION, SOLUTION INTRAVENOUS at 10:26:00

## 2019-12-07 RX ADMIN — MIDAZOLAM HYDROCHLORIDE 2 MG: 1 INJECTION INTRAMUSCULAR; INTRAVENOUS at 10:33:00

## 2019-12-07 RX ADMIN — SODIUM CHLORIDE: 9 INJECTION, SOLUTION INTRAVENOUS at 11:26:00

## 2019-12-07 NOTE — DIETARY NOTE
Clinical Nutrition EN Recommendations  Noted pt NPO for G-J tube placement. Once appropriate to initiate, recommend Jevity 1.5 at 15 ml/hour. Hold at 15 ml/hr x 12 hrs.     120 ml water flush q 4 hours    If labs are stable, can advance 10 ml/hour q 8 ho

## 2019-12-07 NOTE — ANESTHESIA POSTPROCEDURE EVALUATION
[address] Patient Status:  Inpatient   Age/Gender 63year old female MRN [id number]   Location [facility]. Attending Shivam Angulo, 160[address] [facility] Day # 3  Jessica Aguayo       Anesthesia Post-op Note    Procedure

## 2019-12-07 NOTE — OPERATIVE REPORT
Pre-Operative Diagnosis: malnutrition, vomiting   Post-Operative Diagnosis:   1. Eosive gastritis   2.  Successful placement of gastrostomy tube with jejunal extension  Procedure Performed:   Esophagogastroduodenoscopy with placement of gastrostomy and jeju abdomen was exposed, and a site about 2-3 finger breadths below the costal margin and just left of midline was identified. Good transillumination was achieved, and good 1:1 ratio of external compression to internal indentation was appreciated.  The area was Dietitian for recommendations - initiation of tube feeding should be cautious, so as to avoid or minimize re-feeding syndrome risk                                                                      2) Check cbc, cmp, Mg, Phos in am

## 2019-12-07 NOTE — PROGRESS NOTES
[facility]  Progress Note     Sandy Nya Patient Status:  Inpatient    1960 MRN [id number]   [facility] 3NE-A Attending Clark Simms MD   Hosp Day # 3 [facility]     Chief Complaint: Weakness    S: Complain 12/07/19  0710   PTP 13.1   INR 0.95       Recent Labs   Lab 12/04/19  1109   TROP <0.045            Imaging: Imaging data reviewed in Epic.     Medications:   • pantoprazole (PROTONIX) IV push  40 mg Intravenous Q12H   • buprenorphine HCl  8 mg Sublingual

## 2019-12-07 NOTE — PROGRESS NOTES
Spoke with patient and her  Marguerite Corrales, who is POA.  The potentially life-threatening complications of infection, sedation, bleeding, perforation and death were reviewed along with the possible need for surgical management, transfusions, or repeat endosco

## 2019-12-07 NOTE — PLAN OF CARE
Assumed pt care at 1. A&Ox4. VSS. Room air. NSR/ST on tele. NPO at midnight for G-J tube placement. Pt reports numbness/tingling in all extremities. Rt AC PIV SL. Denies pain. Pt using bedpan to void. Last BM 12/5. Daily weights. I/O's.   Restin assessment.  - Educate pt/family on patient safety including physical limitations  - Instruct pt to call for assistance with activity based on assessment  - Modify environment to reduce risk of injury  - Provide assistive devices as appropriate  - Consider Assess patient stability and activity tolerance for standing, transferring and ambulating w/ or w/o assistive devices  - Assist with transfers and ambulation using safe patient handling equipment as needed  - Ensure adequate protection for wounds/incisions

## 2019-12-08 ENCOUNTER — APPOINTMENT (OUTPATIENT)
Dept: CT IMAGING | Facility: HOSPITAL | Age: 59
DRG: 640 | End: 2019-12-08
Attending: INTERNAL MEDICINE
Payer: COMMERCIAL

## 2019-12-08 ENCOUNTER — APPOINTMENT (OUTPATIENT)
Dept: GENERAL RADIOLOGY | Facility: HOSPITAL | Age: 59
DRG: 640 | End: 2019-12-08
Attending: NURSE PRACTITIONER
Payer: COMMERCIAL

## 2019-12-08 ENCOUNTER — ANESTHESIA EVENT (OUTPATIENT)
Dept: ENDOSCOPY | Facility: HOSPITAL | Age: 59
DRG: 640 | End: 2019-12-08
Payer: COMMERCIAL

## 2019-12-08 ENCOUNTER — ANESTHESIA (OUTPATIENT)
Dept: ENDOSCOPY | Facility: HOSPITAL | Age: 59
DRG: 640 | End: 2019-12-08
Payer: COMMERCIAL

## 2019-12-08 PROCEDURE — 99291 CRITICAL CARE FIRST HOUR: CPT | Performed by: NURSE PRACTITIONER

## 2019-12-08 PROCEDURE — 74176 CT ABD & PELVIS W/O CONTRAST: CPT | Performed by: INTERNAL MEDICINE

## 2019-12-08 PROCEDURE — 71045 X-RAY EXAM CHEST 1 VIEW: CPT | Performed by: NURSE PRACTITIONER

## 2019-12-08 PROCEDURE — 99253 IP/OBS CNSLTJ NEW/EST LOW 45: CPT | Performed by: INTERNAL MEDICINE

## 2019-12-08 PROCEDURE — 30233K1 TRANSFUSION OF NONAUTOLOGOUS FROZEN PLASMA INTO PERIPHERAL VEIN, PERCUTANEOUS APPROACH: ICD-10-PCS | Performed by: INTERNAL MEDICINE

## 2019-12-08 PROCEDURE — 30233N1 TRANSFUSION OF NONAUTOLOGOUS RED BLOOD CELLS INTO PERIPHERAL VEIN, PERCUTANEOUS APPROACH: ICD-10-PCS | Performed by: INTERNAL MEDICINE

## 2019-12-08 PROCEDURE — 99254 IP/OBS CNSLTJ NEW/EST MOD 60: CPT | Performed by: SURGERY

## 2019-12-08 PROCEDURE — 02HV33Z INSERTION OF INFUSION DEVICE INTO SUPERIOR VENA CAVA, PERCUTANEOUS APPROACH: ICD-10-PCS | Performed by: INTERNAL MEDICINE

## 2019-12-08 PROCEDURE — 0W3P8ZZ CONTROL BLEEDING IN GASTROINTESTINAL TRACT, VIA NATURAL OR ARTIFICIAL OPENING ENDOSCOPIC: ICD-10-PCS | Performed by: INTERNAL MEDICINE

## 2019-12-08 PROCEDURE — 99233 SBSQ HOSP IP/OBS HIGH 50: CPT | Performed by: INTERNAL MEDICINE

## 2019-12-08 RX ORDER — METOCLOPRAMIDE HYDROCHLORIDE 5 MG/ML
10 INJECTION INTRAMUSCULAR; INTRAVENOUS ONCE
Status: COMPLETED | OUTPATIENT
Start: 2019-12-08 | End: 2019-12-08

## 2019-12-08 RX ORDER — SODIUM CHLORIDE 9 MG/ML
INJECTION, SOLUTION INTRAVENOUS ONCE
Status: COMPLETED | OUTPATIENT
Start: 2019-12-08 | End: 2019-12-08

## 2019-12-08 RX ORDER — SODIUM CHLORIDE, SODIUM LACTATE, POTASSIUM CHLORIDE, CALCIUM CHLORIDE 600; 310; 30; 20 MG/100ML; MG/100ML; MG/100ML; MG/100ML
INJECTION, SOLUTION INTRAVENOUS CONTINUOUS PRN
Status: DISCONTINUED | OUTPATIENT
Start: 2019-12-08 | End: 2019-12-08 | Stop reason: SURG

## 2019-12-08 RX ORDER — METOCLOPRAMIDE HYDROCHLORIDE 5 MG/ML
INJECTION INTRAMUSCULAR; INTRAVENOUS
Status: DISCONTINUED
Start: 2019-12-08 | End: 2019-12-08

## 2019-12-08 RX ORDER — SODIUM CHLORIDE 9 MG/ML
INJECTION, SOLUTION INTRAVENOUS CONTINUOUS
Status: DISCONTINUED | OUTPATIENT
Start: 2019-12-08 | End: 2019-12-08

## 2019-12-08 RX ORDER — LIDOCAINE HYDROCHLORIDE 10 MG/ML
INJECTION, SOLUTION EPIDURAL; INFILTRATION; INTRACAUDAL; PERINEURAL AS NEEDED
Status: DISCONTINUED | OUTPATIENT
Start: 2019-12-08 | End: 2019-12-08 | Stop reason: SURG

## 2019-12-08 RX ORDER — SODIUM CHLORIDE 9 MG/ML
INJECTION, SOLUTION INTRAVENOUS ONCE
Status: DISCONTINUED | OUTPATIENT
Start: 2019-12-08 | End: 2019-12-22

## 2019-12-08 RX ADMIN — SODIUM CHLORIDE, SODIUM LACTATE, POTASSIUM CHLORIDE, CALCIUM CHLORIDE: 600; 310; 30; 20 INJECTION, SOLUTION INTRAVENOUS at 09:16:00

## 2019-12-08 RX ADMIN — SODIUM CHLORIDE, SODIUM LACTATE, POTASSIUM CHLORIDE, CALCIUM CHLORIDE: 600; 310; 30; 20 INJECTION, SOLUTION INTRAVENOUS at 08:12:00

## 2019-12-08 RX ADMIN — LIDOCAINE HYDROCHLORIDE 40 MG: 10 INJECTION, SOLUTION EPIDURAL; INFILTRATION; INTRACAUDAL; PERINEURAL at 08:24:00

## 2019-12-08 NOTE — PROGRESS NOTES
[facility] Pharmacy Note:  Renal Adjustment for piperacillin/tazobactam (Satish Sierra)    Joel Gallo is a 63year old female who has been prescribed piperacillin/tazobactam (ZOSYN) 4.5 g every 6 hrs.   CrCl is estimated creatinine clearance is 53.8 mL/min (ba

## 2019-12-08 NOTE — PLAN OF CARE
Upon assessment pt resting in bed with levophed and vasopressin infusing. Multiple blood products administered, no adverse reactions. EGD preformed at bedside, this RN present in room during course of procedure.  Pt extubated post procedure and placed on na feeding, grooming, and bathing  - Educate and encourage patient/family in tolerated functional activity level and precautions during self-care     Outcome: Not Progressing

## 2019-12-08 NOTE — CONSULTS
[facility] Pulmonary, Critical Care and Sleep    Gertrude Coelho Patient Status:  Inpatient    1960 MRN [id number]   Location 463/463-A PCP Terese Muñoz     Date of Admission: 2019  History of Present Illness: Pt is a 63year old female consul appetite    • Mouth sores    • Muscle weakness     due to neuropathy in P.T now   • Nausea    • Neuropathy    • On tube feeding diet     eats a little   • Osteoporosis    • Pacemaker    • Pancreatitis    • Personal history of antineoplastic chemotherapy Garett Dye MD at [facility] ENDOSCOPY   • ESOPHAGOGASTRODUODENOSCOPY (EGD) N/A 2/23/2019    Performed by Justine Jones MD at [facility] ENDOSCOPY   • ESOPHAGOGASTRODUODENOSCOPY (EGD) N/A 1/16/2019    Performed by Ever Busch MD at [facility] ENDOSCOPY   • [address] [place] [address] Take 1 tablet (5 mg total) by mouth 2 (two) times daily. , Disp: 60 tablet, Rfl: 3  Buprenorphine HCl-Naloxone HCl 8-2 MG Sublingual SL Tab, Place 1 tablet under the tongue 3 (three) times daily. , Disp: , Rfl: 0  mirtazapine 15 MG Oral Tab, Take 15 mg by mo 20. 00 Years   Quit date: 11/1/2004   Smokeless tobacco: Never Used       Alcohol use Yes   Comment: \"occasional\"       Drug use:    Types: Cannabis   Comment: once per week     Work:No exposures.     TB: none  Asbestos: none    Family History   Problem Re -American 95 >=60   MAGNESIUM    Collection Time: 12/07/19  7:10 AM   Result Value Ref Range    Magnesium 1.9 1.6 - 2.6 mg/dL   PROTHROMBIN TIME (PT)    Collection Time: 12/07/19  7:10 AM   Result Value Ref Range    PT 13.1 12.5 - 14.7 seconds    IN 4.9 2.5 - 4.9 mg/dL   MAGNESIUM    Collection Time: 12/08/19  1:35 AM   Result Value Ref Range    Magnesium 1.7 1.6 - 2.6 mg/dL   COMP METABOLIC PANEL (14)    Collection Time: 12/08/19  1:35 AM   Result Value Ref Range    Glucose 169 (H) 70 - 99 mg/dL    S (H) 0    Total Cells Counted 100     RBC Morphology Normal Normal, Slide reviewed, see previous RBC morphology.     Platelet Morphology Normal Normal   LACTIC ACID 3 HR POST POSITIVE    Collection Time: 12/08/19  3:41 AM   Result Value Ref Range    Lactic A None Seen Small /LPF    Transitional Cells None Seen Small /LPF    Hyaline Casts Present (A) None Seen /LPF    Granular Casts Present (A) None Seen /LPF    Mucous Urine 2+ (A) None Seen    Yeast Urine None Seen None Seen    Non-Squamous Epithelial Moderate

## 2019-12-08 NOTE — PROGRESS NOTES
[facility]  Progress Note     Divina Kan Patient Status:  Inpatient    1960 MRN [id number]   [facility] 3NE-A Attending Connie Gonzalez MD   Hosp Day # 4 PCP [facility]     Chief Complaint: Weakness    S: [place] 8.7 8.3* 9.2 7.8*  7.8*   ALB 2.2*  --   --  1.5*   * 134* 134* 139  139   K 4.1 4.5 5.0 4.9  4.9   CL 99 105 100 110  110   CO2 23.0 23.0 22.0 14.0*  14.0*   ALKPHO 302*  --   --  180*   *  --   --  89*   ALT 55  --   --  32   BILT 1.2  -- Sotalol, may ask for Cardiology input    Plan of care: as above.  Patient critically ill; discussed in detail with patient's daughter    Quality:  · DVT Prophylaxis: Lovenox  · CODE status: Full  · Kumar: None  · Central line: None    Will the patient be re

## 2019-12-08 NOTE — PROCEDURES
Procedure note: R Subclavian central line placement  Indication: Hypotension  : Geovanna  Anesthesia: Local   US exam showed no clear venous lumen on RIJ site. Pt has L sided AICD with subclavian wires. Informed consent obtained and time out performed.

## 2019-12-08 NOTE — ANESTHESIA POSTPROCEDURE EVALUATION
[address] Patient Status:  Inpatient   Age/Gender 63year old female MRN [id number]   [facility] 4SW-A Attending Ross Le, 160[address] [facility] Day # 4 PCP [address] [facility]       Anesthesia Post-op Note    Procedure(s):

## 2019-12-08 NOTE — PROGRESS NOTES
ICU  Critical Care APRN Progress Note    NAME: Pamella Huff - ROOM: [address] [place] [address] - MRN: [id number] - Age: 63year old - :1960    History Of Present Illness:  Pamella Huff is a 63year old female with PMHx significant for HFrEF with A • Ischemic cardiomyopathy    • Kidney failure    • Leg swelling    • Loss of appetite    • Mouth sores    • Muscle weakness     due to neuropathy in P.T now   • Nausea    • Neuropathy    • On tube feeding diet     eats a little   • Osteoporosis    • Pac Physical Exam:    General Appearance: lethargic, confused, pale, mottled fingers, poor cap refill  Neck: No JVD, neck supple, no adenopathy, trachea midline, no carotid bruits  Lungs: Clear to auscultation bilaterally, respirations unlabored  Heart: Re 12: 12     Approved by: Richard Muller DO on 12/04/2019 at 12:14          Xr Chest Ap Portable  (cpt=71045)    Result Date: 11/18/2019  CONCLUSION:  No active cardiopulmonary process identified.     Dictated by: Davy Mortensen MD on 11/18/2019 at 9:11     Ap to evaluate  -Hold TF but ok to give contrast through tube    #CHF, CAD, PPM  -EKG unchanged  -Troponin negative  -EF 30-35% in November  -Cardiology consult    #Afib on Eliquis, PPM  -Eliquis on hold  -Lovenox given last night at 4pm, now d/c due to anemi

## 2019-12-08 NOTE — PLAN OF CARE
Received pt from floor with persistent hypotension/tachycardia. SBP upon arrival in the high 50's/60's. Levo started right away and titrated as needed. Pt arrived pale, cold to touch, confused, breathing in the 30's. Afebrile.  BC x1 set were done periphera

## 2019-12-08 NOTE — PLAN OF CARE
Assumed care of patient at [address] [place] [address]. Patient A&Ox4, very drowsy throughout the day. On RA, , saturating well. On telemetry, ST. Increasing to 130's post operative. GJ Tube placement today.  To start tube feeds tonight, however tube will need to be assess appropriate  Outcome: Progressing     Problem: SAFETY ADULT - FALL  Goal: Free from fall injury  Description  INTERVENTIONS:  - Assess pt frequently for physical needs  - Identify cognitive and physical deficits and behaviors that affect risk of falls.   - including rhythm and repeat lab results as appropriate  - Fluid restriction as ordered  - Instruct patient on fluid and nutrition restrictions as appropriate  Outcome: Progressing     Problem: MUSCULOSKELETAL - ADULT  Goal: Return mobility to safest level

## 2019-12-08 NOTE — PROGRESS NOTES
Assumed patient care at 1900  Patient A&O x 4  Drowsy  Complaints of pain to abdomen. GI paged and notified. Per GI pain is expected. Tube hooked up to gravity drainage. XR confirmed placement of G-J tube.    Increased HR and tube feeds unable to be started

## 2019-12-08 NOTE — CONSULTS
[facility]    Report of Consultation    Sharad Wells Patient Status:  Inpatient    1960 MRN [id number]   [facility] 4SW-A Attending Maude Navarro, DO   Hosp Day # 4 PCP Lyla Browning     Date of Admission:  2019 appetite    • Mouth sores    • Muscle weakness     due to neuropathy in P.T now   • Nausea    • Neuropathy    • On tube feeding diet     eats a little   • Osteoporosis    • Pacemaker    • Pancreatitis    • Personal history of antineoplastic chemotherapy Alona Kellogg MD at [facility] ENDOSCOPY   • ESOPHAGOGASTRODUODENOSCOPY (EGD) N/A 2/23/2019    Performed by Juan Carlos Shah MD at [address] [place] [address] ENDOSCOPY   • ESOPHAGOGASTRODUODENOSCOPY (EGD) N/A 1/16/2019    Performed by Elise Muller MD at [address] [place] [address] ENDOSCOPY   • [address] [place] [address] Cannabis.     Allergies:    Codeine Sulfate         NAUSEA ONLY    Comment:Pt states \"it messes with my stomach, but I can             take it if I need it\"  Sulfa Antibiotics       NAUSEA AND VOMITING    Comment:pancreatitis  Nsaids                  Venora Balo Exam:  Blood pressure 100/66, pulse 112, temperature 100 °F (37.8 °C), temperature source Temporal, resp. rate 23, height 157.5 cm (5' 2\"), weight 100 lb 9.6 oz (45.6 kg), SpO2 97 %.   Temp (24hrs), Av.4 °F (36.9 °C), Min:97.5 °F (36.4 °C), Max:100 °F state     Acute on chronic congestive heart failure, unspecified heart failure type (HCC)     Severe muscle deconditioning     History of laparoscopic cholecystectomy     Renal insufficiency     Chronic idiopathic pain syndrome     Adjustment disorder with

## 2019-12-08 NOTE — PROGRESS NOTES
Progress Note    Pt transferred to ICU for hypotension and tachycardia. Labs show a decline in Hgb. She is already on PPI BID. She has an elevated WBC count. Recommend empiric Zosyn and CT A/P with gastrograffin via G tube.  Per nursing, she has been somewh

## 2019-12-08 NOTE — PROGRESS NOTES
CT with blood in stomach. Pt has not had melena or hematochezia, but could be because of delayed motility. She may also have a secondary cause of hypotension, and has been started on Abx. I have tightened her bumper.  She will be getting her second unit of

## 2019-12-08 NOTE — ANESTHESIA PREPROCEDURE EVALUATION
PRE-OP EVALUATION    Patient Name: Aggie Vincent    Pre-op Diagnosis: GI bleed [K92.2]    Procedure(s):  ESOPHAGOGASTRODUODENOSCOPY (EGD)    Surgeon(s) and Role:     * Lizzie Hightower, DO - Primary    Pre-op vitals reviewed.   Temp: 98 °F (36.7 °C Oral, Q6H PRN  Metoclopramide HCl (REGLAN) injection 10 mg, 10 mg, Intravenous, Q8H PRN  Amitriptyline HCl (ELAVIL) tab 10 mg, 10 mg, Oral, Nightly  DULoxetine HCl (CYMBALTA) DR particles cap 30 mg, 30 mg, Oral, BID  folic acid (FOLVITE) tab 400 mcg, 400 m acid (FOLVITE) 400 MCG Oral Tab, Take 400 mcg by mouth daily. , Disp: , Rfl:         Allergies: Codeine Sulfate; Sulfa Antibiotics; Nsaids      Anesthesia Evaluation    Patient summary reviewed.     Anesthetic Complications  (+) history of anesthetic complic ESOPHAGOGASTRODUODENOSCOPY (EGD) N/A 2/23/2019    Performed by Esau Bass MD at [facility] ENDOSCOPY   • ESOPHAGOGASTRODUODENOSCOPY (EGD) N/A 1/16/2019    Performed by Layne Heimlich, MD at [facility] ENDOSCOPY   • ESOPHAGOGASTRODUODENOSCOPY (EGD) N/A 11/16/201 12/08/2019     12/08/2019    CO2 14.0 (L) 12/08/2019    CO2 14.0 (L) 12/08/2019    BUN 18 12/08/2019    BUN 18 12/08/2019    CREATSERUM 0.82 12/08/2019    CREATSERUM 0.82 12/08/2019     (H) 12/08/2019     (H) 12/08/2019    CA 7.8 (L) 12

## 2019-12-08 NOTE — OPERATIVE REPORT
Operative Report-Esophagogastroduodenoscopy with control of bleeding  Hanny Walton 8/5/1960   [facility] [id number] MRN [id number]   Admission Date 12/4/2019 Operation Date 12/8/2019   Attending Physician Alfredo Mejia DO Operating Physician Marivel Murray JEJUNUM: Normal. The Jejunal extension tube was seen up to the distal tip. There was no signs of active bleeding.    THERAPEUTICS: See above  RECOMMENDATIONS:   - Post EGD precautions, watch for bleeding, infection, perforation, adverse drug reactions   - M

## 2019-12-08 NOTE — ANESTHESIA PROCEDURE NOTES
Airway  Date/Time: 12/8/2019 8:25 AM  Urgency: elective    Airway not difficult    General Information and Staff    Patient location during procedure: ICU  Anesthesiologist: Morgan Cuevas MD  Performed: anesthesiologist     Indications and Patien

## 2019-12-09 ENCOUNTER — TELEPHONE (OUTPATIENT)
Dept: FAMILY MEDICINE | Age: 59
End: 2019-12-09

## 2019-12-09 PROCEDURE — 3E0H76Z INTRODUCTION OF NUTRITIONAL SUBSTANCE INTO LOWER GI, VIA NATURAL OR ARTIFICIAL OPENING: ICD-10-PCS | Performed by: INTERNAL MEDICINE

## 2019-12-09 PROCEDURE — 99232 SBSQ HOSP IP/OBS MODERATE 35: CPT | Performed by: INTERNAL MEDICINE

## 2019-12-09 PROCEDURE — 99232 SBSQ HOSP IP/OBS MODERATE 35: CPT | Performed by: SURGERY

## 2019-12-09 RX ORDER — APIXABAN 5 MG/1
TABLET, FILM COATED ORAL
Refills: 0 | COMMUNITY
Start: 2019-11-20 | End: 2020-02-14 | Stop reason: SDUPTHER

## 2019-12-09 RX ORDER — OXYMETAZOLINE HCL 0.05 %
AEROSOL, SPRAY WITH PUMP (ML) NASAL
COMMUNITY
Start: 2010-03-15

## 2019-12-09 RX ORDER — AMITRIPTYLINE HYDROCHLORIDE 10 MG/1
TABLET, FILM COATED ORAL
Refills: 2 | COMMUNITY
Start: 2019-12-02 | End: 2020-05-26

## 2019-12-09 RX ORDER — POTASSIUM CHLORIDE 29.8 MG/ML
40 INJECTION INTRAVENOUS ONCE
Status: DISCONTINUED | OUTPATIENT
Start: 2019-12-09 | End: 2019-12-09

## 2019-12-09 RX ORDER — SOTALOL HYDROCHLORIDE 80 MG/1
TABLET ORAL
Refills: 0 | COMMUNITY
Start: 2019-11-20 | End: 2020-05-26

## 2019-12-09 NOTE — PROGRESS NOTES
[facility]  Advanced Heart Failure Progress Note    Nora Rodgers Patient Status:  Inpatient    1960 MRN [id number]   [facility] 4SW-A Attending Anival Mcbride, DO   Hosp Day # 5 PCP SHRUTI DESAI     Subjective:  I have k Normal excursions and effort. Abdomen: Soft, diffusely tender, audible bowel sounds. Extremities: Without clubbing, cyanosis or edema. Peripheral pulses are 2+. Neurologic: Alert and oriented, normal affect. Skin: Warm and dry.      Laboratory/Data: particles cap 30 mg, 30 mg, Oral, BID  folic acid (FOLVITE) tab 400 mcg, 400 mcg, Oral, Daily  lisinopril tab 2.5 mg, 2.5 mg, Oral, Nightly  metoprolol succinate (Toprol XL) partial tablet 12.5 mg, 12.5 mg, Oral, Nightly  mirtazapine (REMERON) tab 15 mg, 1 Malfunction of gastrostomy tube ([address]     Complication of gastrostomy tube ([address]     Abdominal pain of unknown etiology     Elevated troponin     AICD discharge     Atrial fibrillation (HCC)     Leukocytosis     Acute weakness     Gait disturbance     Dizz

## 2019-12-09 NOTE — PROGRESS NOTES
Critical Care Progress Note        NAME: Elton Cosby - ROOM: 018/200-T - MRN: [id number] - Age: 63year old - : 1960  Date of Admission: 2019 10:46 AM  Admission Diagnosis: Dizziness [R42]  Gait disturbance [R26.9]  Acute weakness [R5 12/09/19 0617     PRN Medication:norepinephrine, vasopressin (PITRESSIN) infusion for shock, LORazepam **OR** [DISCONTINUED] LORazepam, acetaminophen, Zolpidem Tartrate     Lungs: clear to auscultation bilaterally  Heart: S1, S2 normal, no murmur, click, r [place], [place],[address]   07/31/2013 02:38 PM 65 2 - 99 pg/mL Final     Comment:     [address] [place], [place],[address]     TSH   Date/Time Value Ref Range Status   11/19/2019 06:04 AM 3.710 0.358 - 3.740 mIU/mL Final acidosis  -resolved  6. CV: h/o afib and off anticoagulation, Systolic HF EF 30 %. Cards following  7. Proph  SCD, off eliquis currently. 8. FEN  -diet per GI  9.  Dispo  Full code ICU monitoring until seen by GI, can transfer out from pulm perspective

## 2019-12-09 NOTE — PROGRESS NOTES
[facility]  Progress Note     Roscoe Guaman Patient Status:  Inpatient    1960 MRN [id number]   [facility] 3NE-A Attending Edward Mcclain MD   Hosp Day # 5 [facility]     Chief Complaint: Weakness    S: patient r 7. 8*  7.8* 7.3*   ALB 2.2*  --   --  1.5*  --    *   < > 134* 139  139 137   K 4.1   < > 5.0 4.9  4.9 3.3*   CL 99   < > 100 110  110 108   CO2 23.0   < > 22.0 14.0*  14.0* 23.0   ALKPHO 302*  --   --  180*  --    *  --   --  89*  --    ALT 55 1. Monitor tele, Eliquis is on hold  12. Chronic Neuropathy  1. subutex TID - home dose  13. Prolonged Qtc  1. Cards evaluated, EKG yesterday improved  14.  Hypophosphatemia, hypokalemia, hypomagnesia - replace    Quality:  · DVT Prophylaxis: scds  · CODE

## 2019-12-09 NOTE — CM/SW NOTE
12/09/19 1500   CM/SW Referral Data   Referral Source Physician   Reason for Referral Discharge planning   Informant Patient;Spouse   Social History   Recreational Drug/Alcohol Use no   Major Changes Last 6 Months no   Domestic/Partner Violence no   Denia Right

## 2019-12-09 NOTE — PLAN OF CARE
Took over patient care in am. Vital signs monitored. Kept NPO. Repeat labs at 2pm. GJ tube intact. Turned per protocol. Sacral has stage 1 but blanchable. Nares + for MRsa , APN made aware. Family updated and at bedside.  Continue to monitor patient progres

## 2019-12-09 NOTE — OCCUPATIONAL THERAPY NOTE
Patient transferred to ICU 12/8/19 d/t hypotension and tachycardia. Will require new PT/OT orders to eval and treat when medically appropriate.

## 2019-12-09 NOTE — PROGRESS NOTES
[facility] Gastroenterology Progress Note    S:  Pt feeling well today with no BM in the past 24 hours. Off pressors at this time. O: BP 97/73 (BP Location: Left arm)   Pulse 104   Temp 99 °F (37.2 °C) (Temporal)   Resp 18   Ht 62\"   Wt 108 lb 7.

## 2019-12-09 NOTE — PROGRESS NOTES
[facility]  Progress Note    Matt Stubbs Patient Status:  Inpatient    1960 MRN [id number]   [facility] 4SW-A Attending Mandie Duron, DO   Hosp Day # 5 PCP SHRUTI DESAI     Subjective:  Patient currently denies any ab systolic CHF (congestive heart failure) (HCC)     CAD (coronary artery disease)     Metabolic acidosis     Arterial hypotension     Coronary artery disease involving native coronary artery of native heart with angina pectoris ([address]     Gastroesophageal ref examinations. Discussed at length with patient and family the rationale regarding current treatment and management. I spent  34  minutes face to face with the patient.  More than 50% of that time was spent counseling the patient and/or on coordination of

## 2019-12-09 NOTE — PAYOR COMM NOTE
REF: [id number]  APPROVED 12/4/19-12/7/19 PER [facility] CLINICAL UPDATE FOR 12/7/19-12/8/19 12/7/19  Chief Complaint: Weakness     S: Complains of nausea, some discomfort at PEG     Vital signs:  Temp:  [98 °F (36.7 °C)-98.4 °F (36.9 °C)] 98 °F Nightly   • mirtazapine  15 mg Oral Nightly   • Sotalol HCl  80 mg Oral 2 times per day      ASSESSMENT / PLAN:      1. Weakness/FTT:  PT eval noted, appreciate neuro follow up. PMR consult.   2. Suspected Eating Disorder:  Calorie count, s/p G-J tube POD 169*   BUN 11 8 11 18  18   CREATSERUM 0.61 0.53* 0.79 0.82  0.82   GFRAA 116 121 95 91  91   GFRNAA 100 105 83 79  79   CA 8.7 8.3* 9.2 7.8*  7.8*   ALB 2.2*  --   --  1.5*   * 134* 134* 139  139   K 4.1 4.5 5.0 4.9  4.9   CL 99 105 100 110  110   C meds, likely 2/2 Sotalol, may ask for Cardiology input     Plan of care: as above.  Patient critically ill; discussed in detail with patient's daughter     Quality:  · DVT Prophylaxis: Lovenox  · CODE status: Full    MEDICATIONS ADMINISTERED IN LAST 1 DAY:

## 2019-12-09 NOTE — HOME CARE LIAISON
Attempted to see patient to offer home health choice at discharge. Per floor RN, room occupied at this time. Will follow up later.

## 2019-12-09 NOTE — DIETARY NOTE
Clinical Nutrition    Pt w/ GJ tube intact. Repeat Phos level @ 1400. If ok w/ GI* and surgery to start TFs, would recommend trickle feeds of Jevity 1.5 @ 15 ml/hr today if Phos level >2.      Geena Weaver, MS, RD, LDN  Critical Care Dietitian  Pager # [phone]

## 2019-12-09 NOTE — CONSULTS
[place] is a 63year old female  Patient presents with:  Dehydration  Fatigue      REFERRED BY    Patient presents for evaluation at the request of Dr. Eric Burch. Patient was admitted with decreasing p.o. intake.   And subsequently had a G J-t Shortness of breath    • Stented coronary artery    • Syncope    • Systolic heart failure (HCC)    • Thrush of mouth and esophagus (HCC)    • Uncomfortable fullness after meals    • Visual impairment     glasses for reading   • Vomiting    • Wears glasses (EGD) N/A 8/11/2015    Performed by Miriam Hammer MD at [facility] ENDOSCOPY   • FOOT SURGERY      fixed nerve   • GASTROSTOMY TUBE REMOVAL/REPLACEMENT N/A 8/30/2019    Performed by Jaciel Gillis MD at [address] [place] [address] 10/1 2  Metoprolol Succinate ER 25 MG Oral Tablet 24 Hr, Take 0.5 tablets (12.5 mg total) by mouth nightly., Disp: 30 tablet, Rfl: 2  Zolpidem Tartrate ER 6.25 MG Oral Tab CR, Take 6.25 mg by mouth nightly as needed for Sleep., Disp: , Rfl:   folic acid (FOLVIT Positive recent anemia from post procedure bleeding      EXAM     Blood pressure 102/71, pulse 119, temperature 98.6 °F (37 °C), temperature source Temporal, resp. rate 22, height 5' 2\" (1.575 m), weight 100 lb 9.6 oz (45.6 kg), SpO2 96 %.   GENERAL: well Phosphatase 11/18/2019 300* 46 - 118 U/L Final   • Bilirubin, Total 11/18/2019 0.9  0.1 - 2.0 mg/dL Final   • Total Protein 11/18/2019 7.5  6.4 - 8.2 g/dL Final   • Albumin 11/18/2019 1.8* 3.4 - 5.0 g/dL Final   • Globulin  11/18/2019 5.7* 2.8 - 4.4 g/dL F 11/18/2019 3.13  1.00 - 4.00 x10(3) uL Final   • Monocyte Absolute 11/18/2019 1.22* 0.10 - 1.00 x10(3) uL Final   • Eosinophil Absolute 11/18/2019 0.27  0.00 - 0.70 x10(3) uL Final   • Basophil Absolute 11/18/2019 0.29* 0.00 - 0.20 x10(3) uL Final   • Trupti BPM Final   • Atrial rate 11/19/2019 75  BPM Final   • P-R Interval 11/19/2019 152  ms Final   • QRS Duration 11/19/2019 70  ms Final   • Q-T Interval 11/19/2019 458  ms Final   • QTC Calculation (Bezet) 11/19/2019 511  ms Final   • P Axis 11/19/2019 59  d mg/dL  Borderline: 150-199 mg/dL  High: 200-499 mg/dL  Very High: >=500 mg/dL         • LDL Cholesterol 11/19/2019 165* <100 mg/dL Final    Desirable <100 mg/dL   Borderline 100-129 mg/dL   High     >=130mg/dL       • VLDL 11/19/2019 40* 0 - 30 mg/dL Final RDW-SD 11/19/2019 54.1* 35.1 - 46.3 fL Final   • Neutrophil Absolute Prelim 11/19/2019 5.54  1.50 - 7.70 x10 (3) uL Final   • Neutrophil Absolute 11/19/2019 5.54  1.50 - 7.70 x10(3) uL Final   • Lymphocyte Absolute 11/19/2019 2.73  1.00 - 4.00 x10(3) uL Fi degrees Final   • T Axis 11/20/2019 -79  degrees Final       ASSESSMENT   Imp: Pneumoperitoneum likely secondary to intervention of GJ tube placement. Patient's examination at this time is not consistent with leakage of enteric contents.   Her white blood CONSULT TO HOSPITALIST  NURSING CONSULT TO DIETITIAN  IP CONSULT TO NEUROLOGY  IP CONSULT TO PHYSICAL MEDICINE REHAB  IP CONSULT TO GASTROENTEROLOGY  IP CONSULT TO FOOD AND NUTRITION SERVICES  IP CONSULT TO CRITICAL CARE INTENSIVIST  IP CONSULT TO [address] [place] [address]

## 2019-12-09 NOTE — PLAN OF CARE
Received asleep, drowsy but oriented to self and place. Frequent reorientation done. On RA maintaining sats. GJ tube to gravity, draining dark bile color output. BP stable. No fever. Hb Q 8 hrs- 9.7 @ 0000; 9.3 @ 0430  Mentation improved overnight.  More a low intermittent suction as ordered  - Evaluate effectiveness of ordered antiemetic medications  - Provide nonpharmacologic comfort measures as appropriate  - Advance diet as tolerated, if ordered  - Obtain nutritional consult as needed  - Evaluate fluid b nutritional consult as needed  - Optimize oral hygiene and moisture  - Encourage food from home; allow for food preferences  - Enhance eating environment  Outcome: Not Progressing

## 2019-12-10 PROCEDURE — 93289 INTERROG DEVICE EVAL HEART: CPT | Performed by: NURSE PRACTITIONER

## 2019-12-10 PROCEDURE — 99233 SBSQ HOSP IP/OBS HIGH 50: CPT | Performed by: INTERNAL MEDICINE

## 2019-12-10 PROCEDURE — 99232 SBSQ HOSP IP/OBS MODERATE 35: CPT | Performed by: INTERNAL MEDICINE

## 2019-12-10 PROCEDURE — 99232 SBSQ HOSP IP/OBS MODERATE 35: CPT | Performed by: SURGERY

## 2019-12-10 NOTE — PROGRESS NOTES
[facility]  Progress Note    Rufus Sierra Patient Status:  Inpatient    1960 MRN [id number]   [facility] 4NW-A Attending Ross Le, DO   Hosp Day # 6 PCP SHRUTI DESAI     Subjective:  Patient is resting comfortably. Anxiety disorder     Depression     Hyponatremia     Hypotension, unspecified hypotension type     Intra-abdominal free air of unknown etiology     Post-operative state     Acute on chronic congestive heart failure, unspecified heart failure type ([facility] [address] assistant. I have personally examined the patient and reviewed all relevant labs and reports. I agree with her physical exam and the data listed in the report, and I have made any relevant changes in editing her note.     I agree with the above listed as

## 2019-12-10 NOTE — PROGRESS NOTES
[address] Patient Status:  Inpatient    1960 MRN [id number]   [facility] 4NW-A Attending Della Gilbert, DO   Hosp Day # 6 PCP Yessenia Kumar     Critical Care Progress Note     Date of Admission:  Readings from Last 3 Encounters:  12/10/19 : 109 lb 12.8 oz (49.8 kg)  11/18/19 : 108 lb 0.4 oz (49 kg)  09/27/19 : 112 lb (50.8 kg)       I/O last 3 completed shifts: In: 6995.8 [P.O.:180; I.V.:3784.8; IUHKS:1458;  Other:165; IV PIGGYBACK:1391]  Out: 4500 following, IR aware of case  4. ID: Possible UTI with sepsis.   + U/A.   -Zosyn started. 12/8  -await urine culture  5. CV: h/o afib and off anticoagulation, Systolic HF EF 30 %. Cards following  6.  Dispo  -will follow peripherally, please call if furthe

## 2019-12-10 NOTE — DIETARY NOTE
[facility]     NUTRITION FOLLOW UP ASSESSMENT     Pt meets severe malnutrition criteria.     CRITERIA FOR MALNUTRITION DIAGNOSIS:  Criteria for non-severe malnutrition diagnosis: chronic illness related to energy intake less than75% for greater than 1 kg/m².  IBW: 56.8 kg     WEIGHT HISTORY:   Wt Readings from Last 6 Encounters:  12/06/19 : 45.5 kg (100 lb 4.8 oz)  11/18/19 : 49 kg (108 lb 0.4 oz)  09/27/19 : 50.8 kg (112 lb)  09/10/19 : 50.8 kg (112 lb)  04/09/19 : 53.2 kg (117 lb 3.2 oz)  04/05/19 : 4

## 2019-12-10 NOTE — PHYSICAL THERAPY NOTE
PHYSICAL THERAPY EVALUATION - INPATIENT     Room Number: 415/415-A  Evaluation Date: 12/10/2019  Type of Evaluation: Re-evaluation  Physician Order: PT Eval and Treat    Presenting Problem: acute weakness, gait disturbance to ICU 12/8 hypotension  Re Ischemic cardiomyopathy    • Kidney failure    • Leg swelling    • Loss of appetite    • Mouth sores    • Muscle weakness     due to neuropathy in P.T now   • Nausea    • Neuropathy    • On tube feeding diet     eats a little   • Osteoporosis    • [place] DO Chaya at [facility] ENDOSCOPY   • ESOPHAGOGASTRODUODENOSCOPY (EGD) N/A 9/27/2019    Performed by Esau Bass MD at [facility] ENDOSCOPY   • ESOPHAGOGASTRODUODENOSCOPY (EGD) N/A 9/10/2019    Performed by Layne Heimlich, MD at [facility] ENDOSCOPY   • [id number] [facility] week at home he was carrying her he works from home but prior she was ind and ambulatory with all ADLs and activities    SUBJECTIVE  \"I am too tired\"    Patient self-stated goal is go home does not want rehab upon discharge    OBJECTIVE  Precautions: (G Location: Left arm  BP Method: Automatic  Patient Position: Sitting    O2 WALK  SPO2 on Room Air at Rest: 95               AM-PAC '6-Clicks' INPATIENT SHORT FORM - BASIC MOBILITY  How much difficulty does the patient currently have. ..  -   Turning over in exercise: Ankle pumps  Glut sets  Quad sets  discharge planning    Patient End of Session: In bed;Needs met;Call light within reach;RN aware of session/findings; All patient questions and concerns addressed; Family present    ASSESSMENT   Patient is a 63 ye

## 2019-12-10 NOTE — PLAN OF CARE
Received patient from ICU. Alert and oriented X4. Vital signs stable. Afebrile. Phosphorus monitored, recent result 2.6. GTube feeding still on hold as ordered. Per GI, start G-tube feeding ONLY if Phos is 4.0. Maintained NPO except with sips with meds.  No

## 2019-12-10 NOTE — PROGRESS NOTES
[facility]  Cardiology Progress Note    Subjective:  Upon awakening the patient was oriented to place and person but not to time she was not able to tell me what year it is.   According to the  she has been even more confused throughout the [facility] Right ventricle: The cavity size was normal. Pacer C/W ICD noted in the     right ventricle. Systolic function was normal.  5. Pulmonary arteries: Systolic pressure was within the normal range,     estimated to be 27mm Hg.     Assessment:  · Hemorrhagic [employer], [address] [place] [address] Director, [address]  [address], [address] [place], [address] [address] [place]  Phone: [phone]  Fax: [phone]  E-mail

## 2019-12-10 NOTE — PROGRESS NOTES
[facility]  Progress Note     dEe Chin Patient Status:  Inpatient    1960 MRN [id number]   [facility] 3NE-A Attending Chepe Ruiz MD   Hosp Day # 6 [facility]     Chief Complaint: Weakness    S: Patient c 12/08/19  0135 12/09/19  0439 12/09/19  1351 12/10/19  0641   *   < > 169*  169* 116*  --  92   BUN 11   < > 18  18 23*  --  13   CREATSERUM 0.61   < > 0.82  0.82 0.50*  --  0.40*   GFRAA 116   < > 91  91 123  --  133   GFRNAA 100   < > 79  79 107 w/ GI  4. Acute blood loss anemia  1. 8.9 today, off AC  5. Thrombocytopenia, consumptive, decreased to 70s  6. Chronic Neuropathy/pain  1. subutex TID - home dose  7. Weakness/FTT  1. Neuro evaluated  Suspected 2/2 malnutrition  2. B12 was elevated  8.  UT adequate per Nutrition/GI

## 2019-12-10 NOTE — PAYOR COMM NOTE
--------------  CONTINUED STAY REVIEW-----REQUESTING ADDITIONAL DAY 12/9 AND 12/10      Payor: [facility]  Subscriber #:  [id number]  Authorization Number: [id number]    Admit date: 12/4/19  Admit time: 3148    Admitting Physician: Fay Negrete MD  At HGB 13.1 14.3 8.2* 8.5* 9.8* 10.4* 9.7* 9.3*   MCV 98.5 96.8 104.0* 91.9  --   --   --  90.2   .0 257.0 213.0 131.0*  --   --   --  81.0*   BAND  --   --  2  --   --   --   --   --    INR  --  0.95  --  1.06  --   --   --   --                  Buzz 2. Trend hgb - stable 9.3 this am  3. Off pressors, continue BID PPI  4. Currently npo, Stop IVF once transitioned to feeds. 3. Acute blood loss anemia  1. Monitor hgb  4. Thrombocytopenia, consumptive, monitorWeakness/FTT due to below  5.  Severe malnut Hosp Day # 6  [address]      Chief Complaint: Weakness     S: Patient confused today and did poorly w/ therapy. family reports worse today and pt just moved out of ICU to floor.    Received home meds and appears she received ativan IV given last ni CREATSERUM 0.61   < > 0.82  0.82 0.50*  --  0.40*   GFRAA 116   < > 91  91 123  --  133   GFRNAA 100   < > 79  79 107  --  115   CA 8.7   < > 7.8*  7.8* 7.3*  --  7.6*   ALB 2.2*  --  1.5*  --   --   --    *   < > 139  139 137  --  136   K 4.1   < > 6. Chronic Neuropathy/pain  1. subutex TID - home dose  7. Weakness/FTT  1. Neuro evaluated  Suspected 2/2 malnutrition  2. B12 was elevated  8.  UTI  1. UA was + and Had received prophylactic antibx for PEG so culture did not yield results, on Zosyn, WBCs Amitriptyline HCl (ELAVIL) tab 10 mg     Date Action Dose Route User    12/9/2019 2103 Given 10 mg Oral Paris Monday, RN      buprenorphine HCl (SUBUTEX) SL tab 8 mg     Date Action Dose Route User    12/10/2019 1151 Given 8 mg Sublingual Karla George Potassium Phosphate Dibasic 30 mmol in sodium chloride 0.9% 250 mL IVPB     Date Action Dose Route User    12/10/2019 0535 New Bag 30 mmol Intravenous Distor, Thaddeus Opitz, RN      Sotalol HCl (BETAPACE) tab 80 mg     Date Action Dose Route User    12/10/2019 1

## 2019-12-10 NOTE — PROGRESS NOTES
[facility] Gastroenterology Progress Note    S:  Pt feeling well today on the floor. No signs of recurrent GIB.       O: /72 (BP Location: Left arm)   Pulse 101   Temp 98 °F (36.7 °C) (Oral)   Resp 18   Ht 62\"   Wt 109 lb 12.8 oz (49.8 kg)   SpO Gastroenterology

## 2019-12-10 NOTE — CM/SW NOTE
PRASAD called Beni Deluna from [facility] regarding pt. She stated they are already on service w/the pt. Sent dietary note, she stated she will make sure nothing has changed w/the tube feeding orders. Pt to also get [facility] at [place].

## 2019-12-10 NOTE — BH PROGRESS NOTE
Talked with the pt nurse Karla. She said, there was a consult for the liaison. Informed her that the liaison already seen her on 12/05/19. Dr. Joseph Samuels was asked to see the pt and see his note from 12/06/19.   Karla was informed that the pt already has a men

## 2019-12-10 NOTE — OCCUPATIONAL THERAPY NOTE
OCCUPATIONAL THERAPY EVALUATION - INPATIENT     Room Number: 415/415-A  Evaluation Date: 12/10/2019  Type of Evaluation: Re-evaluation  Presenting Problem: Acute Weakness    Physician Order: IP Consult to Occupational Therapy  Reason for Therapy: ADL/IADL History of cardiac murmur    • Hyperlipidemia    • Indigestion    • Irregular bowel habits    • Ischemic cardiomyopathy    • Kidney failure    • Leg swelling    • Loss of appetite    • Mouth sores    • Muscle weakness     due to neuropathy in P.T now   • N ERCP,DIAGNOSTIC     • ESOPHAGOGASTRODUODENOSCOPY (EGD) N/A 12/8/2019    Performed by Briana Tyler DO at [facility] ENDOSCOPY   • ESOPHAGOGASTRODUODENOSCOPY (EGD) N/A 9/27/2019    Performed by Harriet Munoz MD at [facility] ENDOSCOPY   • [facility] bar  Other Equipment: Hip kit    Occupation/Status: , on disability since September 2019  Hand Dominance: Right  Drives: Yes  Patient Regularly Uses: Glasses    Prior Level of Function: Pt/spouse report that pt was independent with ADL and mobil NEUROLOGICAL FINDINGS  Neurological Findings: Coordination - Finger to Nose;Coordination - Finger Opposition  Coordination - Finger to Nose: Left decreased speed;Left decreased accuracy; Right decreased speed;Right decreased accuracy; Symmetrical;Other ( and ROM assessed. Pt and family educated in role of OT and [address]. Pt returned to seated position in arm chair with all needs met, call light within reach, RN aware of session.    Patient End of Session: In bed;Needs met;Call light within reach;RN aware of ses Deficits MODERATE - Comorbidities and min to mod modifications of tasks    Clinical Decision Making MODERATE - Analysis of occupational profile, detailed assessments, several treatment options    Overall Complexity MODERATE     OT Discharge Recommendations

## 2019-12-10 NOTE — PROGRESS NOTES
Received pt AO x 3. Off with date/time. TLC patent and intact. GJ tube to gravity bag. Draining dark red/black output. Next Hgb check tomorrow 0200  Phos check at 2200    2135-Report given to [facility]. Endorsed the Phos check.  If 4 and above, may start T

## 2019-12-10 NOTE — CM/SW NOTE
Henrry called to see the pt previously returned the pump. They will need to provide a new one and new tube feeding formulas. They confirmed the pt covered at 100%. Will inform them of the pt's dc date and follow up w/the pt to confirm dc plan.

## 2019-12-10 NOTE — ICD/PM
Medtronic ICD interrogation:    ATP out of atrial arrhythmias: afib RVR over 200 beats per minute November 18th 2019. A few short nonsustained VT episodes. Last ICD shock x1 was on November 18th 2019.      Battery life:  8.9 years    Afib burden: Since

## 2019-12-11 ENCOUNTER — ANCILLARY PROCEDURE (OUTPATIENT)
Dept: CARDIOLOGY | Age: 59
End: 2019-12-11
Attending: INTERNAL MEDICINE

## 2019-12-11 ENCOUNTER — ANCILLARY ORDERS (OUTPATIENT)
Dept: CARDIOLOGY | Age: 59
End: 2019-12-11

## 2019-12-11 DIAGNOSIS — Z95.810 ICD (IMPLANTABLE CARDIOVERTER-DEFIBRILLATOR) IN PLACE: ICD-10-CM

## 2019-12-11 PROCEDURE — 99232 SBSQ HOSP IP/OBS MODERATE 35: CPT | Performed by: HOSPITALIST

## 2019-12-11 PROCEDURE — 99232 SBSQ HOSP IP/OBS MODERATE 35: CPT | Performed by: NURSE PRACTITIONER

## 2019-12-11 PROCEDURE — X1114 CARDIAC DEVICE HOME CHECK - REMOTE UNSCHEDULED: HCPCS | Performed by: INTERNAL MEDICINE

## 2019-12-11 NOTE — PLAN OF CARE
Problem: PAIN - ADULT  Goal: Verbalizes/displays adequate comfort level or patient's stated pain goal  Description  INTERVENTIONS:  - Encourage pt to monitor pain and request assistance  - Assess pain using appropriate pain scale  - Administer analgesics SOB, IVF infusing, receiving IVabx with no reaction. Tube feeding infusing at 15mL/hr, phos was 2.0 did not increase, g tube to gravity had some feeding coming through, jacob SALEH,  ordered to clamp it for now. Will continue to monitor.

## 2019-12-11 NOTE — PLAN OF CARE
More awake today. Able to make needs known. Slow to respond but noted w less confusion today. On clear liquids, has poor oral intake. Took only sips of tea despite encouragement from spouse at bedside. Head of bed maintained above 30 deg.  G tube feeding on g

## 2019-12-11 NOTE — PAYOR COMM NOTE
REF: [facility] FOR 12/11/19 12/11/19  Gastroenterology Progress Note  S:  Pt feeling well today on the floor.        O: BP 90/56 (BP Location: Left arm)   Pulse 82   Temp 98.7 °F (37.1 °C) (Oral)   Resp 18   Ht 62\"   Wt 109 lb (ELAVIL) tab 10 mg       Date Action Dose Route \    12/10/2019 2035 Given 10 mg Oral           buprenorphine HCl (SUBUTEX) SL tab 8 mg       Date Action Dose Route     12/11/2019 1000 Given 8 mg Sublingual     12/10/2019 2003 Given 8 mg Sublingual

## 2019-12-11 NOTE — PLAN OF CARE
Asleep at this time. Verbally responsive, confused & forgetful. NO SOB,no complaints of pain. Appears comfortable. Jevity 1.5 started at 15cc/hr as per GI instructions. Consulted nutritionist re feeding as well. Awaiting further recommendations.  Patient is

## 2019-12-11 NOTE — PROGRESS NOTES
[facility] Gastroenterology Progress Note    S:  Pt feeling well today on the floor.        O: BP 90/56 (BP Location: Left arm)   Pulse 82   Temp 98.7 °F (37.1 °C) (Oral)   Resp 18   Ht 62\"   Wt 109 lb 4.8 oz (49.6 kg)   SpO2 100%   BMI 19.99 kg/m² - cbc qday   - PPI BID   - gen surg following for pneumoperitoneum     Carline [facility], 12/11/19, 12:03 [address] [place] [address] Gastroenterology

## 2019-12-11 NOTE — PROGRESS NOTES
[facility]  Cardiology Progress Note    Subjective:  No chest pain or shortness of breath.     Objective:  BP 90/56 (BP Location: Left arm)   Pulse 82   Temp 98.7 °F (37.1 °C) (Oral)   Resp 18   Ht 5' 2\" (1.575 m)   Wt 109 lb 4.8 oz (49.6 kg)   SpO2 1 is tolerating tube feeds. Her mentation is much improved compared to yesterday. · Check insurance status for potential outpatient referral to [facility] clinic after discharge per dr Tarry Hashimoto.      RAFFI Carpio  12/11/2019  2:34 PM

## 2019-12-11 NOTE — PHYSICAL THERAPY NOTE
PHYSICAL THERAPY TREATMENT NOTE - INPATIENT    Room Number: 415/415-A     Session: 1   Number of Visits to Meet Established Goals: 7    Presenting Problem: acute weakness, gait disturbance to ICU 12/8 hypotension    Problem List  Principal Problem:    Acu glasses for reading   • Vomiting    • Wears glasses    • Weight loss        Past Surgical History  Past Surgical History:   Procedure Laterality Date   • ANGIOGRAM  11/30/2006    Right & left hear angiogram   • ANGIOGRAM  05/31/2016    Right & left hear • FOOT SURGERY      fixed nerve   • GASTROSTOMY TUBE REMOVAL/REPLACEMENT N/A 8/30/2019    Performed by Alejandra Donaldson MD at [address] [facility] N/A 10/10/2018    Performed by Demond Post MD at [facility] MAIN OR   • NERVE JACOBSON much help from another person does the patient currently need. ..   -   Moving to and from a bed to a chair (including a wheelchair)?: Total   -   Need to walk in hospital room?: Total   -   Climbing 3-5 steps with a railing?: Total       AM-PAC Score:  Raw this session. Continue to recommend use of georges lift for OOB transfers. At this time, Pt. presents with decreased balance, impaired strength, difficulty with gait/transfers resulting in downgrade of overall functional mobility.   Due to above deficits,

## 2019-12-11 NOTE — DIETARY NOTE
[facility]     NUTRITION FOLLOW UP ASSESSMENT     Pt meets severe malnutrition criteria.     CRITERIA FOR MALNUTRITION DIAGNOSIS:  Criteria for non-severe malnutrition diagnosis: chronic illness related to energy intake less than75% for greater than 1 chewing or swallowing. Significant wt loss noted since last admission. No family at bedside.  Agreeable to trying Muscle Milk oral supplement.  Noted PMH:CHF, CAD, and A fib. Plan for GI to eval     ANTHROPOMETRICS:  Ht: 165.1 cm (5' 5\")  Wt: 45.5 kg (100

## 2019-12-11 NOTE — PROGRESS NOTES
[facility]  Progress Note     Leata Speaker Patient Status:  Inpatient    1960 MRN [id number]   [facility] 3NE-A Attending [facility] MD   Hosp Day # 7 [facility]     Chief Complaint: Weakness    S: Patient r CREATSERUM 0.61   < > 0.82  0.82 0.50*  --  0.40*  --    GFRAA 116   < > 91  91 123  --  133  --    GFRNAA 100   < > 79  79 107  --  115  --    CA 8.7   < > 7.8*  7.8* 7.3*  --  7.6*  --    ALB 2.2*  --  1.5*  --   --   --   --    *   < > 139  139 PPI, hgb 8.5 this am, off AC  4. Acute blood loss anemia  1. Trending down, monitor  5. Thrombocytopenia, consumptive, monitor  6. Chronic Neuropathy/pain  1. subutex TID - home dose  7. Weakness/FTT  1. Neuro evaluated  Suspected 2/2 malnutrition  2.  B12

## 2019-12-11 NOTE — OCCUPATIONAL THERAPY NOTE
OCCUPATIONAL THERAPY TREATMENT NOTE - INPATIENT     Room Number: 415/415-A  Session: 1/8   Number of Visits to Meet Established Goals: 8    Presenting Problem: Acute Weakness    History related to current admission: pt is 64 yo female admitted from home wi • Kidney failure    • Leg swelling    • Loss of appetite    • Mouth sores    • Muscle weakness     due to neuropathy in P.T now   • Nausea    • Neuropathy    • On tube feeding diet     eats a little   • Osteoporosis    • Pacemaker    • Pancreatitis    • • ESOPHAGOGASTRODUODENOSCOPY (EGD) N/A 9/27/2019    Performed by Frank Heaton MD at [facility] ENDOSCOPY   • ESOPHAGOGASTRODUODENOSCOPY (EGD) N/A 9/10/2019    Performed by Elena Romero MD at [facility] ENDOSCOPY   • ESOPHAGOGASTRODUODENOSCOPY (EGD) N/A 2/23/ ‘6-Clicks’ Inpatient Daily Activity Short Form  How much help from another person does the patient currently need…  -   Putting on and taking off regular lower body clothing?: A Lot  -   Bathing (including washing, rinsing, drying)?: A Lot  -   Toileting, rehabilitation  OT Device Recommendations: TBD    PLAN  OT Treatment Plan: Balance activities; Energy conservation/work simplification techniques;ADL training;IADL training;Functional transfer training;UE strengthening/ROM; Endurance training;Cognitive reori

## 2019-12-12 PROCEDURE — 99233 SBSQ HOSP IP/OBS HIGH 50: CPT | Performed by: HOSPITALIST

## 2019-12-12 PROCEDURE — 99232 SBSQ HOSP IP/OBS MODERATE 35: CPT | Performed by: INTERNAL MEDICINE

## 2019-12-12 RX ORDER — PANTOPRAZOLE SODIUM 40 MG/1
40 TABLET, DELAYED RELEASE ORAL
Status: DISCONTINUED | OUTPATIENT
Start: 2019-12-12 | End: 2019-12-22

## 2019-12-12 RX ORDER — MAGNESIUM OXIDE 400 MG (241.3 MG MAGNESIUM) TABLET
400 TABLET ONCE
Status: COMPLETED | OUTPATIENT
Start: 2019-12-12 | End: 2019-12-12

## 2019-12-12 RX ORDER — POTASSIUM CHLORIDE 29.8 MG/ML
40 INJECTION INTRAVENOUS ONCE
Status: COMPLETED | OUTPATIENT
Start: 2019-12-12 | End: 2019-12-12

## 2019-12-12 RX ORDER — POLYETHYLENE GLYCOL 3350 17 G/17G
17 POWDER, FOR SOLUTION ORAL DAILY
Status: DISCONTINUED | OUTPATIENT
Start: 2019-12-12 | End: 2019-12-22

## 2019-12-12 NOTE — DIETARY NOTE
[facility]     Pt meets severe malnutrition criteria.     CRITERIA FOR MALNUTRITION DIAGNOSIS:  Criteria for non-severe malnutrition diagnosis: chronic illness related to energy intake less than75% for greater than 1 lb 4.8 oz)  11/18/19 : 49 kg (108 lb 0.4 oz)  09/27/19 : 50.8 kg (112 lb)  09/10/19 : 50.8 kg (112 lb)  04/09/19 : 53.2 kg (117 lb 3.2 oz)  04/05/19 : 44.5 kg (98 lb)     NUTRITION:  Diet: Clear Liquid diet + TF  Oral Supplements: None at this time     LAURA

## 2019-12-12 NOTE — PLAN OF CARE
Lethargic, but responds appropriately to questions. Phos level normal at 9pm , therefore increased feeding to 25cc at that time , redraw at 5am. Taking sips of clear liquids . Hb 8.1 at 10pm. LR at 50cc/hr. inco of urine but also used bedpan.

## 2019-12-12 NOTE — PROGRESS NOTES
Bp running low 83/48, not symptomatic. Heart rate 70's. On Tele. Dr Drew Allen notified , no new orders at this time.

## 2019-12-12 NOTE — PROGRESS NOTES
[facility]  Cardiology Progress Note    Subjective:  No chest pain or shortness of breath.   at bedside    Objective:  BP (!) 80/46 (BP Location: Left arm)   Pulse 81   Temp 98.3 °F (36.8 °C) (Oral)   Resp 18   Ht 5' 2\" (1.575 m)   Wt 107 lb 14 · Severe malnutrition: on tube feeds. NEW G TUBE placed 12/7/19. Tube feeds per GI. · Suspected UTI: abx per hospitalist  · Chronic systolic HF secondary to an ischemic cardiomyopathy: EF 30-35%.  Patient has a [employer] dual lead ICD  · CAD: hx of apollo

## 2019-12-12 NOTE — PROGRESS NOTES
[facility] Gastroenterology Progress Note    S:  Pt feeling well today.  No issues with tolerance of TF.        O: BP 96/67 (BP Location: Left arm)   Pulse 88   Temp 98.2 °F (36.8 °C) (Oral)   Resp 18   Ht 62\"   Wt 107 lb 14.4 oz (48.9 kg)   SpO2 99% a true venting port; is just for feeding   - pt with marked thrombocytopenia at this time; would consider heme consult given loss, but some of this is likely consumptive  - can trial small amount of soft diet    - cbc qday   - PPI BID   - gen surg followin

## 2019-12-12 NOTE — PAYOR COMM NOTE
--------------  CONTINUED STAY REVIEW------REQUESTING ADDITIONAL DAY 12/12    Annelise Crowe PPO  Subscriber #:  [id number]  Authorization Number: [id number]    Admit date: 12/4/19  Admit time: 5590    Admitting Physician: Watson Caballero MD  Attending Ph WBC 28.1* 24.0* 19.7*  --  14.3*  --  8.9  --   --   --    HGB 14.3 8.2* 8.5*   < > 9.3* 9.4* 8.9* 8.5* 8.3* 8.1*   MCV 96.8 104.0* 91.9  --  90.2  --  88.8  --   --   --    .0 213.0 131.0*  --  81.0*  --  72.0* 75.0*  --   --    BAND  --  2  --   - • Metoprolol Succinate ER  12.5 mg Oral Nightly   • mirtazapine  15 mg Oral Nightly   • Sotalol HCl  80 mg Oral 2 times per day         ASSESSMENT / PLAN:      1. Severe malnutrition   1.  Suspected Eating Disorder  2. s/p G-J tube on 12/7, started TF yeste MEDICATIONS ADMINISTERED IN LAST 1 DAY:  acetaminophen (TYLENOL) tab 650 mg     Date Action Dose Route User    12/11/2019 1321 Given 650 mg Oral Christiano George RN      Amitriptyline HCl (ELAVIL) tab 10 mg     Date Action Dose Route User    12/11/2019 12/11/2019 1300 New Bag 3.375 g Intravenous Srinivasan George, RN      potassium chloride IVPB premix 40 mEq     Date Action Dose Route User    12/12/2019 1138 New Bag 40 mEq Intravenous Gayatri Freedman, RN      sodium phosphate 30 mmol in sodium chl

## 2019-12-12 NOTE — PAYOR COMM NOTE
--------------  CONTINUED STAY REVIEW------REQUESTING ADDITIONAL DAY 12/12      Payor: LAUREN COHN  Subscriber #:  [id number]  Authorization Number: [id number]    Admit date: 12/4/19  Admit time: 9064    Admitting Physician: Gwen Blanton MD  Attending WBC 28.1* 24.0* 19.7*  --  14.3*  --  8.9  --   --   --    HGB 14.3 8.2* 8.5*   < > 9.3* 9.4* 8.9* 8.5* 8.3* 8.1*   MCV 96.8 104.0* 91.9  --  90.2  --  88.8  --   --   --    .0 213.0 131.0*  --  81.0*  --  72.0* 75.0*  --   --    BAND  --  2  --   - • Metoprolol Succinate ER  12.5 mg Oral Nightly   • mirtazapine  15 mg Oral Nightly   • Sotalol HCl  80 mg Oral 2 times per day         ASSESSMENT / PLAN:      1. Severe malnutrition   1.  Suspected Eating Disorder  2. s/p G-J tube on 12/7, started TF yeste MEDICATIONS ADMINISTERED IN LAST 1 DAY:  acetaminophen (TYLENOL) tab 650 mg     Date Action Dose Route User    12/11/2019 1321 Given 650 mg Oral Danuta George, RN      Amitriptyline HCl (ELAVIL) tab 10 mg     Date Action Dose Route User    12/11/2019 12/11/2019 1300 New Bag 3.375 g Intravenous Cassandra George, RN      potassium chloride IVPB premix 40 mEq     Date Action Dose Route User    12/12/2019 1138 New Bag 40 mEq Intravenous Gayatri Freedman, RN      sodium phosphate 30 mmol in sodium chl

## 2019-12-12 NOTE — PROGRESS NOTES
[facility]  Progress Note     Pamella Huff Patient Status:  Inpatient    1960 MRN [id number]   [facility] 3NE-A Attending Shaan Mclaughlin MD   Hosp Day # 8 [facility]     Chief Complaint: Weakness    S: Patient r 111*   BUN 18  18 23*  --  13  --  7   CREATSERUM 0.82  0.82 0.50*  --  0.40*  --  0.40*   GFRAA 91  91 123  --  133  --  133   GFRNAA 79  79 107  --  115  --  115   CA 7.8*  7.8* 7.3*  --  7.6*  --  7.9*   ALB 1.5*  --   --   --   --  1.5*     139 1 blood loss anemia  1. Trending down, monitor  5. Thrombocytopenia, consumptive, monitor  6. Chronic hypotension  7. Chronic Neuropathy/pain  1. subutex TID - home dose  8. Weakness/FTT  1. Neuro evaluated  Suspected 2/2 malnutrition  2.  B12 was elevated  9

## 2019-12-13 ENCOUNTER — APPOINTMENT (OUTPATIENT)
Dept: CT IMAGING | Facility: HOSPITAL | Age: 59
DRG: 640 | End: 2019-12-13
Attending: NURSE PRACTITIONER
Payer: COMMERCIAL

## 2019-12-13 PROCEDURE — 99233 SBSQ HOSP IP/OBS HIGH 50: CPT | Performed by: HOSPITALIST

## 2019-12-13 PROCEDURE — 70450 CT HEAD/BRAIN W/O DYE: CPT | Performed by: NURSE PRACTITIONER

## 2019-12-13 PROCEDURE — 99233 SBSQ HOSP IP/OBS HIGH 50: CPT | Performed by: OTHER

## 2019-12-13 PROCEDURE — 90792 PSYCH DIAG EVAL W/MED SRVCS: CPT | Performed by: OTHER

## 2019-12-13 PROCEDURE — 99233 SBSQ HOSP IP/OBS HIGH 50: CPT | Performed by: INTERNAL MEDICINE

## 2019-12-13 PROCEDURE — 95816 EEG AWAKE AND DROWSY: CPT | Performed by: OTHER

## 2019-12-13 NOTE — PROGRESS NOTES
INPATIENT NEUROLOGY PROGRESS NOTE    Date: 12/13/2019    Harish Cabrera is a 63year old female at Hospital Day ( LOS: 9 days )    Assessment:   Delirium  Metabolic encephalopathy  Malnutrition  Chronic peripheral polyneuropathy affecting sensorimo BMI 19.74 kg/m²   Neurological Examination:  Mental status: Oriented to person, place, and time   Speech: Fluent, no dysarthria  Memory and comprehension: Intact   Cranial Nerves: VFF to threat, PERRL 3mm brisk, EOMI, no nystagmus, facial sensation intac

## 2019-12-13 NOTE — PROGRESS NOTES
[facility]  Advanced Heart Failure Progress Note    Hanny Walton Patient Status:  Inpatient    1960 MRN [id number]   [facility] 4NW-A Attending Partha Grande MD   Hosp Day # 9 PCP SHRUTI DESAI     Subjective:   The pat dullness. Normal excursions and effort. Abdomen: Soft, diffusely tender. Extremities: Without clubbing, cyanosis or edema. Peripheral pulses are 2+. Neurologic: Alert and oriented, normal affect. Skin: Warm and dry.      Laboratory/Data:      Labs: artery disease involving native coronary artery of native heart with angina pectoris (HCC)     Gastroesophageal reflux disease     Paresthesias     Weakness     Neuropathy     Opioid dependence with opioid-induced disorder ([facility])     Anxiety disorder     [facility] I will reconsult neurology as well. The long-term plan is referral to the [facility] to the team that evaluates autoimmune processes involving both the GI tract and the nervous system.   However she cannot travel to the [facility] until she is sufficient

## 2019-12-13 NOTE — PROCEDURES
Date of Procedure: 12/13/2019    Procedure: EEG (ELECTROENCEPHALOGRAM)     DX: DIZZINESS  HX: PT IS A 62 YO FEMALE WHO PRESENTS FOR DIZZINESS. PT HAVING DIFFICULTY AMBULATING AND HER LEGS ARE GIVING OUT ON HER. DENIES FOCAL WEAKNESS.  HAD G TUBE REMOVED ABO

## 2019-12-13 NOTE — CM/SW NOTE
MSW confirmed the patient will dc home today with [facility]  P:[phone]  F:[phone]. Orders entered. Henrry will be coming to the bedside to meet with the patient about tube feedings and delivery.     Paige Ramirez, LCSW

## 2019-12-13 NOTE — DIETARY NOTE
12[address] [facility]     Pt meets severe malnutrition criteria.     CRITERIA FOR MALNUTRITION DIAGNOSIS:  Criteria for non-severe malnutrition diagnosis: chronic illness related to energy intake less than75% for greater than 1 to eval     ANTHROPOMETRICS:  Ht: 165.1 cm (5' 5\")  Wt: 45.5 kg (100 lb 4.8 oz). This is 80% of IBW  BMI: Body mass index is 18.35 kg/m².   IBW: 56.8 kg     WEIGHT HISTORY:   Wt Readings from Last 6 Encounters:  12/06/19 : 45.5 kg (100 lb 4.8 oz)  11/18/19

## 2019-12-13 NOTE — PAYOR COMM NOTE
--------------  CONTINUED STAY REVIEW-----REQUESTING ADDITIONAL DAY 12/13      Payor: Edyta Fitch PP  Subscriber #:  [id number]  Authorization Number: [id number]    Admit date: 12/4/19  Admit time: 3893    Admitting Physician: Juan Barahona MD  Attending P 2239 12/11/19  1202 12/11/19  2158 12/12/19  1336   WBC 28.1* 24.0* 19.7*  --  14.3*  --  8.9  --   --   --   --    HGB 14.3 8.2* 8.5*   < > 9.3*   < > 8.9* 8.5* 8.3* 8.1* 7.9*   MCV 96.8 104.0* 91.9  --  90.2  --  88.8  --   --   --   --    .0 213. • DULoxetine HCl  30 mg Oral BID   • folic acid  224 mcg Oral Daily   • lisinopril  2.5 mg Oral Nightly   • Metoprolol Succinate ER  12.5 mg Oral Nightly   • mirtazapine  15 mg Oral Nightly   • Sotalol HCl  80 mg Oral 2 times per day         ASSESSMENT / P Aldo KUNZ  9:58 AM      Addendum  Patient seen and examined  Very quiet and withdrawn as family speaks to her  Having more hallucinations  EXT: No c/c     Imaging: Reviewed  Agree with above  Due to worsening mental status will have psych re-eval 12/12/2019 1237 New Bag 3.375 g Intravenous Gayatri Freedman, RN      Sotalol HCl (BETAPACE) tab 80 mg     Date Action Dose Route User    12/13/2019 0830 Given 80 mg Oral Cecily Aguilar RN          Procedures:      Plan:

## 2019-12-13 NOTE — PROGRESS NOTES
[facility]  Progress Note     Ruffus Cage Patient Status:  Inpatient    1960 MRN [id number]   [facility] 3NE-A Attending Roman Moreira MD   Hosp Day # 9  [place]      Chief Complaint: Weakness    S: Patient r Lab 12/08/19  0135 12/09/19  0439  12/10/19  0641 12/11/19  0558 12/12/19  0517 12/13/19  0622   *  169* 116*  --  92  --  111*  --    BUN 18  18 23*  --  13  --  7  --    CREATSERUM 0.82  0.82 0.50*  --  0.40*  --  0.40*  --    GFRAA 91  91 123 resolved  3. [place] Tear, Erosive Gastritis   1. seen on repeat EGD on 12/8 s/p clipping and Epi injection  2. BID PPI  4. Acute blood loss anemia  1. Repeat CBC as OP in 3 days, hgb trend down partly dilutional  5.  Thrombocytopenia, consumptive, mon

## 2019-12-13 NOTE — PLAN OF CARE
Patient A+Ox4,forgetful. Gtube infusing at goal rate of 45ml/hr and tolerating well. No c/o nausea. Denies any pain. Fall precaution in place. BP running in low 90's night BP meds held.  Labs in am.     Problem: PAIN - ADULT  Goal: Verbalizes/displays adeq hygiene and moisture  - Encourage food from home; allow for food preferences  - Enhance eating environment  Outcome: Progressing     Problem: METABOLIC/FLUID AND ELECTROLYTES - ADULT  Goal: Electrolytes maintained within normal limits  Description  INTERVE

## 2019-12-13 NOTE — CONSULTS
[facility]  Report of Psychiatric Consultation    Blaire Larissa Patient Status:  Inpatient    1960 MRN [id number]   [facility] 4NW-A Attending Shannan Cummings MD   [facility] Day # 9 PCP Evelyn Liao     Date of Admission:  appetite/chronic nausea.      7) Continue non-pharm delirium care- orientation, family visits, cognitive stimulation, blinds open/lights on in daytime, quiet/dark at night    Claudia Le  12/13/2019  2:03 PM    History of Present Illness:  64 yo WF with hx encouraged to consider switching to buprenorphine to decrease the side effects of opioids (ie delirium, cognitive slowing, constipation, decrease gastric motility, risk for resp failure in case of accidental OD).  Our service thought she was using the opioi having drainage and causing pain. Her weight was 112 lbs. Patient decided with GI to have the tube removed since she was eating more on her own now. She began to have more nausea followed by vomiting and early satiety.  She had more generalized weakness and attack 2004   • Constipation    • Coronary artery disease     LAD, angioplasty and stent   • Diarrhea, unspecified    • Dizziness    • Esophageal reflux    • Fatigue    • Fever    • Frequent use of laxatives    • Frequent UTI    • Heart attack ([facility]    • H ULTRASOUND (EUS) N/A 9/6/2018    Performed by Jose May DO at [facility] ENDOSCOPY   • ENDOSCOPIC ULTRASOUND (EUS) N/A 12/1/2015    Performed by Ricardo Jefferson MD at [facility] ENDOSCOPY   • ENTEROSCOPY N/A 12/11/2018    Performed by Mabeline Spurling, MD at [facility] at [facility] ENDOSCOPY   • REMOVAL GALLBLADDER  10/2018   • TONSILLECTOMY       Family History   Problem Relation Age of Onset   • Other (Other) Father         Polio & multiple aortic aneursyms - burst   • Cancer Mother         lung CA   • Cancer Sister 36 Oral, Nightly PRN    Review of Systems   Constitutional: Positive for malaise/fatigue.      Mental Status Exam:     Risk Assessment  Suicidal ideation: no suicidal ideation    Objective       12/13/19  1121   BP: 97/66   Pulse: 92   Resp: 18   Temp: 98.6 °F

## 2019-12-13 NOTE — PROGRESS NOTES
On continued ivf at reg rates and antibiotic at reg   Rates via rt TLC,w/ good blood return,g jtube   Feeding of Jevity 1.5 T 35 CC w/ 120 cc h2o flush,on soft diet also,g j tube for feeding only. k and mg replaced per protocol assisted needs.

## 2019-12-13 NOTE — PROGRESS NOTES
PSYCH CONSULT    Date of Admission: 12/4/19  Date of Consult: 12/13/19  Reason for Consultation: Hallucinations    Impression:  Primary Psychiatric Diagnosis:  Delirium/encephalopathy likely due to general anesthesia on 12/8/19 during GJ-tube placement, me

## 2019-12-14 PROCEDURE — 99233 SBSQ HOSP IP/OBS HIGH 50: CPT | Performed by: HOSPITALIST

## 2019-12-14 PROCEDURE — 99233 SBSQ HOSP IP/OBS HIGH 50: CPT | Performed by: OTHER

## 2019-12-14 RX ORDER — MAGNESIUM OXIDE 400 MG (241.3 MG MAGNESIUM) TABLET
800 TABLET ONCE
Status: COMPLETED | OUTPATIENT
Start: 2019-12-14 | End: 2019-12-14

## 2019-12-14 NOTE — PLAN OF CARE
Patient A+Ox4. Slow to respond at times but answers appropriately. Denies any pain. Tube feeding infusing at goal rate of 45ml/hr. Denies any nausea or vomiting. Aspiration and fall precautions maintained. Up with assist x1.      Problem: [address] measures as appropriate  - Advance diet as tolerated, if ordered  - Obtain nutritional consult as needed  - Evaluate fluid balance  Outcome: Progressing  Goal: Maintains adequate nutritional intake (undernourished)  Description  INTERVENTIONS:  - Monitor p

## 2019-12-14 NOTE — PROGRESS NOTES
[facility]  Progress Note     Leata Speaker Patient Status:  Inpatient    1960 MRN [id number]   [facility] 3NE-A Attending [facility] MD   Hosp Day # 10 [facility]     Chief Complaint: Weakness    S: Patient Lab 12/08/19  0135 12/09/19  0439  12/10/19  0641 12/11/19  0558 12/12/19  0517 12/13/19  0622   *  169* 116*  --  92  --  111*  --    BUN 18  18 23*  --  13  --  7  --    CREATSERUM 0.82  0.82 0.50*  --  0.40*  --  0.40*  --    GFRAA 91  91 123 on 12/8 s/p clipping and Epi injection  2. BID PPI  4. Acute blood loss anemia  1. Repeat CBC increasing  5. Thrombocytopenia, consumptive, monitor  6. Chronic hypotension, stable  7. Chronic Neuropathy/pain- subutex TID - home dose  8.  Metabolic encephalo

## 2019-12-14 NOTE — PHYSICAL THERAPY NOTE
PHYSICAL THERAPY TREATMENT NOTE - INPATIENT    Room Number: 415/415-A     Session: 2   Number of Visits to Meet Established Goals: 7    Presenting Problem: acute weakness, gait disturbance to ICU 12/8 hypotension    Problem List  Principal Problem:    Acu Visual impairment     glasses for reading   • Vomiting    • Wears glasses    • Weight loss        Past Surgical History  Past Surgical History:   Procedure Laterality Date   • ANGIOGRAM  11/30/2006    Right & left hear angiogram   • ANGIOGRAM  05/31/2016 MD at [facility] ENDOSCOPY   • FOOT SURGERY      fixed nerve   • GASTROSTOMY TUBE REMOVAL/REPLACEMENT N/A 8/30/2019    Performed by Candice Singh MD at [address] [facility] N/A 10/10/2018    Performed by Herman Ayala MD at [facility] [place] -   Moving from lying on back to sitting on the side of the bed?: A Lot   How much help from another person does the patient currently need. ..   -   Moving to and from a bed to a chair (including a wheelchair)?: Total   -   Need to walk in hospital room? Repetitions   10   Sets   1     Patient End of Session: In bed;Needs met;Call light within reach;RN aware of session/findings; All patient questions and concerns addressed; Alarm set    ASSESSMENT     Pt seen this AM for bed mobility, sit<>stand transfers,

## 2019-12-14 NOTE — PROGRESS NOTES
INPATIENT NEUROLOGY PROGRESS NOTE    Date: 12/14/2019    Geovanni Graham is a 63year old female at Hospital Day ( LOS: 10 days )    Assessment:   Delirium  Metabolic encephalopathy  Malnutrition  Chronic peripheral polyneuropathy affecting sensorim 88   Temp 97.8 °F (36.6 °C) (Oral)   Resp 18   Ht 62\"   Wt 113 lb (51.3 kg)   SpO2 97%   BMI 20.67 kg/m²   Neurological Examination:  Mental status: A & O X 3  Language: no aphasia  Speech: no dysarthria  CN II-XII: intact   Motor strength: 5-/5 all extre

## 2019-12-14 NOTE — PROGRESS NOTES
Pt up in chair and family at bedside. Pt has gtube running at 45 cc/her and tolerating feeding. Pt takes meds with sip of water but has poor appetite. Pt withdrawn and only answers or awake when talked to. Neurology and psych and cardiology saw pt today.  C

## 2019-12-14 NOTE — PROGRESS NOTES
Cardiology    Reviewed with RN - no new issues. Family at bedside mental status improving not back to baseline but improving. Tube feedings continue.   Will see again on Monday - call if needed    Wicho Bhatia NP

## 2019-12-15 PROCEDURE — 99232 SBSQ HOSP IP/OBS MODERATE 35: CPT | Performed by: HOSPITALIST

## 2019-12-15 RX ORDER — MAGNESIUM OXIDE 400 MG (241.3 MG MAGNESIUM) TABLET
400 TABLET ONCE
Status: COMPLETED | OUTPATIENT
Start: 2019-12-15 | End: 2019-12-15

## 2019-12-15 NOTE — PLAN OF CARE
A&Ox4. Afebrile this shift. Chronic hypotension- asymptomatic this afternoon at 81/50, hospitalist service notified with no new orders. Patient resting in bed comfortably throughout the day.  at the bedside this AM, reviewed POC.  Patient's 3 childre from fall injury  Description  INTERVENTIONS:  - Assess pt frequently for physical needs  - Identify cognitive and physical deficits and behaviors that affect risk of falls.   - Sallis fall precautions as indicated by assessment.  - Educate pt/family on interruptions  - Encourage family to assist in orientation and promotion of home routines  Outcome: Progressing     Problem: GASTROINTESTINAL - ADULT  Goal: Maintains adequate nutritional intake (undernourished)  Description  INTERVENTIONS:  - Monitor perc encourage patient/family in tolerated functional activity level and precautions during self-care     Outcome: Not Progressing     Problem: Impaired Functional Mobility  Goal: Achieve highest/safest level of mobility/gait  Description  Interventions:  - Ass

## 2019-12-15 NOTE — PROGRESS NOTES
[facility]  Progress Note     Estella Yates Patient Status:  Inpatient    1960 MRN [id number]   [facility] 3NE-A Attending Oziel Rodríguez MD   Hosp Day # 11 [facility]      Chief Complaint: Weakness    S: Patient --  115  --    CA 7.3*  --  7.6*  --  7.9*  --    ALB  --   --   --   --  1.5*  --      --  136  --  139  --    K 3.3*   < > 4.0 3.9 3.6 4.1     --  105  --  106  --    CO2 23.0  --  23.0  --  30.0  --    ALKPHO  --   --   --   --  153*  -- peg. 10. Hyponatremia, resolved  11. Chronic Systolic HF, compensated. 12. Recent ICD shocks -ICD interrogation reviewed  13. CAD  15. Afib, controlled  1. Per cards, sotalol, Eliquis is on hold - need to f/u w/ GI on how long to keep off   15.  Prolonged

## 2019-12-15 NOTE — PLAN OF CARE
A/O. Forgetful at times. Flat affect. Slow to respond at times. RA.  states pt confused and hallucinating. No evidence noted while RN/MD in room.  states \"sure she isn't confused when you are in here. \"  pt answers questions appropriately an food from home; allow for food preferences  - Enhance eating environment  Outcome: Not Progressing     Problem: PAIN - ADULT  Goal: Verbalizes/displays adequate comfort level or patient's stated pain goal  Description  INTERVENTIONS:  - Encourage pt to mon including physical limitations  - Instruct pt to call for assistance with activity based on assessment  - Modify environment to reduce risk of injury  - Provide assistive devices as appropriate  - Consider OT/PT consult to assist with strengthening/mobilit

## 2019-12-15 NOTE — PLAN OF CARE
Patient received lethargic. Slept entire shift, she is able to be awoken and answers questions appropriately. Takes medication po with no coughing note. But then is quick to fall back asleep. Repositioned as per protocol. All needs anticipated by staff.  Toño Vences

## 2019-12-15 NOTE — CM/SW NOTE
Social work received referral for new PT recommendation for acute rehab. PMR order entered for [facility] to evaluate patient.

## 2019-12-16 PROCEDURE — 99232 SBSQ HOSP IP/OBS MODERATE 35: CPT | Performed by: HOSPITALIST

## 2019-12-16 PROCEDURE — 99232 SBSQ HOSP IP/OBS MODERATE 35: CPT | Performed by: INTERNAL MEDICINE

## 2019-12-16 RX ORDER — MAGNESIUM OXIDE 400 MG (241.3 MG MAGNESIUM) TABLET
400 TABLET ONCE
Status: COMPLETED | OUTPATIENT
Start: 2019-12-16 | End: 2019-12-16

## 2019-12-16 NOTE — CM/SW NOTE
CM met patient and spoke with  via phone to discuss discharge plan. Patient is to have PMR evaluation done for placement to  acute rehab. Andrade Muhammad- liachay from [facility] informed.  CM will meet with patient and  at 9 am 12/17 to discuss discharge rashad

## 2019-12-16 NOTE — PROGRESS NOTES
[facility]  Cardiology Progress Note    Piotr Lee Patient Status:  Inpatient    1960 MRN [id number]   [facility] 4NW-A Attending Sabi Clark MD   Hosp Day # 12 PCP SHRUTI DESAI     Subjective:   at beds • Sotalol HCl  80 mg Oral 2 times per day         Assessment:  · Chronic systolic heart failure secondary to ischemic cardiomyopathy, LVEF 30-35%. Compensated.    · Severe malnutrition, currently on tube feeds  · UGI bleed secondary Eliane Armand tear, roger

## 2019-12-16 NOTE — PLAN OF CARE
Problem: PAIN - ADULT  Goal: Verbalizes/displays adequate comfort level or patient's stated pain goal  Description  INTERVENTIONS:  - Encourage pt to monitor pain and request assistance  - Assess pain using appropriate pain scale  - Administer analgesics (undernourished)  Description  INTERVENTIONS:  - Monitor percentage of each meal consumed  - Identify factors contributing to decreased intake, treat as appropriate  - Assist with meals as needed  - Monitor I&O, WT and lab values  - Obtain nutritional cons light in reach. Will continue to monitor.

## 2019-12-16 NOTE — PAYOR COMM NOTE
--------------  CONTINUED STAY REVIEW-------REQUESTING ADDITIONAL DAYS 12/15 AND 12/16      Payor: LAUREN COHN  Subscriber #:  [id number]  Authorization Number: [id number]    Admit date: 12/4/19  Admit time: 5503    Admitting Physician: Carmen Thakur MD 0700   WBC 14.3*  --  8.9  --   --   --   --  12.6*   HGB 9.3*   < > 8.9* 8.5* 8.3* 8.1* 7.9* 8.5*   MCV 90.2  --  88.8  --   --   --   --  94.0   PLT 81.0*  --  72.0* 75.0*  --   --   --  133.0*    < > = values in this interval not displayed.        3. 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   4. Dr. Alexandra Soto evaluated. 2. Shock, hemorrhagic, resolved  3. Freddy Sammy Tear, Erosive Gastritis   1. on repeat EGD on 12/8 s/p clipping and Epi injection  2.  Con S: Patient without complaints. Denies pain a peg site. Spouse at bedside.   Awaiting PMR eval.      Review of Systems: 10 point ROS completed and was negative, except for pertinent positive and negatives stated in subjective.     Vital signs:  Temp:  [98.2 Estimated Creatinine Clearance: 118.6 mL/min (A) (based on SCr of 0.4 mg/dL (L)).     No results for input(s): PTP, INR in the last 168 hours.     No results for input(s): TROP, CK in the last 168 hours.           Imaging: Imaging data reviewed in Epic.    35. Deconditioning  1. Acute Rehab eval     Quality:  · DVT Prophylaxis: scds  · CODE status: Full     Estimated date of discharge: tomorrow? PT/OT recs - Acute rehab. F/u shanon/ Ajit as OP.      Await acute rehab eval.  D/c planning.  Case d/w MD, spouse, RN a Pantoprazole Sodium (PROTONIX) EC tab 40 mg     Date Action Dose Route User    12/16/2019 0505 Given 40 mg Oral Ankit Greene RN    12/15/2019 1718 Given 40 mg Oral Ramos Greenberg RN      QUEtiapine Fumarate (SEROQUEL) partial tab 12.5 mg     Date Actio

## 2019-12-16 NOTE — PHYSICAL THERAPY NOTE
PHYSICAL THERAPY TREATMENT NOTE - INPATIENT    Room Number: 415/415-A     Session: 3  Number of Visits to Meet Established Goals: 7    Presenting Problem: acute weakness, gait disturbance to ICU 12/8 hypotension    Problem List  Principal Problem:    Acut Uncomfortable fullness after meals    • Visual impairment     glasses for reading   • Vomiting    • Wears glasses    • Weight loss        Past Surgical History  Past Surgical History:   Procedure Laterality Date   • ANGIOGRAM  11/30/2006    Right & left he 8/11/2015    Performed by Wendie Blount MD at [facility] ENDOSCOPY   • FOOT SURGERY      fixed nerve   • GASTROSTOMY TUBE REMOVAL/REPLACEMENT N/A 8/30/2019    Performed by Bhavya Arreola MD at [address] [place] [address] N/A 10/10/2018 from lying on back to sitting on the side of the bed?: Unable   How much help from another person does the patient currently need. ..   -   Moving to and from a bed to a chair (including a wheelchair)?: Total   -   Need to walk in hospital room?: Total   - of Session: Up in chair;Needs met;Call light within reach;RN aware of session/findings; All patient questions and concerns addressed; Alarm set; Family present    ASSESSMENT     Pt seen this AM for bed mobility, sit<>stand transfers, and balance activities.

## 2019-12-16 NOTE — PROGRESS NOTES
[facility]  Progress Note     Divina Lucius Patient Status:  Inpatient    1960 MRN [id number]   [facility] 3NE-A Attending Naomi Salazar MD   [facility]      Chief Complaint: Weakness    S: Patient --   --  81*  --   --    ALT  --   --  22  --   --    BILT  --   --  0.4  --   --    TP  --   --  5.0*  --   --     < > = values in this interval not displayed. Estimated Creatinine Clearance: 118.6 mL/min (A) (based on SCr of 0.4 mg/dL (L)).     No on hold - need to f/u w/ GI on how long to keep off   16. Prolonged Qtc- Cards following. 17. Hypophosphatemia Due to malnutrition, resolved 4.4  18. Deconditioning  1.  Acute Rehab eval    Quality:  · DVT Prophylaxis: scds  · CODE status: Full    Estimat

## 2019-12-17 PROCEDURE — 99232 SBSQ HOSP IP/OBS MODERATE 35: CPT | Performed by: INTERNAL MEDICINE

## 2019-12-17 PROCEDURE — 99232 SBSQ HOSP IP/OBS MODERATE 35: CPT | Performed by: HOSPITALIST

## 2019-12-17 PROCEDURE — 99232 SBSQ HOSP IP/OBS MODERATE 35: CPT | Performed by: OTHER

## 2019-12-17 RX ORDER — MELATONIN
100 DAILY
Qty: 30 TABLET | Refills: 0 | Status: SHIPPED | OUTPATIENT
Start: 2019-12-18 | End: 2020-11-24

## 2019-12-17 RX ORDER — POLYETHYLENE GLYCOL 3350 17 G/17G
17 POWDER, FOR SOLUTION ORAL DAILY
Qty: 30 EACH | Refills: 0 | Status: SHIPPED | OUTPATIENT
Start: 2019-12-18 | End: 2020-01-21

## 2019-12-17 RX ORDER — MELATONIN
100 DAILY
Status: DISCONTINUED | OUTPATIENT
Start: 2019-12-17 | End: 2019-12-22

## 2019-12-17 NOTE — PROGRESS NOTES
Pt drowsy with no complaints of pain. Pt given meds per MAR. VSS. Tube feed infusing per orders. Pt tolerating well. External purewick catheter in place. Needs met at this time. Report given to Janine LOCK.

## 2019-12-17 NOTE — DIETARY NOTE
[facility]     Pt meets severe malnutrition criteria.     CRITERIA FOR MALNUTRITION DIAGNOSIS:  Criteria for non-severe malnutrition diagnosis: chronic illness related to energy intake less than75% for greater than 1 kg (108 lb 0.4 oz)  09/27/19 : 50.8 kg (112 lb)  09/10/19 : 50.8 kg (112 lb)  04/09/19 : 53.2 kg (117 lb 3.2 oz)  04/05/19 : 44.5 kg (98 lb)     NUTRITION:  Diet: Low Fiber/ GI Soft + TF  Oral Supplements: None at this time     FOOD/NUTRITION RELATED HISTO

## 2019-12-17 NOTE — CM/SW NOTE
Patient declined for stay at Acute rehab. Patient and  informed and would like a referral placed to [facility] at the [facility]. DON and referral placed per .  is requesting to transfer patient to [facility].     Tanisha HENRY, 1

## 2019-12-17 NOTE — PLAN OF CARE
Problem: SAFETY ADULT - FALL  Goal: Free from fall injury  Description  INTERVENTIONS:  - Assess pt frequently for physical needs  - Identify cognitive and physical deficits and behaviors that affect risk of falls.   - [place] fall precautions as indica (undernourished)  Description  INTERVENTIONS:  - Monitor percentage of each meal consumed  - Identify factors contributing to decreased intake, treat as appropriate  - Assist with meals as needed  - Monitor I&O, WT and lab values  - Obtain nutritional cons

## 2019-12-17 NOTE — PROGRESS NOTES
[facility]  Report of Psychiatric Progress Note    Piotr Lee Patient Status:  Inpatient    1960 MRN [id number]   [facility] 4NW-A Attending Sabi Clark MD   Hosp Day # 15 PCP Zain Ward     Date of Admission: 1 Illness:  62 yo WF with hx anorexia (as teenager), CAD, CHF, chronic pain, recurrent N/V/wt loss was admitted on 12/4/19 for management of her persistent nausea, intermittent vomiting, low po intake, early satiety, and generalized weakness.  Her  was she was using the opioids to treat both her chronic  functional pain disorder and anxiety/depression. Her N/V and weight loss and failure to thrive has been a chronic issue. Her weight was 111lb in 10/2018 and down to 90 lb in 12/2018.  She admitted to r generalized weakness and failure to thrive again. Her weight likely went down-- the bed weights are not accurate--range between 100-109 lbs. GI recommended reinserting the GJ tube which was performed on 12/7/19. She is tolerating the tube feeds.      Psych Diarrhea, unspecified    • Dizziness    • Esophageal reflux    • Fatigue    • Fever    • Frequent use of laxatives    • Frequent UTI    • Heart attack [facility])    • Heart palpitations    • Hemorrhoids    • History of blood transfusion     2009   • History of ULTRASOUND (EUS) N/A 12/1/2015    Performed by Anson Reed MD at [facility] ENDOSCOPY   • ENTEROSCOPY N/A 12/11/2018    Performed by Layne Heimlich, MD at [facility] ENDOSCOPY   • EP ELECTROPHYSIOLOGY STUDY  04/13/2006   • ERCP,DIAGNOSTIC     • ESOPHAGOGASTRODUOD Relation Age of Onset   • Other (Other) Father         Polio & multiple aortic aneursyms - burst   • Cancer Mother         lung CA   • Cancer Sister 36        metastatic breast CA   • Breast Cancer Sister    • Diabetes Brother         T1   • Lung Disorder Systems   Constitutional: Positive for malaise/fatigue.      Mental Status Exam:     Risk Assessment  Suicidal ideation: no suicidal ideation    Objective       12/17/19  0834   BP: (!) 81/56   Pulse: 93   Resp: 20   Temp: 99 °F (37.2 °C)     Appearance: fa

## 2019-12-17 NOTE — PLAN OF CARE
Patient received alert and oriented, repositioned as per patient request. Hob maintained at 45 degrees throughout the night. Patient was awake for majority of night, claims she has a hard time sleeping.  No anxiety or hallucinations noted throughout the activity based on assessment  - Modify environment to reduce risk of injury  - Provide assistive devices as appropriate  - Consider OT/PT consult to assist with strengthening/mobility  - Encourage toileting schedule  Outcome: Progressing     Problem: Daphne Downs

## 2019-12-17 NOTE — PROGRESS NOTES
Chart reviewed. 62 yo with Severe Malnutrition and Hemorrhagic shock s/p Eliane jayden tear. On 12/7/19 underwent Esophagogastroduodenoscopy with placement of gastrostomy and jejunal extension.     Note indicate pt is tolerating TF (Jevity 1.5) at

## 2019-12-17 NOTE — PAYOR COMM NOTE
--------------  CONTINUED STAY REVIEW-----REQUESTING ADDITIONAL DAY 12/17      Payor: Becca Irby [employer] PPO  Subscriber #:  [id number]  Authorization Number: [id number]    Admit date: 12/4/19  Admit time: 1001    Admitting Physician: Carmen Thakur MD  Attending P · Severe malnutrition: on tube feeds. NEW G TUBE placed 12/7/19. Tube feeds instilling   · Altered mental status:   · Suspected UTI: abx per hospitalist  · Chronic systolic HF secondary to an ischemic cardiomyopathy: EF 30-35%.  Patient has a [employer] dual Pulse:  [73-94] 93  Resp:  [14-20] 20  BP: ()/(56-67) 81/56     Wt Readings from Last 6 Encounters:  12/16/19 : 108 lb 0.4 oz (49 kg)  11/18/19 : 108 lb 0.4 oz (49 kg)  09/27/19 : 112 lb (50.8 kg)  09/10/19 : 112 lb (50.8 kg)  04/09/19 : 117 lb 3.2 o • folic acid  989 mcg Oral Daily   • lisinopril  2.5 mg Oral Nightly   • Metoprolol Succinate ER  12.5 mg Oral Nightly   • mirtazapine  15 mg Oral Nightly   • Sotalol HCl  80 mg Oral 2 times per day         ASSESSMENT / PLAN:      1.  Severe malnutrition wi Date Action Dose Route User    12/16/2019 2114 Given 10 mg Oral Josey Sims      buprenorphine HCl (SUBUTEX) SL tab 8 mg     Date Action Dose Route User    12/17/2019 0915 Given 8 mg Sublingual Aroldo Bryant RN    12/16/2019 2100 Given 8 mg Subl

## 2019-12-17 NOTE — PROGRESS NOTES
[facility]  Cardiology Progress Note    Subjective:  No chest pain or shortness of breath. Sitting comfortably in bed.      Objective:  BP (!) 81/56 (BP Location: Left arm)   Pulse 93   Temp 99 °F (37.2 °C) (Oral)   Resp 20   Ht 5' 2\" (1.575 m)   Wt 1 · Depression    Plan:  · Continue BB/sotalol for rate control and ACE  · Continue tube feeds per GI  · A/C on hold, GI to determine when to resume. · Plans for eventual d/c to [facility]  · Patient used the \"sit to stand\" machine yesterday.  Continue P

## 2019-12-17 NOTE — PROGRESS NOTES
On continued tube feeding at goal rate of 45 cc/hr,sched meds given and tolerated well per oral,seen by therapist and rec Jeffory Severs JOy  Rehab,Tube feeding was leaking but got fixed  By GI.assisted needs.

## 2019-12-17 NOTE — PROGRESS NOTES
[facility]  Progress Note     Roscoe Guaman Patient Status:  Inpatient    1960 MRN [id number]   [facility] 3NE-A Attending Daljit Shea MD   [facility]     Chief Complaint: Weakness    S: Patient --    TP 5.0*  --   --        Estimated Creatinine Clearance: 105.4 mL/min (A) (based on SCr of 0.45 mg/dL (L)). No results for input(s): PTP, INR in the last 168 hours. No results for input(s): TROP, CK in the last 168 hours.          Imaging: Imagin malnutrition, resolved  18. Deconditioning  1. Acute Rehab eval    Quality:  · DVT Prophylaxis: scds  · CODE status: Full    Estimated date of discharge: today and needs to f/u w/ Ajit for further w/u. Await acute rehab eval.  D/c planning.  Case d/w MD,

## 2019-12-18 PROCEDURE — 99232 SBSQ HOSP IP/OBS MODERATE 35: CPT | Performed by: NURSE PRACTITIONER

## 2019-12-18 PROCEDURE — 99232 SBSQ HOSP IP/OBS MODERATE 35: CPT | Performed by: INTERNAL MEDICINE

## 2019-12-18 NOTE — OCCUPATIONAL THERAPY NOTE
OCCUPATIONAL THERAPY TREATMENT NOTE - INPATIENT     Room Number: 415/415-A  Session: 2   Number of Visits to Meet Established Goals: 8    Presenting Problem: Acute Weakness    History related to current admission: pt is 64 yo female admitted from home with Irregular bowel habits    • Ischemic cardiomyopathy    • Kidney failure    • Leg swelling    • Loss of appetite    • Mouth sores    • Muscle weakness     due to neuropathy in P.T now   • Nausea    • Neuropathy    • On tube feeding diet     eats a little Performed by Harvey Dyer DO at [facility] ENDOSCOPY   • ESOPHAGOGASTRODUODENOSCOPY (EGD) N/A 9/27/2019    Performed by Corinne Genin, MD at [facility] ENDOSCOPY   • ESOPHAGOGASTRODUODENOSCOPY (EGD) N/A 9/10/2019    Performed by Cristofer Saldana MD at [facility] [address] ACTIVITY TOLERANCE                         O2 SATURATIONS                ACTIVITIES OF DAILY LIVING ASSESSMENT  AM-PAC ‘6-Clicks’ Inpatient Daily Activity Short Form  How much help from another person does the patient currently need…  -   Putting on an independence with ADL/functional transfers. Recommend trial of BHAVNA 12-14 days. Pt may require more supportive environment after BHAVNA pending progression.       OT Discharge Recommendations: Sub-acute rehabilitation(trial 12-14 days)  OT Device Recommenda

## 2019-12-18 NOTE — PROGRESS NOTES
[facility]  Cardiology Progress Note    Marita Isaacs Patient Status:  Inpatient    1960 MRN [id number]   [facility] 4NW-A Attending Sylvia Lugo, DO   Hosp Day # 14 PCP SHRUTI DESAI     Subjective:  Jejunal extension u • folic acid  191 mcg Oral Daily   • lisinopril  2.5 mg Oral Nightly   • Metoprolol Succinate ER  12.5 mg Oral Nightly   • mirtazapine  15 mg Oral Nightly   • Sotalol HCl  80 mg Oral 2 times per day         Assessment:  · Chronic systolic heart failure s

## 2019-12-18 NOTE — PHYSICAL THERAPY NOTE
Attempted to see Pt this AM - RN aware of attempt. Pt refusing to work with writer this AM.  Pt encouraged to get up to chair with use of georges lift - still refused use of lift equipment. PCT made aware.   Will f/u later today if time permits, after all o

## 2019-12-18 NOTE — PHYSICAL THERAPY NOTE
PHYSICAL THERAPY TREATMENT NOTE - INPATIENT    Room Number: 415/415-A     Session: 4  Number of Visits to Meet Established Goals: 7    Presenting Problem: acute weakness, gait disturbance to ICU 12/8 hypotension    Problem List  Principal Problem:    Acut Uncomfortable fullness after meals    • Visual impairment     glasses for reading   • Vomiting    • Wears glasses    • Weight loss        Past Surgical History  Past Surgical History:   Procedure Laterality Date   • ANGIOGRAM  11/30/2006    Right & left he 8/11/2015    Performed by Miriam Hammer MD at [facility] ENDOSCOPY   • FOOT SURGERY      fixed nerve   • GASTROSTOMY TUBE REMOVAL/REPLACEMENT N/A 8/30/2019    Performed by Jaciel Gillis MD at [address] [place] [address] N/A 10/10/2018 the bed?: Unable   How much help from another person does the patient currently need. ..   -   Moving to and from a bed to a chair (including a wheelchair)?: Total   -   Need to walk in hospital room?: Total   -   Climbing 3-5 steps with a railing?: Total Transfers/Environmental Barriers    Toilet/Commode Transfers: NA  Stairs: NA  Curb Step: NA      THERAPEUTIC EXERCISES  Lower Extremity Ankle pumps  Hip AB/AD  Heel slides     Upper Extremity Elbow flex/ext and  - open/close     Position Supine     Rep

## 2019-12-18 NOTE — PLAN OF CARE
Assumed care of pt at [address]. Pt fatigued and drowsy overnight. VSS. Pt has chronically low BP. Afebrile. No c/o pain. Pt A/O x2, to person and time. Spouse at bedside.  Spouse concerned about her disorientation at night, says its been happening more frequent ADULT  Goal: Minimal or absence of nausea and vomiting  Description  INTERVENTIONS:  - Maintain adequate hydration with IV or PO as ordered and tolerated  - Nasogastric tube to low intermittent suction as ordered  - Evaluate effectiveness of ordered antiem limitations with patient/family  Outcome: Progressing     Problem: Impaired Functional Mobility  Goal: Achieve highest/safest level of mobility/gait  Description  Interventions:  - Assess patient's functional ability and stability  - Promote increasing act

## 2019-12-18 NOTE — CM/SW NOTE
CM left message with Liaison at [address] [place] [address] Ave # [phone] inquiring about BHAVNA referral. Referral still under review per allscripts note. Awaiting return call.      Tanisha HENRY, 12/18/19, 12:52 PM

## 2019-12-18 NOTE — PROGRESS NOTES
Gastroenterology Progress Note  Hector Arguello Patient Status:  Inpatient    1960 MRN [id number]   [facility] 4NW-A Attending Milagros Fraga, DO   Hosp Day # 14 [facility] anticoagulants held for 48 hours given hx of GI bleeding/MWT  Plan:   1. EGD under MAC with replacement of jejunal extension tube with Dr Flori Galdamez on 12/20/2019.  The risks, benefits, alternatives of the procedure including the risks of anesthesia, bleeding

## 2019-12-18 NOTE — PLAN OF CARE
Problem: PAIN - ADULT  Goal: Verbalizes/displays adequate comfort level or patient's stated pain goal  Description  INTERVENTIONS:  - Encourage pt to monitor pain and request assistance  - Assess pain using appropriate pain scale  - Administer analgesics appropriate  - Advance diet as tolerated, if ordered  - Obtain nutritional consult as needed  - Evaluate fluid balance  12/17/2019 1835 by Levi Garcia RN  Outcome: Progressing  12/17/2019 1329 by Levi Garcia RN  Outcome: Progressing  Goal:

## 2019-12-18 NOTE — PROGRESS NOTES
[facility]  Progress Note     Sharad Galeas Patient Status:  Inpatient    1960 MRN [id number]   [facility] 3NE-A Attending Farhan Degroot MD   Hosp Day # 14  [place]      Chief Complaint: Weakness    S: Patient SCr of 0.45 mg/dL (L)). No results for input(s): PTP, INR in the last 168 hours. No results for input(s): TROP, CK in the last 168 hours. Imaging: Imaging data reviewed in Epic.     Medications:   • Thiamine HCl  100 mg Oral Daily   • apixaban eval    Quality:  · DVT Prophylaxis: scds  · CODE status: Full    Estimated date of discharge: BHAVNA today and needs to f/u w/ [place] for further w/u. Spouse working on [employer] w/ social work. D/c planning. Repeat CBC/lytes in 3 days as OP.   AC resumed yesterd

## 2019-12-19 ENCOUNTER — ANESTHESIA EVENT (OUTPATIENT)
Dept: ENDOSCOPY | Facility: HOSPITAL | Age: 59
DRG: 640 | End: 2019-12-19
Payer: COMMERCIAL

## 2019-12-19 PROCEDURE — 99232 SBSQ HOSP IP/OBS MODERATE 35: CPT | Performed by: NURSE PRACTITIONER

## 2019-12-19 PROCEDURE — 99232 SBSQ HOSP IP/OBS MODERATE 35: CPT | Performed by: INTERNAL MEDICINE

## 2019-12-19 RX ORDER — DEXTROSE AND SODIUM CHLORIDE 5; .45 G/100ML; G/100ML
INJECTION, SOLUTION INTRAVENOUS CONTINUOUS
Status: DISCONTINUED | OUTPATIENT
Start: 2019-12-19 | End: 2019-12-22

## 2019-12-19 RX ORDER — MAGNESIUM SULFATE HEPTAHYDRATE 40 MG/ML
2 INJECTION, SOLUTION INTRAVENOUS ONCE
Status: COMPLETED | OUTPATIENT
Start: 2019-12-19 | End: 2019-12-19

## 2019-12-19 NOTE — PROGRESS NOTES
[facility]  Cardiology Progress Note    Deniz Dye Patient Status:  Inpatient    1960 MRN [id number]   [facility] 4NW-A Attending Krysta Coto, DO   Hosp Day # 15 PCP SHRUTI DESAI     Subjective:  No nausea while NPO BID AC   • PEG 3350  17 g Oral Daily   • sodium chloride   Intravenous Once   • sodium chloride   Intravenous Once   • buprenorphine HCl  8 mg Sublingual TID   • Amitriptyline HCl  10 mg Oral Nightly   • DULoxetine HCl  30 mg Oral BID   • folic acid  381 m

## 2019-12-19 NOTE — PROGRESS NOTES
[facility]  Progress Note     Divina Kan Patient Status:  Inpatient    1960 MRN [id number]   [facility] 3NE-A Attending Lisa Shepherd MD   [facility]      Chief Complaint: Weakness    S: Patient p Thiamine HCl  100 mg Oral Daily   • Pantoprazole Sodium  40 mg Oral BID AC   • PEG 3350  17 g Oral Daily   • sodium chloride   Intravenous Once   • sodium chloride   Intravenous Once   • buprenorphine HCl  8 mg Sublingual TID   • Amitriptyline HCl  10 mg O tube re-eval per GI, holding AC in meantime.      Aldo KUNZ  11:13 AM       Patient seen and examined independently, agree with above except as otherwise noted.     On Physical Exam:     General: Alert and Oriented x 3, NAD, Cachectic/frail  Lungs:

## 2019-12-19 NOTE — PAYOR COMM NOTE
--------------  CONTINUED STAY REVIEW------REQUESTING ADDITIONAL DAY 12/19      Payor: [facility] [employer] PPO  Subscriber #:  [id number]  Authorization Number: [id number]    Admit date: 12/4/19  Admit time: 1408    Admitting Physician: Raymundo Rocha MD  Attending 0622 12/16/19  0617 12/19/19  0538   GLU  --   --  77   BUN  --   --  11   CREATSERUM  --  0.45* 0.44*   GFRAA  --  128 129   GFRNAA  --  111 112   CA  --   --  9.3   NA  --   --  140   K 4.1 3.9 4.1   CL  --   --  104   CO2  --   --  31.0         Estimate 12. Recent ICD shocks -ICD interrogation reviewed  13. NSVT  1. Tele, BB, watch lytes  14. CAD  13. Afib, controlled  1. Per cards, sotalol/metoprolol  2. Eliquis resumed on 12/17  16. Prolonged Qtc  1. Cards following.    17. Hypophosphatemia Due to [facility]

## 2019-12-19 NOTE — PLAN OF CARE
Pt is aaox3-4, forgetful, denies pain, attempted to flush J-tube but to no avail, Lacie Scheuermann, APN seen pt and attempted to flush as well, pt is scheduled for EGD w/ J-tube placement this Friday, eliquis is dc'd, PT/OT worked with pt, still needs total lift, indicated by assessment.  - Educate pt/family on patient safety including physical limitations  - Instruct pt to call for assistance with activity based on assessment  - Modify environment to reduce risk of injury  - Provide assistive devices as appropriat Problem: Impaired Functional Mobility  Goal: Achieve highest/safest level of mobility/gait  Description  Interventions:  - Assess patient's functional ability and stability  - Promote increasing activity/tolerance for mobility and gait  - Educate and eng contributing to decreased intake, treat as appropriate  - Assist with meals as needed  - Monitor I&O, WT and lab values  - Obtain nutritional consult as needed  - Optimize oral hygiene and moisture  - Encourage food from home; allow for food preferences  -

## 2019-12-19 NOTE — CM/SW NOTE
Interdisciplinary Rounds: 12/19/19  Admitted: 12/4/2019 LOS: 15  Disciplines in attendance: charge nurse, staff nurse, CM, [address] and discharge plan reviewed. Plan to discharge to [facility], they have insurance auth.     Active issues needing re

## 2019-12-19 NOTE — ANESTHESIA PREPROCEDURE EVALUATION
PRE-OP EVALUATION    Patient Name: Gabo James    Pre-op Diagnosis: Feeding difficulties [R63.3]    Procedure(s):  Esophagogastroduodenoscopy with placement of jejunal tube via existing gastrostomy tube       Surgeon(s) and Role:     Rebecca Oneill, Intravenous, Once  [COMPLETED] lactated ringers IV bolus 500 mL, 500 mL, Intravenous, Once  [COMPLETED] phytonadione (AQUA-MEPHYTON) 10 mg in sodium chloride 0.9% 50 mL IVPB, 10 mg, Intravenous, Once  0.9% NaCl infusion, , Intravenous, Once  [COMPLETED] Me Take 15 mg by mouth nightly., Disp: , Rfl:   Amitriptyline HCl 10 MG Oral Tab, Take 1 tablet (10 mg total) by mouth nightly., Disp: 30 tablet, Rfl: 2  Omeprazole 40 MG Oral Capsule Delayed Release, Take 40 mg by mouth daily.   , Disp: , Rfl: 3  DULoxetine H • COLONOSCOPY     • COLONOSCOPY N/A 7/13/2015    Performed by Bharat Rosario MD at [facility] ENDOSCOPY   • EGD     • ENDOSCOPIC ULTRASOUND (EUS) N/A 9/6/2018    Performed by Heron Reynoso DO at [facility] ENDOSCOPY   • ENDOSCOPIC ULTRASOUND (EUS) N/A 12/1/2015    Per PERCUTANEOUS ENDOSCOPIC GASTROSTOMY PLACEMENT/JEJUNOSTOMY TUBE PLACEMENT N/A 12/11/2018    Performed by Hussain Santiago MD at [facility] ENDOSCOPY   • REMOVAL GALLBLADDER  10/2018   • TONSILLECTOMY       Social History    Tobacco Use      Smoking status: Former

## 2019-12-19 NOTE — PLAN OF CARE
Pt A/O x2-3 tonight. More alert than the previous night. VSS. Afebrile. No c/o pain. Pt having poor appetite, was eating jello upon arrival to shift. Pt kept NPO after midnight per orders. Son at bedside. Plan for J tube to get replaced tomorrow 12/20.  No ordered  - Evaluate effectiveness of ordered antiemetic medications  - Provide nonpharmacologic comfort measures as appropriate  - Advance diet as tolerated, if ordered  - Obtain nutritional consult as needed  - Evaluate fluid balance  Outcome: Progressing ability and stability  - Promote increasing activity/tolerance for mobility and gait  - Educate and engage patient/family in tolerated activity level and precautions  - Recommend use of total lift for transfers  - patient to perform supine leg exercises

## 2019-12-20 ENCOUNTER — ANESTHESIA (OUTPATIENT)
Dept: ENDOSCOPY | Facility: HOSPITAL | Age: 59
DRG: 640 | End: 2019-12-20
Payer: COMMERCIAL

## 2019-12-20 PROCEDURE — 99232 SBSQ HOSP IP/OBS MODERATE 35: CPT | Performed by: NURSE PRACTITIONER

## 2019-12-20 PROCEDURE — 99232 SBSQ HOSP IP/OBS MODERATE 35: CPT | Performed by: INTERNAL MEDICINE

## 2019-12-20 RX ORDER — ACETAMINOPHEN 120 MG/1
120 SUPPOSITORY RECTAL EVERY 6 HOURS PRN
Status: DISCONTINUED | OUTPATIENT
Start: 2019-12-20 | End: 2019-12-22

## 2019-12-20 NOTE — CM/SW NOTE
12/20/19 1500   Discharge disposition   Expected discharge disposition Skilled Nurs   Name of Facillity/Home [id number] [facility] Nurs   Discharge transportation Private car   Chiquita Linder from [facility] aware the pt will most likely dc later today (after

## 2019-12-20 NOTE — OCCUPATIONAL THERAPY NOTE
OCCUPATIONAL THERAPY TREATMENT NOTE - INPATIENT     Room Number: 415/415-A  Session: 3/8   Number of Visits to Meet Established Goals: 8    Presenting Problem: Acute Weakness    History related to current admission: pt is 64 yo female admitted from home wi Irregular bowel habits    • Ischemic cardiomyopathy    • Kidney failure    • Leg swelling    • Loss of appetite    • Mouth sores    • Muscle weakness     due to neuropathy in P.T now   • Nausea    • Neuropathy    • On tube feeding diet     eats a little Performed by Lizzie Perez DO at [facility] ENDOSCOPY   • ESOPHAGOGASTRODUODENOSCOPY (EGD) N/A 9/27/2019    Performed by Khushi Santos MD at [facility] ENDOSCOPY   • ESOPHAGOGASTRODUODENOSCOPY (EGD) N/A 9/10/2019    Performed by Gerri Fragoso MD at [facility] [address] O2 SATURATIONS                ACTIVITIES OF DAILY LIVING ASSESSMENT  AM-PAC ‘6-Clicks’ Inpatient Daily Activity Short Form  How much help from another person does the patient currently need…  -   Putting on and taking off regular lower body cloth for BUE therapeutic exercises, transfer training, and neuro re-education. Pt continues to progress with balance and seated trunk control, but still requires DEP for transfers.  The pt continues to funciton below previous functional level and would benefit f Goals:  Pt will verbalized A&O x3 utilizing environmental cues and/or one verbal cue.

## 2019-12-20 NOTE — PROGRESS NOTES
[facility]  Cardiology Progress Note    Subjective:  No chest pain or shortness of breath.     Objective:  BP 98/64 (BP Location: Left arm)   Pulse 88   Temp 98.8 °F (37.1 °C) (Oral)   Resp 12   Ht 5' 2\" (1.575 m)   Wt 103 lb 2.8 oz (46.8 kg)   SpO2 9 · Depression    Plan:  · Holding NOAC for JG tube placement today per GI. Resume NOAC when ok w GI service. · Plan for eventual transfer to the [facility] rehab. · Patient follows with dr Chloe Herrera in the outpatient setting.  Plan for her to recover and b

## 2019-12-20 NOTE — PLAN OF CARE
Pt A/o x2-3. VSS. Afebrile. No c/o pain. Tube feedings on hold right now. Pt kept NPO after midnight for J tube replacement. IV fluids infusing. No acute events overnight. Call light within reach. Will continue to monitor.      Problem: PAIN - ADULT  Goal: measures as appropriate  - Advance diet as tolerated, if ordered  - Obtain nutritional consult as needed  - Evaluate fluid balance  Outcome: Progressing     Problem: METABOLIC/FLUID AND ELECTROLYTES - ADULT  Goal: Electrolytes maintained within normal limi grooming, and bathing  - Educate and encourage patient/family in tolerated functional activity level and precautions during self-care     Outcome: Progressing     Problem: Impaired Functional Mobility  Goal: Achieve highest/safest level of mobility/gait  D

## 2019-12-20 NOTE — PROGRESS NOTES
[facility]  Progress Note     Joel Gallo Patient Status:  Inpatient    1960 MRN [id number]   [facility] 3NE-A Attending Joshua Mcginnis MD   Hosp Day # 16  [place]      Chief Complaint: Weakness    S: complains Daily   • sodium chloride   Intravenous Once   • sodium chloride   Intravenous Once   • buprenorphine HCl  8 mg Sublingual TID   • Amitriptyline HCl  10 mg Oral Nightly   • DULoxetine HCl  30 mg Oral BID   • folic acid  236 mcg Oral Daily   • lisinopril  2 needs to f/u w/ Fong as OP. Repeat CBC/lytes in 3 days as OP.

## 2019-12-20 NOTE — PLAN OF CARE
Problem: PAIN - ADULT  Goal: Verbalizes/displays adequate comfort level or patient's stated pain goal  Description  INTERVENTIONS:  - Encourage pt to monitor pain and request assistance  - Assess pain using appropriate pain scale  - Administer analgesics allow for food preferences  - Enhance eating environment  Outcome: Progressing   Pt. Remains NPO for J tube insertion via pt's existing G tube. Pt w/o c/o nausea or abd pain. BM last night. Plans to resume Pt's Tube feeding once J tube replaced.  Pt. Leilani Mohr

## 2019-12-21 PROCEDURE — 0D2DXUZ CHANGE FEEDING DEVICE IN LOWER INTESTINAL TRACT, EXTERNAL APPROACH: ICD-10-PCS | Performed by: INTERNAL MEDICINE

## 2019-12-21 PROCEDURE — 99232 SBSQ HOSP IP/OBS MODERATE 35: CPT | Performed by: INTERNAL MEDICINE

## 2019-12-21 DEVICE — 3-PORT THROUGH-THE-PEG J-TUBE KIT
Type: IMPLANTABLE DEVICE | Status: FUNCTIONAL
Brand: ENDOVIVE JEJUNAL FEEDING TUBE

## 2019-12-21 RX ORDER — POTASSIUM CHLORIDE 29.8 MG/ML
40 INJECTION INTRAVENOUS ONCE
Status: COMPLETED | OUTPATIENT
Start: 2019-12-21 | End: 2019-12-21

## 2019-12-21 RX ORDER — MAGNESIUM SULFATE HEPTAHYDRATE 40 MG/ML
2 INJECTION, SOLUTION INTRAVENOUS ONCE
Status: COMPLETED | OUTPATIENT
Start: 2019-12-21 | End: 2019-12-21

## 2019-12-21 RX ORDER — SODIUM CHLORIDE, SODIUM LACTATE, POTASSIUM CHLORIDE, CALCIUM CHLORIDE 600; 310; 30; 20 MG/100ML; MG/100ML; MG/100ML; MG/100ML
INJECTION, SOLUTION INTRAVENOUS CONTINUOUS
Status: DISCONTINUED | OUTPATIENT
Start: 2019-12-21 | End: 2019-12-22

## 2019-12-21 RX ORDER — NALOXONE HYDROCHLORIDE 0.4 MG/ML
80 INJECTION, SOLUTION INTRAMUSCULAR; INTRAVENOUS; SUBCUTANEOUS AS NEEDED
Status: DISCONTINUED | OUTPATIENT
Start: 2019-12-21 | End: 2019-12-21 | Stop reason: HOSPADM

## 2019-12-21 RX ORDER — LIDOCAINE HYDROCHLORIDE 10 MG/ML
INJECTION, SOLUTION EPIDURAL; INFILTRATION; INTRACAUDAL; PERINEURAL AS NEEDED
Status: DISCONTINUED | OUTPATIENT
Start: 2019-12-21 | End: 2019-12-21 | Stop reason: SURG

## 2019-12-21 RX ADMIN — LIDOCAINE HYDROCHLORIDE 50 MG: 10 INJECTION, SOLUTION EPIDURAL; INFILTRATION; INTRACAUDAL; PERINEURAL at 12:49:00

## 2019-12-21 NOTE — PLAN OF CARE
Problem: PAIN - ADULT  Goal: Verbalizes/displays adequate comfort level or patient's stated pain goal  Description  INTERVENTIONS:  - Encourage pt to monitor pain and request assistance  - Assess pain using appropriate pain scale  - Administer analgesics (undernourished)  Description  INTERVENTIONS:  - Monitor percentage of each meal consumed  - Identify factors contributing to decreased intake, treat as appropriate  - Assist with meals as needed  - Monitor I&O, WT and lab values  - Obtain nutritional cons Progressing       Patient alert and oriented X3 with episodes of confusion and  forgetfulness. Follows simple command. NPO post midnight in preparation for GTube placement in the morning, patient voiced understanding. GTube clamped at this time.  No nause

## 2019-12-21 NOTE — OPERATIVE REPORT
Small bowel endoscopy operative report  Patient Name: West Miles  Procedure: Esophagogastroduodenoscopy with push enteroscopy and placement of jejunal feeding tube  Date of procedure: 12/21/2019    Indication: Jejunal feeding tube malfunction incisors. Clips from prior ulcer management were appreciated, at the EGJxn. Stomach: The gastric body, antrum, fundus, cardia, and angularis were normal, without masses, polyps, ulcers, erosions, diverticula, or varices.    A gastrostomy tube was appr

## 2019-12-21 NOTE — PROGRESS NOTES
[facility]  Progress Note     West Miles Patient Status:  Inpatient    1960 MRN [id number]   [facility] 3NE-A Attending Rachael Lagunas MD   Hosp Day # 16 [facility] [address]      Chief Complaint: Weakness    S: no compla Intravenous Once   • Thiamine HCl  100 mg Oral Daily   • Pantoprazole Sodium  40 mg Oral BID AC   • PEG 3350  17 g Oral Daily   • sodium chloride   Intravenous Once   • sodium chloride   Intravenous Once   • buprenorphine HCl  8 mg Sublingual TID   • Amitr Full    Estimated date of discharge: BHAVNA 1-2 days if tolerating TF and needs to f/u w/ Fong as OP. Repeat CBC/lytes in 3 days as OP.

## 2019-12-21 NOTE — PLAN OF CARE
Assumed care @ 0730. Patient c/o severe mouth pain. Patient's abdomen soft, non-distended, non-tender. GJ- tube replaced by DR. Blanco, tube feeding started @ [phone] @ 10 cc/hr. Patient refused to eat lunch.  Patient's blood pressure 23'Y- 14'V systolic, johnny

## 2019-12-21 NOTE — ANESTHESIA POSTPROCEDURE EVALUATION
[address] Patient Status:  Inpatient   Age/Gender 63year old female MRN [id number]   Location [facility]. Attending Shivam Angulo, [address] [facility] [place] Day # 16 PCP SHRUTI DESAI       Anesthesia Post-op Note    Procedur

## 2019-12-22 VITALS
DIASTOLIC BLOOD PRESSURE: 54 MMHG | BODY MASS INDEX: 19.57 KG/M2 | HEART RATE: 76 BPM | TEMPERATURE: 99 F | HEIGHT: 62 IN | SYSTOLIC BLOOD PRESSURE: 89 MMHG | WEIGHT: 106.38 LBS | OXYGEN SATURATION: 96 % | RESPIRATION RATE: 16 BRPM

## 2019-12-22 PROCEDURE — 99239 HOSP IP/OBS DSCHRG MGMT >30: CPT | Performed by: INTERNAL MEDICINE

## 2019-12-22 NOTE — PROGRESS NOTES
[facility]  Progress Note     Gertrude Meliton Patient Status:  Inpatient    1960 MRN [id number]   [facility] 3NE-A Attending Lorena Olson MD   Hosp Day # 18  [place]      Chief Complaint: Weakness    S: no compla Epic.    Medications:   • apixaban  5 mg Oral BID   • Thiamine HCl  100 mg Oral Daily   • Pantoprazole Sodium  40 mg Oral BID AC   • PEG 3350  17 g Oral Daily   • sodium chloride   Intravenous Once   • sodium chloride   Intravenous Once   • buprenorphine H Eliquis  · CODE status: Full    Discharge to [facility] as soon as placement is secured. RN will discuss with .

## 2019-12-22 NOTE — PLAN OF CARE
Patient is for discharge to [facility]. Nursing report given to nurse Jerome Davenport. Central line aseptically discontinued per MD order. Pressure dressing applied. Tip of catheter intact, no complications noted, No bleeding.

## 2019-12-22 NOTE — CM/SW NOTE
Per unit RN, pt can d/c if bed is available at [facility]. PRASAD called Yennifer liaison for [employer], and left a message. Await call back.           PRASAD called the [facility], no one is covering admissions today but they do have pt on their list of patient's

## 2019-12-22 NOTE — PLAN OF CARE
Tolerating tube feeds. No c/o nausea,no vomiting. Drinking fluids,not eating solid food. J tube secure & intact in place,flushing well. Kept cvomfortable in bed. Head of bed maintained above 30deg.  Kept clean & comfortable,purewick in use for urinary incontin

## 2019-12-22 NOTE — PLAN OF CARE
Problem: PAIN - ADULT  Goal: Verbalizes/displays adequate comfort level or patient's stated pain goal  Description  INTERVENTIONS:  - Encourage pt to monitor pain and request assistance  - Assess pain using appropriate pain scale  - Administer analgesics maintained within normal limits  Description  INTERVENTIONS:  - Monitor labs and rhythm and assess patient for signs and symptoms of electrolyte imbalances  - Administer electrolyte replacement as ordered  - Monitor response to electrolyte replacements, in

## 2019-12-23 NOTE — PAYOR COMM NOTE
--------------  DISCHARGE REVIEW------REQUESTING ADDITIONAL DAY 12/21 WITH DISCHARGE ON 12/22      [facility] E[facility]  Subscriber #:  [id number]  Authorization Number: [id number]    Admit date: 12/4/19  Admit time:  1828  Discharge Date: 12/22/2019  5:17 PM 1599 12/19/19  0538 12/21/19  0620   GLU  --  77 123*   BUN  --  11 4*   CREATSERUM 0.45* 0.44* 0.42*   GFRAA 128 129 131   GFRNAA 111 112 113   CA  --  9.3 8.4*   NA  --  140 140   K 3.9 4.1 3.5   CL  --  104 109   CO2  --  31.0 27.0         Estimated Cre 3. Now Is intermittent and suspect hospital delerium  9. UTI  1. completed Zosyn course. 10. Hyponatremia, resolved  11. Chronic Systolic HF, compensated. 12. Recent ICD shocks -ICD interrogation reviewed  13. NSVT  1. Tele, BB  14. CAD  13.  Afib, contro

## 2019-12-23 NOTE — CM/SW NOTE
SW received a call from  stating they made it to [facility] parking lot and decided to not go in.  took the pt home. He stated, Camila Ayala wouldn't give her, her meds. \" He is wanting to discuss alternative options.  Informed him w/the [facility]

## 2019-12-24 NOTE — DISCHARGE SUMMARY
[facility] HOSPITAL[facility]  DISCHARGE SUMMARY     Mena Malone Patient Status:  Inpatient    1960 MRN [id number]   [facility] 4NW-A Attending No att. providers found   Hosp Day # 18 PCP Sayra Simms     Date of Admission: 2019 calorie malnutrition as she had previously had 1. Patient underwent EGD and had been doing okay except for some nausea but later that evening following the procedure became profoundly hypotensive and transferred to the ICU.   Repeat blood work showed an ac Take 1 tablet (10 mg total) by mouth nightly. Stop taking on:  December 31, 2019  Quantity:  30 tablet  Refills:  2     apixaban 5 MG Tabs  Commonly known as:  ELIQUIS      Take 1 tablet (5 mg total) by mouth 2 (two) times daily.    Quantity:  60 table [id number]  [phone]    Schedule an appointment as soon as possible for a visit in 1 week      Rafat Shannon [address]  Shruti Villegas [phone]    Go on 1/7/2020  for a follow-up with the GI nurse practitoner at 8:20 am     JULIÁN Grimaldo

## 2020-01-01 ENCOUNTER — EXTERNAL RECORD (OUTPATIENT)
Dept: HEALTH INFORMATION MANAGEMENT | Facility: OTHER | Age: 60
End: 2020-01-01

## 2020-01-02 ENCOUNTER — TELEPHONE (OUTPATIENT)
Dept: CARDIOLOGY | Age: 60
End: 2020-01-02

## 2020-01-15 ENCOUNTER — TELEPHONE (OUTPATIENT)
Dept: CARDIOLOGY | Age: 60
End: 2020-01-15

## 2020-01-21 ENCOUNTER — OFFICE VISIT (OUTPATIENT)
Dept: NEUROLOGY | Facility: CLINIC | Age: 60
End: 2020-01-21
Payer: COMMERCIAL

## 2020-01-21 VITALS
HEART RATE: 74 BPM | DIASTOLIC BLOOD PRESSURE: 70 MMHG | SYSTOLIC BLOOD PRESSURE: 94 MMHG | RESPIRATION RATE: 16 BRPM | HEIGHT: 62 IN | WEIGHT: 106 LBS | BODY MASS INDEX: 19.51 KG/M2

## 2020-01-21 DIAGNOSIS — R27.0 ATAXIA: ICD-10-CM

## 2020-01-21 DIAGNOSIS — R29.2 HYPOREFLEXIA: ICD-10-CM

## 2020-01-21 DIAGNOSIS — E43 SEVERE PROTEIN-CALORIE MALNUTRITION (HCC): ICD-10-CM

## 2020-01-21 DIAGNOSIS — G62.9 NEUROPATHY: Primary | ICD-10-CM

## 2020-01-21 DIAGNOSIS — R53.1 WEAKNESS: ICD-10-CM

## 2020-01-21 PROCEDURE — 99213 OFFICE O/P EST LOW 20 MIN: CPT | Performed by: OTHER

## 2020-01-21 NOTE — PROGRESS NOTES
[facility] Progress Note    HPI  Patient presents with:  Neuropathy: Follow up      Hector Arguello is a 63year old F with complicated past medical history.   Patient was previously seen by my neurology associate, Dr. Dany Duvall, Currently, she feels she can walk but her knees buckle, denies urinary / bowel incontinence - denies headaches aside from when her ICD fires.   She has constipation, and has lost ~30 lbs since 7/2018 - she was having intractable vomiting prior to having hx of ERIKA d/t dehydration   • Shortness of breath    • Stented coronary artery    • Syncope    • Systolic heart failure (HCC)    • Thrush of mouth and esophagus (HCC)    • Uncomfortable fullness after meals    • Visual impairment     glasses for reading • ESOPHAGOGASTRODUODENOSCOPY (EGD) N/A 11/16/2018    Performed by Fatuma Vázquez MD at [facility] ENDOSCOPY   • ESOPHAGOGASTRODUODENOSCOPY (EGD) N/A 12/1/2015    Performed by Tavia Cosby MD at [facility] ENDOSCOPY   • ESOPHAGOGASTRODUODENOSCOPY (EGD) N/A 8/11/ Years since quitting: 15.2      Smokeless tobacco: Never Used    Substance and Sexual Activity      Alcohol use: Yes        Comment: \"occasional\"      Drug use: Yes        Types: Cannabis        Comment: once per week    Other Topics      Concerns: Review of Systems:  No chest pain or palpitations; otherwise as noted in HPI.     Exam:  BP 94/70 (BP Location: Left arm, Patient Position: Sitting, Cuff Size: adult)   Pulse 74   Resp 16   Ht 62\"   Wt 106 lb (48.1 kg)   BMI 19.39 kg/m²   Estimated body ma GD1A ANTIBODIES, IGG/IGM      0 - 50 IV 13   GD1B ANTIBODIES, IGG/IGM      0 - 50 IV 21   GQ1B ANTIBODIES, IGG/IGM      0 - 50 IV 7     Pathology:  Sural nerve biopsy    The left sural nerve biopsy was sent to the [address] [place] [address] Component      Latest Ref Rng & Units 9/4/2018 9/3/2018 9/2/2018   WBC      4.0 - 13.0 x10(3) uL   10.3   RBC      3.80 - 5.10 x10(6)uL   4.55   Hemoglobin      12.0 - 16.0 g/dL   13.9   Hematocrit      34.0 - 50.0 %   41.7   Platelet Count      457.5 - 45 0 - 35 mg/dL 9     IMMUNOGLOBULIN G      768 - 1632 mg/dL 1380     IMMUNOGLOBULIN G CSF      0.0 - 6.0 mg/dL 1.9     ALBUMIN, SERUM/PLASMA, NEPH      3500 - 5200 mg/dL 2770 (L)     ALBUMIN INDEX      0.0 - 9.0 ratio 3.2     CSF IGG SYNTHESIS RATE CT lumbar spine with and without contrast (9/3/18): CONCLUSION:    1. Mild multilevel thoracic degenerative change. 2.  Fatty infiltration of the liver. CT brain (9/2/18):      FINDINGS:       VENTRICLES/SULCI:  Ventricles and sulci are normal in s Fib head EDB 14.79 0.8 84.8 7.19 3.3 Fib head - Ankle 29 8.59 34   R Tibial - AH      Ankle AH 6.77 2.9 100 6.25 8.8 Ankle - AH 8          Pop fossa AH 17.92 2.0 67.7 7.45 7.6 Pop fossa - Ankle 37 11.15 35       F  Wave      Nerve F Lat M Lat F-M Lat 6.  Right tibial motor response was abnormal due to mildly reduced amplitude, with mildly prolonged distal motor latency, and mildly slowed conduction velocity; F wave response was prolonged.   7.  Right median motor response was abnormal due to mildly prol This is an abnormal study. There is electrophysiologic evidence to suggest an underlying large fiber, length dependent polyneuropathy, with absent sensory responses, indicating more severe sensory axonal involvement than motor involvement.   The presence o 4.  There is no evidence of a radiculopathy           Impression/ Plan:  Margarita Cantrell is a 63year old woman with complicated PMHx, including but not limited, to cardiogenic shock approximately 8 years ago, for which patient was considered for ca (R53.1) Weakness  Plan: as noted above    No follow-ups on file.     Sonja Morgan MD, Neurology  [facility]  Pager [phone]  1/21/2020

## 2020-01-27 RX ORDER — ZOLPIDEM TARTRATE 6.25 MG/1
TABLET, FILM COATED, EXTENDED RELEASE ORAL
Qty: 90 TABLET | Refills: 0 | Status: SHIPPED | OUTPATIENT
Start: 2020-01-27 | End: 2020-04-21

## 2020-01-28 RX ORDER — ZOLPIDEM TARTRATE 6.25 MG/1
6.25 TABLET, FILM COATED, EXTENDED RELEASE ORAL NIGHTLY PRN
Qty: 90 TABLET | Refills: 0 | OUTPATIENT
Start: 2020-01-28

## 2020-01-31 ENCOUNTER — TELEPHONE (OUTPATIENT)
Dept: FAMILY MEDICINE | Age: 60
End: 2020-01-31

## 2020-02-03 ENCOUNTER — TELEPHONE (OUTPATIENT)
Dept: FAMILY MEDICINE | Age: 60
End: 2020-02-03

## 2020-02-05 ENCOUNTER — TELEPHONE (OUTPATIENT)
Dept: FAMILY MEDICINE | Age: 60
End: 2020-02-05

## 2020-02-14 ENCOUNTER — TELEPHONE (OUTPATIENT)
Dept: CARDIOLOGY | Age: 60
End: 2020-02-14

## 2020-02-14 RX ORDER — LISINOPRIL 2.5 MG/1
TABLET ORAL
Qty: 90 TABLET | Refills: 0 | Status: SHIPPED | OUTPATIENT
Start: 2020-02-14 | End: 2020-05-14

## 2020-02-14 RX ORDER — APIXABAN 5 MG/1
TABLET, FILM COATED ORAL
Qty: 60 TABLET | Refills: 0 | Status: SHIPPED | OUTPATIENT
Start: 2020-02-14 | End: 2020-03-16

## 2020-02-14 RX ORDER — CLOPIDOGREL BISULFATE 75 MG/1
TABLET ORAL
Qty: 90 TABLET | Refills: 0 | Status: SHIPPED | OUTPATIENT
Start: 2020-02-14 | End: 2020-05-26

## 2020-02-19 ENCOUNTER — TELEPHONE (OUTPATIENT)
Dept: CARDIOLOGY | Age: 60
End: 2020-02-19

## 2020-02-19 ENCOUNTER — ANCILLARY ORDERS (OUTPATIENT)
Dept: CARDIOLOGY | Age: 60
End: 2020-02-19

## 2020-02-19 ENCOUNTER — ANCILLARY PROCEDURE (OUTPATIENT)
Dept: CARDIOLOGY | Age: 60
End: 2020-02-19
Attending: INTERNAL MEDICINE

## 2020-02-19 DIAGNOSIS — Z95.810 ICD (IMPLANTABLE CARDIOVERTER-DEFIBRILLATOR) IN PLACE: ICD-10-CM

## 2020-02-19 PROCEDURE — X1114 CARDIAC DEVICE HOME CHECK - REMOTE UNSCHEDULED: HCPCS | Performed by: INTERNAL MEDICINE

## 2020-02-19 PROCEDURE — 93296 REM INTERROG EVL PM/IDS: CPT | Performed by: INTERNAL MEDICINE

## 2020-02-19 PROCEDURE — 93295 DEV INTERROG REMOTE 1/2/MLT: CPT | Performed by: INTERNAL MEDICINE

## 2020-03-10 ENCOUNTER — TELEPHONE (OUTPATIENT)
Dept: CARDIOLOGY | Age: 60
End: 2020-03-10

## 2020-03-11 ENCOUNTER — APPOINTMENT (OUTPATIENT)
Dept: CARDIOLOGY | Age: 60
End: 2020-03-11

## 2020-03-12 ENCOUNTER — APPOINTMENT (OUTPATIENT)
Dept: CARDIOLOGY | Age: 60
End: 2020-03-12

## 2020-03-13 ENCOUNTER — TELEPHONE (OUTPATIENT)
Dept: CARDIOLOGY | Age: 60
End: 2020-03-13

## 2020-03-16 ENCOUNTER — TELEPHONE (OUTPATIENT)
Dept: CARDIOLOGY | Age: 60
End: 2020-03-16

## 2020-03-16 RX ORDER — DULOXETIN HYDROCHLORIDE 30 MG/1
30 CAPSULE, DELAYED RELEASE ORAL 2 TIMES DAILY
Qty: 60 CAPSULE | Refills: 1 | Status: SHIPPED | OUTPATIENT
Start: 2020-03-16

## 2020-03-16 RX ORDER — DULOXETIN HYDROCHLORIDE 30 MG/1
CAPSULE, DELAYED RELEASE ORAL
Qty: 60 CAPSULE | Refills: 4 | OUTPATIENT
Start: 2020-03-16

## 2020-03-16 RX ORDER — APIXABAN 5 MG/1
TABLET, FILM COATED ORAL
Qty: 60 TABLET | Refills: 2 | Status: SHIPPED | OUTPATIENT
Start: 2020-03-16 | End: 2020-06-26

## 2020-03-17 ENCOUNTER — TELEPHONE (OUTPATIENT)
Dept: FAMILY MEDICINE | Age: 60
End: 2020-03-17

## 2020-03-17 RX ORDER — NITROFURANTOIN 25; 75 MG/1; MG/1
100 CAPSULE ORAL 2 TIMES DAILY
Qty: 10 CAPSULE | Refills: 0 | Status: SHIPPED | OUTPATIENT
Start: 2020-03-17 | End: 2020-03-22

## 2020-03-18 ENCOUNTER — EXTERNAL RECORD (OUTPATIENT)
Dept: HEALTH INFORMATION MANAGEMENT | Facility: OTHER | Age: 60
End: 2020-03-18

## 2020-03-20 ENCOUNTER — TELEPHONE (OUTPATIENT)
Dept: CARDIOLOGY | Age: 60
End: 2020-03-20

## 2020-03-24 RX ORDER — OMEPRAZOLE 40 MG/1
CAPSULE, DELAYED RELEASE ORAL
Qty: 90 CAPSULE | Refills: 3 | Status: SHIPPED | OUTPATIENT
Start: 2020-03-24

## 2020-04-14 ENCOUNTER — TELEPHONE (OUTPATIENT)
Dept: INTERNAL MEDICINE | Age: 60
End: 2020-04-14

## 2020-04-14 ENCOUNTER — TELEPHONE (OUTPATIENT)
Dept: FAMILY MEDICINE | Age: 60
End: 2020-04-14

## 2020-04-16 ENCOUNTER — OFFICE VISIT (OUTPATIENT)
Dept: FAMILY MEDICINE | Age: 60
End: 2020-04-16

## 2020-04-16 VITALS
OXYGEN SATURATION: 96 % | HEART RATE: 90 BPM | TEMPERATURE: 97.4 F | DIASTOLIC BLOOD PRESSURE: 66 MMHG | SYSTOLIC BLOOD PRESSURE: 82 MMHG

## 2020-04-16 DIAGNOSIS — I10 ESSENTIAL HYPERTENSION, BENIGN: ICD-10-CM

## 2020-04-16 DIAGNOSIS — I25.5 ISCHEMIC CARDIOMYOPATHY: ICD-10-CM

## 2020-04-16 DIAGNOSIS — Z95.810 AICD (AUTOMATIC CARDIOVERTER/DEFIBRILLATOR) PRESENT: ICD-10-CM

## 2020-04-16 DIAGNOSIS — N18.30 STAGE 3 CHRONIC KIDNEY DISEASE DUE TO BENIGN HYPERTENSION (CMD): ICD-10-CM

## 2020-04-16 DIAGNOSIS — E43 SEVERE PROTEIN-CALORIE MALNUTRITION (CMD): ICD-10-CM

## 2020-04-16 DIAGNOSIS — N63.20 LUMP OF LEFT BREAST: Primary | ICD-10-CM

## 2020-04-16 DIAGNOSIS — G90.09 IDIOPATHIC PERIPHERAL AUTONOMIC NEUROPATHY: ICD-10-CM

## 2020-04-16 DIAGNOSIS — I50.22 CHRONIC SYSTOLIC CONGESTIVE HEART FAILURE (CMD): ICD-10-CM

## 2020-04-16 DIAGNOSIS — I12.9 STAGE 3 CHRONIC KIDNEY DISEASE DUE TO BENIGN HYPERTENSION (CMD): ICD-10-CM

## 2020-04-16 PROCEDURE — 3078F DIAST BP <80 MM HG: CPT | Performed by: FAMILY MEDICINE

## 2020-04-16 PROCEDURE — 99214 OFFICE O/P EST MOD 30 MIN: CPT | Performed by: FAMILY MEDICINE

## 2020-04-16 PROCEDURE — 3074F SYST BP LT 130 MM HG: CPT | Performed by: FAMILY MEDICINE

## 2020-04-21 RX ORDER — ZOLPIDEM TARTRATE 6.25 MG/1
TABLET, FILM COATED, EXTENDED RELEASE ORAL
Qty: 90 TABLET | Refills: 0 | Status: SHIPPED | OUTPATIENT
Start: 2020-04-21 | End: 2020-08-26 | Stop reason: SDUPTHER

## 2020-04-27 ENCOUNTER — TELEPHONE (OUTPATIENT)
Dept: FAMILY MEDICINE | Age: 60
End: 2020-04-27

## 2020-04-27 ENCOUNTER — APPOINTMENT (OUTPATIENT)
Dept: MAMMOGRAPHY | Age: 60
End: 2020-04-27
Attending: FAMILY MEDICINE

## 2020-04-28 ENCOUNTER — APPOINTMENT (OUTPATIENT)
Dept: MAMMOGRAPHY | Age: 60
End: 2020-04-28
Attending: FAMILY MEDICINE

## 2020-05-05 ENCOUNTER — APPOINTMENT (OUTPATIENT)
Dept: FAMILY MEDICINE | Age: 60
End: 2020-05-05

## 2020-05-13 ENCOUNTER — TELEPHONE (OUTPATIENT)
Dept: CARDIOLOGY | Age: 60
End: 2020-05-13

## 2020-05-14 ENCOUNTER — TELEPHONE (OUTPATIENT)
Dept: CARDIOLOGY | Age: 60
End: 2020-05-14

## 2020-05-14 RX ORDER — CLOPIDOGREL BISULFATE 75 MG/1
TABLET ORAL
Qty: 90 TABLET | Refills: 0 | OUTPATIENT
Start: 2020-05-14

## 2020-05-14 RX ORDER — SIMVASTATIN 40 MG
40 TABLET ORAL DAILY
Qty: 90 TABLET | Refills: 1 | Status: SHIPPED | OUTPATIENT
Start: 2020-05-14

## 2020-05-14 RX ORDER — LISINOPRIL 2.5 MG/1
TABLET ORAL
Qty: 90 TABLET | Refills: 0 | Status: SHIPPED | OUTPATIENT
Start: 2020-05-14

## 2020-05-15 ENCOUNTER — E-ADVICE (OUTPATIENT)
Dept: CARDIOLOGY | Age: 60
End: 2020-05-15

## 2020-05-15 RX ORDER — ZOLPIDEM TARTRATE 6.25 MG/1
TABLET, FILM COATED, EXTENDED RELEASE ORAL
Qty: 90 TABLET | OUTPATIENT
Start: 2020-05-15

## 2020-05-23 ENCOUNTER — TELEPHONE (OUTPATIENT)
Dept: CARDIOLOGY | Age: 60
End: 2020-05-23

## 2020-05-26 RX ORDER — METOPROLOL SUCCINATE 25 MG/1
12.5 TABLET, EXTENDED RELEASE ORAL AT BEDTIME
Qty: 45 TABLET | Refills: 1 | Status: SHIPPED | OUTPATIENT
Start: 2020-05-26

## 2020-05-27 ENCOUNTER — ANCILLARY ORDERS (OUTPATIENT)
Dept: CARDIOLOGY | Age: 60
End: 2020-05-27

## 2020-05-27 ENCOUNTER — ANCILLARY PROCEDURE (OUTPATIENT)
Dept: CARDIOLOGY | Age: 60
End: 2020-05-27
Attending: INTERNAL MEDICINE

## 2020-05-27 DIAGNOSIS — Z95.810 ICD (IMPLANTABLE CARDIOVERTER-DEFIBRILLATOR) IN PLACE: ICD-10-CM

## 2020-05-27 PROCEDURE — 93296 REM INTERROG EVL PM/IDS: CPT | Performed by: INTERNAL MEDICINE

## 2020-05-27 PROCEDURE — X1114 CARDIAC DEVICE HOME CHECK - REMOTE UNSCHEDULED: HCPCS | Performed by: INTERNAL MEDICINE

## 2020-05-27 PROCEDURE — 93295 DEV INTERROG REMOTE 1/2/MLT: CPT | Performed by: INTERNAL MEDICINE

## 2020-05-27 RX ORDER — BUPRENORPHINE HYDROCHLORIDE AND NALOXONE HYDROCHLORIDE DIHYDRATE 8; 2 MG/1; MG/1
2 TABLET SUBLINGUAL DAILY
Refills: 1 | COMMUNITY
Start: 2020-04-26

## 2020-05-28 ENCOUNTER — V-VISIT (OUTPATIENT)
Dept: CARDIOLOGY | Age: 60
End: 2020-05-28

## 2020-05-28 VITALS
WEIGHT: 108 LBS | BODY MASS INDEX: 17.97 KG/M2 | SYSTOLIC BLOOD PRESSURE: 90 MMHG | DIASTOLIC BLOOD PRESSURE: 68 MMHG | HEART RATE: 100 BPM

## 2020-05-28 DIAGNOSIS — E43 SEVERE PROTEIN-CALORIE MALNUTRITION (CMD): ICD-10-CM

## 2020-05-28 DIAGNOSIS — Z95.810 AICD (AUTOMATIC CARDIOVERTER/DEFIBRILLATOR) PRESENT: ICD-10-CM

## 2020-05-28 DIAGNOSIS — I12.9 STAGE 3 CHRONIC KIDNEY DISEASE DUE TO BENIGN HYPERTENSION (CMD): ICD-10-CM

## 2020-05-28 DIAGNOSIS — E78.2 MIXED HYPERLIPIDEMIA: ICD-10-CM

## 2020-05-28 DIAGNOSIS — I25.119 CORONARY ARTERY DISEASE INVOLVING NATIVE CORONARY ARTERY OF NATIVE HEART WITH ANGINA PECTORIS (CMD): ICD-10-CM

## 2020-05-28 DIAGNOSIS — I25.2 OLD MYOCARDIAL INFARCTION: ICD-10-CM

## 2020-05-28 DIAGNOSIS — I10 ESSENTIAL HYPERTENSION, BENIGN: ICD-10-CM

## 2020-05-28 DIAGNOSIS — N18.30 STAGE 3 CHRONIC KIDNEY DISEASE DUE TO BENIGN HYPERTENSION (CMD): ICD-10-CM

## 2020-05-28 DIAGNOSIS — I50.22 CHRONIC SYSTOLIC CONGESTIVE HEART FAILURE (CMD): Primary | ICD-10-CM

## 2020-05-28 DIAGNOSIS — R62.7 ADULT FAILURE TO THRIVE: ICD-10-CM

## 2020-05-28 PROCEDURE — 99443 TELEPHONE E&M BY PHYSICIAN EST PT NOT ORIG PREV 7 DAYS 21-30 MIN: CPT | Performed by: INTERNAL MEDICINE

## 2020-05-28 ASSESSMENT — ENCOUNTER SYMPTOMS
NUMBNESS: 1
WEIGHT LOSS: 0
SUSPICIOUS LESIONS: 0
COUGH: 0
HEMATOCHEZIA: 0
HEMOPTYSIS: 0
WEIGHT GAIN: 0
LOSS OF BALANCE: 1
BRUISES/BLEEDS EASILY: 0
ALLERGIC/IMMUNOLOGIC COMMENTS: NO NEW FOOD ALLERGIES
FEVER: 0
CHILLS: 0

## 2020-06-22 ENCOUNTER — EXTERNAL RECORD (OUTPATIENT)
Dept: HEALTH INFORMATION MANAGEMENT | Facility: OTHER | Age: 60
End: 2020-06-22

## 2020-06-22 PROBLEM — N18.30 STAGE 3 CHRONIC KIDNEY DISEASE DUE TO BENIGN HYPERTENSION (HCC): Status: ACTIVE | Noted: 2018-01-03

## 2020-06-22 PROBLEM — M81.0 OSTEOPOROSIS OF LUMBAR SPINE: Status: ACTIVE | Noted: 2018-01-03

## 2020-06-22 PROBLEM — I12.9 STAGE 3 CHRONIC KIDNEY DISEASE DUE TO BENIGN HYPERTENSION (HCC): Status: ACTIVE | Noted: 2018-01-03

## 2020-06-26 RX ORDER — APIXABAN 5 MG/1
TABLET, FILM COATED ORAL
Qty: 60 TABLET | Refills: 5 | Status: SHIPPED | OUTPATIENT
Start: 2020-06-26

## 2020-07-06 ENCOUNTER — TELEPHONE (OUTPATIENT)
Dept: CARDIOLOGY | Age: 60
End: 2020-07-06

## 2020-07-07 RX ORDER — ZOLPIDEM TARTRATE 6.25 MG/1
TABLET, FILM COATED, EXTENDED RELEASE ORAL
Qty: 90 TABLET | Refills: 0 | OUTPATIENT
Start: 2020-07-07

## 2020-08-04 ENCOUNTER — TELEPHONE (OUTPATIENT)
Dept: CARDIOLOGY | Age: 60
End: 2020-08-04

## 2020-08-07 ENCOUNTER — E-ADVICE (OUTPATIENT)
Dept: CARDIOLOGY | Age: 60
End: 2020-08-07

## 2020-08-12 ENCOUNTER — TELEPHONE (OUTPATIENT)
Dept: FAMILY MEDICINE | Age: 60
End: 2020-08-12

## 2020-08-25 ENCOUNTER — APPOINTMENT (OUTPATIENT)
Dept: OTHER | Facility: HOSPITAL | Age: 60
End: 2020-08-25
Attending: PREVENTIVE MEDICINE

## 2020-08-26 ENCOUNTER — V-VISIT (OUTPATIENT)
Dept: FAMILY MEDICINE | Age: 60
End: 2020-08-26

## 2020-08-26 DIAGNOSIS — I50.22 CHRONIC SYSTOLIC CONGESTIVE HEART FAILURE (CMD): ICD-10-CM

## 2020-08-26 DIAGNOSIS — N18.30 STAGE 3 CHRONIC KIDNEY DISEASE DUE TO BENIGN HYPERTENSION (CMD): ICD-10-CM

## 2020-08-26 DIAGNOSIS — I50.22 CHRONIC SYSTOLIC HEART FAILURE (CMD): ICD-10-CM

## 2020-08-26 DIAGNOSIS — R62.7 ADULT FAILURE TO THRIVE: ICD-10-CM

## 2020-08-26 DIAGNOSIS — E43 SEVERE PROTEIN-CALORIE MALNUTRITION (CMD): ICD-10-CM

## 2020-08-26 DIAGNOSIS — G90.09 IDIOPATHIC PERIPHERAL AUTONOMIC NEUROPATHY: ICD-10-CM

## 2020-08-26 DIAGNOSIS — I25.5 ISCHEMIC CARDIOMYOPATHY: ICD-10-CM

## 2020-08-26 DIAGNOSIS — I12.9 STAGE 3 CHRONIC KIDNEY DISEASE DUE TO BENIGN HYPERTENSION (CMD): ICD-10-CM

## 2020-08-26 DIAGNOSIS — E78.2 MIXED HYPERLIPIDEMIA: ICD-10-CM

## 2020-08-26 DIAGNOSIS — I10 ESSENTIAL HYPERTENSION, BENIGN: ICD-10-CM

## 2020-08-26 DIAGNOSIS — G61.81 CIDP (CHRONIC INFLAMMATORY DEMYELINATING POLYNEUROPATHY) (CMD): Primary | ICD-10-CM

## 2020-08-26 DIAGNOSIS — Z95.810 AICD (AUTOMATIC CARDIOVERTER/DEFIBRILLATOR) PRESENT: ICD-10-CM

## 2020-08-26 PROCEDURE — 99214 OFFICE O/P EST MOD 30 MIN: CPT | Performed by: FAMILY MEDICINE

## 2020-08-26 PROCEDURE — G0372 MD SERVICE REQUIRED FOR PMD: HCPCS | Performed by: FAMILY MEDICINE

## 2020-08-26 RX ORDER — ZOLPIDEM TARTRATE 6.25 MG/1
6.25 TABLET, FILM COATED, EXTENDED RELEASE ORAL
Qty: 90 TABLET | Refills: 1 | Status: SHIPPED | OUTPATIENT
Start: 2020-08-26

## 2020-08-27 ENCOUNTER — TELEPHONE (OUTPATIENT)
Dept: NEUROLOGY | Facility: CLINIC | Age: 60
End: 2020-08-27

## 2020-08-27 ENCOUNTER — TELEPHONE (OUTPATIENT)
Dept: CARDIOLOGY | Age: 60
End: 2020-08-27

## 2020-08-27 NOTE — TELEPHONE ENCOUNTER
Please call Dr. Sharyn Russell from occupational health to discuss pt's treatment. She works until 5 today and will be in tomorrow.     [phone]

## 2020-08-31 NOTE — TELEPHONE ENCOUNTER
Attempted to call Dr. Sunita Camejo - not available but will be in tomorrow; will follow up to discuss

## 2020-09-01 NOTE — TELEPHONE ENCOUNTER
Called and discussed with Dr. Danna Fuentes; notes reviewed -- patient has seen [facility] Hari PANDYA for second opinion; etiology for neuropathy unclear but repeat EMG does not suggest CIDP from there as well - other workup done and follow with [facility] Hari PANDYA was recommende

## 2020-09-02 PROCEDURE — 93296 REM INTERROG EVL PM/IDS: CPT | Performed by: INTERNAL MEDICINE

## 2020-09-02 PROCEDURE — 93295 DEV INTERROG REMOTE 1/2/MLT: CPT | Performed by: INTERNAL MEDICINE

## 2020-09-04 ENCOUNTER — ANCILLARY PROCEDURE (OUTPATIENT)
Dept: CARDIOLOGY | Age: 60
End: 2020-09-04
Attending: INTERNAL MEDICINE

## 2020-09-04 ENCOUNTER — TELEPHONE (OUTPATIENT)
Dept: INTERNAL MEDICINE | Age: 60
End: 2020-09-04

## 2020-09-04 ENCOUNTER — ANCILLARY ORDERS (OUTPATIENT)
Dept: CARDIOLOGY | Age: 60
End: 2020-09-04

## 2020-09-04 DIAGNOSIS — Z95.810 ICD (IMPLANTABLE CARDIOVERTER-DEFIBRILLATOR) IN PLACE: ICD-10-CM

## 2020-09-04 PROCEDURE — X1114 CARDIAC DEVICE HOME CHECK - REMOTE UNSCHEDULED: HCPCS | Performed by: INTERNAL MEDICINE

## 2020-09-11 ENCOUNTER — E-ADVICE (OUTPATIENT)
Dept: CARDIOLOGY | Age: 60
End: 2020-09-11

## 2020-09-11 ENCOUNTER — MED INFO FORMS (OUTPATIENT)
Dept: HEALTH INFORMATION MANAGEMENT | Facility: OTHER | Age: 60
End: 2020-09-11

## 2020-09-14 NOTE — PHYSICAL THERAPY NOTE
PHYSICAL THERAPY EVALUATION - INPATIENT     Room Number: 8521/0053-Z  Evaluation Date: 11/16/2018  Type of Evaluation: Initial  Physician Order: PT Eval and Treat    Presenting Problem: n/v  Reason for Therapy: Mobility Dysfunction and Discharge Plan Jordan Allen DO at [facility] ENDOSCOPY   • ENDOSCOPIC ULTRASOUND (EUS) N/A 12/1/2015    Performed by Hannah Hou MD at [facility] ENDOSCOPY   • EP ELECTROPHYSIOLOGY STUDY  04/13/2006   • ESOPHAGOGASTRODUODENOSCOPY (EGD) N/A 12/1/2015    Performed by Steph De La O, limits     Lower extremity strength is within functional limits except for the following:    Right Hip flexion  3+/5  Left Hip flexion  3+/5  Right Knee extension  4/5  Left Knee extension  4/5    BALANCE  Static Sitting: Good  Dynamic Sitting: Fair -  Sta 2  2. Standing unsupported with eyes closed                                0  3. Reaching forward with outstretched arm while standing       2  4.   object from the floor from a standing position           0  5. Turning to look over left and right sh training;Range of motion;Strengthening;Stair training;Transfer training;Balance training  Rehab Potential : Fair  Frequency (Obs): 3x/week  Number of Visits to Meet Established Goals: 5      CURRENT GOALS    Goal #1 Patient will negotiate 1 flight of stair [place] Counseling: I discussed with the patient the risks of guselkumab including but not limited to immunosuppression, serious infections, and drug reactions.  The patient understands that monitoring is required including a PPD at baseline and must alert us or the primary physician if symptoms of infection or other concerning signs are noted.

## 2020-09-30 ENCOUNTER — TELEPHONE (OUTPATIENT)
Dept: INTERNAL MEDICINE | Age: 60
End: 2020-09-30

## 2020-10-07 ENCOUNTER — DOCUMENTATION (OUTPATIENT)
Dept: CARDIOLOGY | Age: 60
End: 2020-10-07

## 2020-10-16 ENCOUNTER — EXTERNAL RECORD (OUTPATIENT)
Dept: HEALTH INFORMATION MANAGEMENT | Facility: OTHER | Age: 60
End: 2020-10-16

## 2020-10-27 ENCOUNTER — TELEPHONE (OUTPATIENT)
Dept: FAMILY MEDICINE | Age: 60
End: 2020-10-27

## 2020-10-27 DIAGNOSIS — N18.30 STAGE 3 CHRONIC KIDNEY DISEASE DUE TO BENIGN HYPERTENSION (CMD): ICD-10-CM

## 2020-10-27 DIAGNOSIS — G61.81 CIDP (CHRONIC INFLAMMATORY DEMYELINATING POLYNEUROPATHY) (CMD): ICD-10-CM

## 2020-10-27 DIAGNOSIS — I50.22 CHRONIC SYSTOLIC CONGESTIVE HEART FAILURE (CMD): ICD-10-CM

## 2020-10-27 DIAGNOSIS — R62.7 ADULT FAILURE TO THRIVE: ICD-10-CM

## 2020-10-27 DIAGNOSIS — I10 ESSENTIAL HYPERTENSION, BENIGN: ICD-10-CM

## 2020-10-27 DIAGNOSIS — Z74.01 BEDBOUND: ICD-10-CM

## 2020-10-27 DIAGNOSIS — I12.9 STAGE 3 CHRONIC KIDNEY DISEASE DUE TO BENIGN HYPERTENSION (CMD): ICD-10-CM

## 2020-10-27 DIAGNOSIS — E78.2 MIXED HYPERLIPIDEMIA: ICD-10-CM

## 2020-10-27 DIAGNOSIS — G90.09 IDIOPATHIC PERIPHERAL AUTONOMIC NEUROPATHY: ICD-10-CM

## 2020-10-27 DIAGNOSIS — Z95.810 AICD (AUTOMATIC CARDIOVERTER/DEFIBRILLATOR) PRESENT: ICD-10-CM

## 2020-10-27 DIAGNOSIS — L89.309 PRESSURE INJURY OF SKIN OF BUTTOCK, UNSPECIFIED INJURY STAGE, UNSPECIFIED LATERALITY: Primary | ICD-10-CM

## 2020-11-02 ENCOUNTER — TELEPHONE (OUTPATIENT)
Dept: CARDIOLOGY | Age: 60
End: 2020-11-02

## 2020-11-02 ENCOUNTER — TELEPHONE (OUTPATIENT)
Dept: FAMILY MEDICINE | Age: 60
End: 2020-11-02

## 2020-11-04 ENCOUNTER — TELEPHONE (OUTPATIENT)
Dept: FAMILY MEDICINE | Age: 60
End: 2020-11-04

## 2020-11-06 RX ORDER — CEPHALEXIN 500 MG/1
500 CAPSULE ORAL 3 TIMES DAILY
Qty: 21 CAPSULE | Refills: 0 | Status: SHIPPED | OUTPATIENT
Start: 2020-11-06 | End: 2020-11-13

## 2020-11-06 RX ORDER — BALSAM PERU/CASTOR OIL
OINTMENT (GRAM) TOPICAL
Qty: 60 G | Refills: 1 | Status: SHIPPED | OUTPATIENT
Start: 2020-11-06

## 2020-11-09 ENCOUNTER — TELEPHONE (OUTPATIENT)
Dept: FAMILY MEDICINE | Age: 60
End: 2020-11-09

## 2020-11-10 ENCOUNTER — TELEPHONE (OUTPATIENT)
Dept: CARDIOLOGY | Age: 60
End: 2020-11-10

## 2020-11-20 ENCOUNTER — E-ADVICE (OUTPATIENT)
Dept: FAMILY MEDICINE | Age: 60
End: 2020-11-20

## 2020-11-24 ENCOUNTER — APPOINTMENT (OUTPATIENT)
Dept: GENERAL RADIOLOGY | Facility: HOSPITAL | Age: 60
DRG: 394 | End: 2020-11-24
Attending: EMERGENCY MEDICINE
Payer: COMMERCIAL

## 2020-11-24 ENCOUNTER — APPOINTMENT (OUTPATIENT)
Dept: CT IMAGING | Facility: HOSPITAL | Age: 60
DRG: 394 | End: 2020-11-24
Attending: NURSE PRACTITIONER
Payer: COMMERCIAL

## 2020-11-24 ENCOUNTER — HOSPITAL ENCOUNTER (INPATIENT)
Facility: HOSPITAL | Age: 60
LOS: 3 days | Discharge: HOME | DRG: 394 | End: 2020-11-27
Attending: EMERGENCY MEDICINE | Admitting: HOSPITALIST
Payer: COMMERCIAL

## 2020-11-24 DIAGNOSIS — E87.1 HYPONATREMIA: ICD-10-CM

## 2020-11-24 DIAGNOSIS — E87.5 HYPERKALEMIA: ICD-10-CM

## 2020-11-24 DIAGNOSIS — R63.30 FEEDING DIFFICULTIES: ICD-10-CM

## 2020-11-24 DIAGNOSIS — T85.528A GASTROJEJUNOSTOMY TUBE DISLODGEMENT: ICD-10-CM

## 2020-11-24 DIAGNOSIS — E86.0 DEHYDRATION: Primary | ICD-10-CM

## 2020-11-24 DIAGNOSIS — E87.1 HYPONATREMIA WITH DECREASED SERUM OSMOLALITY: ICD-10-CM

## 2020-11-24 DIAGNOSIS — D72.829 LEUKOCYTOSIS, UNSPECIFIED TYPE: ICD-10-CM

## 2020-11-24 PROBLEM — D64.9 ANEMIA: Status: ACTIVE | Noted: 2020-11-24

## 2020-11-24 PROCEDURE — 74177 CT ABD & PELVIS W/CONTRAST: CPT | Performed by: NURSE PRACTITIONER

## 2020-11-24 PROCEDURE — 99223 1ST HOSP IP/OBS HIGH 75: CPT | Performed by: INTERNAL MEDICINE

## 2020-11-24 PROCEDURE — 71045 X-RAY EXAM CHEST 1 VIEW: CPT | Performed by: EMERGENCY MEDICINE

## 2020-11-24 RX ORDER — ATORVASTATIN CALCIUM 20 MG/1
20 TABLET, FILM COATED ORAL NIGHTLY
Status: DISCONTINUED | OUTPATIENT
Start: 2020-11-24 | End: 2020-11-27

## 2020-11-24 RX ORDER — SODIUM CHLORIDE 9 MG/ML
INJECTION, SOLUTION INTRAVENOUS CONTINUOUS
Status: DISCONTINUED | OUTPATIENT
Start: 2020-11-24 | End: 2020-11-27

## 2020-11-24 RX ORDER — MELATONIN
400 DAILY
Status: DISCONTINUED | OUTPATIENT
Start: 2020-11-24 | End: 2020-11-27

## 2020-11-24 RX ORDER — ONDANSETRON 2 MG/ML
4 INJECTION INTRAMUSCULAR; INTRAVENOUS EVERY 6 HOURS PRN
Status: DISCONTINUED | OUTPATIENT
Start: 2020-11-24 | End: 2020-11-27

## 2020-11-24 RX ORDER — SODIUM CHLORIDE 9 MG/ML
INJECTION, SOLUTION INTRAVENOUS CONTINUOUS
Status: ACTIVE | OUTPATIENT
Start: 2020-11-24 | End: 2020-11-25

## 2020-11-24 RX ORDER — VANCOMYCIN HYDROCHLORIDE 125 MG/1
125 CAPSULE ORAL EVERY 6 HOURS
Status: DISCONTINUED | OUTPATIENT
Start: 2020-11-24 | End: 2020-11-25

## 2020-11-24 RX ORDER — PANTOPRAZOLE SODIUM 40 MG/1
40 TABLET, DELAYED RELEASE ORAL
Status: DISCONTINUED | OUTPATIENT
Start: 2020-11-25 | End: 2020-11-27

## 2020-11-24 RX ORDER — DULOXETIN HYDROCHLORIDE 30 MG/1
30 CAPSULE, DELAYED RELEASE ORAL 2 TIMES DAILY
Status: DISCONTINUED | OUTPATIENT
Start: 2020-11-24 | End: 2020-11-27

## 2020-11-24 RX ORDER — MELATONIN
100 DAILY
Status: DISCONTINUED | OUTPATIENT
Start: 2020-11-24 | End: 2020-11-27

## 2020-11-24 RX ORDER — SODIUM CHLORIDE 9 MG/ML
INJECTION, SOLUTION INTRAVENOUS CONTINUOUS
Status: ACTIVE | OUTPATIENT
Start: 2020-11-24 | End: 2020-11-24

## 2020-11-24 RX ORDER — ZOLPIDEM TARTRATE 6.25 MG/1
6.25 TABLET, FILM COATED, EXTENDED RELEASE ORAL NIGHTLY PRN
COMMUNITY

## 2020-11-24 RX ORDER — MIRTAZAPINE 15 MG/1
15 TABLET, FILM COATED ORAL NIGHTLY
Status: DISCONTINUED | OUTPATIENT
Start: 2020-11-24 | End: 2020-11-27

## 2020-11-24 RX ORDER — ACETAMINOPHEN 325 MG/1
650 TABLET ORAL EVERY 6 HOURS PRN
Status: DISCONTINUED | OUTPATIENT
Start: 2020-11-24 | End: 2020-11-27

## 2020-11-24 RX ORDER — AMITRIPTYLINE HYDROCHLORIDE 10 MG/1
10 TABLET, FILM COATED ORAL NIGHTLY
Status: DISCONTINUED | OUTPATIENT
Start: 2020-11-24 | End: 2020-11-24

## 2020-11-24 RX ORDER — BUPRENORPHINE HYDROCHLORIDE 8 MG/1
8 TABLET SUBLINGUAL 2 TIMES DAILY
Status: DISCONTINUED | OUTPATIENT
Start: 2020-11-24 | End: 2020-11-27

## 2020-11-24 NOTE — ED INITIAL ASSESSMENT (HPI)
Per family GJ tube fell out Saturday. Since family reports very minimal PO and dark colored urine. Reports minimal pain around site, denies bleeding. C/O generalized fatigue.

## 2020-11-24 NOTE — CONSULTS
[facility]                       Gastroenterology [address] [facility] Gastroenterology    Colon Splinter Rekha Patient Status:  Emergency    1960 MRN [id number]   Location [address] [facility] Attending Nikita Altman Diarrhea, unspecified    • Dizziness    • Easy bruising    • Esophageal reflux    • Fatigue    • Fever    • Food intolerance    • Frequent urination    • Frequent use of laxatives    • Frequent UTI    • Heart attack [facility])    • Heart palpitations    • Heart • EGD     • ENDOSCOPIC ULTRASOUND (EUS) N/A 9/6/2018    Performed by Lukasz Ley DO at [address] [place] [address] ENDOSCOPY   • ENDOSCOPIC ULTRASOUND (EUS) N/A 12/1/2015    Performed by Anson Reed MD at [address] [place] [address] ENDOSCOPY   • ENTEROSCOPY N/A 12/11/2018    Performed by Georgina Ray • PERCUTANEOUS ENDOSCOPIC GASTROSTOMY PLACEMENT/JEJUNOSTOMY TUBE PLACEMENT N/A 12/11/2018    Performed by Mandi Nixon MD at [facility] ENDOSCOPY   • REMOVAL GALLBLADDER  10/2018   • TONSILLECTOMY       Medications:   •  [COMPLETED] sodium chloride 0.9% IV hoarseness of voice, hearing loss, sinus congestion, tinnitus           Neurologic: The patient reports no history of seizure, stroke, or frequent headaches  PE: BP 91/71   Pulse 98   Temp 98.2 °F (36.8 °C) (Temporal)   Resp 15   Ht 5' 5\" (1.651 m)   Wt 1 with hx of MI, CAD s/p PCI, ischemic CM s/p ICD, AF (Eliquis) anorexia (denies current issue), cyclic vomiting syndrome C/B progressive wt loss s/p PEG tube with jejunal extension (required multiple endoscopic evaluations d/t J-tube migration) admitted tod       [address] [facility]  Gastroenterology/Advanced Endoscopy  [facility] Gastroenterology

## 2020-11-24 NOTE — ED PROVIDER NOTES
Patient Seen in: [facility] Emergency Department      History   Patient presents with:  Cath Tube Problem  Dehydration    Stated Complaint: feeding tube fell out on saturday and needs it re-placed, has attempted to eat *    HPI    50-year-old with a • Loss of appetite    • Mouth sores    • Muscle weakness     due to neuropathy in P.T now   • Nausea    • Neuropathy    • On tube feeding diet     eats a little   • Osteoporosis    • Pacemaker    • Pancreatitis    • Personal history of antineoplastic jose ESOPHAGOGASTRODUODENOSCOPY (EGD) N/A 12/8/2019    Performed by Krishan Wolf DO at [facility] ENDOSCOPY   • ESOPHAGOGASTRODUODENOSCOPY (EGD) N/A 9/27/2019    Performed by Daisy Snellen, MD at [facility] ENDOSCOPY   • ESOPHAGOGASTRODUODENOSCOPY (EGD) N/A 9/10/2019 Types: Cannabis      Comment: once per week             Review of Systems    Positive for stated complaint: feeding tube fell out on saturday and needs it re-placed, has attempted to eat *  Other systems are as noted in HPI.   Constitutional and vital sig abdominal tenderness. There is no guarding or rebound. Comments: Mildly erythematous area around where the G-tube was, no exact purulence noted. Musculoskeletal: Normal range of motion. General: No tenderness or deformity.    Lymphadenopathy: panel order CBC WITH DIFFERENTIAL WITH PLATELET.   Procedure                               Abnormality         Status                     ---------                               -----------         ------                     CBC W/ DIFFERENTIAL[[id number]] Noted POA    Anemia D64.9 11/24/2020 Yes    Dehydration E86.0 11/15/2018 Unknown

## 2020-11-24 NOTE — H&P
[facility]  History and Physical     [facility] Patient Status:  Emergency    1960 MRN [id number]   Location [address] [facility] Attending Noemi Ingram MD   Hosp Day # 0 [address] • PONV (postoperative nausea and vomiting)    • Presence of other cardiac implants and grafts    • Rash    • Renal disorder     hx of ERIKA d/t dehydration   • Shortness of breath    • Sleep disturbance    • Stented coronary artery    • Stress    • Syncope ESOPHAGOGASTRODUODENOSCOPY (EGD) N/A 2/23/2019    Performed by Larry Banks MD at [facility] ENDOSCOPY   • ESOPHAGOGASTRODUODENOSCOPY (EGD) N/A 1/16/2019    Performed by Josseline Dale MD at [facility] ENDOSCOPY   • ESOPHAGOGASTRODUODENOSCOPY (EGD) N/A 11/16/201 testicular   • Cancer Brother         skin cancer   • Hypertension Neg    • Obesity Neg    • Psychiatric Neg    • Lipids Neg         Allergies:   Codeine Sulfate         NAUSEA ONLY    Comment:Pt states \"it messes with my stomach, but I can             ta Rfl: 2    •  folic acid (FOLVITE) 139 MCG Oral Tab, Take 400 mcg by mouth daily. , Disp: , Rfl:         Review of Systems:   A comprehensive 14 point review of systems was completed. Pertinent positives and negatives noted in the HPI.     Physical Exam: abd/pelv  3. Hyponatremia  1. IVF, monitor BMP  4. Hyperkalemia  1. Follow BMP   5. Chronic Systolic CHF  1. Appears on dry side  2. Gentle hydration  6. Sacral wound, present on admission  1. Wound care eval  7. CAD  8. GERD  9. AFib  10. Anxiety  11.  Chr

## 2020-11-25 ENCOUNTER — APPOINTMENT (OUTPATIENT)
Dept: CT IMAGING | Facility: HOSPITAL | Age: 60
DRG: 394 | End: 2020-11-25
Attending: HOSPITALIST
Payer: COMMERCIAL

## 2020-11-25 ENCOUNTER — ANESTHESIA EVENT (OUTPATIENT)
Dept: ENDOSCOPY | Facility: HOSPITAL | Age: 60
DRG: 394 | End: 2020-11-25
Payer: COMMERCIAL

## 2020-11-25 PROCEDURE — 99232 SBSQ HOSP IP/OBS MODERATE 35: CPT | Performed by: INTERNAL MEDICINE

## 2020-11-25 PROCEDURE — 70450 CT HEAD/BRAIN W/O DYE: CPT | Performed by: HOSPITALIST

## 2020-11-25 RX ORDER — CEFAZOLIN SODIUM/WATER 2 G/20 ML
2 SYRINGE (ML) INTRAVENOUS
Status: COMPLETED | OUTPATIENT
Start: 2020-11-26 | End: 2020-11-26

## 2020-11-25 NOTE — PAYOR COMM NOTE
--------------  ADMISSION REVIEW     Payor: LAUREN PPJALEN  Subscriber #:  [id number]  Authorization Number: [id number]    Admit date: 11/24/20  Admit time: 932 [address]       Admitting Physician: Delano Burns MD  Attending Physician:  Renu Rodríguez DO  Primary Ca pt is on waiting list for heart transplant/pt had a heart attack 2004   • Constipation    • Coronary artery disease     LAD, angioplasty and stent   • Diarrhea, unspecified    • Dizziness    • Easy bruising    • Esophageal reflux    • Fatigue    • Fever • CATH DRUG ELUTING STENT      x 2   • CHOLECYSTECTOMY     • COLONOSCOPY     • COLONOSCOPY N/A 7/13/2015    Performed by Chantelle Garcia MD at [facility] ENDOSCOPY   • EGD     • ENDOSCOPIC ULTRASOUND (EUS) N/A 9/6/2018    Performed by Haydee Daniel DO at [facility] ENDOS [employer] heart net   • PERCUTANEOUS ENDOSCOPIC GASTROSTOMY PLACEMENT/JEJUNOSTOMY TUBE PLACEMENT N/A 12/7/2019    Performed by Nicky Perez DO at [facility] ENDOSCOPY   • PERCUTANEOUS ENDOSCOPIC GASTROSTOMY PLACEMENT/JEJUNOSTOMY TUBE PLACEMENT N/A 12/11/2018 Heart sounds: Normal heart sounds. No murmur. No friction rub. No gallop. Pulmonary:      Effort: Pulmonary effort is normal. No respiratory distress. Breath sounds: Normal breath sounds. No stridor. No wheezing.    Chest:      Chest wall: No ten CBC W/ DIFFERENTIAL - Abnormal; Notable for the following components:    WBC 22.3 (*)     RBC 3.51 (*)     HGB 11.0 (*)     HCT 34.3 (*)     RDW-SD 49.3 (*)     Neutrophil Absolute Prelim 17.17 (*)     Neutrophil Absolute 17.17 (*)     Monocyte Absolute 1. Disposition and Plan     Clinical Impression:  Dehydration  (primary encounter diagnosis)  Hyponatremia  Hyperkalemia  Hyponatremia with decreased serum osmolality  Gastrojejunostomy tube dislodgement    Disposition:  Admit  11/24/2020 11 • Constipation    • Coronary artery disease     LAD, angioplasty and stent   • Diarrhea, unspecified    • Dizziness    • Easy bruising    • Esophageal reflux    • Fatigue    • Fever    • Food intolerance    • Frequent urination    • Frequent use of laxativ • CHOLECYSTECTOMY     • COLONOSCOPY     • COLONOSCOPY N/A 7/13/2015    Performed by Dante Garcia MD at [facility] ENDOSCOPY   • EGD     • ENDOSCOPIC ULTRASOUND (EUS) N/A 9/6/2018    Performed by Aroldo Garzon DO at [address] [place] [address] ENDOSCOPY   • ENDOSCOPIC ULTRASOUND (EUS) • PERCUTANEOUS ENDOSCOPIC GASTROSTOMY PLACEMENT/JEJUNOSTOMY TUBE PLACEMENT N/A 12/7/2019    Performed by Nicky Perez DO at [facility] ENDOSCOPY   • PERCUTANEOUS ENDOSCOPIC GASTROSTOMY PLACEMENT/JEJUNOSTOMY TUBE PLACEMENT N/A 12/11/2018    Performed by Izabela Melissa •  mirtazapine 15 MG Oral Tab, Take 15 mg by mouth nightly., Disp: , Rfl:     •  Omeprazole 40 MG Oral Capsule Delayed Release, Take 40 mg by mouth daily.   , Disp: , Rfl: 3    •  nystatin [id number] UNIT/GM External Cream, Apply 2 g topically 2 (two) times joaquín Recent Labs   Lab 11/24/20  1046   WBC 22.3*   HGB 11.0*   MCV 97.7   .0   INR 1.12*       Recent Labs   Lab 11/24/20  1046   *   BUN 10   CREATSERUM 0.84   GFRAA 88   GFRNAA 76   CA 7.5*   ALB 1.2*   *   K 5.3*   CL 93*   CO2 29.0   AL 11/25/2020 0946 Given 30 mg Oral Karina Rosa RN    11/24/2020 2138 Given 30 mg Oral Chastity Amaral RN      folic acid (FOLVITE) tab 400 mcg     Date Action Dose Route User    11/25/2020 7337 Given 400 mcg Oral Karina Rosa RN      iohex HPI: This is a 36-year-old female with Hx that includes MI, CAD s/p PCI, ischemic CM s/p ICD, AF (Eliquis)  cyclic vomiting syndrome C/B progressive wt loss s/p PEG tube with jejunal extension (required multiple endoscopic evaluations d/t J-tube migration) • History of cardiac murmur     • Hyperlipidemia     • Indigestion     • Irregular bowel habits     • Ischemic cardiomyopathy     • Kidney failure     • Leg swelling     • Loss of appetite     • Mouth sores     • Muscle weakness       due to neuropathy in   Performed by Layne Heimlich, MD at [facility] ENDOSCOPY   • EP ELECTROPHYSIOLOGY STUDY   04/13/2006   • ERCP,DIAGNOSTIC       • ESOPHAGOGASTRODUODENOSCOPY (EGD) N/A 12/21/2019     Performed by Layne Heimlich, MD at [facility] ENDOSCOPY   • ESOPHAGOGASTRODUODENOSCOPY Medications:   •  [COMPLETED] sodium chloride 0.9% IV bolus 500 mL, 500 mL, Intravenous, Once    Followed by     •  0.9% NaCl infusion, , Intravenous, Continuous        Allergies:   Codeine Sulfate         NAUSEA ONLY    Comment:Pt states \"it messes with PE: BP 91/71   Pulse 98   Temp 98.2 °F (36.8 °C) (Temporal)   Resp 15   Ht 5' 5\" (1.651 m)   Wt 100 lb (45.4 kg)   SpO2 100%   BMI 16.64 kg/m²   Gen: Drowsy arouses briefly to verbal stimuli; thin with temporal muscle wasting  HENT: EOMI, PERRL, oropharyn Impression: 81-year-old female with hx of MI, CAD s/p PCI, ischemic CM s/p ICD, AF (Eliquis) cyclic vomiting syndrome C/B progressive wt loss s/p PEG tube with jejunal extension (required multiple endoscopic evaluations d/t J-tube migration) admitted today       [address] [facility]  Gastroenterology/Advanced Endoscopy  [facility] Gastroenterology            Electronically signed by Aroldo Garzon DO at 11/24/2020  3:11 PM     ED to Hosp-Admission (Current) on 11/24/2020        Revision History        Detailed Repor 1046 11/25/20  0920   WBC 22.3* 15.7*   HGB 11.0* 10.5*   MCV 97.7 98.5   .0 219.0   INR 1.12*  --           Recent Labs   Lab 11/24/20  1046   *   BUN 10   CREATSERUM 0.84   GFRAA 88   GFRNAA 76   CA 7.5*   ALB 1.2*   *   K 5.3*   CL 9 Will the patient be referred to TCC on discharge?: No  Estimated date of discharge: TBD  Discharge is dependent on: Course, GI recs  At this point Ms. Da Silva is expected to be discharge to: [address] [place],9D of care discussed with patient, HAYDE YU

## 2020-11-25 NOTE — CONSULTS
[facility]  Report of Inpatient Wound Care Consultation     Margarita Cantrell Patient Status:  Inpatient    1960 MRN [id number]   [facility] 4NW-A Attending Stefan Swanson, DO   Hosp Day # 1 PCP KHAI Trujillo 38 Thrush of mouth and esophagus (HCC)    • Uncomfortable fullness after meals    • Visual impairment     glasses for reading   • Vomiting    • Wears glasses    • Weight loss      Family History   Problem Relation Age of Onset   • Other (Other) Father at [facility] ENDOSCOPY   • ESOPHAGOGASTRODUODENOSCOPY (EGD) N/A 9/10/2019    Performed by Gelacio Rothman MD at [facility] ENDOSCOPY   • ESOPHAGOGASTRODUODENOSCOPY (EGD) N/A 2/23/2019    Performed by Mariama Munoz MD at [facility] ENDOSCOPY   • ESOPHAGOGASTRODUODENOSCOPY 306.0 219.0   K 5.3* 3.8   CREATSERUM 0.84 0.79   BUN 10 8   * 76   CA 7.5* 7.9*   ALB 1.2* 1.0*   TP 6.8 5.7*   PTT 39.5*  --    INR 1.12*  --        ASSESSMENT/ RECOMMENDATIONS:  Received verbal order from  To Prachi Shepherd, PT, MPT for

## 2020-11-25 NOTE — OCCUPATIONAL THERAPY NOTE
Order received for OT eval per functional mobility screen. Attempted to see Pt this am for OT eval. Pts spouse present in room who reports that patient has been non ambulatory for ~ 1 year. Pt has total assist for all mobility.  Pts spouse transfers or marinelli

## 2020-11-25 NOTE — ANESTHESIA PREPROCEDURE EVALUATION
PRE-OP EVALUATION    Patient Name: Hector Arguello    Pre-op Diagnosis: Feeding difficulties [R63.3]    Procedure(s):  PERCUTANEOUS ENDOSCOPIC GASTROSTOMY PLACEMENT/JEJUNOSTOMY TUBE PLACEMENT    Surgeon(s) and Role:     * DO Hari Krishnamurthy Disp: , Rfl: 0, Past Week at 0900    •  mirtazapine 15 MG Oral Tab, Take 15 mg by mouth nightly., Disp: , Rfl: , Past Week at 2000    •  Omeprazole 40 MG Oral Capsule Delayed Release, Take 40 mg by mouth daily.   , Disp: , Rfl: 3, Past Week at 0900    •  ny pectoris (HCC)  Gastroesophageal reflux disease Paresthesias  Weakness Neuropathy  Opioid dependence with opioid-induced disorder (HCC) Anxiety disorder  Depression Hyponatremia  Hypotension, unspecified hypotension type Intra-abdominal free air of unknown ANGIOGRAM  05/31/2016    Right & left heart angiogram   • ANGIOGRAM  06/02/2016    Right & left heart angiogram   • ANGIOPLASTY (CORONARY)  11/24/2004    stent to LAD   • ANGIOPLASTY (CORONARY)  05/11/2005    stent to LAD   • BIOPSY  2019    heel   • C-SEC Lucille Munoz MD at [address] 10/10/2018    Performed by Jannell Schirmer, MD at [facility] MAIN OR   • NERVE SURAL BIOPSY Left 3/12/2019    Performed by Mahsa Freeman MD at [facility] MAIN OR   • OTHER      apparatus- Elvira Henry Plan/risks discussed with: patient          . Options, risks and benefits of anesthesia as outlined in the anesthesia consent were reviewed with the patient. The consent was signed without further questions.        Present on Admission:  • Anemia

## 2020-11-25 NOTE — PROGRESS NOTES
[facility]  Progress Note     Gertrude Meliton Patient Status:  Inpatient    1960 MRN [id number]   [facility] 4NW-A Attending Yoselin Porter, DO   Hosp Day # 1  Jessica Sq     Chief Complaint: GJ tube malfunction Imaging: Imaging data reviewed in Epic.     Medications:   • apixaban  5 mg Oral BID   • buprenorphine HCl  8 mg Sublingual BID   • DULoxetine HCl  30 mg Oral BID   • folic acid  488 mcg Oral Daily   • mirtazapine  15 mg Oral Nightly   • Pantoprazole Sodi

## 2020-11-25 NOTE — PHYSICAL THERAPY NOTE
Order received for PT eval per functional mobility screen. Attempted to see Pt this am for PT eval. Pts spouse present in room who reports that patient has been non ambulatory for ~ 1 year. Pt has total assist for all mobility.  Pts spouse transfers or marinelli [phone]

## 2020-11-25 NOTE — DIETARY NOTE
[facility]    NUTRITION INITIAL ASSESSMENT    Pt meets severe malnutrition criteria.     CRITERIA FOR MALNUTRITION DIAGNOSIS:  Criteria for severe malnutrition diagnosis: chronic illness related to energy intake less than 75% for greater than 1 month, days a week. Jevity 1.5 nixon run on pump 60-70mls/hr over 10 hours from 10am - 8pm.    Per  he feels her wt has been stable for the past few months. And notes at good wt is 52.3kg   He also notes she has pressure sore on sacrum.       ANTHROPOMETRIC

## 2020-11-25 NOTE — PROGRESS NOTES
Gastroenterology Progress Note  Denicestephanie Jacinto Patient Status:  Inpatient    1960 MRN [id number]   [facility] 4NW-A Attending Elma Cochran, [address] [facility] Day # 1 [facility] PATIENT STATED HISTORY:(As transcribed by Technologist)  Inpatient. Patient's feeding tube fell out on Saturday and needs to be replced. Leukocytosis.       CONTRAST USED:  52cc of Omnipaque 350     FINDINGS:    LIVER:  Scattered low-density regions thr (denies current issue), cyclic vomiting syndrome C/B progressive wt loss s/p PEG tube with jejunal extension (required multiple endoscopic evaluations d/t J-tube migration) now admitted after tube was reportedly dislodged 2-3 days ago.  Initial evaluation r

## 2020-11-25 NOTE — PLAN OF CARE
Pt is aox4 on room air no tele and pt has denied any sob or chest pain at this time. Pt vitals has been stable pt blood pressure is in the low 80's and md is aware at this time.  Pt has denied any N/V during this shift and pt has been resting in bed no ques

## 2020-11-26 ENCOUNTER — ANESTHESIA (OUTPATIENT)
Dept: ENDOSCOPY | Facility: HOSPITAL | Age: 60
DRG: 394 | End: 2020-11-26
Payer: COMMERCIAL

## 2020-11-26 PROCEDURE — 0DH68UZ INSERTION OF FEEDING DEVICE INTO STOMACH, VIA NATURAL OR ARTIFICIAL OPENING ENDOSCOPIC: ICD-10-PCS | Performed by: INTERNAL MEDICINE

## 2020-11-26 PROCEDURE — 99232 SBSQ HOSP IP/OBS MODERATE 35: CPT | Performed by: INTERNAL MEDICINE

## 2020-11-26 PROCEDURE — 3E0G76Z INTRODUCTION OF NUTRITIONAL SUBSTANCE INTO UPPER GI, VIA NATURAL OR ARTIFICIAL OPENING: ICD-10-PCS | Performed by: INTERNAL MEDICINE

## 2020-11-26 DEVICE — SAFETY PEG KIT 20FR: Type: IMPLANTABLE DEVICE | Site: ABDOMEN | Status: FUNCTIONAL

## 2020-11-26 RX ORDER — DIPHENHYDRAMINE HYDROCHLORIDE 50 MG/ML
12.5 INJECTION INTRAMUSCULAR; INTRAVENOUS AS NEEDED
Status: ACTIVE | OUTPATIENT
Start: 2020-11-26 | End: 2020-11-26

## 2020-11-26 RX ORDER — MAGNESIUM OXIDE 400 MG (241.3 MG MAGNESIUM) TABLET
800 TABLET ONCE
Status: COMPLETED | OUTPATIENT
Start: 2020-11-26 | End: 2020-11-26

## 2020-11-26 RX ORDER — NALOXONE HYDROCHLORIDE 0.4 MG/ML
80 INJECTION, SOLUTION INTRAMUSCULAR; INTRAVENOUS; SUBCUTANEOUS AS NEEDED
Status: ACTIVE | OUTPATIENT
Start: 2020-11-26 | End: 2020-11-26

## 2020-11-26 RX ORDER — METOCLOPRAMIDE HYDROCHLORIDE 5 MG/ML
10 INJECTION INTRAMUSCULAR; INTRAVENOUS AS NEEDED
Status: ACTIVE | OUTPATIENT
Start: 2020-11-26 | End: 2020-11-26

## 2020-11-26 RX ORDER — DEXTROSE MONOHYDRATE 25 G/50ML
INJECTION, SOLUTION INTRAVENOUS
Status: COMPLETED
Start: 2020-11-26 | End: 2020-11-26

## 2020-11-26 RX ORDER — FAMOTIDINE 20 MG/1
20 TABLET ORAL DAILY
Status: DISCONTINUED | OUTPATIENT
Start: 2020-11-26 | End: 2020-11-27

## 2020-11-26 RX ORDER — ONDANSETRON 2 MG/ML
4 INJECTION INTRAMUSCULAR; INTRAVENOUS AS NEEDED
Status: ACTIVE | OUTPATIENT
Start: 2020-11-26 | End: 2020-11-26

## 2020-11-26 RX ORDER — PHENYLEPHRINE HCL 10 MG/ML
VIAL (ML) INJECTION AS NEEDED
Status: DISCONTINUED | OUTPATIENT
Start: 2020-11-26 | End: 2020-11-26 | Stop reason: SURG

## 2020-11-26 RX ORDER — DEXTROSE MONOHYDRATE 25 G/50ML
25 INJECTION, SOLUTION INTRAVENOUS ONCE
Status: COMPLETED | OUTPATIENT
Start: 2020-11-26 | End: 2020-11-26

## 2020-11-26 RX ORDER — SODIUM CHLORIDE, SODIUM LACTATE, POTASSIUM CHLORIDE, CALCIUM CHLORIDE 600; 310; 30; 20 MG/100ML; MG/100ML; MG/100ML; MG/100ML
INJECTION, SOLUTION INTRAVENOUS CONTINUOUS
Status: DISCONTINUED | OUTPATIENT
Start: 2020-11-26 | End: 2020-11-27

## 2020-11-26 RX ORDER — LIDOCAINE HYDROCHLORIDE 10 MG/ML
INJECTION, SOLUTION EPIDURAL; INFILTRATION; INTRACAUDAL; PERINEURAL AS NEEDED
Status: DISCONTINUED | OUTPATIENT
Start: 2020-11-26 | End: 2020-11-26 | Stop reason: SURG

## 2020-11-26 RX ADMIN — PHENYLEPHRINE HCL 100 MCG: 10 MG/ML VIAL (ML) INJECTION at 10:59:00

## 2020-11-26 RX ADMIN — PHENYLEPHRINE HCL 100 MCG: 10 MG/ML VIAL (ML) INJECTION at 11:03:00

## 2020-11-26 RX ADMIN — CEFAZOLIN SODIUM/WATER 2 G: 2 G/20 ML SYRINGE (ML) INTRAVENOUS at 10:55:00

## 2020-11-26 RX ADMIN — LIDOCAINE HYDROCHLORIDE 50 MG: 10 INJECTION, SOLUTION EPIDURAL; INFILTRATION; INTRACAUDAL; PERINEURAL at 10:54:00

## 2020-11-26 NOTE — PROGRESS NOTES
[facility]  Progress Note     Hanny Walton Patient Status:  Inpatient    1960 MRN [id number]   [facility] 4NW-A Attending Eleanor Allison, DO   Hosp Day # 2 PCP Brennan Holloway     Chief Complaint: GJ tube malfunction Estimated Creatinine Clearance: 68.9 mL/min (based on SCr of 0.63 mg/dL). Recent Labs   Lab 11/24/20  1046   PTP 14.8*   INR 1.12*     No results for input(s): TROP, CK in the last 168 hours. Imaging: Imaging data reviewed in Epic.     Medication Home    Plan of care discussed with patient, HAYDE Elmore DO

## 2020-11-26 NOTE — OPERATIVE REPORT
Santosh Da Silva Patient Status:  Inpatient    1960 MRN [id number]   [facility] ENDOSCOPY Attending Delores Macias, DO   Hosp Day # 2 PCP SHRUTI DESAI     PREOPERATIVE DIAGNOSIS/INDICATION: Cyclic Vomiting Syndrome, Displaced G help of a snare. The wire was removed en block with the scope, after this a percutaneous gastrostomy tube, Σ[address] 233 20 Fr with externally removable with a soft internal bolster was introduced with the Pull method.  Placement was confirmed endoscopi

## 2020-11-26 NOTE — DIETARY NOTE
[facility]    NUTRITION FOLLOW UP ASSESSMENT    Pt meets severe malnutrition criteria.     CRITERIA FOR MALNUTRITION DIAGNOSIS:  Criteria for severe malnutrition diagnosis: chronic illness related to energy intake less than 75% for greater than 1 month angioplasty and stents. She had g-j tube placed but has had trouble with clogging jtube so has been using gtube without trouble. Plan is to replace gtube today or tomorrow.     Her  reports she will eat one small meal ~4-5 days of the week and use per week  3.  Tolerance of enteral nutrition without signs/symptoms of intolerance (abdominal discomfort/distention)    MEDICATIONS:  Pepcid, Folic Acid, Thiamine, Mag Ox    LABS:  Glu:62, AST:78, Na++:136 (previous Na++:126-133)    Pt is at high nutrition

## 2020-11-26 NOTE — ANESTHESIA POSTPROCEDURE EVALUATION
[address] Patient Status:  Inpatient   Age/Gender 62year old female MRN [id number]   Location [facility]. Attending Tung Fisher, [address] Day # 2  Jessica Aguayo       Anesthesia Post-op Note    Procedure

## 2020-11-26 NOTE — PROGRESS NOTES
Alert but forgetful, tearful. Tries to get out of bed, bed alarm on. Npo for Gtube placement. IV fluids continued. inco of urine and stool.  mepilex changed

## 2020-11-26 NOTE — PLAN OF CARE
Assumed care of patient at [facility]. Patient A&Ox3-4, forgetful at times, requiring frequent redirection from  & nursing staff. Low blood glucose this AM--MD & Ildefonso notified. Will correct per [facility], G-tube procedure delayed--await placement.    is

## 2020-11-26 NOTE — PLAN OF CARE
A/O. Dominik Ashing. Tearful at times especially when  not at bedside. emotional support given. Per , pt doesn't walk and pt forgets when she is in hospital. Pt attempted to get OOB x3 this shift. Bed alarm on. amcrest camera in use.  Re-orientated

## 2020-11-27 VITALS
SYSTOLIC BLOOD PRESSURE: 73 MMHG | RESPIRATION RATE: 20 BRPM | HEIGHT: 65 IN | WEIGHT: 100 LBS | TEMPERATURE: 98 F | OXYGEN SATURATION: 95 % | BODY MASS INDEX: 16.66 KG/M2 | DIASTOLIC BLOOD PRESSURE: 56 MMHG | HEART RATE: 109 BPM

## 2020-11-27 PROCEDURE — 99239 HOSP IP/OBS DSCHRG MGMT >30: CPT | Performed by: INTERNAL MEDICINE

## 2020-11-27 RX ORDER — CIPROFLOXACIN 500 MG/1
500 TABLET, FILM COATED ORAL 2 TIMES DAILY
Qty: 6 TABLET | Refills: 0 | Status: ON HOLD | OUTPATIENT
Start: 2020-11-27 | End: 2020-12-07

## 2020-11-27 RX ORDER — MAGNESIUM OXIDE 400 MG (241.3 MG MAGNESIUM) TABLET
800 TABLET ONCE
Status: COMPLETED | OUTPATIENT
Start: 2020-11-27 | End: 2020-11-27

## 2020-11-27 NOTE — DISCHARGE SUMMARY
[facility] HOSPITALIST  DISCHARGE SUMMARY     Roscoe Guaman Patient Status:  Inpatient    1960 MRN [id number]   [facility] 4NW-A Attending Gab Curtis DO   Hosp Day # 3 PCP Thom Villavicencio     Date of Admission: 2020  Date Medium Risk  0-28   Low Risk       TCM Follow-Up Recommendation:  LACE > 58:  High Risk of readmission after discharge from the hospital.    Procedures during hospitalization:   • PEG tube placement    Incidental or significant findings and recommendations daily. Refills: 3     simvastatin 40 MG Tabs  Commonly known as: ZOCOR      Take 1 tablet (40 mg total) by mouth nightly.    Refills: 0     Zolpidem Tartrate ER 6.25 MG Tbcr  Commonly known as: AMBIEN CR      Take 6.25 mg by mouth nightly as needed for Sl

## 2020-11-27 NOTE — PLAN OF CARE
NURSING DISCHARGE NOTE    Discharged Home via Wheelchair. Accompanied by Spouse  Belongings Taken by patient/family. Discharge instructions reviewed with patient & spouse at the bedside. 1 x RX already sent to preferred [facility].  Telemetry monitor rem

## 2020-11-27 NOTE — PLAN OF CARE
Pt is A&O, forgetful at times, VSS, and denies pain. Pt is on RA with bilateral diminished lung sounds,  in place. NSR on telemetry. SCDs in place. Abdomen is soft and nontender with active bowel sounds. G tube with dressing C/D/I.  Tube feedings infusin

## 2020-11-27 NOTE — PLAN OF CARE
Assumed care of patient at [address] [place] [address]. Patient A&Ox2-3, more confused at certain times than others. Easily redirectable. On RA. HR mildly elevated in low 100's. Patient c/o mid/left upper abdominal pain near G-tube site.  Ice pack applied & Tylenol given per

## 2020-11-30 ENCOUNTER — APPOINTMENT (OUTPATIENT)
Dept: ULTRASOUND IMAGING | Facility: HOSPITAL | Age: 60
DRG: 871 | End: 2020-11-30
Attending: EMERGENCY MEDICINE
Payer: COMMERCIAL

## 2020-11-30 ENCOUNTER — APPOINTMENT (OUTPATIENT)
Dept: CT IMAGING | Facility: HOSPITAL | Age: 60
DRG: 871 | End: 2020-11-30
Attending: EMERGENCY MEDICINE
Payer: COMMERCIAL

## 2020-11-30 ENCOUNTER — HOSPITAL ENCOUNTER (INPATIENT)
Facility: HOSPITAL | Age: 60
LOS: 7 days | Discharge: HOSPICE | DRG: 871 | End: 2020-12-08
Attending: EMERGENCY MEDICINE | Admitting: HOSPITALIST
Payer: COMMERCIAL

## 2020-11-30 ENCOUNTER — APPOINTMENT (OUTPATIENT)
Dept: GENERAL RADIOLOGY | Facility: HOSPITAL | Age: 60
DRG: 871 | End: 2020-11-30
Attending: EMERGENCY MEDICINE
Payer: COMMERCIAL

## 2020-11-30 DIAGNOSIS — R53.1 WEAKNESS GENERALIZED: ICD-10-CM

## 2020-11-30 DIAGNOSIS — R11.15 EMESIS, PERSISTENT: Primary | ICD-10-CM

## 2020-11-30 DIAGNOSIS — E88.09 HYPOALBUMINEMIA: ICD-10-CM

## 2020-11-30 PROCEDURE — 93970 EXTREMITY STUDY: CPT | Performed by: EMERGENCY MEDICINE

## 2020-11-30 PROCEDURE — 99220 INITIAL OBSERVATION CARE,LEVL III: CPT | Performed by: INTERNAL MEDICINE

## 2020-11-30 PROCEDURE — 71045 X-RAY EXAM CHEST 1 VIEW: CPT | Performed by: EMERGENCY MEDICINE

## 2020-11-30 PROCEDURE — 74176 CT ABD & PELVIS W/O CONTRAST: CPT | Performed by: EMERGENCY MEDICINE

## 2020-11-30 PROCEDURE — 99255 IP/OBS CONSLTJ NEW/EST HI 80: CPT | Performed by: INTERNAL MEDICINE

## 2020-11-30 RX ORDER — ONDANSETRON 2 MG/ML
4 INJECTION INTRAMUSCULAR; INTRAVENOUS EVERY 6 HOURS PRN
Status: DISCONTINUED | OUTPATIENT
Start: 2020-11-30 | End: 2020-12-08

## 2020-11-30 RX ORDER — MIRTAZAPINE 15 MG/1
15 TABLET, FILM COATED ORAL NIGHTLY
Status: DISCONTINUED | OUTPATIENT
Start: 2020-11-30 | End: 2020-12-08

## 2020-11-30 RX ORDER — ONDANSETRON 2 MG/ML
4 INJECTION INTRAMUSCULAR; INTRAVENOUS EVERY 6 HOURS PRN
Status: DISCONTINUED | OUTPATIENT
Start: 2020-11-30 | End: 2020-11-30

## 2020-11-30 RX ORDER — MELATONIN
400 DAILY
Status: DISCONTINUED | OUTPATIENT
Start: 2020-11-30 | End: 2020-12-08

## 2020-11-30 RX ORDER — ACETAMINOPHEN 325 MG/1
650 TABLET ORAL EVERY 6 HOURS PRN
Status: DISCONTINUED | OUTPATIENT
Start: 2020-11-30 | End: 2020-12-08

## 2020-11-30 RX ORDER — ATORVASTATIN CALCIUM 40 MG/1
40 TABLET, FILM COATED ORAL NIGHTLY
Refills: 0 | Status: DISCONTINUED | OUTPATIENT
Start: 2020-11-30 | End: 2020-12-08

## 2020-11-30 RX ORDER — ONDANSETRON 2 MG/ML
4 INJECTION INTRAMUSCULAR; INTRAVENOUS ONCE
Status: COMPLETED | OUTPATIENT
Start: 2020-11-30 | End: 2020-11-30

## 2020-11-30 RX ORDER — NYSTATIN 100000 U/G
2 CREAM TOPICAL 2 TIMES DAILY
Status: DISCONTINUED | OUTPATIENT
Start: 2020-11-30 | End: 2020-12-01

## 2020-11-30 RX ORDER — PANTOPRAZOLE SODIUM 40 MG/1
40 TABLET, DELAYED RELEASE ORAL
Refills: 3 | Status: DISCONTINUED | OUTPATIENT
Start: 2020-12-01 | End: 2020-11-30

## 2020-11-30 RX ORDER — BUPRENORPHINE HYDROCHLORIDE 8 MG/1
8 TABLET SUBLINGUAL 2 TIMES DAILY
Status: DISCONTINUED | OUTPATIENT
Start: 2020-11-30 | End: 2020-12-01

## 2020-11-30 RX ORDER — ZOLPIDEM TARTRATE 5 MG/1
5 TABLET ORAL NIGHTLY PRN
Status: DISCONTINUED | OUTPATIENT
Start: 2020-11-30 | End: 2020-12-07

## 2020-11-30 RX ORDER — HYDROMORPHONE HYDROCHLORIDE 1 MG/ML
0.5 INJECTION, SOLUTION INTRAMUSCULAR; INTRAVENOUS; SUBCUTANEOUS EVERY 30 MIN PRN
Status: DISCONTINUED | OUTPATIENT
Start: 2020-11-30 | End: 2020-12-01

## 2020-11-30 RX ORDER — DULOXETIN HYDROCHLORIDE 30 MG/1
30 CAPSULE, DELAYED RELEASE ORAL 2 TIMES DAILY
Status: DISCONTINUED | OUTPATIENT
Start: 2020-11-30 | End: 2020-12-08

## 2020-11-30 NOTE — ED PROVIDER NOTES
Patient Seen in: [facility] Emergency Department      History   Patient presents with:  Nausea/Vomiting/Diarrhea  Swelling Edema    Stated Complaint: vomiting and edema to lower extremities.  Recently dc'd from hospital friday    HPI    Patient here habits    • Ischemic cardiomyopathy    • Kidney failure    • Leg swelling    • Loss of appetite    • Mouth sores    • Muscle weakness     due to neuropathy in P.T now   • Nausea    • Neuropathy    • On tube feeding diet     eats a little   • Osteoporosis 12/21/2019    Performed by Manuel Blas MD at [facility] ENDOSCOPY   • ESOPHAGOGASTRODUODENOSCOPY (EGD) N/A 12/8/2019    Performed by Raleigh Mclean DO at [facility] ENDOSCOPY   • ESOPHAGOGASTRODUODENOSCOPY (EGD) N/A 9/27/2019    Performed by SANDRA Gregory Smoking status: Former Smoker        Years: 20.00        Quit date: 2004        Years since quittin.0      Smokeless tobacco: Never Used    Alcohol use: Yes      Comment: \"occasional\"    Drug use: Yes      Types: Cannabis      Comment: once per normal limits   PRO BETA NATRIURETIC PEPTIDE - Abnormal; Notable for the following components:    Pro-Beta Natriuretic Peptide 7,182 (*)     All other components within normal limits   LACTIC ACID, PLASMA - Abnormal; Notable for the following components: ------                     CBC W/ DIFFERENTIAL[[id number]]          Abnormal            Final result                 Please view results for these tests on the individual orders. SCAN SLIDE   CBC WITH DIFFERENTIAL WITH PLATELET    Narrative:      The follo recommends 500 cc bolus and close monitoring given the need for fluids for sepsis versus though need for fluid restriction for her CHF. Dr Radha Rosa on page as well.   Admission disposition: 11/30/2020 10:43 AM                             Disposition and Plan

## 2020-11-30 NOTE — H&P
[facility]  History and Physical     Denise Navarro Patient Status:  Emergency    1960 MRN [id number]   Location [facility] Attending Sony Amin MD   Hosp Day # 0 [address] intolerance    • Frequent urination    • Frequent use of laxatives    • Frequent UTI    • Heart attack [facility])    • Heart palpitations    • Heartburn    • Hemorrhoids    • History of blood transfusion     2009   • History of cardiac murmur    • Hyperlipidemi at [facility] ENDOSCOPY   • ENDOSCOPIC ULTRASOUND (EUS) N/A 12/1/2015    Performed by Bharath Singh MD at [facility] ENDOSCOPY   • ENTEROSCOPY N/A 12/11/2018    Performed by Dayana Valle MD at [facility] ENDOSCOPY   • EP ELECTROPHYSIOLOGY STUDY  04/13/2006   • ERCP,DIAG Performed by Rasheed Arreola DO at [facility] ENDOSCOPY   • PERCUTANEOUS ENDOSCOPIC GASTROSTOMY PLACEMENT/JEJUNOSTOMY TUBE PLACEMENT N/A 12/11/2018    Performed by Jai Carter MD at [facility] ENDOSCOPY   • REMOVAL GALLBLADDER  10/2018   • TONSILLECTOMY Oral Capsule Delayed Release, Take 40 mg by mouth daily. , Disp: , Rfl: 3    •  nystatin [id number] UNIT/GM External Cream, Apply 2 g topically 2 (two) times daily.  To affected area around PEG site, Disp: 30 g, Rfl: 2    •  DULoxetine HCl 30 MG Oral Cap DR Membreno 11/30/20  0934   WBC 22.3* 15.7* 14.2* 20.5* 27.7*   HGB 11.0* 10.5* 9.6* 10.1* 11.3*   MCV 97.7 98.5 101.0* 101.3* 100.3*   .0 219.0 210.0 204.0 223.0   INR 1.12*  --   --   --   --        Recent Labs   Lab 11/26/20  0717 11/27/20  0658 11/30/20  0 sepsis/HOTN  11.  Anxiety  1. duloxetine    Quality:  · DVT Prophylaxis: Eliquis  · CODE status: FULL  · Kumar: no  · If COVID testing is negative, may discontinue isolation: yes     Plan of care discussed with pt, pt's , ED    JESSICA Gonzalez

## 2020-11-30 NOTE — CONSULTS
[facility]                       [facility]    Nazario Da Silva Patient Status:  Emergency    1960 MRN [id number]   Location [address] [facility] Attending Nicola Leonardo MD defibrillator in place    • Change in hair    • Chest pain on exertion    • CONGESTIVE HEART FAILURE     pt is on waiting list for heart transplant/pt had a heart attack 2004   • Constipation    • Coronary artery disease     LAD, angioplasty and stent   • DEFIBRILLATOR PLACEMENT  2004    [employer] ICD- newest one [id number]?    • CARDIAC PACEMAKER PLACEMENT     • CATH DRUG ELUTING STENT      x 2   • CHOLECYSTECTOMY     • COLONOSCOPY     • COLONOSCOPY N/A 7/13/2015    Performed by Dyan Bender MD at [facility] ENDOSCO sleeve)   • OTHER  03/12/2019    left sural nerve biopsy   • OTHER SURGICAL HISTORY  06/30/2009    [facility] heart net   • PERCUTANEOUS ENDOSCOPIC GASTROSTOMY PLACEMENT/JEJUNOSTOMY TUBE PLACEMENT N/A 11/26/2020    Performed by Sarah Simeon DO at [facility] ENDO arthralgias            Genitourinary:  The patient reports no history of recurrent urinary tract infections, hematuria, dysuria, or nephrolithiasis           Psychiatric: + anorexia            Oncologic: The patient reports no history of prior solid tumor 11/30/2020     Recent Labs   Lab 11/25/20  0920 11/26/20  0717 11/27/20  0658 11/30/20  0934   GLU 76 62* 113* 116*   BUN 8 8 7 15   CREATSERUM 0.79 0.63 0.58 1.07*   GFRAA 95 114 117 66   GFRNAA 82 98 101 57*   CA 7.9* 7.2* 7.5* 8.1*   * 136 133* 13 62 yr-old female with complex hx including MI, CAD s/p PCI, ischemic CM s/p ICD, AF (Eliquis), anorexia (denies current issue), cyclic vomiting syndrome c/b progressive wt loss s/p PEG tube (required jejunal extension however c/b J-tube migration; was rece migration/clogging presenting with nausea and vomiting. Patient was recently admitted for malnutrition and had PEG replacement. Patient was discharged home with tube feeds and was tolerating while in house.   Patient began having nausea and vomiting over

## 2020-11-30 NOTE — PAYOR COMM NOTE
--------------  DISCHARGE REVIEW    Payor: LAUREN [facility]  Subscriber #:  [id number]  Authorization Number: Geni Vasquez    Admit date: 11/24/20  Admit time:  5620  Discharge Date: 11/27/2020  1:00 PM     Admitting Physician: Adriana Clarke MD  Attending Physic well.  She did have electrolyte abnormalities on arrival which were corrected in the hospital.  She had leukocytosis and was found to have a UTI.   Urine culture was positive for pan susceptible E. coli and she will be discharged on ciprofloxacin for 3 more FOLVITE      Take 400 mcg by mouth daily. Refills: 0     lisinopril 2.5 MG Tabs      Take 1 tablet (2.5 mg total) by mouth nightly. Quantity: 30 tablet  Refills: 2     Metoprolol Succinate ER 25 MG Tb24  Commonly known as:  Toprol XL      Take 0.5 table rate and rhythm. No murmurs, rubs or gallops. Abdomen: Soft, nontender, nondistended. Positive bowel sounds. No rebound or guarding. +PEG in place and is c/d/i  Neurologic: No focal neurological deficits. Musculoskeletal: Moves all extremities.   Extre

## 2020-11-30 NOTE — DIETARY MALNUTRITION NOTE
[facility]    NUTRITION INITIAL ASSESSMENT    Pt meets moderate malnutrition criteria.     CRITERIA FOR MALNUTRITION DIAGNOSIS:  Criteria for non-severe malnutrition diagnosis: chronic illness related to wt loss 5% in 1 month, energy intake less than75 (previous G-J tube, clogging/breaking). Pt's  present at time of visit and provided most TF/wt hx. Pt on cyclic feeds at home. Usually eats 1 meal per day (small dinner). Previously on Jevity 1.5 at 60-70 ml/hr over 14-16 hours.  TF rate increased du PRESCRIPTION: 45.4 kg  Calories: 9116-1607 calories/day (33-35 calories per kg)  Protein: 68-82 grams protein/day (1.5-1.8 grams protein per kg)  Fluid: ~1 ml/kcal or per MD discretion    MONITOR AND EVALUATE/NUTRITION GOALS:  1.  Tolerance of enteral nutri

## 2020-11-30 NOTE — ED INITIAL ASSESSMENT (HPI)
Pt here for vomiting and lower extremitry swelling. Pt recently in hospital for new feeding tube and malnutrition. Per family pt vomiting since coming home on Friday and swelling noted Saturday. Denies fever. Diarrhea noted.

## 2020-11-30 NOTE — CONSULTS
[facility]  Cardiology Consultation    Joel Gallo Patient Status:  Observation    1960 MRN [id number]   [facility] 4NW-A Attending Miguel Reynolds MD   Hosp Day # 0 [facility]     Reason for [facility] • Pancreatitis    • Personal history of antineoplastic chemotherapy     2006- due to being on transplant   • Pneumonia due to organism    • PONV (postoperative nausea and vomiting)    • Presence of other cardiac implants and grafts    • Rash    • Renal d ESOPHAGOGASTRODUODENOSCOPY (EGD) N/A 9/10/2019    Performed by Slade Espinoza MD at [facility] ENDOSCOPY   • ESOPHAGOGASTRODUODENOSCOPY (EGD) N/A 2/23/2019    Performed by Karina Dwyer MD at [facility] ENDOSCOPY   • ESOPHAGOGASTRODUODENOSCOPY (EGD) N/A 1/16/2019 testicular   • Cancer Brother         skin cancer   • Hypertension Neg    • Obesity Neg    • Psychiatric Neg    • Lipids Neg       reports that she quit smoking about 16 years ago. She quit after 20.00 years of use.  She has never used smokeless toba Wt 100 lb (45.4 kg)   SpO2 (!) 84%   BMI 16.64 kg/m²   Temp (24hrs), Av °F (36.7 °C), Min:97.9 °F (36.6 °C), Max:98 °F (36.7 °C)       Intake/Output Summary (Last 24 hours) at 2020 1741  Last data filed at 2020 1301  Gross per 24 hour   In within the limitations of the exam as above. Dictated by (CST): Alberto Kessler MD on 11/30/2020 at 11:23 AM     CT abd/pelvis 11/30/2020: CONCLUSION:    1.  There is pneumoperitoneum with increasing ascites may be due to percutaneous gastrostomy placement, primary service  -continue BB, statin and Eliquis  -agree with holding ACE-I for now    Thank you for allowing me to participate in the care of your patient.     Cristi Pablo MD  11/30/2020  5:41 PM

## 2020-12-01 ENCOUNTER — APPOINTMENT (OUTPATIENT)
Dept: CV DIAGNOSTICS | Facility: HOSPITAL | Age: 60
DRG: 871 | End: 2020-12-01
Attending: INTERNAL MEDICINE
Payer: COMMERCIAL

## 2020-12-01 PROCEDURE — 93306 TTE W/DOPPLER COMPLETE: CPT | Performed by: INTERNAL MEDICINE

## 2020-12-01 PROCEDURE — 99232 SBSQ HOSP IP/OBS MODERATE 35: CPT | Performed by: INTERNAL MEDICINE

## 2020-12-01 PROCEDURE — 99255 IP/OBS CONSLTJ NEW/EST HI 80: CPT | Performed by: OTHER

## 2020-12-01 RX ORDER — SODIUM CHLORIDE 9 MG/ML
INJECTION, SOLUTION INTRAVENOUS CONTINUOUS
Status: DISCONTINUED | OUTPATIENT
Start: 2020-12-01 | End: 2020-12-04

## 2020-12-01 RX ORDER — HYDROCODONE BITARTRATE AND ACETAMINOPHEN 5; 325 MG/1; MG/1
1 TABLET ORAL EVERY 6 HOURS PRN
Status: DISCONTINUED | OUTPATIENT
Start: 2020-12-01 | End: 2020-12-07

## 2020-12-01 RX ORDER — DEXTROSE MONOHYDRATE 25 G/50ML
INJECTION, SOLUTION INTRAVENOUS
Status: DISPENSED
Start: 2020-12-01 | End: 2020-12-01

## 2020-12-01 RX ORDER — DEXTROSE MONOHYDRATE 25 G/50ML
50 INJECTION, SOLUTION INTRAVENOUS ONCE
Status: COMPLETED | OUTPATIENT
Start: 2020-12-01 | End: 2020-12-01

## 2020-12-01 RX ORDER — SODIUM CHLORIDE 9 MG/ML
INJECTION, SOLUTION INTRAVENOUS ONCE
Status: COMPLETED | OUTPATIENT
Start: 2020-12-01 | End: 2020-12-01

## 2020-12-01 NOTE — CONSULTS
[address] [place] [address]  TEL: [phone]  FAX: [phone]    Derick Pierce LouCherelle Patient Status:  Observation    1960 MRN [id number]   [facility] 4NW-A Attending Adriana Clarke MD   Hosp Day # 0 PCP Rufina Mukherjee cardiomyopathy    • Kidney failure    • Leg swelling    • Loss of appetite    • Mouth sores    • Muscle weakness     due to neuropathy in P.T now   • Nausea    • Neuropathy    • On tube feeding diet     eats a little   • Osteoporosis    • Pacemaker    • Pa Alberto Redmond MD at [facility] ENDOSCOPY   • ESOPHAGOGASTRODUODENOSCOPY (EGD) N/A 12/8/2019    Performed by Anna Morris DO at [facility] ENDOSCOPY   • ESOPHAGOGASTRODUODENOSCOPY (EGD) N/A 9/27/2019    Performed by Abby Munoz MD at [facility] ENDOSCOPY   • [address] [place] [address] & multiple aortic aneursyms - burst   • Cancer Mother         lung CA   • Cancer Sister 36        metastatic breast CA   • Breast Cancer Sister    • Diabetes Brother         T1   • Lung Disorder Brother         sarcoidosis   • Cancer Brother         testic Intravenous, Q8H    Review of Systems:    A comprehensive 10 point review of systems was completed. Pertinent positives and negatives noted in the the HPI. Physical Exam:    General: No acute distress. Alert and oriented x 3.   Thin  Vital signs: Blood with opioid-induced disorder ([facility] [address]     Anxiety disorder     Depression     Hyponatremia     Hypotension, unspecified hypotension type     Intra-abdominal free air of unknown etiology     Post-operative state     Acute on chronic congestive heart failure, u persistent     Weakness generalized     Hypoalbuminemia     Bilateral leg edema      ASSESSMENT:    #leukocytosis  -suspect this is reactive 2/2 N/V, but possible from PNA  #lactic acidosis  -2/2 dehydration by infection  #B/L PNA, possible aspiration  -ra

## 2020-12-01 NOTE — DIETARY NOTE
[facility] ASSESSMENT    Pt meets moderate malnutrition criteria.     CRITERIA FOR MALNUTRITION DIAGNOSIS:  Criteria for non-severe malnutrition diagnosis: chronic illness related to wt loss 5% in 1 month, energy intake less than swelling after being DC'd this past Fri, 11/27. Pt seen d/t nutrition consult for TF re-eval and adjustment. Pt with recent admit (11/24-11/27) for G-tube replacement (previous G-J tube, clogging/breaking).  Pt's  present at time of visit and provid RELATED PHYSICAL FINDINGS:     1. Body Fat/Muscle Mass: moderate depletion body fat orbital region and moderate depletion muscle mass temple region per visual exam.     2. Fluid Accumulation: 3+ pitting edema on B/L ankles and feet per RN note.     Jesu Colunga

## 2020-12-01 NOTE — PLAN OF CARE
Lab called & reported to RN that blood glucose was 50,accuchech was 62. Also BP was running very low BP on the 60s despite multiple rechecks. BP cuff changed to peds cuff. BP improved to 90/43 on the L arm. Dextrose IV 50 given once. Will continue to [facility]

## 2020-12-01 NOTE — PLAN OF CARE
NURSING ADMISSION NOTE      Patient admitted via Cart  Oriented to room. Safety precautions initiated. Bed in low position. Call light in reach. Admission navigator completed with  and patient. New orders received. Consults called.  Per

## 2020-12-01 NOTE — OCCUPATIONAL THERAPY NOTE
Order received for OT eval. Per pt's spouse from recent admission: patient has been non ambulatory for ~ 1 year. Pt has total assist for all mobility.  Pts spouse transfers or carries patient with total assist. Patient assists minimally and is unable to [facility]

## 2020-12-01 NOTE — CONSULTS
[facility]  Report of Inpatient Wound Care Consultation    Ruffus Cage Patient Status:  Observation    1960 MRN [id number]   [facility] 4NW-A Attending Marques Valdez MD   Hosp Day # 0 [facility]     Reason for Renal disorder     hx of ERIKA d/t dehydration   • Shortness of breath    • Sleep disturbance    • Stented coronary artery    • Stress    • Syncope    • Systolic heart failure (HCC)    • Thrush of mouth and esophagus (HCC)    • Uncomfortable fullness after m Performed by Gila Adams MD at [facility] ENDOSCOPY   • ESOPHAGOGASTRODUODENOSCOPY (EGD) N/A 11/16/2018    Performed by Doretha Alcala MD at [facility] ENDOSCOPY   • ESOPHAGOGASTRODUODENOSCOPY (EGD) N/A 12/1/2015    Performed by Yobany Santizo MD at [facility] ENDO 12/01/2020    BILT 1.0 12/01/2020    TP 4.9 12/01/2020    AST 60 12/01/2020    ALT 13 12/01/2020    B12 1,824 12/01/2020    PGLU 223 12/01/2020         Impression:  Sacrum stage III pressure injury  Measurement : 0.8cm x 0.8cm x 0.1 cm  Wound bed ; 75- 100

## 2020-12-01 NOTE — CONSULTS
[facility]    PATIENT'S NAME: Kusum Christianson   ATTENDING PHYSICIAN: VICKI Deluna Corolla: Anna Agustin M.D.    PATIENT ACCOUNT#:   [de-identified]    LOCATION:  [address] [place] [address]  MEDICAL RECORD #:   [id number]       DATE OF BI high school. FAMILY HISTORY:  Father  from polio complications and mother  from cancer. REVIEW OF SYSTEMS:  The patient denies any hematuria, dysuria, hemoptysis, cough, or sputum production at this time.   She currently denies any CVA, TI have a neurology consult to evaluate for the weakness in the lower legs, particular the foot drop, and see if there is anything further. I gave the patient my card, had all questions answered. I spent about 45 minutes with the patient.   All questions ans

## 2020-12-01 NOTE — CM/SW NOTE
Sent resume order to [facility].  Pt [facility] feeding [facility] [address]

## 2020-12-01 NOTE — PLAN OF CARE
Pharmacist was able to verify medication subutex & schedule dose was requested & RN offered med to patient. Patient declined med. Claims medication doesn't work. She also declined nystatin cream,she told RN \"I had a long conversation w/ nurse yesterday,I ramírez

## 2020-12-01 NOTE — PHYSICAL THERAPY NOTE
Order received for PT eval and chart reviewed. Pt with recent admission where therapy spoke with patient/patients spouse on 11/25/20. Pts spouse reported that patient has been non ambulatory for ~ 1 year. Pt has total assist for all mobility.  Pts spouse tr

## 2020-12-01 NOTE — CM/SW NOTE
Reviewed pt's chart. Pt admitted due to tube feeding issues. Per chart review pt is current w/[facility]. Pt noted to be sometimes confused. Left a message for spouse. Awaiting a call back. SW to follow up to assess for needs at a later time.

## 2020-12-01 NOTE — PLAN OF CARE
Patient AO x4, VSS, BP runs low and she stated that is normal for her. IV zosyn running per MAR. Pt given Teretha Anais, she stated that she takes it every night before bed to sleep. No n/v since arriving on the floor. G-tube in placed.  Pt's heels lifted off the

## 2020-12-01 NOTE — PLAN OF CARE
Clear fluids noted leaking thru fromt he G tube site. Flushing done & it still does same thing. Tube feeding stopped for the moment & paged GI to refer. Awaiting response.  Patient encouraged to increase oral intake since she is now able to have oral intake s

## 2020-12-01 NOTE — PROGRESS NOTES
[facility] Gastroenterology Progress Note    CC: nausea, vomiting     S: Pt with no nausea/vomiting with water/CLD  And now tolerating TF at low rates. O: BP (!) 72/44   Pulse 87   Temp 97.8 °F (36.6 °C) (Oral)   Resp 20   Ht 65\"   Wt 100 lb (45. -Eventually would be reasonable to consider tertiary care referral given refractory cyclical vomiting and complicated course  - OV in 3-4 weeks     GI will sign off at this time. Please call back with any questions or concerns.          Ting Chaparro,

## 2020-12-01 NOTE — PROGRESS NOTES
[facility]  Progress Note     Arbeverly Cabrera Patient Status:  Observation    1960 MRN [id number]   [facility] 4NW-A Attending Whitney Robledo MD   Hosp Day # 0 [facility]     Chief Complaint: BLLE edema, N/V    S 22.1*   HGB 11.0* 10.5* 9.6* 10.1* 11.3* 9.8*   MCV 97.7 98.5 101.0* 101.3* 100.3* 98.4   .0 219.0 210.0 204.0 223.0 150.0   INR 1.12*  --   --   --   --   --        Recent Labs   Lab 11/27/20  0658 11/30/20  0934 11/30/20  1055 11/30/20  1505   GLU for VTE  1. Cardiology following  2. Repeat echo done this morning, results pending  3. Vascular surgery consulted > arterial doppler BLLE  5. Chronic anemia - at baseline  1. Folic acid  2. B12 increased  6. Generalized weakness  1. PT/OT  7.  Hyponatremia

## 2020-12-01 NOTE — PLAN OF CARE
Tube feeding inititiated at 10cc/hr then eventually increased to 20cc/hr after clarification made w/ Gi MD & nutritionist. Tube placement confirmed prior to starting tube feeds,tolerating well so far,no nausea & vomiting noted,no c/o abdominal pain.  Will c

## 2020-12-01 NOTE — PROGRESS NOTES
[facility]  Cardiology Progress Note    Subjective:  No chest pain or shortness of breath.     Objective:  BP 95/50 (BP Location: Left arm)   Pulse 87   Temp 97.8 °F (36.6 °C) (Oral)   Resp 20   Ht 5' 5\" (1.651 m)   Wt 100 lb (45.4 kg)   SpO2 92%   BM diuretics because I believe all her edema is third spacing due to hypoalbuminemia. In addition she has an infection, which can render her intravascular depletion even worse. Eduin Alfredo M.D., F.A.C.C., F.ELVI.H.ELVI., F.E.S.C.   Medical Director, H

## 2020-12-02 ENCOUNTER — APPOINTMENT (OUTPATIENT)
Dept: ULTRASOUND IMAGING | Facility: HOSPITAL | Age: 60
DRG: 871 | End: 2020-12-02
Attending: SURGERY
Payer: COMMERCIAL

## 2020-12-02 PROCEDURE — 93925 LOWER EXTREMITY STUDY: CPT | Performed by: SURGERY

## 2020-12-02 PROCEDURE — 99232 SBSQ HOSP IP/OBS MODERATE 35: CPT | Performed by: HOSPITALIST

## 2020-12-02 PROCEDURE — 93923 UPR/LXTR ART STDY 3+ LVLS: CPT | Performed by: SURGERY

## 2020-12-02 PROCEDURE — 99232 SBSQ HOSP IP/OBS MODERATE 35: CPT | Performed by: NURSE PRACTITIONER

## 2020-12-02 RX ORDER — VANCOMYCIN HYDROCHLORIDE
25 ONCE
Status: COMPLETED | OUTPATIENT
Start: 2020-12-02 | End: 2020-12-02

## 2020-12-02 RX ORDER — DEXTROSE MONOHYDRATE 25 G/50ML
50 INJECTION, SOLUTION INTRAVENOUS
Status: DISCONTINUED | OUTPATIENT
Start: 2020-12-02 | End: 2020-12-08

## 2020-12-02 RX ORDER — NYSTATIN 100000 U/G
CREAM TOPICAL 2 TIMES DAILY
Status: DISCONTINUED | OUTPATIENT
Start: 2020-12-02 | End: 2020-12-08

## 2020-12-02 RX ORDER — SODIUM CHLORIDE 9 MG/ML
INJECTION, SOLUTION INTRAVENOUS ONCE
Status: COMPLETED | OUTPATIENT
Start: 2020-12-02 | End: 2020-12-02

## 2020-12-02 NOTE — PLAN OF CARE
BP improved a little after bolus of 500cc NS, accucheck 81 after orange juice was given. Patient remains alert & awake. Asymptomatic. Will restart tube feeding as directed by GI. Will continue to monitor. Encouraged patient to increase oral intake.  [employer]

## 2020-12-02 NOTE — PROGRESS NOTES
[facility]  TEL: [phone]  FAX: [phone]    Nazario Da Silva Patient Status:  Observation    1960 MRN [id number]   [facility] 4NW-A Attending Lisbeth Elam MD   Hosp Day # 0 SOFIE Joyce disturbance    • Stented coronary artery    • Stress    • Syncope    • Systolic heart failure (HCC)    • Thrush of mouth and esophagus (HCC)    • Uncomfortable fullness after meals    • Visual impairment     glasses for reading   • Vomiting    • Wears glas (EGD) N/A 11/16/2018    Performed by Rod Cramer MD at [facility] ENDOSCOPY   • ESOPHAGOGASTRODUODENOSCOPY (EGD) N/A 12/1/2015    Performed by Charito Kwan MD at [facility] ENDOSCOPY   • ESOPHAGOGASTRODUODENOSCOPY (EGD) N/A 8/11/2015    Performed by Salvatore Tamayo, [employer].     Allergies:    Codeine Sulfate         NAUSEA ONLY    Comment:Pt states \"it messes with my stomach, but I can             take it if I need it\"  Sulfa Antibiotics       NAUSEA AND VOMITING    Comment:pancreatitis  Nsaids                  Sergey Estrada Positive bowel sounds. Musculoskeletal: Full range of motion of all extremities. B/L LE edema  Integument: No lesions. No erythema. Psychiatric: Appropriate mood and affect. Neurologic: No focal neurological deficits.     Laboratory Data:  Lab Results with nausea, unspecified vomiting type     Dehydration     Acute gastritis without hemorrhage     Acute chest pain     Hyperglycemia     Intractable vomiting without nausea     Intractable vomiting without nausea, unspecified vomiting type     Anorexia

## 2020-12-02 NOTE — CONSULTS
[facility]    Report of Consultation    Gertrude Coelho Patient Status:  Inpatient    1960 MRN [id number]   [facility] 4NW-A Attending Blaze Curtis MD   [facility] Day # 0 SOFIE Muñoz     Date of Admission:  2020 a little   • Osteoporosis    • Pacemaker    • Pancreatitis    • Personal history of antineoplastic chemotherapy     2006- due to being on transplant   • Pneumonia due to organism    • PONV (postoperative nausea and vomiting)    • Presence of other cardiac Melissa Vigil MD at [facility] ENDOSCOPY   • ESOPHAGOGASTRODUODENOSCOPY (EGD) N/A 9/10/2019    Performed by Ever Busch MD at [facility] ENDOSCOPY   • ESOPHAGOGASTRODUODENOSCOPY (EGD) N/A 2/23/2019    Performed by Melissa Munoz MD at [facility] ENDOSCOPY   • [address] [place] [address] sarcoidosis   • Cancer Brother         testicular   • Cancer Brother         skin cancer   • Hypertension Neg    • Obesity Neg    • Psychiatric Neg    • Lipids Neg       reports that she quit smoking about 16 years ago.  She quit after 20.00 years of us 100 lb (45.4 kg), SpO2 92 %. GENERAL:  Patient is a(n) 62year old female in no acute distress. HEENT:  Normocephalic, atraumatic  LUNGS: Clear to auscultation bilaterally. HEART:  S1, S2, Regular rate and rhythm.   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

## 2020-12-02 NOTE — PLAN OF CARE
Awake & alert & verbally responsive,appears pale & weak. Denies dizziness or any chest discomforts. BP remains low SBP on the low 70s lowest obtained when switched out to R & L arm was 66 systolic BP. Remaina on IV fluids. Paged Dr Yeny Mays to refer Sydnee Birch

## 2020-12-02 NOTE — PROGRESS NOTES
[address] [facility] Progress Note    Margarita Cantrell Patient Status:  Inpatient    1960 MRN [id number]   [facility] SHRUTI DESAI     CC: Foot drop    Angel Luis Gilbert PT here d/t left foot; 3rd toe injury; PT states about 1 mth ago PT injured left foot while at work. PT known diabetic; saw PCP for wound check, but today his \"toe fell off\".  PT noted to have significant swelling to left foot and necrotic like tissue on Diagnostics:  11/30/2020 CT Abdomen and Pelvis  There is pneumoperitoneum with increasing ascites may be due to percutaneous gastrostomy placement.     Increasing bilateral pleural effusions with development of patches of nonspecific ground-glass dens

## 2020-12-02 NOTE — PROGRESS NOTES
[facility]  Progress Note     Ede Chin Patient Status:  Inpatient    1960 MRN [id number]   [facility] 4NW-A Attending Delisa Earing [address] Day # 1  Jessica      Chief Complaint: Henry Lombard    S: Manisha Fraga 17.0* 22.0 21.0   ALKPHO 505*  --  436* 373*  --    AST  --  55* 69* 60*  --    ALT 16  --  18 13  --    BILT 1.0  --  1.0 1.0  --    TP 6.5  --  5.7* 4.9*  --        Estimated Creatinine Clearance: 41.3 mL/min (A) (based on SCr of 1.05 mg/dL (H)).     No pending  2. Cardiology following  10. Sacral wound - present prior to admission  1. Wound care consulted  11. GERD  1. PPI  12. Afib  1. Eliquis, BB  13. CAD  1. Statin, BB  2. Holding ACEi d/t sepsis/HOTN  14.  Anxiety  1. duloxetine    Plan of care: GI st

## 2020-12-02 NOTE — PLAN OF CARE
2115 Patient's BP 70/49, paged Dr. Jamal Ladd, n/o 500ml bolus 0.9NS.    0.9 @100 started. Patient's peg tube leaks right at the opening in to the body. Leaks a lot of clear fluid, looks like water.   Dressing changed by nurse 4 times, dressing saturated ea

## 2020-12-02 NOTE — PROGRESS NOTES
[facility] Gastroenterology Progress Note    CC: nausea, vomiting     S: Called back about leaking PEG; Pt with no nausea/vomiting      O: BP (!) 75/48   Pulse 104   Temp 97.6 °F (36.4 °C) (Oral)   Resp 16   Ht 5' 5\" (1.651 m)   Wt 100 lb (45.4 kg) following, possible PNA on CT   - Cardiology following   - Nutrition consulted given large volume of tube feeding over a smaller period of time at home as pt was supposed to be on 90 cc/hr at home but did not tolerate this; pt's  states that pt did

## 2020-12-02 NOTE — PROGRESS NOTES
[facility]  Cardiology Progress Note    Subjective:  No chest pain or shortness of breath.     Objective:  BP (!) 85/51 (BP Location: Left arm)   Pulse 78   Temp 97.6 °F (36.4 °C) (Oral)   Resp 16   Ht 5' 5\" (1.651 m)   Wt 100 lb (45.4 kg)   SpO2 92%    normal.   5. Pulmonary arteries: Systolic pressure was at the upper limits of normal      to, at most, mildly increased; in the range of 30 mm Hg to 35 mm Hg. 6. Pericardium, extracardiac: There was no pericardial effusion.      Impressions:  This st

## 2020-12-02 NOTE — PLAN OF CARE
Seen & evaluated by Dr Morena Yanez. He did some adjustments w/ the PEG at the bedside & told RN to restart tube feedings. He told RN that there will still some leakage from the site but it shouldn't be too much since the whole was big.  He wants to be notified

## 2020-12-02 NOTE — PAYOR COMM NOTE
--------------  ADMISSION REVIEW     Payor: LAUREN [facility]  Subscriber #:  [id number]  Authorization Number: [id number]    Admit date: 12/1/20  Admit time: 26       Admitting Physician: Leo Carreon MD  Attending Physician:  Lucina Etienne • Chest pain on exertion    • CONGESTIVE HEART FAILURE     pt is on waiting list for heart transplant/pt had a heart attack 2004   • Constipation    • Coronary artery disease     LAD, angioplasty and stent   • Diarrhea, unspecified    • Dizziness    • Easy Medtronic ICD- newest one [id number]?    • CARDIAC PACEMAKER PLACEMENT     • CATH DRUG ELUTING STENT      x 2   • CHOLECYSTECTOMY     • COLONOSCOPY     • COLONOSCOPY N/A 7/13/2015    Performed by Dayana Holder MD at [facility] ENDOSCOPY   • EGD     • ENDOSCOPIC ULTR left sural nerve biopsy   • OTHER SURGICAL HISTORY  06/30/2009    [facility] heart net   • PERCUTANEOUS ENDOSCOPIC GASTROSTOMY PLACEMENT/JEJUNOSTOMY TUBE PLACEMENT N/A 11/26/2020    Performed by Otf Kasper DO at [facility] ENDOSCOPY   • PERCUTANEOUS ENDOSCOPI Musculoskeletal: No tenderness, swelling, deformity   Skin: No significant lesions   Neuro: Grossly intact to patient's baseline      ED Course     Labs Reviewed   COMP METABOLIC PANEL (14) - Abnormal; Notable for the following components:       Result Ese CBC W/ DIFFERENTIAL - Abnormal; Notable for the following components:    WBC 22.1 (*)     RBC 3.13 (*)     HGB 9.8 (*)     HCT 30.8 (*)     RDW-SD 50.3 (*)     Neutrophil Absolute Prelim 16.96 (*)     Neutrophil Absolute 16.96 (*)     Monocyte Absolute 2.4 2. Increasing bilateral pleural effusions with development of patches of nonspecific ground-glass density may be due to an infectious/inflammatory process, correlate clinically.      Bilateral lower extremity venous Doppler no evidence of DVT  MDM      Briana Filed: 2020  2:35 PM Date of Service: 2020 12:34 PM Status: Signed    : Cindy Stanton DO (Physician)           [facility]  History and Physical     Blaire Else Patient Status:  Emergency    1960 MRN [id number] Sulfa Antibiotics       NAUSEA AND VOMITING    Comment:pancreatitis  Nsaids                  UNKNOWN    Comment:Cardiologist states none to be taken. Medications:  No current facility-administered medications on file prior to encounter.      •  Ciproflox General: pale, weak/malnourished appearing  HEENT: Normocephalic atraumatic. Dry mucous membranes. EOM-I. PERRLA. Anicteric. Neck: No lymphadenopathy. No JVD. No carotid bruits. Respiratory: Clear to auscultation bilaterally. No wheezes. No rhonchi.   Car 2. gentle IVF given significant cardiac history and albumin of 1  3. CTAP/chest ordered in ED  4. Empiric zosyn  5. BL cx'es obtained  6. Urine culture pending  7. Rapid COVID testing negative  8. ID consulted  2. LE edema and elevated pro-BNP (~7k).  ULISESLE Reason for consultation: nausea, vomiting   HPI: This is a 62 yr-old female with a complex hx that includes MI, CAD s/p PCI, ischemic CM s/p ICD, AF (Eliquis), anorexia (denies current issue), cyclic vomiting syndrome c/b progressive wt loss s/p PEG tube ( THERAPEUTICS: After good transillumination and good indentation the skin was prepped with Chloraprep.  The area  Was covered with a fenestrated sterile drape.  The skin was numbed using a 22 gauge needle and a 5 cc syringe with 1% lidocaine, a total of 3 cc Impression: 62 yr-old female with complex hx including MI, CAD s/p PCI, ischemic CM s/p ICD, AF (Eliquis), anorexia (denies current issue), cyclic vomiting syndrome c/b progressive wt loss s/p PEG tube (required jejunal extension however c/b J-tube migrati Briefly, this is a 81-year-old female with a history of ischemic cardiomyopathy, A. fib on Eliquis, cyclical vomiting status post PEG tube with recurrent JG tube migration/clogging presenting with nausea and vomiting.   Patient was recently admitted for mal Mena Malone is a a(n) 62year old female who presents with worsening bilateral lower extremity edema, nausea/vomiting and abdominal pain. She has followed in the past with my colleague Dr. Alden Sandoval.   She has recently been hospitalized (11/24-11 US venous duplex 11/30/2020: CONCLUSION:  No evidence of deep venous thrombosis in the bilateral lower extremities within the limitations of the exam as above.  Dictated by (CST): Elen Sharpe MD on 11/30/2020 at 11:23 AM      CT abd/pelvis 11/30/2020: C -likely increase peripheral edema likely due to hypoalbuminemia  -careful hydration for sepsis without septic shock  -further evaluation of UTI/PNA per primary service  -continue BB, statin and Eliquis  -agree with holding ACE-I for now     Thank you for a Vital signs: Blood pressure (!) 70/51, pulse 100, temperature 97.6 °F (36.4 °C), temperature source Oral, resp. rate 20, height 165.1 cm (5' 5\"), weight 100 lb (45.4 kg), SpO2 93 %. HEENT: Moist mucous membranes. Extraocular muscles are intact.   Normal c Progress Note            Theodore [facility] Patient Status:  Observation    1960 MRN [id number]   [facility] 4NW-A Attending Sharon Buckner MD   Hosp Day # 0 PCP Mirtha Field      Chief Complaint: BLLE edema, N/V     S: Patient f WBC 22.3* 15.7* 14.2* 20.5* 27.7* 22.1*   HGB 11.0* 10.5* 9.6* 10.1* 11.3* 9.8*   MCV 97.7 98.5 101.0* 101.3* 100.3* 98.4   .0 219.0 210.0 204.0 223.0 150.0   INR 1.12*  --   --   --   --   --                 Recent Labs   Lab 11/27/20  0658 11/30/2 4. BLLE edema and elevated pro-BNP (~7k). BLLE venous doppler negative for VTE  1. Cardiology following  2. Repeat echo done this morning, results pending  3. Vascular surgery consulted > arterial doppler BLLE  5. Chronic anemia - at baseline  1.  Folic aci Denise Navarro is a(n) 62year old female, with a PMH of cyclic vomiting syndrome with malnutrition requiring feeding tube and axonal neuropathy with ataxia, who presented with LE edema and generalized weakness. We were asked to see for foot drop. Increasing bilateral pleural effusions with development of patches of nonspecific ground-glass density may be due to an infectious/inflammatory process.      11/25/2020 CT Brain  There is no acute abnormality     Assessment/Plan:  · Severe axonal sensory a 12/2/2020 0543 Given 40 mg Intravenous Hemanth Metz RN    12/1/2020 1726 Given 40 mg Intravenous Tanmay George RN      Piperacillin Sod-Tazobactam So (ZOSYN) 3.375 g in dextrose 5 % 100 mL ADD-vantage     Date Action Dose Route User    12/2/2

## 2020-12-03 PROBLEM — J18.9 MULTIFOCAL PNEUMONIA: Status: ACTIVE | Noted: 2020-12-03

## 2020-12-03 PROCEDURE — 99232 SBSQ HOSP IP/OBS MODERATE 35: CPT | Performed by: OTHER

## 2020-12-03 PROCEDURE — 99232 SBSQ HOSP IP/OBS MODERATE 35: CPT | Performed by: INTERNAL MEDICINE

## 2020-12-03 PROCEDURE — 99232 SBSQ HOSP IP/OBS MODERATE 35: CPT | Performed by: HOSPITALIST

## 2020-12-03 RX ORDER — LEVOTHYROXINE SODIUM 0.07 MG/1
75 TABLET ORAL
Status: DISCONTINUED | OUTPATIENT
Start: 2020-12-03 | End: 2020-12-08

## 2020-12-03 NOTE — CM/SW NOTE
SW received a call from [facility] stating they do not supply the tube feeding supplies. Called [facility]. They stated they do not service the pt.  Will talk to the pt's spouse about this

## 2020-12-03 NOTE — DIETARY NOTE
1230 [facility] ASSESSMENT    Pt meets moderate malnutrition criteria.     CRITERIA FOR MALNUTRITION DIAGNOSIS:  Criteria for non-severe malnutrition diagnosis: chronic illness related to wt loss 5% in 1 month, energy intake less than No new wts. 12/1- Received call from RN about MD wanting to advance TF more frequently, as tolerated, to achieve goal rate sooner. TF started at 10 ml/hr today. Discussed TF changes with RN. See TF recs above.      [birth year]59 y/o female admitted with vomi TF  Oral Supplements: none at this time    FOOD/NUTRITION RELATED HISTORY:  Appetite: Poor  Intake: no PO intakes recorded yet  Intake Meeting Needs: No, TF to meet nutrition needs  Food Allergies: No  Cultural/Ethnic/Congregational Preferences Addresses:  Yes

## 2020-12-03 NOTE — PLAN OF CARE
Assumed care at [address], pt alert and oriented x3, afebrile, on 1.5L of oxygen sating at 92-98%. BP slight improvement. Lactic 3.2 notified MD, ordered to bolus and notify ID. No new orders from ID. Repeat lactic 2. 2. will continue to monitor.  Pt received Gato

## 2020-12-03 NOTE — PROGRESS NOTES
120 [facility] Dosing Service    Initial Pharmacokinetic Consult for Vancomycin Dosing     Marita Isaacs is a 62year old patient who is being treated for sepsis.   Pharmacy has been asked to dose Vancomycin by Dr. Pauleen Lundborg:  Isa Burden

## 2020-12-03 NOTE — PROGRESS NOTES
[address] [place] [address]  TEL: [phone]  FAX: [phone]    [facility] Patient Status:  Observation    1960 MRN [id number]   [facility] 4NW-A Attending Adriana Clarke MD   Hosp Day # 2 PCP Rufina Mukherjee artery    • Stress    • Syncope    • Systolic heart failure (HCC)    • Thrush of mouth and esophagus (HCC)    • Uncomfortable fullness after meals    • Visual impairment     glasses for reading   • Vomiting    • Wears glasses    • Weight loss      Past Julio by Tilmon Bence, MD at [facility] ENDOSCOPY   • ESOPHAGOGASTRODUODENOSCOPY (EGD) N/A 12/1/2015    Performed by Delicia Sage MD at [facility] ENDOSCOPY   • ESOPHAGOGASTRODUODENOSCOPY (EGD) N/A 8/11/2015    Performed by Kwasi Irving MD at [facility] NAUSEA ONLY    Comment:Pt states \"it messes with my stomach, but I can             take it if I need it\"  Sulfa Antibiotics       NAUSEA AND VOMITING    Comment:pancreatitis  Nsaids                  UNKNOWN    Comment:Cardiologist states none to be and negatives noted in the the HPI. Physical Exam:    General: No acute distress. Drowsy, did wake up briefly. Very thin  Vital signs: Blood pressure 90/62, pulse 98, temperature 97.9 °F (36.6 °C), temperature source Oral, resp.  rate 16, height 5' 5\" (

## 2020-12-03 NOTE — PROGRESS NOTES
[address] [place] [address]  TEL: [phone]  FAX: [phone]    Carrie Da Silva Patient Status:  Observation    1960 MRN [id number]   [facility] 4NW-A Attending Pernell Castleman, MD   Hosp Day # 2 PCP Chavo Judge Shortness of breath    • Sleep disturbance    • Stented coronary artery    • Stress    • Syncope    • Systolic heart failure (HCC)    • Thrush of mouth and esophagus (HCC)    • Uncomfortable fullness after meals    • Visual impairment     glasses for readi • ESOPHAGOGASTRODUODENOSCOPY (EGD) N/A 11/16/2018    Performed by Loree Villalta MD at [facility] ENDOSCOPY   • ESOPHAGOGASTRODUODENOSCOPY (EGD) N/A 12/1/2015    Performed by Fernie Valdez MD at [facility] ENDOSCOPY   • ESOPHAGOGASTRODUODENOSCOPY (EGD) N/A 8/1 current drug use. Drug: Cannabis.     Allergies:    Codeine Sulfate         NAUSEA ONLY    Comment:Pt states \"it messes with my stomach, but I can             take it if I need it\"  Sulfa Antibiotics       NAUSEA AND VOMITING    Comment:pancreatitis  Nsai and negatives noted in the the HPI. Physical Exam:    General: No acute distress. Alert and oriented x 3. Thin  Vital signs: Blood pressure (!) 88/67, pulse 88, temperature 97.8 °F (36.6 °C), resp.  rate 16, height 165.1 cm (5' 5\"), weight 100 lb (45.4 failure type (HCC)     Severe muscle deconditioning     History of laparoscopic cholecystectomy     Renal insufficiency     Chronic idiopathic pain syndrome     Adjustment disorder with depressed mood     Intractable vomiting with nausea     Adult failure neg  #acute on chronic N/V  #B/L LE edema  #recent E coli UTI  #malnutrition s/p GJ tube    PLAN:  -change to Vanco/Meropenem  -f/u Bcx, NGTD  -get UA, Ulegionella, and UStrep  -if having diarrhea, get Cdiff  -f/u GI recs about leaking from PEG tube  -fariha

## 2020-12-03 NOTE — PLAN OF CARE
PeG tube feeding restarted this pm at 20cc/hr. Patient tolerating feedings so far,no nauea & vomiting. Placement confirmed prior to starting the feeding. Head of bed placed above 30deg.  Abdominal dressing applied over the PEG for any leaking of fluids,minim

## 2020-12-03 NOTE — PROGRESS NOTES
[facility]  Progress Note     Montez Soulier Patient Status:  Inpatient    1960 MRN [id number]   [facility] 4NW-A Attending Susi Black [facility] Day # 2  [address]     Chief Complaint: Virginia Moffett    S: Lemuel Quinn 21.0  --    ALKPHO 505*  --  436* 373*  --   --    AST  --  55* 69* 60*  --   --    ALT 16  --  18 13  --   --    BILT 1.0  --  1.0 1.0  --   --    TP 6.5  --  5.7* 4.9*  --   --        Estimated Creatinine Clearance: 45.7 mL/min (based on SCr of 0.95 mg/d HFrEF - worsening LE edema, but abnormal CXR more concerning for infection than volume overload  1. Echo results pending  2. Cardiology following  10. Sacral wound - present prior to admission  1. Wound care consulted  11. GERD  1. PPI  12. Afib  1.  Cathalene Uday

## 2020-12-03 NOTE — PROGRESS NOTES
[facility]  Cardiology Progress Note    Subjective:  No chest pain or shortness of breath. Does not interact much. Withdrawn. Appears depressed.      Objective:  BP 90/62   Pulse 98   Temp 97.9 °F (36.6 °C) (Oral)   Resp 16   Ht 5' 5\" (1.651 m)   Wt 1 Director, Heart Failure Research, [address] [place] Director, [address] 4th [facility]  Jorge Toney 12, [address] [place], [address] [address]  Phone: [phone] f

## 2020-12-04 ENCOUNTER — APPOINTMENT (OUTPATIENT)
Dept: GENERAL RADIOLOGY | Facility: HOSPITAL | Age: 60
DRG: 871 | End: 2020-12-04
Attending: HOSPITALIST
Payer: COMMERCIAL

## 2020-12-04 ENCOUNTER — APPOINTMENT (OUTPATIENT)
Dept: NUCLEAR MEDICINE | Facility: HOSPITAL | Age: 60
DRG: 871 | End: 2020-12-04
Attending: INTERNAL MEDICINE
Payer: COMMERCIAL

## 2020-12-04 PROCEDURE — 71045 X-RAY EXAM CHEST 1 VIEW: CPT | Performed by: HOSPITALIST

## 2020-12-04 PROCEDURE — 99232 SBSQ HOSP IP/OBS MODERATE 35: CPT | Performed by: HOSPITALIST

## 2020-12-04 PROCEDURE — 99232 SBSQ HOSP IP/OBS MODERATE 35: CPT | Performed by: INTERNAL MEDICINE

## 2020-12-04 PROCEDURE — 78580 LUNG PERFUSION IMAGING: CPT | Performed by: INTERNAL MEDICINE

## 2020-12-04 RX ORDER — ALBUMIN (HUMAN) 12.5 G/50ML
25 SOLUTION INTRAVENOUS ONCE
Status: COMPLETED | OUTPATIENT
Start: 2020-12-04 | End: 2020-12-04

## 2020-12-04 RX ORDER — FUROSEMIDE 10 MG/ML
40 INJECTION INTRAMUSCULAR; INTRAVENOUS ONCE
Status: COMPLETED | OUTPATIENT
Start: 2020-12-04 | End: 2020-12-04

## 2020-12-04 NOTE — CONSULTS
Pulmonary H&P/Consult       NAME: Blaire Dias - ROOM: [facility] - MRN: [id number] - Age: 62year old - :  1960    Date of Admission: 2020  9:19 AM   Admission Diagnosis: Hypoalbuminemia [E88.09]  Weakness generalized [R53.1]  Emesis, habits    • Ischemic cardiomyopathy    • Kidney failure    • Leg swelling    • Loss of appetite    • Mouth sores    • Muscle weakness     due to neuropathy in P.T now   • Nausea    • Neuropathy    • On tube feeding diet     eats a little   • Osteoporosis Performed by Qamar Roy MD at [facility] ENDOSCOPY   • ESOPHAGOGASTRODUODENOSCOPY (EGD) N/A 12/8/2019    Performed by Luis Sue DO at [facility] ENDOSCOPY   • ESOPHAGOGASTRODUODENOSCOPY (EGD) N/A 9/27/2019    Performed by Kalyani Munoz MD at [address] (500 mg total) by mouth 2 (two) times daily for 3 days. , Disp: 6 tablet, Rfl: 0, Past Week at Unknown time    •  Zolpidem Tartrate ER 6.25 MG Oral Tab CR, Take 6.25 mg by mouth nightly as needed for Sleep., Disp: , Rfl: , Past Week at Unknown time    •  ap History:  Social History    Socioeconomic History      Marital status:       Spouse name: Not on file      Number of children: Not on file      Years of education: Not on file      Highest education level: Not on file    Occupational History      No Not Asked        Self-Exams: Not Asked    Social History Narrative      Not on file         Family History:  Family History   Problem Relation Age of Onset   • Other (Other) Father         Polio & multiple aortic aneursyms - burst   • Cancer Mother mouth daily. , Disp: , Rfl:         Scheduled Medication:  • Levothyroxine Sodium  75 mcg Oral Before breakfast   • nystatin   Topical BID   • meropenem  500 mg Intravenous Q8H   • vancomycin  15 mg/kg Intravenous Q24H   • pantoprazole (PROTONIX) IV push  4 Net 2100 ml       BP 96/70 (BP Location: Left arm)   Pulse 109   Temp 98.5 °F (36.9 °C) (Oral)   Resp 18   Ht 5' 5\" (1.651 m)   Wt 117 lb 1 oz (53.1 kg)   SpO2 90%   BMI 19.48 kg/m²     General Appearance:    Alert, cooperative, no distress, appears sta

## 2020-12-04 NOTE — PROGRESS NOTES
[facility]    Progress Note    Aggie Days Patient Status:  Inpatient    1960 MRN [id number]   [facility] 4NW-A Attending Jenny Mel[facility]dez [facility] Day # 2 PCP SHRUTI DESAI     Subjective:  Citlaly Kati Oral, Q15 Min PRN    Or    •  dextrose 50 % injection 50 mL, 50 mL, Intravenous, Q15 Min PRN    Or    •  glucose (DEX4) oral liquid 30 g, 30 g, Oral, Q15 Min PRN    Or    •  Glucose-Vitamin C (DEX-4) chewable tab 8 tablet, 8 tablet, Oral, Q15 Min PRN    • disease     Paresthesias     Weakness     Neuropathy     Opioid dependence with opioid-induced disorder (HCC)     Anxiety disorder     Depression     Hyponatremia     Hypotension, unspecified hypotension type     Intra-abdominal free air of unknown etiolog Gastrojejunostomy tube dislodgement     Leukocytosis, unspecified type     Emesis, persistent     Weakness generalized     Hypoalbuminemia     Bilateral leg edema     Multifocal pneumonia        Impression/Plan:  Severe axonal sensory and motor polyneuropa

## 2020-12-04 NOTE — CM/SW NOTE
PRASAD left a message for the pt's  in regards to dc plan. Awaiting a call back. Still unsure where pt gets her tube feeding supplies from. Sent updated clinicals to [facility]. Updated [facility] liaison at .  PRASAD to remain available and follo

## 2020-12-04 NOTE — PROGRESS NOTES
[facility]  Advanced Heart Failure Progress Note    Gertrude Coelho Patient Status:  Inpatient    1960 MRN [id number]   [facility] 4NW-A Attending Froy [facility] Day # 3 PCP SHRUTI DESAI     Subjective: HGB 10.1 12/04/2020    HCT 32.7 12/04/2020    MCV 97.0 12/04/2020    MCH 30.0 12/04/2020    MCHC 30.9 12/04/2020    RDW 14.6 12/04/2020    .0 12/04/2020     Lab Results   Component Value Date    PT 13.3 10/16/2012    INR 1.12 (H) 11/24/2020    IN Oral, Nightly    •  mirtazapine (REMERON) tab 15 mg, 15 mg, Oral, Nightly    •  atorvastatin (LIPITOR) tab 40 mg, 40 mg, Oral, Nightly    •  zolpidem (AMBIEN) tab 5 mg, 5 mg, Oral, Nightly PRN    •  acetaminophen (TYLENOL) tab 650 mg, 650 mg, Oral, Q6H PRN fibrillation (HCC)     Leukocytosis     Acute weakness     Gait disturbance     Dizziness     Frequent falls     Sepsis with acute organ dysfunction without septic shock (HCC)     Eliane-Lee tear     Shock ([facility] [address]     Hallucinations     Delirium     Encep [phone]  E-mail: [email or web address]    12/4/2020  12:02 PM

## 2020-12-04 NOTE — PROGRESS NOTES
[facility]  Progress Note     Ruffus Cage Patient Status:  Inpatient    1960 MRN [id number]   [facility] 4NW-A Attending Conchis Isaacs [address] Day # [address]  [address]     Chief Complaint: [place] Jhon    S: [facility] < > 4.5 4.9 4.6 4.0  --      --   --  108 108 107  --    CO2 20.0*  --   --  17.0* 22.0 21.0  --    ALKPHO 505*  --   --  436* 373*  --   --    AST  --   --  55* 69* 60*  --   --    ALT 16  --   --  18 13  --   --    BILT 1.0  --   --  1.0 1.0  -- poor intake  1. improving  8. Cyclic vomiting/chronic G-tube/malnourishment  1. GI consulted  2. Nutrition consulted for TF  3. PTA mirtazapine, folic acid  9.  Chronic HFrEF - worsening LE edema, but abnormal CXR more concerning for infection than volume o

## 2020-12-04 NOTE — PLAN OF CARE
PT A/O, 94% ON 2L, DESATS ON RA, AFEBRILE, IV ANTIBIOTICS, IVF INFUSING, NAUSEATED, EMESIS X2, ZOFRAN GIVEN, TUBE FEEDINGS AT 30CC/HR, HYPOTENSIVE, ASYMPTOMATIC, REFUSED SCDS, DRESSING TO PEG TUBE D/I, MEPILEX TO SACRUM, INSTRUCTED PT ON POC, LABS IN AM  S

## 2020-12-04 NOTE — PAYOR COMM NOTE
--------------  CONTINUED STAY REVIEW-----REQUESTING ADDITIONAL DAY 12/4      Payor: Riccardo Hamman PPO  Subscriber #:  [id number]  Authorization Number: [id number]    Admit date: 12/1/20  Admit time: 26    Admitting Physician: Adela Weiss MD  Attending Phys .3* 98.4 96.2 97.9 96.4   .0 150.0 143.0* 171.0 185. 0                   Recent Labs   Lab 11/30/20  0934 11/30/20  0934 11/30/20  1055 11/30/20  1505 12/01/20  0757 12/02/20  0852 12/03/20  0437   *  --   --  85 50* 57*  --    BUN 15 2. Hypotension - BL BP's in 80's/40's per . 1. Asymptomatic in 60's/40 - suspect related to cuff size  2. Improved with pediatric cuff  3. Stop IVF  3. Leukocytosis  1. Improving w/ IVF/zosyn  4. BLLE edema and elevated pro-BNP (~7k).  BLLE venous d Date Action Dose Route User    12/4/2020 0920 Given 5 mg Oral Alicia María, [facility]    12/3/2020 2128 Given 5 mg Oral Raúl Delgado, RN      atorvastatin (LIPITOR) tab 40 mg     Date Action Dose Route User    12/3/2020 2128 Given 40 mg Oral Sandy, Ba 12/4/2020 0631 Given 40 mg Intravenous Pradeep Casey, RN    12/3/2020 1840 Given 40 mg Intravenous Kirstie Paige RN      0.9% NaCl infusion     Date Action Dose Route User    12/4/2020 0000 New Bag (none) Intravenous Pradeep Casey, RN      [place]

## 2020-12-04 NOTE — PROGRESS NOTES
[address] [place] [address]  TEL: [phone]  FAX: [phone]    [facility] LouCherelle Patient Status:  Observation    1960 MRN [id number]   [facility] 4NW-A Attending Blaze Curtis MD   Hosp Day # 3 PCP Desmond Larson ([address]    • Thrush of mouth and esophagus ([address]    • Uncomfortable fullness after meals    • Visual impairment     glasses for reading   • Vomiting    • Wears glasses    • Weight loss      Past Surgical History:   Procedure Laterality Date   • ANGIOGRAM  11 ESOPHAGOGASTRODUODENOSCOPY (EGD) N/A 12/1/2015    Performed by Jonatan Ambrose MD at [facility] ENDOSCOPY   • ESOPHAGOGASTRODUODENOSCOPY (EGD) N/A 8/11/2015    Performed by Donna Marshall MD at [facility] ENDOSCOPY   • FOOT SURGERY      fixed nerve   • GASTROSTOMY TUB messes with my stomach, but I can             take it if I need it\"  Sulfa Antibiotics       NAUSEA AND VOMITING    Comment:pancreatitis  Nsaids                  UNKNOWN    Comment:Cardiologist states none to be taken.     Medications:    Current Facility- did wake up briefly. Very thin  Vital signs: Blood pressure 96/70, pulse 109, temperature 98.6 °F (37 °C), temperature source Oral, resp. rate 18, height 5' 5\" (1.651 m), weight 117 lb 1 oz (53.1 kg), SpO2 90 %. HEENT: Moist mucous membranes.  Extraocular

## 2020-12-05 PROBLEM — E03.8 SUBCLINICAL HYPOTHYROIDISM: Status: ACTIVE | Noted: 2020-12-05

## 2020-12-05 PROCEDURE — 99232 SBSQ HOSP IP/OBS MODERATE 35: CPT | Performed by: HOSPITALIST

## 2020-12-05 PROCEDURE — 99233 SBSQ HOSP IP/OBS HIGH 50: CPT | Performed by: NURSE PRACTITIONER

## 2020-12-05 RX ORDER — FUROSEMIDE 10 MG/ML
40 INJECTION INTRAMUSCULAR; INTRAVENOUS ONCE
Status: COMPLETED | OUTPATIENT
Start: 2020-12-05 | End: 2020-12-05

## 2020-12-05 RX ORDER — POTASSIUM CHLORIDE 1.5 G/1.77G
40 POWDER, FOR SOLUTION ORAL ONCE
Status: COMPLETED | OUTPATIENT
Start: 2020-12-05 | End: 2020-12-05

## 2020-12-05 RX ORDER — ALBUMIN (HUMAN) 12.5 G/50ML
25 SOLUTION INTRAVENOUS ONCE
Status: COMPLETED | OUTPATIENT
Start: 2020-12-05 | End: 2020-12-05

## 2020-12-05 NOTE — PLAN OF CARE
Breathing continues to be labored. Able to wean to 4L O2  Right hand IV painful  Vascular consult for better access ordered  Vanc trough ordered for tonight  IVABX  Monitor urine output. It patient remains inc consider purwick. Pt declined this morning.  Wi

## 2020-12-05 NOTE — PROGRESS NOTES
[facility]  Progress Note     Hector Arguello Patient Status:  Inpatient    1960 MRN [id number]   [facility] 4NW-A Attending Scottie [facility] [address] Day # 4  [address]      Chief Complaint: Petar Boston    S: Gamal Moran --  112   CO2 17.0* 22.0 21.0  --   --  18.0*   ALKPHO 436* 373*  --   --   --  386*   AST 69* 60*  --   --   --  43*   ALT 18 13  --   --   --  19   BILT 1.0 1.0  --   --   --  0.6   TP 5.7* 4.9*  --   --   --  5.8*       Estimated Creatinine Clearance: weakness  1. PT/OT  7. Hyponatremia 2/2 poor intake  1. improving  8. Cyclic vomiting/chronic G-tube/malnourishment  1. GI consulted  2. Nutrition consulted for TF  3. PTA mirtazapine, folic acid  9.  Chronic HFrEF - worsening LE edema, but abnormal CXR mor

## 2020-12-05 NOTE — PROGRESS NOTES
[facility]  Cardiology Progress Note    Herberth Jacinto Patient Status:  Inpatient    1960 MRN [id number]   [facility] 4NW-A [facility] 4 PCP [address] back to case due t 12/05/2020    TP 5.8 12/05/2020    AST 43 12/05/2020    ALT 19 12/05/2020       CXR: Extensive bilateral infiltrates. Differential considerations include pulmonary edema and or pneumonia.     Medications:    • Levothyroxine Sodium  75 mcg Oral Before break

## 2020-12-05 NOTE — PLAN OF CARE
Pt alert and oriented x4, on 2L oxygen at start of the shift; desatting to mid 80s, oxygen increased to 5L and sats improved to 93%. Blood pressures stable in the 80s.  Approximately 2100 pt was tachypneic and tachycardic, rhonchi heard bilaterally in the l

## 2020-12-06 ENCOUNTER — APPOINTMENT (OUTPATIENT)
Dept: GENERAL RADIOLOGY | Facility: HOSPITAL | Age: 60
DRG: 871 | End: 2020-12-06
Attending: HOSPITALIST
Payer: COMMERCIAL

## 2020-12-06 PROCEDURE — 99232 SBSQ HOSP IP/OBS MODERATE 35: CPT | Performed by: HOSPITALIST

## 2020-12-06 PROCEDURE — 99233 SBSQ HOSP IP/OBS HIGH 50: CPT | Performed by: NURSE PRACTITIONER

## 2020-12-06 PROCEDURE — 71045 X-RAY EXAM CHEST 1 VIEW: CPT | Performed by: HOSPITALIST

## 2020-12-06 RX ORDER — POTASSIUM CHLORIDE 1.5 G/1.77G
40 POWDER, FOR SOLUTION ORAL ONCE
Status: COMPLETED | OUTPATIENT
Start: 2020-12-06 | End: 2020-12-06

## 2020-12-06 RX ORDER — ALBUMIN (HUMAN) 12.5 G/50ML
25 SOLUTION INTRAVENOUS ONCE
Status: COMPLETED | OUTPATIENT
Start: 2020-12-06 | End: 2020-12-06

## 2020-12-06 RX ORDER — FUROSEMIDE 10 MG/ML
40 INJECTION INTRAMUSCULAR; INTRAVENOUS ONCE
Status: COMPLETED | OUTPATIENT
Start: 2020-12-06 | End: 2020-12-06

## 2020-12-06 RX ORDER — ALPRAZOLAM 0.25 MG/1
0.25 TABLET ORAL EVERY 6 HOURS PRN
Status: DISCONTINUED | OUTPATIENT
Start: 2020-12-06 | End: 2020-12-08

## 2020-12-06 RX ORDER — POTASSIUM CHLORIDE 20 MEQ/1
40 TABLET, EXTENDED RELEASE ORAL EVERY 4 HOURS
Status: COMPLETED | OUTPATIENT
Start: 2020-12-06 | End: 2020-12-06

## 2020-12-06 NOTE — PLAN OF CARE
Pt is aox4 and is on 3L of oxygen and pt is tolerating that well pt does not state sob at this time. Pt Is on tele and via tele is is NSR and Sinus tachy and with deep breaths pt is ok.  Pt blood pressure is on the lower and and this is pt baseline an md aw suctioning and perform as needed  - Assess and instruct to report SOB or any respiratory difficulty  - Respiratory Therapy support as indicated  - Manage/alleviate anxiety  - Monitor for signs/symptoms of CO2 retention  Outcome: Progressing     Problem: Enrique Kwon

## 2020-12-06 NOTE — PLAN OF CARE
Pt alert and oriented x3-4, forgetful at times. C/o SOB this afternoon, O2 stable at 2L. Lasix and albumin given. Kumar in place. Urine output minimal. Tube feeding infusing at 40ml/hr, unable to increase d/t nausea. No residuals noted. Potassium replaced.

## 2020-12-06 NOTE — PROGRESS NOTES
[facility] Pharmacy Note:  Renal Adjustment for meropenem (MERREM)    Karen Cortez is a 62year old patient who has been prescribed meropenem (MERREM) 500 mg every 8 hrs.   CrCl is estimated creatinine clearance is 31.3 mL/min (A) (based on SCr of 1.74 m

## 2020-12-06 NOTE — PROGRESS NOTES
[facility] Pharmacy dosing service    Follow-up Pharmacokinetic Consult for Vancomycin Dosing     Tri Barboza is a 62year old patient who is being treated for pneumonia and sepsis.    Patient is on day 4 of Vancomycin and is currently receiving 750 mg [address] Extension: [phone]

## 2020-12-06 NOTE — PROGRESS NOTES
[facility] Pharmacy dosing service    Follow-up Pharmacokinetic Consult for Vancomycin Dosing     Herberth Jacinto is a 62year old patient who is being treated for pneumonia and sepsis.  Patient is on day 5 of Vancomycin and is currently receiving 750 mg IV Extension: [phone]

## 2020-12-06 NOTE — PROGRESS NOTES
12/06/20 1150   Clinical Encounter Type   Visited With Patient; Health care provider   Routine Visit Introduction  (Patient states that  has completed POA paperwork and will try to bring copies to the hospital)   Continue Visiting No

## 2020-12-06 NOTE — PROGRESS NOTES
[facility]  Cardiology Progress Note    Adam Live Patient Status:  Inpatient    1960 MRN [id number]   [facility] 4NW-A Attending Arianne Johnson [address] Day # 5 PCP SHRUTI DESAI         Subjective:  States \ breakfast   • nystatin   Topical BID   • pantoprazole (PROTONIX) IV push  40 mg Intravenous BID   • apixaban  5 mg Oral BID   • DULoxetine HCl  30 mg Oral BID   • folic acid  961 mcg Oral Daily   • Metoprolol Succinate ER  12.5 mg Oral Nightly   • mirtazap

## 2020-12-06 NOTE — PROGRESS NOTES
[facility]  Progress Note     Aggie Days Patient Status:  Inpatient    1960 MRN [id number]   [facility] 4NW-A Attending Mj Beltran [address] Day # 5  Jessica      Chief Complaint: Hollice Course    S: Liseth 4.0  --   --  3.7 3.6    108 107  --   --  112  --    CO2 17.0* 22.0 21.0  --   --  18.0*  --    ALKPHO 436* 373*  --   --   --  386*  --    AST 69* 60*  --   --   --  43*  --    ALT 18 13  --   --   --  19  --    BILT 1.0 1.0  --   --   --  0.6  -- weakness  1. PT/OT  7. Hyponatremia 2/2 poor intake  1. improving  8. Cyclic vomiting/chronic G-tube/malnourishment  1. GI consulted  2. Nutrition consulted for TF  3. PTA mirtazapine, folic acid  9.  Chronic HFrEF - worsening LE edema, cxr now showing volu

## 2020-12-07 PROCEDURE — 99253 IP/OBS CNSLTJ NEW/EST LOW 45: CPT | Performed by: INTERNAL MEDICINE

## 2020-12-07 PROCEDURE — 99232 SBSQ HOSP IP/OBS MODERATE 35: CPT | Performed by: HOSPITALIST

## 2020-12-07 PROCEDURE — 99233 SBSQ HOSP IP/OBS HIGH 50: CPT | Performed by: INTERNAL MEDICINE

## 2020-12-07 RX ORDER — LEVOFLOXACIN 750 MG/1
750 TABLET ORAL
Qty: 2 TABLET | Refills: 0 | Status: SHIPPED | OUTPATIENT
Start: 2020-12-07 | End: 2020-12-10

## 2020-12-07 RX ORDER — MORPHINE SULFATE 20 MG/ML
5 SOLUTION ORAL
Status: DISCONTINUED | OUTPATIENT
Start: 2020-12-07 | End: 2020-12-08

## 2020-12-07 NOTE — CM/SW NOTE
MSW received a message from Palliative Care MD.  Patient wishes to dc home with hospice. MSW spoke with spouse at patient's bedside. He wants a hospice agency that will be easy to transition to an inpatient unit if needed.   [facility] is a close inp

## 2020-12-07 NOTE — PROGRESS NOTES
[address] [facility]  TEL: [phone]  FAX: [phone]    Lore Da Silva Patient Status:  Observation    1960 MRN [id number]   [facility] 4NW-A Attending Roswell Sicard, MD   Hosp Day # 6 PCP Mary Horowitz disturbance    • Stented coronary artery    • Stress    • Syncope    • Systolic heart failure (HCC)    • Thrush of mouth and esophagus (HCC)    • Uncomfortable fullness after meals    • Visual impairment     glasses for reading   • Vomiting    • Wears glas (EGD) N/A 11/16/2018    Performed by Karina Dwyer MD at [facility] ENDOSCOPY   • ESOPHAGOGASTRODUODENOSCOPY (EGD) N/A 12/1/2015    Performed by Donavan Negrete MD at [facility] ENDOSCOPY   • ESOPHAGOGASTRODUODENOSCOPY (EGD) N/A 8/11/2015    Performed by Divya Belle, Cannabis.     Allergies:    Codeine Sulfate         NAUSEA ONLY    Comment:Pt states \"it messes with my stomach, but I can             take it if I need it\"  Sulfa Antibiotics       NAUSEA AND VOMITING    Comment:pancreatitis  Nsaids                  Sydell Saucer systems was completed. Pertinent positives and negatives noted in the the HPI. Physical Exam:    General: No acute distress. Awake. On NC. Vital signs: Blood pressure 91/65, pulse 78, temperature 98.1 °F (36.7 °C), temperature source Oral, resp.  rate NGTD  -await Ulegionella, and UStrep   -if having diarrhea, get Cdiff  -monitor temps and WBCs  -pulmonary and cards evals appreciated  -noted plans for palliative care to see    D/w , patient, RN and Dr. Kenzie Bolanos. AARON DiazC

## 2020-12-07 NOTE — DIETARY NOTE
[address] [facility] ASSESSMENT    Pt meets moderate malnutrition criteria.     CRITERIA FOR MALNUTRITION DIAGNOSIS:  Criteria for non-severe malnutrition diagnosis: chronic illness related to wt loss 5% in 1 month, energy intake less than mL/hr. PEG in place and bumper adjusted by GI yesterday d/t leaking around PEG. Pt very fatigued and quiet when I went in.  RN tried to increase TF by 10 mL and pt felt nauseas. Pt was given zofran and TF was kept at 30 mL/hr. No new wts.      12/1Dpatricia Gottlieb (105 lb)  01/21/20 : 48.1 kg (106 lb)  12/22/19 : 48.3 kg (106 lb 6.4 oz)  11/18/19 : 49 kg (108 lb 0.4 oz)  09/27/19 : 50.8 kg (112 lb)  09/10/19 : 50.8 kg (112 lb)    NUTRITION:  Diet: Clear Liquid (advance diet as tolerated) + TF  Oral Supplements: none

## 2020-12-07 NOTE — CONSULTS
[address] [facility],[address] LouCherelle Patient Status:  Inpatient    1960 MRN [id number]   [facility] 4NW-A Attending [facility] Day # 6 PCP Leyva Locket     Date of Consult: 1 Hyperlipidemia    • Indigestion    • Irregular bowel habits    • Ischemic cardiomyopathy    • Kidney failure    • Leg swelling    • Loss of appetite    • Mouth sores    • Muscle weakness     due to neuropathy in P.T now   • Nausea    • Neuropathy    • On t ESOPHAGOGASTRODUODENOSCOPY (EGD) N/A 12/21/2019    Performed by Manuel Blas MD at [facility] ENDOSCOPY   • ESOPHAGOGASTRODUODENOSCOPY (EGD) N/A 12/8/2019    Performed by Raleigh Mclean DO at [facility] ENDOSCOPY   • ESOPHAGOGASTRODUODENOSCOPY (EGD) N/A 9/27/2019 Systems: Palliative Care symptom needs assessed:   appears overall - cathectic, frail . pt is alert and oriented to time place and person . Complains of  dypsnea.  She was short of breath at rest. I requested RN to give Roxanol, which helped with breath decades)  2) May continue life-prolonging measures and treatments  3) Includes treatment of symptoms to improve quality of life while receiving above measures and treatments  4) Consultation services    Hospice services:  1) Phone services 24/7 (they will Full           Some Disease      80     Full                  Normal w/effort          Full                   Normal or reduced       Full                                   Some Disease      70    Reduced          Can't Perform Job     Full (MERREM) 500 mg in sodium chloride 0.9% 100 mL MBP, 500 mg, Intravenous, Q12H  •  ALPRAZolam (XANAX) tab 0.25 mg, 0.25 mg, Oral, Q6H PRN  •  Levothyroxine Sodium tab 75 mcg, 75 mcg, Oral, Before breakfast  •  glucose (DEX4) oral liquid 15 g, 15 g, Oral, Q1 12/07/2020    ALB 2.1 (L) 12/07/2020    ALKPHO 319 (H) 12/07/2020    BILT 0.4 12/07/2020    TP 5.9 (L) 12/07/2020    AST 94 (H) 12/07/2020    ALT 25 12/07/2020    DDIMER 6.35 (H) 12/04/2020     (H) 09/27/2013    MG 1.5 (L) 11/27/2020    PHOS 3.3 11/ Goals of care: Pt and her  wish to go home with hospice. --left msg with floor SW to put in a referral for hospice. --code status changed to DNAR/Comfort care. --POLST completed and scanned.        Palliative Care Follow Up:    A total of   70 mi

## 2020-12-07 NOTE — CM/SW NOTE
MSW spoke with Ben De La Cruz from [facility]. She will be here between 4:30 and 5 to complete intake and sign patient onto hospice. Discharge likely tomorrow morning by ambulance due to 02 need to home.      Shaniqua Augustine LCSW

## 2020-12-07 NOTE — PROGRESS NOTES
[facility]  Progress Note     Hernando Tyler Patient Status:  Inpatient    1960 MRN [id number]   [facility] 4NW-A Att[facility] Day # 6  Jessica Sq     Chief Complaint: Sarah Arguello    S: Nadia Reddy --   --  140   K 4.6 4.0  --  3.7 3.6 3.8 5.0  5.0    107  --  112  --   --  118*   CO2 22.0 21.0  --  18.0*  --   --  14.0*   ALKPHO 373*  --   --  386*  --   --  319*   AST 60*  --   --  43*  --   --  94*   ALT 13  --   --  19  --   --  25   BILT 1 increased  6. Generalized weakness  1. PT/OT  7. Hyponatremia 2/2 poor intake  1. improving  8. Cyclic vomiting/chronic G-tube/malnourishment  1. GI consulted  2. Nutrition consulted for TF  3. PTA mirtazapine, folic acid  9.  Chronic HFrEF - worsening LE e

## 2020-12-07 NOTE — PLAN OF CARE
Problem: GASTROINTESTINAL - ADULT  Goal: Minimal or absence of nausea and vomiting  Description: INTERVENTIONS:  - Maintain adequate hydration with IV or PO as ordered and tolerated  - Nasogastric tube to low intermittent suction as ordered  - Evaluate e Tolerated feedings at this time, will attempt to increase. Accucheck Q6, with no coverage. Pt repositioned for comfort. Pt resting in bed, will continue to monitor.

## 2020-12-07 NOTE — PROGRESS NOTES
[facility] dosing service    Follow-up Pharmacokinetic Consult for Vancomycin Dosing     Montez Soulier is a 62year old patient who is being treated for pneumonia.    Patient is on day 6 of vancomycin, initial dose 750 mg IV Q 24 hours, however h patient.     Chandni Parker [facility]  12/7/2020  7:56 AM  [address] [place] [address] Extension: [phone]

## 2020-12-07 NOTE — PROGRESS NOTES
[facility]  Advanced Heart Failure Progress Note    Pamella Huff Patient Status:  Inpatient    1960 MRN [id number]   [facility] 4NW-A Attending Suni Boo [address] Day # 6 PCP SHRUTI DESAI     Subjective: 12/07/2020    .7 12/07/2020    MCH 30.7 12/07/2020    MCHC 30.4 12/07/2020    RDW 15.0 12/07/2020    .0 12/07/2020     Lab Results   Component Value Date    PT 13.3 10/16/2012    INR 1.12 (H) 11/24/2020    INR 1.06 12/08/2019    INR 0.95 12/0 Oral, Daily    •  metoprolol succinate (Toprol XL) partial tablet 12.5 mg, 12.5 mg, Oral, Nightly    •  mirtazapine (REMERON) tab 15 mg, 15 mg, Oral, Nightly    •  atorvastatin (LIPITOR) tab 40 mg, 40 mg, Oral, Nightly    •  acetaminophen (TYLENOL) tab 650 AICD discharge     Atrial fibrillation (HCC)     Leukocytosis     Acute weakness     Gait disturbance     Dizziness     Frequent falls     Sepsis with acute organ dysfunction without septic shock (HCC)     Eliane-Lee tear     Shock ([address]     Jennifer [address], [address] [facility], [address] [address] [place]  Phone: [phone]  Fax: [phone]  E-mail: [email or web address]    12/7/2020  12:31 PM

## 2020-12-08 ENCOUNTER — TELEPHONE (OUTPATIENT)
Dept: CARDIOLOGY | Age: 60
End: 2020-12-08

## 2020-12-08 VITALS
DIASTOLIC BLOOD PRESSURE: 54 MMHG | HEIGHT: 65 IN | TEMPERATURE: 98 F | HEART RATE: 87 BPM | BODY MASS INDEX: 20.99 KG/M2 | RESPIRATION RATE: 18 BRPM | OXYGEN SATURATION: 100 % | WEIGHT: 126 LBS | SYSTOLIC BLOOD PRESSURE: 83 MMHG

## 2020-12-08 PROBLEM — Z51.5 PALLIATIVE CARE BY SPECIALIST: Status: ACTIVE | Noted: 2020-12-08

## 2020-12-08 PROCEDURE — 99232 SBSQ HOSP IP/OBS MODERATE 35: CPT | Performed by: INTERNAL MEDICINE

## 2020-12-08 PROCEDURE — 99239 HOSP IP/OBS DSCHRG MGMT >30: CPT | Performed by: HOSPITALIST

## 2020-12-08 NOTE — PROGRESS NOTES
[address] [facility] 149 Follow Up    Matt Stubbs Patient Status:  Inpatient    1960 MRN [id number]   [facility] 4NW-A Attending Michael Martinez [facility] Day # 7 SOFIE Harden     Date of Consult: 20 **OR** dextrose 50 % injection 50 mL, 50 mL, Intravenous, Q15 Min PRN **OR** glucose (DEX4) oral liquid 30 g, 30 g, Oral, Q15 Min PRN **OR** Glucose-Vitamin C (DEX-4) chewable tab 8 tablet, 8 tablet, Oral, Q15 Min PRN  •  nystatin (MYCOSTATIN) 685401 UNIT/ temperature 98 °F (36.7 °C), temperature source Oral, resp. rate 20, height 5' 5\" (1.651 m), weight 126 lb (57.2 kg), SpO2 100 %. Body mass index is 20.97 kg/m².   Present Level of pain: none   Non-verbal signs of pain present: NO    Physical Exam:  Gener clinically.       Beronica Peck MD  12/8/2020  10:49 AM  [address] [phone]

## 2020-12-08 NOTE — PROGRESS NOTES
[facility]  Progress Note     Estella Yates Patient Status:  Inpatient    1960 MRN [id number]   [facility] 4NW-A [facility]  Jessica Sq     Chief Complaint: Nabil Horn    S: Vahid Ragsdale 138  --   --  140  --    K 4.0  --  3.7 3.6 3.8 5.0  5.0  --      --  112  --   --  118*  --    CO2 21.0  --  18.0*  --   --  14.0*  --    ALKPHO  --   --  386*  --   --  319*  --    AST  --   --  43*  --   --  94*  --    ALT  --   --  19  --   --  2 acid  2. B12 increased  6. Generalized weakness  1. PT/OT  7. Hyponatremia 2/2 poor intake  1. improving  8. Cyclic vomiting/chronic G-tube/malnourishment  1. GI consulted  2. Nutrition consulted for TF  3. PTA mirtazapine, folic acid  9.  Chronic HFrEF - w

## 2020-12-08 NOTE — CM/SW NOTE
THE [facility] BLS to arrive at [facility]. RN aware. Harman from [place] also aware.

## 2020-12-08 NOTE — PLAN OF CARE
Pt is aaox3-4, pt is drowsy but able to hold conversation at this time, pt request for morphine oral for comfort, on O2 2-3L/nc, O2sat 89-94%, pt/spouse spoke with [facility] and agreed, plan to dc home with hospice tomorrow once everything is set at respiratory effort  - Oxygen supplementation based on oxygen saturation or ABGs  - Provide Smoking Cessation handout, if applicable  - Encourage broncho-pulmonary hygiene including cough, deep breathe, Incentive Spirometry  - Assess the need for suctioning

## 2020-12-08 NOTE — ICD/PM
Discussed with Dr. Elroy Baca. Decision has been made for comfort care/DNR w/ transition to home hospice tomorrow. ICD tachy therapies turned off earlier this afternoon per [employer] rep as instructed by myself and Dr. Elroy Baca.   Cardiology will f/u tomorr

## 2020-12-08 NOTE — PAYOR COMM NOTE
--------------  CONTINUED STAY REVIEW    Payor: [facility] PPO  Subscriber #:  [id number]  Authorization Number: [id number]    Admit date: 12/1/20  Admit time: José Miguel Johnson    Admitting Physician: Adela Weiss MD  Attending Physician:  Fern Riojas*  Pr 41* 51*   CA 7.7*  --  6.8*  --   --  8.0*  --    ALB  --   --  1.7*  --   --  2.1*  --    *  --  138  --   --  140  --    K 4.0  --  3.7 3.6 3.8 5.0  5.0  --      --  112  --   --  118*  --    CO2 21.0  --  18.0*  --   --  14.0*  --    ALKPHO results pending  3. Vascular surgery consulted > arterial doppler BLLE  5. Chronic anemia - at baseline  1. Folic acid  2. B12 increased  6. Generalized weakness  1. PT/OT  7. Hyponatremia 2/2 poor intake  1. improving  8.  Cyclic vomiting/chronic G-tube/ma

## 2020-12-08 NOTE — CM/SW NOTE
12/08/20 1200   Discharge disposition   Expected discharge disposition Home or Self   Name of [address]   Discharge transportation THE [facility]   BLS to arrive at [address]. RN aware.  Spouse also aware and spoke [place] in r

## 2020-12-08 NOTE — PLAN OF CARE
Problem: GASTROINTESTINAL - ADULT  Goal: Minimal or absence of nausea and vomiting  Description: INTERVENTIONS:  - Maintain adequate hydration with IV or PO as ordered and tolerated  - Nasogastric tube to low intermittent suction as ordered  - Evaluate e nutrition consult as needed  - Instruct patient on self management of diabetes  Outcome: Progressing   Pt alert and oriented x3, vitals stable, afebrile, on 3L of O2 nc sating at 98%.  Accuchecks Q6 with no coverage, receiving IVabx, Kumar intact and draini

## 2020-12-08 NOTE — PLAN OF CARE
Pt. A&O x 3-4, can be a little forgetful. VSS. Afebrile. Pt. On 3L O2; O2 sats mid 90's. Pt. C/o pain; PRN PO morphine given per MAR. PEG tube feeding at 40cc/hr. Pt. C/o nausea; PRN zofran given per MAR. Plan to d/c to home with [facility].  Season's difficulty  - Respiratory Therapy support as indicated  - Manage/alleviate anxiety  - Monitor for signs/symptoms of CO2 retention  Outcome: Progressing     Problem: Diabetes/Glucose Control  Goal: Glucose maintained within prescribed range  Description: IN

## 2020-12-09 ENCOUNTER — E-ADVICE (OUTPATIENT)
Dept: FAMILY MEDICINE | Age: 60
End: 2020-12-09

## 2020-12-09 ENCOUNTER — E-ADVICE (OUTPATIENT)
Dept: CARDIOLOGY | Age: 60
End: 2020-12-09

## 2020-12-09 NOTE — PAYOR COMM NOTE
--------------  DISCHARGE REVIEW    Payor: LAUREN COHN  Subscriber #:  [id number]  Authorization Number: [id number]    Admit date: 12/1/20  Admit time:  4532  Discharge Date: 12/8/2020  5:09 PM     Admitting Physician: Sharon Buckner MD  Attending Christianoia

## 2020-12-14 NOTE — DISCHARGE SUMMARY
[facility]PITAL[facility]  DISCHARGE SUMMARY     Marita Isaacs Patient Status:  Inpatient    1960 MRN [id number]   [facility] 4NW-A Attending No att. providers found   Hosp Day # 7 PCP Loren Agosto     Date of Admission: 2020 able to be weaned down to 2L. Pt was seen by neurology for lower extremity dysesthesu=ias and felt it was due to severe protein malnutrition  After having a discussion with the  about her not getting better with her recovering from the infection due known as: ZOCOR           ASK your doctor about these medications      Instructions Prescription details   levofloxacin 750 MG Tabs  Commonly known as: LEVAQUIN  Ask about: Should I take this medication?       Take 1 tablet (750 mg total) by mouth every oth

## 2021-05-25 VITALS
TEMPERATURE: 96.8 F | HEART RATE: 72 BPM | SYSTOLIC BLOOD PRESSURE: 110 MMHG | RESPIRATION RATE: 20 BRPM | BODY MASS INDEX: 19.54 KG/M2 | WEIGHT: 117.3 LBS | HEIGHT: 65 IN | DIASTOLIC BLOOD PRESSURE: 76 MMHG

## 2021-11-16 NOTE — INTERVAL H&P NOTE
Monitor. Pre-op Diagnosis: NAUSEA VOMITING    The above referenced H&P was reviewed by Georgina Birch DO on 9/6/2018, the patient was examined and no significant changes have occurred in the patient's condition since the H&P was performed.   I discussed with the p

## 2022-05-11 NOTE — PAYOR COMM NOTE
--------------  CONTINUED STAY REVIEW-------REQUESTING ADDITIONAL DAYS 12/5, 12/6 AND 12/7      Payor: Becca Irby [employer] PPO  Subscriber #:  [id number]  Authorization Number: [id number]    Admit date: 12/1/20  Admit time: [facility]    Admitting Physician: Irma Mason, Operative Note      Patient: Joan Mcmillan  YOB: 1964  MRN: [id number]    Date of Procedure: 5/11/2022    Pre-Op Diagnosis: Complex tear of lateral meniscus of left knee as current injury, subsequent encounter [S83.272D] Complex tear of medial meniscus of left knee as current injury, subsequent encounter [S83.232D] Chondromalacia of left knee [M94.262]    Post-Op Diagnosis: Same       Procedure(s):  VIDEO ARTHROSCOPY LEFT KNEE, PARTIAL MEDIAL AND LATERAL MENISCECTOMIES, PATELLAR CHONDROPLASTY    Surgeon(s):  Anabelle Multani MD    Assistant:   Surgical Assistant: Chris Cummins    Anesthesia: General    Estimated Blood Loss (mL): Minimal    Complications: None    Specimens:   * No specimens in log *    Implants:  * No implants in log *      Drains: * No LDAs found *    Findings: EUA: Stable to varus valgus at 0 and 30 degrees, Negative AD/PD and 1A Lachman. A/S: Grade II changes trochlea and patella with inferior patellar pole osteophyte. + Grade II/III changes medial compartment with complex medial meniscus tear zone A2-B2. Posterior medial root intact and anteromedial root intact. Intermeniscal ligament intact. Slight chronic appearing changes to anterior horn lateral meniscus with root however intact. Posterior horn lateral meniscus with chondrocalcinosis appreciated with small radial tear E3-F3. Posterior root intact. No gross loose bodies. Detailed Description of Procedure:     OPERATIVE INDICATIONS: Patient is a 63 y.o. male status post  left knee injury sustaining meniscal tears. Preoperative subjective symptoms, exam and imaging as applies correlated to meniscal pathology. After appropriate treatment and evaluation, please see outpatient notes for details, a discussion was had with the patient with regard to nonoperative versus operative measures including risks and benefits of each. Understanding these, the patient wished to proceed with surgery.  These risks included were not HGB 9.8* 10.2* 10.0* 10.1* 8.6*   MCV 96.2 97.9 96.4 97.0 100.0   .0* 171.0 185.0 273.0 203.0                  Recent Labs   Lab 11/30/20  1505 12/01/20  0757 12/02/20  0852 12/03/20  0437 12/04/20  0659 12/05/20  0750   GLU 85 50* 57*  --   --  116 limited to: damage to nerves, arteries, tendons, veins, infection, bleeding, continued pain, continued disability, need for revision surgery, chondral changes/chondral damage, loss of knee range of motion, stiffness, damage to ligaments causing knee instability potentially requiring bracing, hardware failure, retained broken hardware, retained broken instrumentation, new onset pain, need for additional surgery, painful hardware, revision surgery, arthritic related pain, paresthesias, numbness, tingling, venothromboembolism, associated complications with anesthesia, and ultimately death. Understanding these risks, a signed and documented consent was placed in the patients chart. OPERATIVE PROCEDURE: The patient was correctly identified in the preoperative holding area. The left  lower extremity was marked by the surgeon. All questions were answered. Ultimately the patient was transported back to the operating room placed supine on the OR table with all bony prominences well padded. EUA revealed the above findings. Left lower extremity was prepped and draped in the standard sterile fashion after the patient received Ancef IV x1 within 60 minutes prior to surgery, a timeout was performed per protocol. Esmarch was used for exsanguination and tourniquet was raised to 250 mm of mercury. Standard anterolateral working portal was made. Scope was introduced. Initial diagnostic arthroscopy performed after also creating an anterior medial portal. Probe was used to probe the patellofemoral comaprtment, the medial and lateral compartments as well as the intercondylar notch. Tourniquet was released at 7  minutes with prompt return of circulation given venous tourniquet. The anticipated pathology of the  medial meniscus was appreciated at the A2-B2  zone(s) and using biters and karin, and switching portals as needed, the meniscus was debrided to stable base and edge.   Lateral meniscus radial tear in posterior horn was 6. Rapid COVID testing negative  7. ID following  2. Hypotension - BL BP's in 80's/40's per . 1. Asymptomatic in 60's/40 - suspect related to cuff size  2. Improved with pediatric cuff  3. Stop IVF  3. Leukocytosis  1. Improving w/ IVF/zosyn  4.  BL debrided to stable edge using shaver and biters. Chondrocalcinosis was appreciated in the lateral meniscus and far anteromedial trochlea. The above listed findings were apprecated with regard to grading the cartilage quality in each compartment. All wounds were thoroughly irrigated. Skin was cleaned and dried and arthroscopic incisions were closed with 3-0 nylon in interrupted fashion. 10cc of 0.25% bupivicaine without epi was used to infiltrate the portal sites. The wounds were dressed with Xeroform, 4 x 4s, and sterile webril. The  left lower extremity was dressed with an Ace wrap without undue tension. The patient was awakened without difficulty and transferred to recovery in stable condition.      CONDITION: Stable    DISPOSITION: To PACU, then to home    POST OP PLAN: Patient will follow post op protocol/discharge instructions provided      Electronically signed by Annika Marino MD on 5/11/22 at 11:48 AM EDT      Electronically signed by Annika Marino MD on 5/11/2022 at 11:45 AM Date of Service:  2020  4:37 PM               Signed          [facility]  Progress Note            Laveda Anger Patient Status:  Inpatient    1960 MRN [id number]   [facility] 4NW-A Attending Alisia Lu CA 8.0* 7.7* 7.7*  --   --  6.8*  --    ALB 0.8* 0.8*  --   --   --  1.7*  --    * 135* 135*  --   --  138  --    K 4.9 4.6 4.0  --   --  3.7 3.6    108 107  --   --  112  --    CO2 17.0* 22.0 21.0  --   --  18.0*  --    ALKPHO 436* 373*  -- 3. Vascular surgery consulted > arterial doppler BLLE  20. Chronic anemia - at baseline  1. Folic acid  2. B12 increased  21. Generalized weakness  1. PT/OT  22. Hyponatremia 2/2 poor intake  1. improving  23.  Cyclic vomiting/chronic G-tube/malnourishment 10 point ROS completed and was negative, except for pertinent positive and negatives stated in subjective.     Vital signs:  Temp:  [97.1 °F (36.2 °C)-98.8 °F (37.1 °C)] 98.1 °F (36.7 °C)  Pulse:  [] 78  Resp:  [18-22] 18  BP: (84-91)/(56-65) 91/65 < > = values in this interval not displayed.         Estimated Creatinine Clearance: 38.9 mL/min (A) (based on SCr of 1.4 mg/dL (H)).     No results for input(s): PTP, INR in the last 168 hours.     No results for input(s): TROP, CK in the last 168 hours. 1. Cardiology following  2. Will give albumin with lasix today. 40. Sacral wound - present prior to admission  1. Wound care consulted  41. GERD  1. PPI  42. Afib  1. Eliquis BB  43. CAD  1. Statin, BB  2. Holding ACEi d/t sepsis/HOTN  44.  Elevated TSH- Date Action Dose Route User    12/7/2020 0251 New Bag 500 mg Intravenous Ivonne Hess RN    12/6/2020 1613 New Bag 500 mg Intravenous Calvin Bustamante RN      metoprolol succinate (Toprol XL) partial tablet 12.5 mg     Date Action Dose Route User    12/6

## 2023-02-27 NOTE — CM/SW NOTE
Patient discussed during rounds. Palliative Care consult today. She is current with [facility]. [facility] agency in [address] today. Will continue to follow for further discharge planning needs.   She is currently on 2L02 but does not have this at  None

## 2023-06-23 NOTE — ED PROVIDER NOTES
Patient Seen in: [facility] Emergency Department    History   Patient presents with:  Dehydration (metabolic/constitutional)  Poor Feed Anorexia (constitutional)    Stated Complaint: chf pt, not caring for self.  dehydrated, not eating--Dr Munoz want stent to LAD   • ANGIOPLASTY (CORONARY)  2005    stent to LAD   •       2   • CARDIAC DEFIBRILLATOR PLACEMENT      [employer] ICD   • CARDIAC PACEMAKER PLACEMENT     • CATH DRUG ELUTING STENT     • COLONOSCOPY     • COLONOSCOPY N/A  Resp 16   Ht 167.6 cm (5' 6\")   Wt 40.8 kg   SpO2 99%   BMI 14.53 kg/m²         Physical Exam  General: . Patient's thin cachectic looking. Oral mucosa is wet but his lips do look dry. The patient is in no respiratory distress.  The patient is not sept URINALYSIS WITH CULTURE REFLEX   RAINBOW DRAW BLUE   RAINBOW DRAW LAVENDER   RAINBOW DRAW LIGHT GREEN   RAINBOW DRAW GOLD                 ER course the patient states that she has had a gallbladder resection for this.       MDM     Admission disposition: BONES:  Decreased bone mineralization. Lucency within the T6 vertebral body suggests an intraosseous hemangioma. Multilevel Schmorl's nodes predominantly involve both superior and inferior T11 endplate with chronic appearing compression deformity. urinalysis is recommended. Followup imaging suggested to ensure resolution. LYMPH NODES:  Unremarkable. BONES:  Unremarkable. OTHER:  None. CONCLUSION:  1. No evidence of a bowel obstruction. 2. Fatty infiltration of the liver.  3. Trace amount of hy dehydrated, not eating. Sent here for possible PEG placement. Has had difficulty eating, steadily losing weight. CHF pt. FINDINGS:  Cardiac size is normal.  Stable mesh and sequential pacing electrodes/AICD. Lungs are large. Correlate for COPD.   Leilani Dunaway Spine appears normal, range of motion is not limited, no muscle or joint tenderness

## 2023-08-06 NOTE — PAYOR COMM NOTE
--------------  ADMISSION REVIEW     Payor: LAUREN [facility]  Subscriber #:  [id number]  Authorization Number: [id number]    Admit date: 11/18/19  Admit time: 5       Admitting Physician: Bette Davis MD  Attending Physician:  Rod Maya MD  Primary Car • Diarrhea, unspecified    • Dizziness    • Esophageal reflux    • Fatigue    • Fever    • Frequent use of laxatives    • Frequent UTI    • Heart attack [facility])    • Heart palpitations    • Hemorrhoids    • History of blood transfusion     2009   • History o • ENDOSCOPIC ULTRASOUND (EUS) N/A 12/1/2015    Performed by Andrade Sheehan MD at [facility] ENDOSCOPY   • ENTEROSCOPY N/A 12/11/2018    Performed by Miller Roberts MD at [facility] ENDOSCOPY   • EP ELECTROPHYSIOLOGY STUDY  04/13/2006   • ERCP,DIAGNOSTIC     • [facility] Comment: pt denies             Review of Systems    Positive for stated complaint: chest pain s/p AICD fired x 3  Other systems are as noted in HPI. Constitutional and vital signs reviewed.       All other systems reviewed and negative except as noted Pro-Beta Natriuretic Peptide 1,840 (*)     All other components within normal limits   CBC W/ DIFFERENTIAL - Abnormal; Notable for the following components:    WBC 15.0 (*)     RDW 15.5 (*)     RDW-SD 54.6 (*)     Neutrophil Absolute Prelim 9.96 (*)     N General Patient IV established, placed on cardiac monitor and pulse ox. Chest x-ray performed, no acute findings, chemistry sodium 136 potassium 3.5 BUN to creatinine 0.84 glucose 157. BNP 1840, troponin 0 0.39. CBC WBC 10.0 hemoglobin 13.2 platelet 514.   Patie History of Present Illness: Brigette Hammer is a 63year old female with PMH CHF, CAD, GERD, HLD who p/w AICD firing. Pt in her normal state of health. Today was lying in bed when her AICD suddenly fired. Shocked her 3 times in 3 minutes.  Pt denies • Visual impairment     glasses for reading   • Vomiting    • Wears glasses    • Weight loss         Past Surgical History:   Past Surgical History:   Procedure Laterality Date   • ANGIOGRAM  11/30/2006    Right & left hear angiogram   • ANGIOGRAM  05/31/2 • GASTROSTOMY TUBE REMOVAL/REPLACEMENT N/A 8/30/2019    Performed by Estuardo Strickland MD at [address] 10/10/2018    Performed by Stephania Zafar MD at [facility] MAIN OR   • NERVE SURAL BIOPSY Left 3/12/2019    Perform mirtazapine 15 MG Oral Tab, Take 15 mg by mouth nightly., Disp: , Rfl:   Amitriptyline HCl 10 MG Oral Tab, Take 1 tablet (10 mg total) by mouth nightly., Disp: 30 tablet, Rfl: 2  Clopidogrel Bisulfate 75 MG Oral Tab, Take 1 tablet by mouth daily. , Disp: , Cardiovascular: S1, S2. Regular rate and rhythm. No murmurs, rubs or gallops. Equal pulses. Chest and Back: No tenderness or deformity. Abdomen: Soft, nontender, nondistended. Positive bowel sounds. No rebound, guarding or organomegaly.   Neurologic: No : Channing Burns MD (Physician)        [facility]  Electrophysiology Initial Consult Note              Hanny Walton Patient Status:  Inpatient    1960 MRN [id number]   [facility] 2NE-A Attending Luciana Beavers • Muscle weakness       due to neuropathy in P.T now   • Nausea     • Neuropathy     • On tube feeding diet       eats a little   • Osteoporosis     • Pacemaker     • Pancreatitis     • Personal history of antineoplastic chemotherapy       2006- due to [employer] • ESOPHAGOGASTRODUODENOSCOPY (EGD) N/A 9/10/2019     Performed by Manuel Blas MD at [facility] ENDOSCOPY   • ESOPHAGOGASTRODUODENOSCOPY (EGD) N/A 2/23/2019     Performed by Juventino Ibarra MD at [facility] ENDOSCOPY   • ESOPHAGOGASTRODUODENOSCOPY (EGD) N/A 1/16/ She  reports that she quit smoking about 15 years ago. She quit after 20.00 years of use. She has never used smokeless tobacco. She reports current alcohol use.  She reports that she does not use drugs.     Allergies:     Codeine Sulfate         NAUSEA ONLY Constitutional: no weight loss, no fever  Neuro: no TIA symptoms  Psych: no confusion/disorientation  HEENT: no sore throat  CV: ICD shock  Pulm: no cough  : no flank pain  GI: no abdominal pain  MSK: no joint pain  Hematology: no bleeding  Dermatology: - shock today due to paroxysmal atrial fibrillation with RVR, morphology match not met at higher ventricular rates  - management of AF as below     2) paroxysmal atrial fibrillation  - currently sinus rhythm  - genderless CHADSVASC score 2 for CAD, CHF; re Intractable vomiting without nausea     Intractable vomiting without nausea, unspecified vomiting type     Anorexia     Malnutrition (HCC)     Hypocalcemia     Chronic pain syndrome     Hypomagnesemia     Encounter for gastrojejunal (GJ) tube placement •  pneumococcal vac polyvalent (PNEUMOVAX 23) injection 0.5 mL, 0.5 mL, Subcutaneous, Prior to discharge  •  influenza vaccine split quad (FLULAVAL) ages 6 months to 64 years inj 0.5ml, 0.5 mL, Intramuscular, Prior to discharge  •  Amitriptyline HCl (ELAVI Skin: Warm and dry.         Labs:      Recent Labs   Lab 11/18/19  0838 11/19/19  0604   * 81   BUN 2* 4*   CREATSERUM 0.84 0.44*   GFRAA 89 129   GFRNAA 77 112   CA 8.8 8.5    139   K 3.5 4.6    106   CO2 22.0 29.0              Recent L Paresthesias     Weakness     Neuropathy     Opioid dependence with opioid-induced disorder (HCC)     Anxiety disorder     Depression     Hyponatremia     Hypotension, unspecified hypotension type     Intra-abdominal free air of unknown etiology     Pos 11/18/2019 2150 Given 8 mg Sublingual Monika Bruno RN    11/18/2019 1629 Given 8 mg Sublingual Maurisio Mora RN      DULoxetine HCl (CYMBALTA) DR particles cap 30 mg     Date Action Dose Route User    11/19/2019 1037 Given 30 mg Oral Maurisio Mora 11/18/2019 2149 Given 80 mg Oral Betina Grigsby RN      Zolpidem Tartrate (AMBIEN) tab 5 mg     Date Action Dose Route User    11/19/2019 0001 Given 5 mg Oral Jacob Puga RN          REVIEWER COMMENTS:     OTHER:

## 2023-09-06 NOTE — LETTER
Nessa Rasmussen 182  6 [address]  [place]    Consent for Operation  Date: ____December 25, 2019______________                                Time: _______________    I authorize the performance upon Herberth Jacinto the following revealed by the pictures or by descriptive texts accompanying them. If the procedure has been videotaped, the surgeon will obtain the original videotape. The hospital will not be responsible for storage or maintenance of this tape.   4. For the purpose of a THAT MY DOCTOR PROVIDED ME WITH THE ABOVE EXPLANATIONS, THAT ALL BLANKS OR STATEMENTS REQUIRING INSERTION OR COMPLETION WERE FILLED IN.     Signature of Patient:   ___________________________    When the patient is a minor or mentally incompetent to give co iii. All of the medicines I take (including prescriptions, herbal supplements, and pills I can buy without a prescription (including street drugs/illegal medications).  Failure to inform my anesthesiologist about these medicines may increase my risk of anes _____________________________________________________________________________  Anesthesiologist Signature     Date   Time  I have discussed the procedure and information above with the patient (or patient’s representative) and answered their questions.  The GOAL: Pt will demonstrate improved static/dynamic balance to fair in order to increase safety and independence in ADLs within 4 weeks

## 2024-04-11 NOTE — PROGRESS NOTES
Connie Da Silva Patient Status:  Inpatient    1960 MRN [id number]   [facility] 6NE-A Attending Kg Anderson MD   Hosp Day # 2 PCP Will Heatherce     Critical Care Progress Note      Assessment/Plan:  1.  Hypotension - reportedly deformity. Heart: Regular rate and rhythm, normal S1S2, no murmur. Abdomen: soft, non-tender, non-distended, positive BS. Extremity: No clubbing or cyanosis no edema. Skin: No rashes or lesions.     Recent Labs   Lab  10/16/18   0352   RBC  2.80* inhaler Inhale 2 puffs into the lungs every 6 hours as needed 12/31/16   Automatic Reconciliation, Ar   carvedilol (COREG) 3.125 MG tablet Take 3.125 mg by mouth 2 times daily 4/25/22   Automatic Reconciliation, Ar   dapagliflozin (FARXIGA) 5 MG tablet Take 5 mg by mouth daily 6/18/22   Automatic Reconciliation, Ar   fluticasone (FLONASE) 50 MCG/ACT nasal spray 2 sprays by Nasal route daily as needed    Automatic Reconciliation, Ar   sacubitril-valsartan (ENTRESTO) 49-51 MG per tablet TAKE 1 TABLET BY MOUTH TWICE DAILY. STOP TAKING LISINOPRIL 6/20/22   Automatic Reconciliation, Ar   torsemide (DEMADEX) 20 MG tablet TAKE 5 TABLETS BY MOUTH TWICE DAILY 6/24/22   Automatic Reconciliation, Ar       Current Facility-Administered Medications   Medication Dose Route Frequency    sodium chloride flush 0.9 % injection 5-40 mL  5-40 mL IntraVENous 2 times per day    sodium chloride flush 0.9 % injection 5-40 mL  5-40 mL IntraVENous PRN    0.9 % sodium chloride infusion   IntraVENous PRN    potassium chloride (KLOR-CON) extended release tablet 40 mEq  40 mEq Oral PRN    Or    potassium bicarb-citric acid (EFFER-K) effervescent tablet 40 mEq  40 mEq Oral PRN    Or    potassium chloride 10 mEq/100 mL IVPB (Peripheral Line)  10 mEq IntraVENous PRN    magnesium sulfate 2000 mg in 50 mL IVPB premix  2,000 mg IntraVENous PRN    enoxaparin (LOVENOX) injection 40 mg  40 mg SubCUTAneous Daily    ondansetron (ZOFRAN-ODT) disintegrating tablet 4 mg  4 mg Oral Q8H PRN    Or    ondansetron (ZOFRAN) injection 4 mg  4 mg IntraVENous Q6H PRN    polyethylene glycol (GLYCOLAX) packet 17 g  17 g Oral Daily PRN    acetaminophen (TYLENOL) tablet 650 mg  650 mg Oral Q6H PRN    Or    acetaminophen (TYLENOL) suppository 650 mg  650 mg Rectal Q6H PRN    furosemide (LASIX) injection 80 mg  80 mg IntraVENous BID    carvedilol (COREG) tablet 3.125 mg  3.125 mg Oral BID    sacubitril-valsartan (ENTRESTO) 49-51 MG per tablet 1 tablet  1 tablet Oral BID  anicteric, moist mucous membranes  Neck: supple, difficult to assess JVP  CV: regular rate and rhythm, distant heart sounds, no murmurs, rubs or gallops,  Lungs: no respiratory distress, poor air movement bilaterally  Abdomen: soft, non distended  MSK: 2+ bilateral lower extremity edema  Skin: warm to touch  Neuro: alert and oriented, answered questions appropriately  Psych: normal mood and affect     Lab Review:  CBC:  Recent Labs     04/11/24 0319   WBC 8.4   RBC 4.09*   HGB 8.7*   HCT 32.7*   MCV 80.0   MCH 21.3*   MCHC 26.6*   RDW 19.5*      MPV 10.0       CMP:    Recent Labs     04/11/24 0319      K 3.7   CL 97   CO2 36*   BUN 15   CREATININE 1.34*   GLUCOSE 124*   CALCIUM 8.7   PROT 8.8*   LABALBU 2.5*   BILITOT 0.6   ALKPHOS 56   AST 28   ALT 10*           All Cardiac Markers in the last 24 hours:     Latest Reference Range & Units 04/11/24 03:19   NT Pro-BNP <125 PG/ML 4,930 (H)   (H): Data is abnormally high       Latest Reference Range & Units 04/11/24 03:19   Troponin, High Sensitivity 0 - 76 ng/L 77 (H)   (H): Data is abnormally high    Data Review:  EKG independently reviewed: NSR, IVCD,  ms     Echocardiogram:   2/2022    Left Ventricle: Severely reduced left ventricular systolic function with a visually estimated EF of 20 - 25%. Left ventricle is moderately dilated. Severe septal thickening. Findings consistent with severe concentric hypertrophy. Severe global hypokinesis present.    Right Ventricle: Not well visualized.    Mitral Valve: Mild regurgitation.    Left Atrium: Left atrium is dilated.    Technical qualifiers: Echo study was technically difficult with poor endocardial visualization and technically difficult due to patient's body habitus.    Contrast used: Definity.    [facility] 5/2023  RA (a/v/m): End-exp 22/18/16 mmHg   Phasic mean 12 mmHg    RV: 50/16 mmHg    PCWP (a/v/m): End-exp mean 20 mmHg   Phasic mean 16 mmHg    PA 49/27/34 mmHg         PA sat: 64 % Hgb 10.6

## 2024-11-25 NOTE — OCCUPATIONAL THERAPY NOTE
OCCUPATIONAL THERAPY TREATMENT NOTE - INPATIENT     Room Number: 1529/4406-Z  Session: 1   Number of Visits to Meet Established Goals: 3    Presenting Problem: parasthesias    History related to current admission: Pt is 63year old female admitted on 9/2/2 for further evaluation if clinically indicated. Normal appearing pancreas.      Therapy significant co-morbidities:  CHF (on the wait list for heart transplant), Pacemaker/AICD, CAD, HTN, s/p extraction of all her teeth (dentures), chronic thrush RESTRICTION  Weight Bearing Restriction: None                PAIN ASSESSMENT  Rating: Unable to rate  Location: both feet  Management Techniques: Repositioning       ACTIVITIES OF DAILY LIVING ASSESSMENT  AM-PAC ‘6-Clicks’ Inpatient Daily Activity Short Fo Patient will perform grooming: with supervision and while standing at sink-met  Patient will perform toileting: with supervision- met     Functional Transfer Goals  Patient will perform all functional transfers:  independently-met     UE Exercise Program Spontaneous, unlabored and symmetrical

## (undated) DEVICE — FILTERLINE NASAL ADULT O2/CO2

## (undated) DEVICE — CASED DISP BIPOLAR CORD

## (undated) DEVICE — 3M™ RED DOT™ MONITORING ELECTRODE WITH FOAM TAPE AND STICKY GEL, 50/BAG, 20/CASE, 72/PLT 2570: Brand: RED DOT™

## (undated) DEVICE — Device: Brand: DEFENDO AIR/WATER/SUCTION AND BIOPSY VALVE

## (undated) DEVICE — ENDOSCOPY PACK UPPER: Brand: MEDLINE INDUSTRIES, INC.

## (undated) DEVICE — ENDO MINI-SHEARS 5MM STD LENGT

## (undated) DEVICE — 1200CC GUARDIAN II: Brand: GUARDIAN

## (undated) DEVICE — MEDI-VAC NON-CONDUCTIVE SUCTION TUBING: Brand: CARDINAL HEALTH

## (undated) DEVICE — THE PADLOCK CLIP DEFECT CLOSURE SYSTEM IS USED IN FLEXIBLE ENDOSCOPY FOR THE COMPRESSION OF TISSUE IN THE GASTROINTESTINAL TRACT, FOR HEMOSTASIS OR FOR TREATING LESIONS OF THE WALL OF THE GASTROINTESTINAL ORGANS.: Brand: PADLOCK CLIP

## (undated) DEVICE — DRAPE C-ARM UNIVERSAL

## (undated) DEVICE — FORCEP BIOPSY RJ4 LG CAP W/ND

## (undated) DEVICE — 3M™ IOBAN™ 2 ANTIMICROBIAL INCISE DRAPE 6650EZ: Brand: IOBAN™ 2

## (undated) DEVICE — CLIP RESOLUTION 235CM

## (undated) DEVICE — TIGERTAIL 5F FLXTIP 70CM

## (undated) DEVICE — Device

## (undated) DEVICE — SOL  .9 1000ML BTL

## (undated) DEVICE — MEDI-VAC SUCTION HANDLE REGULAR CAPACITY: Brand: CARDINAL HEALTH

## (undated) DEVICE — STERILE POLYISOPRENE POWDER-FREE SURGICAL GLOVES: Brand: PROTEXIS

## (undated) DEVICE — FLUIDGARD® 160 ANTI-FOG SURGICAL MASK WITH ANTI-GLARE SHIELD: Brand: PRECEPT ®

## (undated) DEVICE — ABDOMINAL PAD: Brand: DERMACEA

## (undated) DEVICE — NITINOL WIRE STR 035

## (undated) DEVICE — SYRINGE 10ML LL TIP

## (undated) DEVICE — SUPER SPONGES,MEDIUM: Brand: KERLIX

## (undated) DEVICE — SOLUTION SURG DURA PREP HAZMAT

## (undated) DEVICE — ST. TONGUE BLADES: Brand: DEROYAL

## (undated) DEVICE — TROCAR: Brand: KII FIOS FIRST ENTRY

## (undated) DEVICE — MARKER SKIN 2 TIP

## (undated) DEVICE — TRANSPOSAL ULTRAFLEX DUO/QUAD ULTRA CART MANIFOLD

## (undated) DEVICE — SUTURE VICRYL 2-0 CT-2

## (undated) DEVICE — FORCEP RADIAL JAW 4

## (undated) DEVICE — LAP CHOLE/APPY CDS-LF: Brand: MEDLINE INDUSTRIES, INC.

## (undated) DEVICE — FIAPC® PROBE W/ FILTER 2200 C OD 2.3MM/6.9FR; L 2.2M/7.2FT: Brand: ERBE

## (undated) DEVICE — DRAPE HALF 40X58 DYNJP2410

## (undated) DEVICE — ALCOHOL 70% 4 OZ

## (undated) DEVICE — SNARE CAPTIFLEX MICRO-OVL OLY

## (undated) DEVICE — KENDALL SCD EXPRESS SLEEVES, THIGH LENGTH, MEDIUM: Brand: KENDALL SCD

## (undated) DEVICE — CLIP LGT 11MM OPEN 2.8MM 235CM

## (undated) DEVICE — DURACLIP 16MM 235CM

## (undated) DEVICE — #15 STERILE STAINLESS BLADE: Brand: STERILE STAINLESS BLADES

## (undated) DEVICE — REM POLYHESIVE ADULT PATIENT RETURN ELECTRODE: Brand: VALLEYLAB

## (undated) DEVICE — SUTURE MONOCRYL 4-0 PS-2

## (undated) DEVICE — SUTURE VICRYL 3-0 RB-1

## (undated) DEVICE — STANDARD HYPODERMIC NEEDLE,POLYPROPYLENE HUB: Brand: MONOJECT

## (undated) DEVICE — "MH-438 A/W VLVE F/140 EVIS-140": Brand: AIR/WATER VALVE

## (undated) DEVICE — MINI LAP PACK-LF: Brand: MEDLINE INDUSTRIES, INC.

## (undated) DEVICE — INSUFFLATION NEEDLE TO ESTABLISH PNEUMOPERITONEUM.: Brand: INSUFFLATION NEEDLE

## (undated) DEVICE — SYRINGE 60ML SLIP TIP

## (undated) DEVICE — DISPOSABLE LAPAROSCOPIC CLIP APPLIER WITH 20 CLIPS.: Brand: EPIX® UNIVERSAL CLIP APPLIER

## (undated) DEVICE — SPECIMEN CONTAINER,POSITIVE SEAL INDICATOR, OR PACKAGED: Brand: PRECISION

## (undated) DEVICE — TROCAR: Brand: KII® SLEEVE

## (undated) DEVICE — DEVICE SPCMN RTRVL RAPTOR MINI

## (undated) DEVICE — DEVICE SPEC RTRVL RPTR 2.4MM

## (undated) DEVICE — VISUALIZATION SYSTEM: Brand: CLEARIFY

## (undated) DEVICE — PREMIUM WET SKIN PREP TRAY: Brand: MEDLINE INDUSTRIES, INC.

## (undated) DEVICE — LIGHT HANDLE

## (undated) DEVICE — BANDAGE ROLL,100% COTTON, 6 PLY, LARGE: Brand: KERLIX

## (undated) DEVICE — MONOFILAMENT ABSORBABLE SUTURE: Brand: MAXON

## (undated) DEVICE — STERILE HOOK LOCK LATEX FREE ELASTIC BANDAGE 4INX5YD: Brand: HOOK LOCK™

## (undated) DEVICE — GAUZE SPONGES,8 PLY: Brand: CURITY

## (undated) DEVICE — DURACLIP 11MM 235CM

## (undated) DEVICE — DERMABOND LIQUID ADHESIVE

## (undated) DEVICE — SUTURE SILK 2-0

## (undated) DEVICE — PROXIMATE RH ROTATING HEAD SKIN STAPLERS (35 WIDE) CONTAINS 35 STAINLESS STEEL STAPLES: Brand: PROXIMATE

## (undated) DEVICE — FLOSEAL HEMOSTATIC MATRIX, 5ML: Brand: FLOSEAL HEMOSTATIC MATRIX

## (undated) DEVICE — NEEDLE CONTRAST INTERJECT 25G

## (undated) DEVICE — KENDALL SCD EXPRESS SLEEVES, KNEE LENGTH, MEDIUM: Brand: KENDALL SCD

## (undated) NOTE — LETTER
[facility]  Gume Ajitrandall 62 [address] [facility], [address] [place] [address]    Consent for Operation    Date: __________________    Time: _______________    1.  I authorize the performance upon Estella Yates the following operation:    Esophagogastroduodenoscopy w procedure has been videotaped, the surgeon will obtain the original videotape. The hospital will not be responsible for storage or maintenance of this tape.     6. For the purpose of advancing medical education, I consent to the admittance of observers to t STATEMENTS REQUIRING INSERTION OR COMPLETION WERE FILLED IN.     Signature of Patient:   ___________________________    When the patient is a minor or mentally incompetent to give consent:  Signature of person authorized to consent for patient: ____________ · If I am allergic to anything or have had a reaction to anesthesia before. 3. I understand how the anesthesia medicine will help me (benefits). 4. I understand that with any type of anesthesia medicine there are risks:  a.  The most common risks are: their representative has agreed to have anesthesia services.     _____________________________________________________________________________  Witness        Date   Time  I have verified that the signature is that of the patient or patient’s representative

## (undated) NOTE — ED AVS SNAPSHOT
[facility] Emergency Department  [place]  [address] [address]  Phone:  [phone]  Fax:  [phone]   MRN: [id number]    Department:  [facility] Emergency Department   Date of Visit:  7/1/201 IF THERE IS ANY CHANGE OR WORSENING OF YOUR CONDITION, CALL YOUR PRIMARY CARE PHYSICIAN AT ONCE OR RETURN IMMEDIATELY TO THE EMERGENCY DEPARTMENT.     If you have been prescribed any medication(s), please fill your prescription right away and begin taking t

## (undated) NOTE — ED AVS SNAPSHOT
Roscoe Guaman   MRN: [id number]    Department:  [facility] Emergency Department   Date of Visit:  10/2/2018           Disclosure     Insurance plans vary and the physician(s) referred by the ER may not be covered by your plan.  Please cont tell this physician (or your personal doctor if your instructions are to return to your personal doctor) about any new or lasting problems. The primary care or specialist physician will see patients referred from the [facility] Emergency Department.  Christine Blair

## (undated) NOTE — ED AVS SNAPSHOT
[facility] Emergency Department    [place]  [address] [address]    Phone:  [phone]    Fax:  [phone] [facility]   MRN: [id number]    Department:  [facility] Emergency Department   Date of Visit IF THERE IS ANY CHANGE OR WORSENING OF YOUR CONDITION, CALL YOUR PRIMARY CARE PHYSICIAN AT ONCE OR RETURN IMMEDIATELY TO THE EMERGENCY DEPARTMENT.     If you have been prescribed any medication(s), please fill your prescription right away and begin taking t

## (undated) NOTE — LETTER
[facility] [address], [address]    Consent for Operation    Date: 9/5/18    Time: 1130    1.  I authorize the performance upon Rufus Sierra the following operation:    Endoscopic ultrasound with monitored anesthesia car videotape. [facility] will not be responsible for storage or maintenance of this tape. 6. For the purpose of advancing medical education, I consent to the admittance of observers to the Operating Room.     7. I authorize the use of any specimen, organs Signature of Patient:   ___________________________    When the patient is a minor or mentally incompetent to give consent:  Signature of person authorized to consent for patient: ___________________________   Relationship to patient: _____________________ drugs/illegal medications). Failure to inform my anesthesiologist about these medicines may increase my risk of anesthetic complications. · If I am allergic to anything or have had a reaction to anesthesia before.     3. I understand how the anesthesia med I have discussed the procedure and information above with the patient (or patient’s representative) and answered their questions. The patient or their representative has agreed to have anesthesia services.     _______________________________________________

## (undated) NOTE — LETTER
10/05/18    Patient: David Bledsoe  : 1960 Visit date: 10/5/2018    Dear  Dr. Dat Graf,    Thank you for referring David Bledsoe to my practice. Please find my assessment and plan below.                 Assessment   Cholecystitis  (pr

## (undated) NOTE — LETTER
[facility] [address], [address] [place] [address]    Consent for Anesthesia   1.    Mariela Anisa agree to be cared for by an anesthesiologist, who is specially trained to monitor me and give me medicine to put me to sleep or keep me c vision, nerves, or muscles and in extremely rare instances death. 5. My doctor has explained to me other choices available to me for my care (alternatives).   6. Pregnant Patients (“epidural”):  I understand that the risks of having an epidural (medicine g

## (undated) NOTE — LETTER
Nessa Rasmussen 182 [address] [address]  [place], [address] [facility]    Consent for Operation  Date: __________________                                Time: _______________    1.  I authorize the performance upon Hanny Walton the following operation:  Pr revealed by the pictures or by descriptive texts accompanying them. If the procedure has been videotaped, the surgeon will obtain the original videotape. The hospital will not be responsible for storage or maintenance of this tape.   8. For the purpose of a THAT MY DOCTOR PROVIDED ME WITH THE ABOVE EXPLANATIONS, THAT ALL BLANKS OR STATEMENTS REQUIRING INSERTION OR COMPLETION WERE FILLED IN.     Signature of Patient:   ___________________________    When the patient is a minor or mentally incompetent to give co iii. All of the medicines I take (including prescriptions, herbal supplements, and pills I can buy without a prescription (including street drugs/illegal medications).  Failure to inform my anesthesiologist about these medicines may increase my risk of anes _____________________________________________________________________________  Anesthesiologist Signature     Date   Time  I have discussed the procedure and information above with the patient (or patient’s representative) and answered their questions.  The

## (undated) NOTE — ED AVS SNAPSHOT
[facility] Emergency Department    [place]  [address] [address]    Phone:  [phone]    Fax:  [address] [facility]   MRN: [id number]    Department:  [facility] Emergency Department   Date of Visit Disclosure     Insurance plans vary and the physician(s) referred by the ER may not be covered by your plan. Please contact your insurance company to determine coverage for follow-up care and referrals.     [address] [employer] [place] [phone] prescription right away and begin taking the medication(s) as directed    If the emergency physician has read X-rays, these will be re-interpreted by a radiologist.  If there is a significant change in your reading, you will be contacted.  Please make sure Medicaid plans. To get signed up and covered, please call [phone] and ask to get set up for an insurance coverage that is in-network with [facility].         Imaging Results         XR CHEST PA + LAT CHEST (CPT=71020) (Final result) Resu the pubic symphysis with non-ionic intravenous contrast material. Post contrast coronal MPR imaging was performed. Dose reduction techniques were used.  Dose information is   transmitted to the A[facility] ([employer] [facility] [employer]) Becca Salazar [facility] Radiology D Visits < Visit Summaries. MyChart questions? Call [phone] for help. [employer] is NOT to be used for urgent needs. For medical emergencies, dial 911.

## (undated) NOTE — LETTER
Nessa Rasmussen 182 6 [address] [address]  [place], [address] [facility]    Consent for Operation  Date: __________________                                Time: _______________    1.  I authorize the performance upon Tri Barboza the following operation:  Pr revealed by the pictures or by descriptive texts accompanying them. If the procedure has been videotaped, the surgeon will obtain the original videotape. The hospital will not be responsible for storage or maintenance of this tape.   6. For the purpose of a THAT MY DOCTOR PROVIDED ME WITH THE ABOVE EXPLANATIONS, THAT ALL BLANKS OR STATEMENTS REQUIRING INSERTION OR COMPLETION WERE FILLED IN.     Signature of Patient:   ___________________________    When the patient is a minor or mentally incompetent to give co iii. All of the medicines I take (including prescriptions, herbal supplements, and pills I can buy without a prescription (including street drugs/illegal medications).  Failure to inform my anesthesiologist about these medicines may increase my risk of anes _____________________________________________________________________________  Anesthesiologist Signature     Date   Time  I have discussed the procedure and information above with the patient (or patient’s representative) and answered their questions.  The

## (undated) NOTE — ED AVS SNAPSHOT
Gustavo Gerardo   MRN: [id number]    Department:  [facility] Emergency Department   Date of Visit:  11/20/2017           Disclosure     Insurance plans vary and the physician(s) referred by the ER may not be covered by your plan.  Please con If you have been prescribed any medication(s), please fill your prescription right away and begin taking the medication(s) as directed    If the emergency physician has read X-rays, these will be re-interpreted by a radiologist.  If there is a significant

## (undated) NOTE — ED AVS SNAPSHOT
Sandy Fay   MRN: [id number]    Department:  [facility] Emergency Department   Date of Visit:  11/8/2018           Disclosure     Insurance plans vary and the physician(s) referred by the ER may not be covered by your plan.  Please cont tell this physician (or your personal doctor if your instructions are to return to your personal doctor) about any new or lasting problems. The primary care or specialist physician will see patients referred from the [facility] Emergency Department.  Oren Logan

## (undated) NOTE — LETTER
To Whom It May Concern: This certifies that Geovanni Graham was seen in my office today for medical care. Please excuse her from work today. Do not hesitate to call with any questions or concerns.         Sincerely,    Dr. Ricky Driver

## (undated) NOTE — IP AVS SNAPSHOT
Patient Demographics     Address  [address] [place] [address] Phone  [phone] [facility]) *Preferred*  [phone] (Work)  [phone] [facility]) E-mail Address  [email or web address]      Emergency Contact(s)     Name Relation Home Work Mobile    Annabelle Bring a paper prescription for each of these medications  levofloxacin 750 MG Tabs           [phone] - MAR ACTION REPORT  (last 24 hrs)    ** SITE UNKNOWN **     Order ID Medication Name Action Time Action Reason Comments    [id number] DULoxetine HCl (CYM [id number] nystatin (MYCOSTATIN) [id number] UNIT/GM cream 12/08/20 0813 Given              Recent Vital Signs       Most Recent Value   Vitals  (!) 83/54 Filed at 12/08/2020 0530   Pulse  87 Filed at 12/08/2020 1112   Resp  20 Filed at 12/08/2020 0530   Temp 137 [facility] [id number] 03/19/20 1441 - Present            Microbiology Results (All)     Procedure Component Value Units Date/Time    Blood Culture FREQ X 2 [[id number]] Collected: 12/03/20 1502    Order Status: Completed Lab Status: Preliminary result Updated Location [address] [place] [address] Attending Deedee Wynn MD   Hosp Day # 0 PCP [facility]     Chief Complaint:[AK.1] generalized weakness, vomiting, BLLE swelling[AK. 2]    History of Present Illness: Roseann Servant a 62 year • Frequent UTI    • Heart attack [facility])    • Heart palpitations    • Heartburn    • Hemorrhoids    • History of blood transfusion     2009   • History of cardiac murmur    • Hyperlipidemia    • Indigestion    • Irregular bowel habits    • Ischemic cardiomyo Performed by Yobany Santizo MD at [facility] ENDOSCOPY   • ENTEROSCOPY N/A 12/11/2018    Performed by Gila Adams MD at [facility] ENDOSCOPY   • EP ELECTROPHYSIOLOGY STUDY  04/13/2006   • ERCP,DIAGNOSTIC     • ESOPHAGOGASTRODUODENOSCOPY (EGD) N/A 12/21/2019 • PERCUTANEOUS ENDOSCOPIC GASTROSTOMY PLACEMENT/JEJUNOSTOMY TUBE PLACEMENT N/A 12/11/2018    Performed by Landon Dumont MD at [facility] ENDOSCOPY   • REMOVAL GALLBLADDER  10/2018   • TONSILLECTOMY         Social History:  reports that she quit smoking about 1 •  Omeprazole 40 MG Oral Capsule Delayed Release, Take 40 mg by mouth daily. , Disp: , Rfl: 3    •  nystatin [id number] UNIT/GM External Cream, Apply 2 g topically 2 (two) times daily.  To affected area around PEG site, Disp: 30 g, Rfl: 2    •  DULoxetine HCl Lab 11/24/20  1046 11/25/20  0920 11/26/20  0717 11/27/20  1000 11/30/20  0934   WBC 22.3* 15.7* 14.2* 20.5* 27.7*   HGB 11.0* 10.5* 9.6* 10.1* 11.3*   MCV 97.7 98.5 101.0* 101.3* 100.3*   .0 219.0 210.0 204.0 223.0   INR 1.12*  --   --   --   -- 7. Sacral wound - present prior to admission  1. Wound care consulted  8. GERD  1. PPI  9. Afib  1. Eliquis, BB  10. CAD  1. Statin, BB  2. Holding ACEi d/t sepsis/HOTN  11.  Anxiety  1. duloxetine[AK.2]    Quality:  · DVT Prophylaxis:[AK.1] Eliquis[AK.2] History of Present Illness: Nora Rodgers is a 62year old female with history of CHF, cyclical vomiting syndrome, with malnourishment, G tube  who was admitted on 11/30/2020 for worsening BLL[HG.1]E[HG.2] .  Work up in our hospital revealed[HG.1] eats a little   • Osteoporosis    • Pacemaker    • Pancreatitis    • Personal history of antineoplastic chemotherapy     2006- due to being on transplant   • Pneumonia due to organism    • PONV (postoperative nausea and vomiting)    • Presence of other ca Performed by Dot Gomez MD at [facility] ENDOSCOPY   • ESOPHAGOGASTRODUODENOSCOPY (EGD) N/A 9/10/2019    Performed by Landon Dumont MD at [facility] ENDOSCOPY   • ESOPHAGOGASTRODUODENOSCOPY (EGD) N/A 2/23/2019    Performed by Sony Munoz MD at [facility] Complains of[HG.2]  dypsnea. [HG.1] She was short of breath at rest. I requested RN to give Roxanol, which helped with breathing    Denies[HG.2]  cough or lightheadedness/dizziness. [HG.1]   Denies[HG.2]  current pain issues. [HG.1]   G tube in situ   Has roma 1) Is not effected by prognostication (can receive palliative care services if prognosis is in hours, days, months, years, decades)  2) May continue life-prolonging measures and treatments  3) Includes treatment of symptoms to improve quality of life while %      Ambulation    Activity Level   Self-Care    Intake                          Consciosness  100   Full         Normal                      Full               Normal                         Full           No Disease            90     Full         Mode Erazo Medications:     Current Facility-Administered Medications:   •  Vancomycin HCl (VANCOCIN) 750 mg in sodium chloride 0.9% 250 mL IVPB add-vantage, 750 mg, Intravenous, Q48H  •  melatonin cap/tab 5 mg, 5 mg, Oral, Nightly  •  Morphine Sulfate (Concentrate) PTT 39.5 (H) 11/24/2020       Chemistry:  Lab Results   Component Value Date    CREATSERUM 1.40 (H) 12/07/2020    CREATSERUM 1.40 (H) 12/07/2020    BUN 25 (H) 12/07/2020     12/07/2020    K 5.0 12/07/2020    K 5.0 12/07/2020     (H) 12/07/2020 Palliative Performance Scale :[HG.1]40%[HG.2]  %          Healthcare Agent Appointed: Yes  Healthcare Agent's Name: [address] [place] [address] [employer] Agent's Phone Number: [phone]                    Assessment/Recommendations:[HG.1]   Dyspnea: secondary t 2D Echocardiogram Results (HF patients only)    No exam resulted this encounter. Cath Angiogram Results (HF Patients only)    No exam resulted this encounter.        Physical Therapy Notes (last 72 hours) (Notes from 12/5/2020  1:08 PM through 12/8/202

## (undated) NOTE — LETTER
[facility]  Phone: [phone]  Right Fax: [phone]  [address] By:  Ophelia Tuttle RN Date: 9/26/19    Patient Name: Houston Cordova  Surgery Date: 9/27/2019    CSN: [id number]  Medical Record: E

## (undated) NOTE — LETTER
Nessa Rasmussen 182 [address] [facility], [address] [facility]     PICC LINE INSERTION CONSENT     I agree to have a Peripherally Inserted Central Catheter (PICC) placed in my arm.    1. The PICC insertion procedure, care, maintenance, risks, benefits, and c goals; and potential problems that might occur during recuperation.  They also discussed reasonable alternatives to the PICC, including risks, benefits, and side effects related to the alternatives and risks related to not receiving this procedure      ____

## (undated) NOTE — ED AVS SNAPSHOT
Piotr Jesus   MRN: [id number]    Department:  [facility] Emergency Department   Date of Visit:  11/20/2018           Disclosure     Insurance plans vary and the physician(s) referred by the ER may not be covered by your plan.  Please con tell this physician (or your personal doctor if your instructions are to return to your personal doctor) about any new or lasting problems. The primary care or specialist physician will see patients referred from the [facility] Emergency Department.  Jamar Smith

## (undated) NOTE — Clinical Note
Hi Dr. Bebeto Clark,   Interesting case - please see my notes for thoughts and additional workup - has had extensive workup while she was in hospital already.   Codey Rashid MD, NeurologyCloverdale Neuroscience Maple PlainPager [phone]

## (undated) NOTE — LETTER
Nessa Rasmussen 182 [address] [address]  [place], [address] [facility]    Consent for Operation  Date: __________________                                Time: _______________    1.  I authorize the performance upon Arben Woods the following operation:  Pr procedure has been videotaped, the surgeon will obtain the original videotape. The hospital will not be responsible for storage or maintenance of this tape.   7. For the purpose of advancing medical education, I consent to the admittance of observers to the STATEMENTS REQUIRING INSERTION OR COMPLETION WERE FILLED IN.     Signature of Patient:   ___________________________    When the patient is a minor or mentally incompetent to give consent:  Signature of person authorized to consent for patient: ____________ supplements, and pills I can buy without a prescription (including street drugs/illegal medications). Failure to inform my anesthesiologist about these medicines may increase my risk of anesthetic complications. iv.  If I am allergic to anything or have ha Anesthesiologist Signature     Date   Time  I have discussed the procedure and information above with the patient (or patient’s representative) and answered their questions. The patient or their representative has agreed to have anesthesia services.     ___

## (undated) NOTE — LETTER
Nessa Rasmussen 182 6 [address] [address]  [place], [address] [place] [address]    Consent for Operation  Date: __________________                                Time: _______________    1.  I authorize the performance upon Estella Yates the following operation:  Pr procedure has been videotaped, the surgeon will obtain the original videotape. The hospital will not be responsible for storage or maintenance of this tape.   7. For the purpose of advancing medical education, I consent to the admittance of observers to the STATEMENTS REQUIRING INSERTION OR COMPLETION WERE FILLED IN.     Signature of Patient:   ___________________________    When the patient is a minor or mentally incompetent to give consent:  Signature of person authorized to consent for patient: ____________ supplements, and pills I can buy without a prescription (including street drugs/illegal medications). Failure to inform my anesthesiologist about these medicines may increase my risk of anesthetic complications. iv.  If I am allergic to anything or have ha Anesthesiologist Signature     Date   Time  I have discussed the procedure and information above with the patient (or patient’s representative) and answered their questions. The patient or their representative has agreed to have anesthesia services.     ___

## (undated) NOTE — LETTER
Consent to Procedure/Sedation    Date: __________________    Time: _______________    1.  I authorize the performance upon Adam Live the following:Esophagogastroduodenoscopy with placement of a percutaneous endoscopic gastrostomy tube under mon Signature of person authorized to consent for patient: Relationship to patient:  ___________________________    ___________________    Witness: ____________________     Date: ______________    Printed: 12/11/2018   12:30 AM    Patient Name: Ender Montemayor

## (undated) NOTE — LETTER
2019        RE: Marita Isaacs     : 1960    Dear Dr. Guera Caruso,    This letter is to inform you that your patient is being scheduled for surgery with Dr. Alphonse Wei on 3/12/19 at [facility]. We have asked the patient to contact you

## (undated) NOTE — IP AVS SNAPSHOT
[address]            (For Outpatient Use Only) Initial Admit Date: 12/4/2019   Inpt/Obs Admit Date: Inpt: 12/4/19 / Obs: N/A   Discharge Date:    [place]:  [de-identified]   MRN: [de-identified]   CSN: [id number]   CEID: [id number] Hospital Account Financial Class: [facility]    December 22, 2019

## (undated) NOTE — IP AVS SNAPSHOT
Patient Demographics     Address  [address] [place] [address] Phone  [phone] [facility]) *Preferred*  [phone] (Work)  [phone] [facility]) E-mail Address  [email or web address]      Emergency Contact(s)     Name Relation Home Work Mobile    Annabelle Amitriptyline HCl 10 MG Tabs  Commonly known as:  ELAVIL      Take 1 tablet (10 mg total) by mouth nightly.   Stop taking on:  December 31, 2019   Nicola Brumfield MD         apixaban 5 MG Tabs  Commonly known as:  ELIQUIS      Take 1 tablet (5 mg total) Bring a paper prescription for each of these medications  PEG 3350 Pack  Thiamine HCl 100 MG Tabs           [id number] - MAR ACTION REPORT  (last 24 hrs)    ** SITE UNKNOWN **     Order ID Medication Name Action Time Action Reason Comments    [id number] [facility][id number][facility] (Normal)  Resulted: 12/22/19 0547, Result status: Final result   Ordering provider:  Wilber Burnette MD  12/19/19 9238 Resulting lab:  [facility]    Specimen Information    Type Source Collected On   Blood — 12/22/19 0754 Order Status:  Completed Lab Status:  Final result Updated:  12/10/19 0805    Specimen:  Stool      C.  Difficile Toxin B Gene Negative    MRSA Culture Only Once [[id number]]  (Abnormal) Collected:  12/08/19 0045    Order Status:  Completed Lab Status:  Sarita Morgan usually use a walker at home. She denies any focal weakness, numbness or tingling. She denies any CP or SOB. She has some abd bloating and N/V at times. She does feel full with minimal oral intake.  Of note, pt did have G tube that recently was removed abou • ANGIOGRAM  11/30/2006    Right & left hear angiogram   • ANGIOGRAM  05/31/2016    Right & left heart angiogram   • ANGIOGRAM  06/02/2016    Right & left heart angiogram   • ANGIOPLASTY (CORONARY)  11/24/2004    stent to LAD   • ANGIOPLASTY (CORONARY)  05 • NERVE SURAL BIOPSY Left 3/12/2019    Performed by Sharonda Patel MD at [facility] MAIN OR   • OTHER      apparatus- around the heart- net(mesh metal sleeve)   • OTHER  03/12/2019    left sural nerve biopsy   • OTHER SURGICAL HISTORY  06/30/2009    Paracor heart Buprenorphine HCl-Naloxone HCl 8-2 MG Sublingual SL Tab, Place 1 tablet under the tongue 3 (three) times daily. , Disp: , Rfl: 0  mirtazapine 15 MG Oral Tab, Take 15 mg by mouth nightly., Disp: , Rfl:   Amitriptyline HCl 10 MG Oral Tab, Take 1 tablet (10 mg rebound, guarding or organomegaly. Neurologic: No focal neurological deficits. CNII-XII grossly intact. Musculoskeletal: Moves all extremities. Extremities: No edema or cyanosis. Integument: No rashes or lesions.    Psychiatric: flat affect      Diagnos Related Notes:  Addendum by Oral MD Juan (Physician) filed at 2019  6:53 PM         [facility]  History and Physical     Harish Cabrera Patient Status:  Inpatient    1960 MRN [id number]   [facility] 3NE-A A • Muscle weakness     due to neuropathy in P.T now   • Nausea    • Neuropathy    • On tube feeding diet     eats a little   • Osteoporosis    • Pacemaker    • Pancreatitis    • Personal history of antineoplastic chemotherapy     2006- due to being on trans Performed by Lana Menjivar MD at [facility] ENDOSCOPY   • ESOPHAGOGASTRODUODENOSCOPY (EGD) N/A 2/23/2019    Performed by Kevin Sommers MD at [facility] ENDOSCOPY   • ESOPHAGOGASTRODUODENOSCOPY (EGD) N/A 1/16/2019    Performed by Lana Menjivar MD at [address] [place] [address] • Psychiatric Neg    • Lipids Neg         Allergies:   Codeine Sulfate         NAUSEA ONLY    Comment:Pt states \"it messes with my stomach, but I can             take it if I need it\"  Sulfa Antibiotics       NAUSEA AND VOMITING    Comment:pancreatitis folic acid (FOLVITE) 024 MCG Oral Tab, Take 400 mcg by mouth daily. , Disp: , Rfl:         Review of Systems:   A comprehensive 14 point review of systems was completed. Pertinent positives and negatives noted in the HPI.     Physical Exam:    BP 94/66 6. Poor oral intake  1. ? Depression  2. Psych liason eval  3.  Pt recently had G tube for nutrition    Quality:  · DVT Prophylaxis: eliquis  · CODE status: full  · Kumar: none    Plan of care discussed with patient, ED physician     Yesica Encarnacion MD  12 component to her difficulty taking in food orally, even if she denies this adamantly.       Personality Traits:  Deferred     Pertinent Medical Diagnoses:  Malnutrition, generalized weakness, erosive gastritis, anemia, CAD, CHF (EF-30-35%), NSVT s/p AICD, a distribution of bilateral numbness in her torso and legs. She also had an EMG that showed very mild left median compressive neuropathy.  She also had a LP with normal protein and glucose and cell count, and IgG, oligoclonal bands, and ACE that were [facility] motility was thought to be playing a role in her N/V, causing her aversion to eating by mouth. Regarding her polyneuropathy, neurology thought it was a result of severe malnutrition and NOT due to an autoimmune or demyelinating process.  During a follow Currently, she feels tired and \"fine\". She always minimizes her mood symptom and denies anxiety and depressed mood. She has no hopelessness or suicidal ideation. She denies having body image issues affecting her ability to eat. She c/o chronic nausea.  Sh • Shortness of breath    • Stented coronary artery    • Syncope    • Systolic heart failure (HCC)    • Thrush of mouth and esophagus (HCC)    • Uncomfortable fullness after meals    • Visual impairment     glasses for reading   • Vomiting    • Wears glasse • ESOPHAGOGASTRODUODENOSCOPY (EGD) N/A 12/1/2015    Performed by Delicia Sage MD at [facility] ENDOSCOPY   • ESOPHAGOGASTRODUODENOSCOPY (EGD) N/A 8/11/2015    Performed by Kwasi Irving MD at [facility] ENDOSCOPY   • FOOT SURGERY      fixed nerve   • GASTROSTOMY T Nsaids                  UNKNOWN    Comment:Cardiologist states none to be taken.     Medications:    Current Facility-Administered Medications:   •  Pantoprazole Sodium (PROTONIX) EC tab 40 mg, 40 mg, Oral, BID AC  •  PEG 3350 (MIRALAX) powder packet 17 g, Memory:[RH.1]  intact remote, got 2 of 3 delayed recall[RH.3]  Language: Intact naming and repetition  Fund of Knowledge: Able to recite name of [place] president    Insight:[RH.1] limited[RH.3]  Judgment:[RH.1] limited[RH.3]      Laboratory Data:  Lab Results admission 11/18-11/20/19 due to chest pain and elevated troponin, pt received AICD shock, found to have new onset A-fib with RVR. Pt with history of MI, CHF, CAD, pacemaker, lap efrain 10/2018, malnutrition, depression.  Pt reports had G-tube over last year • Personal history of antineoplastic chemotherapy     2006- due to being on transplant   • Pneumonia due to organism    • PONV (postoperative nausea and vomiting)    • Presence of other cardiac implants and grafts    • Rash    • Renal disorder     hx of AK • ESOPHAGOGASTRODUODENOSCOPY (EGD) N/A 2/23/2019    Performed by Maria T Henderson MD at [facility] ENDOSCOPY   • ESOPHAGOGASTRODUODENOSCOPY (EGD) N/A 1/16/2019    Performed by Dawn Peterson MD at [facility] ENDOSCOPY   • ESOPHAGOGASTRODUODENOSCOPY (EGD) N/A 11/16/2 -   Putting on and taking off regular lower body clothing?: Total  -   Bathing (including washing, rinsing, drying)?: Total  -   Toileting, which includes using toilet, bedpan or urinal? : Total  -   Putting on and taking off regular upper body clothing?: funciton below previous functional level and would benefit from skilled inpatient OT to address the above deficits, maximizing the patient's ability to return safely to prior level of function.  Current recommendation of 12-14 days continues to be appropria Video Swallow Study Notes    No notes of this type exist for this encounter. SLP Notes    No notes of this type exist for this encounter.      Immunizations     Name Date      INFLUENZA 11/20/19     INFLUENZA 10/18/18       Future Appointments        Pr

## (undated) NOTE — LETTER
Patient Name: Elton Cosby        : 1960       Medical Record #: [id number]    CONSENT FOR PROCEDURES/SEDATION    Date: 10/14/2018       Time: 2:16 PM        1.  I authorize the performance upon Elton Cosby the following:  Centr

## (undated) NOTE — LETTER
Nessa Rasmussen [address] [facility] [address], [address] [facility]    Consent for Operation  Date: _12/11/18_________________                                Time: __1149___________    1.  I authorize the performance upon Tri Barboza the following oper procedure has been videotaped, the surgeon will obtain the original videotape. The hospital will not be responsible for storage or maintenance of this tape.   7. For the purpose of advancing medical education, I consent to the admittance of observers to the STATEMENTS REQUIRING INSERTION OR COMPLETION WERE FILLED IN.     Signature of Patient:   ___________________________    When the patient is a minor or mentally incompetent to give consent:  Signature of person authorized to consent for patient: ____________ supplements, and pills I can buy without a prescription (including street drugs/illegal medications). Failure to inform my anesthesiologist about these medicines may increase my risk of anesthetic complications. iv.  If I am allergic to anything or have ha Anesthesiologist Signature     Date   Time  I have discussed the procedure and information above with the patient (or patient’s representative) and answered their questions. The patient or their representative has agreed to have anesthesia services.     ___

## (undated) NOTE — LETTER
Nessa Rasmussen [address] [address]  [place], [address] [facility]    Consent for Operation  Date: __________________                                Time: _______________    1.  I authorize the performance upon Pamella Huff the following operation:  Pr procedure has been videotaped, the surgeon will obtain the original videotape. The hospital will not be responsible for storage or maintenance of this tape.   6. For the purpose of advancing medical education, I consent to the admittance of observers to the STATEMENTS REQUIRING INSERTION OR COMPLETION WERE FILLED IN.     Signature of Patient:   ___________________________    When the patient is a minor or mentally incompetent to give consent:  Signature of person authorized to consent for patient: ____________ supplements, and pills I can buy without a prescription (including street drugs/illegal medications). Failure to inform my anesthesiologist about these medicines may increase my risk of anesthetic complications. iv.  If I am allergic to anything or have ha Anesthesiologist Signature     Date   Time  I have discussed the procedure and information above with the patient (or patient’s representative) and answered their questions. The patient or their representative has agreed to have anesthesia services.     ___

## (undated) NOTE — LETTER
[facility]  Phone: [phone]  Right Fax: [phone]  [facility] 20 By:  Vaelria Rascon RN Date: 10/5/18    Patient Name: Josias Mota  Surgery Date: 10/10/2018    CSN: [id number]  Medical Record:

## (undated) NOTE — LETTER
[address] [place] [address] Rd, [place], [place]     AUTHORIZATION FOR SURGICAL OPERATION OR PROCEDURE    I hereby authorize Dr. Glory Garland DO, my Physician(s) and whomever may be designated as the doctor's Assistant, to perform the fo 4. I consent to the photographing of procedure(s) to be performed for the purposes of advancing medicine, science and/or education, provided my identity is not revealed.  If the procedure has been videotaped, the physician/surgeon will obtain the original v (Witness signature)                                                                                                  (Date)                                (Time)  STATEMENT OF PHYSICIAN My signature below affirms that prior to the time of the procedure;  I

## (undated) NOTE — LETTER
Nessa Rasmussen [address] [address]  [place], [address] [place] [address]    Consent for Operation  Date: __________________                                Time: _______________    1.  I authorize the performance upon Estella Yates the following operation:  Pr revealed by the pictures or by descriptive texts accompanying them. If the procedure has been videotaped, the surgeon will obtain the original videotape. The hospital will not be responsible for storage or maintenance of this tape.   7. For the purpose of a THAT MY DOCTOR PROVIDED ME WITH THE ABOVE EXPLANATIONS, THAT ALL BLANKS OR STATEMENTS REQUIRING INSERTION OR COMPLETION WERE FILLED IN.     Signature of Patient:   ___________________________    When the patient is a minor or mentally incompetent to give co iii. All of the medicines I take (including prescriptions, herbal supplements, and pills I can buy without a prescription (including street drugs/illegal medications).  Failure to inform my anesthesiologist about these medicines may increase my risk of anes _____________________________________________________________________________  Anesthesiologist Signature     Date   Time  I have discussed the procedure and information above with the patient (or patient’s representative) and answered their questions.  The

## (undated) NOTE — IP AVS SNAPSHOT
[address]            (For Outpatient Use Only) Initial Admit Date: 11/30/2020   Inpt/Obs Admit Date: Inpt: 12/1/20 / Obs: 11/30/20   Discharge Date:    Paula Colon:  [de-identified]   MRN: [de-identified]   CSN: [id number]   CEID: [id number] Group Number:  Insurance Type:    Subscriber Name:  Subscriber :    Subscriber ID:  Pt Rel to Subscriber:    [place] [facility]    2020

## (undated) NOTE — LETTER
Nessa Rasmussen [address] [address]  [place], [address] [place] [address]    Consent for Operation  Date: __________________                                Time: _______________    1.  I authorize the performance upon Divina Kan the following operation:  Pr procedure has been videotaped, the surgeon will obtain the original videotape. The hospital will not be responsible for storage or maintenance of this tape.   7. For the purpose of advancing medical education, I consent to the admittance of observers to the STATEMENTS REQUIRING INSERTION OR COMPLETION WERE FILLED IN.     Signature of Patient:   ___________________________    When the patient is a minor or mentally incompetent to give consent:  Signature of person authorized to consent for patient: ____________ supplements, and pills I can buy without a prescription (including street drugs/illegal medications). Failure to inform my anesthesiologist about these medicines may increase my risk of anesthetic complications. iv.  If I am allergic to anything or have ha Anesthesiologist Signature     Date   Time  I have discussed the procedure and information above with the patient (or patient’s representative) and answered their questions. The patient or their representative has agreed to have anesthesia services.     ___

## (undated) NOTE — LETTER
[facility] Testing [facility]  Phone: [phone]  Right Fax: [phone]  EVALUATION REQUEST PREOP    Sent By:  Judith Freeman Rn Date: 10/8/18    Patient Name: Lisa Eduardo  Surgery Date: 10/10/2018    CSN: [id number]  Medical Record: [facility]

## (undated) NOTE — LETTER
Nessa Rasmussen [address] 6 [address] [address]  [place], [address] [facility]    Consent for Operation  Date: __________________                                Time: _______________    1.  I authorize the performance upon Adam Live the following operation:  Pr procedure has been videotaped, the surgeon will obtain the original videotape. The hospital will not be responsible for storage or maintenance of this tape.     6. For the purpose of advancing medical education, I consent to the admittance of observers to t STATEMENTS REQUIRING INSERTION OR COMPLETION WERE FILLED IN.     Signature of Patient:   ___________________________    When the patient is a minor or mentally incompetent to give consent:  Signature of person authorized to consent for patient: ____________

## (undated) NOTE — LETTER
Nessa Rasmussen 182  [address] [facility] [address], 209 [facility]  Authorization for Surgical Operation and Procedure     Date:___________                                                                                                         Time:__________ 4.   Should the need arise during my operation or immediate post-operative period, I also consent to the administration of blood and/or blood products.   Further, I understand that despite careful testing and screening of blood or blood products by bree 8.   I recognize that in the event my procedure results in extended X-Ray/fluoroscopy time, I may develop a skin reaction. 9.  If I have a Do Not Attempt Resuscitation (DNAR) order in place, that status will be suspended while in the operating room, proc 1. IPiotr Guess agree to be cared for by an anesthesiologist, who is specially trained to monitor me and give me medicine to put me to sleep or keep me comfortable during my procedure    I understand that my anesthesiologist is not an employee 5. My doctor has explained to me other choices available to me for my care (alternatives).   6. Pregnant Patients (“epidural”):  I understand that the risks of having an epidural (medicine given into my back to help control pain during labor), include itchi

## (undated) NOTE — LETTER
Nessa Rasmussen [address] [address]  [place], [address] [facility]    Consent for Operation  Date: __________________                                Time: _______________    1.  I authorize the performance upon David Miss the following operation:  Pr procedure has been videotaped, the surgeon will obtain the original videotape. The hospital will not be responsible for storage or maintenance of this tape.   7. For the purpose of advancing medical education, I consent to the admittance of observers to the STATEMENTS REQUIRING INSERTION OR COMPLETION WERE FILLED IN.     Signature of Patient:   ___________________________    When the patient is a minor or mentally incompetent to give consent:  Signature of person authorized to consent for patient: ____________ supplements, and pills I can buy without a prescription (including street drugs/illegal medications). Failure to inform my anesthesiologist about these medicines may increase my risk of anesthetic complications. iv.  If I am allergic to anything or have ha Anesthesiologist Signature     Date   Time  I have discussed the procedure and information above with the patient (or patient’s representative) and answered their questions. The patient or their representative has agreed to have anesthesia services.     ___